# Patient Record
Sex: MALE | Race: WHITE | NOT HISPANIC OR LATINO | Employment: FULL TIME | URBAN - METROPOLITAN AREA
[De-identification: names, ages, dates, MRNs, and addresses within clinical notes are randomized per-mention and may not be internally consistent; named-entity substitution may affect disease eponyms.]

---

## 2017-01-12 ENCOUNTER — TELEPHONE (OUTPATIENT)
Dept: ORTHOPEDICS | Facility: CLINIC | Age: 22
End: 2017-01-12

## 2017-01-12 ENCOUNTER — HOSPITAL ENCOUNTER (INPATIENT)
Facility: CLINIC | Age: 22
LOS: 2 days | Discharge: HOME | DRG: 603 | End: 2017-01-14
Attending: EMERGENCY MEDICINE | Admitting: PEDIATRICS
Payer: COMMERCIAL

## 2017-01-12 ENCOUNTER — APPOINTMENT (OUTPATIENT)
Dept: ULTRASOUND IMAGING | Facility: CLINIC | Age: 22
DRG: 603 | End: 2017-01-12
Attending: EMERGENCY MEDICINE
Payer: COMMERCIAL

## 2017-01-12 ENCOUNTER — APPOINTMENT (OUTPATIENT)
Dept: GENERAL RADIOLOGY | Facility: CLINIC | Age: 22
DRG: 603 | End: 2017-01-12
Attending: EMERGENCY MEDICINE
Payer: COMMERCIAL

## 2017-01-12 DIAGNOSIS — L03.119 CELLULITIS OF UPPER EXTREMITY, UNSPECIFIED LATERALITY: ICD-10-CM

## 2017-01-12 DIAGNOSIS — L03.90 CELLULITIS: ICD-10-CM

## 2017-01-12 DIAGNOSIS — Z94.1 HEART REPLACED BY TRANSPLANT (H): Primary | ICD-10-CM

## 2017-01-12 DIAGNOSIS — R52 PAIN: Primary | ICD-10-CM

## 2017-01-12 LAB
ANION GAP SERPL CALCULATED.3IONS-SCNC: 8 MMOL/L (ref 3–14)
APTT PPP: 28 SEC (ref 22–37)
BASOPHILS # BLD AUTO: 0 10E9/L (ref 0–0.2)
BASOPHILS NFR BLD AUTO: 0.2 %
BUN SERPL-MCNC: 10 MG/DL (ref 7–30)
CALCIUM SERPL-MCNC: 9.3 MG/DL (ref 8.5–10.1)
CHLORIDE SERPL-SCNC: 103 MMOL/L (ref 94–109)
CO2 SERPL-SCNC: 27 MMOL/L (ref 20–32)
CREAT SERPL-MCNC: 0.69 MG/DL (ref 0.66–1.25)
CRP SERPL-MCNC: 71.6 MG/L (ref 0–8)
DIFFERENTIAL METHOD BLD: ABNORMAL
EOSINOPHIL # BLD AUTO: 0.1 10E9/L (ref 0–0.7)
EOSINOPHIL NFR BLD AUTO: 0.4 %
ERYTHROCYTE [DISTWIDTH] IN BLOOD BY AUTOMATED COUNT: 12.3 % (ref 10–15)
GFR SERPL CREATININE-BSD FRML MDRD: ABNORMAL ML/MIN/1.7M2
GLUCOSE SERPL-MCNC: 104 MG/DL (ref 70–99)
HCT VFR BLD AUTO: 44.2 % (ref 40–53)
HGB BLD-MCNC: 15.8 G/DL (ref 13.3–17.7)
IMM GRANULOCYTES # BLD: 0 10E9/L (ref 0–0.4)
IMM GRANULOCYTES NFR BLD: 0.3 %
INR PPP: 1 (ref 0.86–1.14)
LYMPHOCYTES # BLD AUTO: 1.3 10E9/L (ref 0.8–5.3)
LYMPHOCYTES NFR BLD AUTO: 11.1 %
MCH RBC QN AUTO: 30.3 PG (ref 26.5–33)
MCHC RBC AUTO-ENTMCNC: 35.7 G/DL (ref 31.5–36.5)
MCV RBC AUTO: 85 FL (ref 78–100)
MONOCYTES # BLD AUTO: 1.5 10E9/L (ref 0–1.3)
MONOCYTES NFR BLD AUTO: 12.6 %
NEUTROPHILS # BLD AUTO: 8.8 10E9/L (ref 1.6–8.3)
NEUTROPHILS NFR BLD AUTO: 75.4 %
NRBC # BLD AUTO: 0 10*3/UL
NRBC BLD AUTO-RTO: 0 /100
PLATELET # BLD AUTO: 205 10E9/L (ref 150–450)
POTASSIUM SERPL-SCNC: 3.6 MMOL/L (ref 3.4–5.3)
RBC # BLD AUTO: 5.21 10E12/L (ref 4.4–5.9)
SODIUM SERPL-SCNC: 138 MMOL/L (ref 133–144)
WBC # BLD AUTO: 11.7 10E9/L (ref 4–11)

## 2017-01-12 PROCEDURE — 85025 COMPLETE CBC W/AUTO DIFF WBC: CPT | Performed by: EMERGENCY MEDICINE

## 2017-01-12 PROCEDURE — 96376 TX/PRO/DX INJ SAME DRUG ADON: CPT | Performed by: EMERGENCY MEDICINE

## 2017-01-12 PROCEDURE — S0077 INJECTION, CLINDAMYCIN PHOSP: HCPCS | Performed by: PEDIATRICS

## 2017-01-12 PROCEDURE — 96361 HYDRATE IV INFUSION ADD-ON: CPT | Performed by: EMERGENCY MEDICINE

## 2017-01-12 PROCEDURE — 99285 EMERGENCY DEPT VISIT HI MDM: CPT | Mod: GC | Performed by: EMERGENCY MEDICINE

## 2017-01-12 PROCEDURE — 12000001 ZZH R&B MED SURG/OB UMMC

## 2017-01-12 PROCEDURE — 85730 THROMBOPLASTIN TIME PARTIAL: CPT | Performed by: EMERGENCY MEDICINE

## 2017-01-12 PROCEDURE — 73110 X-RAY EXAM OF WRIST: CPT | Mod: LT

## 2017-01-12 PROCEDURE — 93971 EXTREMITY STUDY: CPT | Mod: LT

## 2017-01-12 PROCEDURE — 25000128 H RX IP 250 OP 636: Performed by: EMERGENCY MEDICINE

## 2017-01-12 PROCEDURE — 25000125 ZZHC RX 250: Performed by: PEDIATRICS

## 2017-01-12 PROCEDURE — 80048 BASIC METABOLIC PNL TOTAL CA: CPT | Performed by: EMERGENCY MEDICINE

## 2017-01-12 PROCEDURE — 25000125 ZZHC RX 250: Performed by: EMERGENCY MEDICINE

## 2017-01-12 PROCEDURE — 25000132 ZZH RX MED GY IP 250 OP 250 PS 637: Performed by: PEDIATRICS

## 2017-01-12 PROCEDURE — 99285 EMERGENCY DEPT VISIT HI MDM: CPT | Performed by: EMERGENCY MEDICINE

## 2017-01-12 PROCEDURE — 87040 BLOOD CULTURE FOR BACTERIA: CPT | Performed by: EMERGENCY MEDICINE

## 2017-01-12 PROCEDURE — 86140 C-REACTIVE PROTEIN: CPT | Performed by: EMERGENCY MEDICINE

## 2017-01-12 PROCEDURE — 96375 TX/PRO/DX INJ NEW DRUG ADDON: CPT | Performed by: EMERGENCY MEDICINE

## 2017-01-12 PROCEDURE — 96365 THER/PROPH/DIAG IV INF INIT: CPT | Performed by: EMERGENCY MEDICINE

## 2017-01-12 PROCEDURE — 85610 PROTHROMBIN TIME: CPT | Performed by: EMERGENCY MEDICINE

## 2017-01-12 RX ORDER — TACROLIMUS 1 MG/1
2 CAPSULE ORAL EVERY 24 HOURS
Status: DISCONTINUED | OUTPATIENT
Start: 2017-01-13 | End: 2017-01-14 | Stop reason: HOSPADM

## 2017-01-12 RX ORDER — NALOXONE HYDROCHLORIDE 0.4 MG/ML
.1-.4 INJECTION, SOLUTION INTRAMUSCULAR; INTRAVENOUS; SUBCUTANEOUS
Status: DISCONTINUED | OUTPATIENT
Start: 2017-01-12 | End: 2017-01-14 | Stop reason: HOSPADM

## 2017-01-12 RX ORDER — AMLODIPINE BESYLATE 5 MG/1
5 TABLET ORAL DAILY
Qty: 90 TABLET | Refills: 3 | Status: SHIPPED
Start: 2017-01-12 | End: 2017-12-22

## 2017-01-12 RX ORDER — MORPHINE SULFATE 2 MG/ML
2 INJECTION, SOLUTION INTRAMUSCULAR; INTRAVENOUS ONCE
Status: COMPLETED | OUTPATIENT
Start: 2017-01-12 | End: 2017-01-12

## 2017-01-12 RX ORDER — IBUPROFEN 600 MG/1
10 TABLET, FILM COATED ORAL EVERY 6 HOURS PRN
Status: DISCONTINUED | OUTPATIENT
Start: 2017-01-12 | End: 2017-01-13

## 2017-01-12 RX ORDER — SODIUM CHLORIDE 9 MG/ML
INJECTION, SOLUTION INTRAVENOUS
Status: DISCONTINUED
Start: 2017-01-12 | End: 2017-01-12 | Stop reason: HOSPADM

## 2017-01-12 RX ORDER — PRAVASTATIN SODIUM 10 MG
10 TABLET ORAL EVERY 24 HOURS
Status: DISCONTINUED | OUTPATIENT
Start: 2017-01-13 | End: 2017-01-14 | Stop reason: HOSPADM

## 2017-01-12 RX ORDER — AMLODIPINE BESYLATE 2.5 MG/1
5 TABLET ORAL EVERY 24 HOURS
Status: DISCONTINUED | OUTPATIENT
Start: 2017-01-13 | End: 2017-01-14 | Stop reason: HOSPADM

## 2017-01-12 RX ORDER — OXYCODONE HYDROCHLORIDE 5 MG/1
5 TABLET ORAL EVERY 4 HOURS
Status: DISCONTINUED | OUTPATIENT
Start: 2017-01-12 | End: 2017-01-12

## 2017-01-12 RX ORDER — MYCOPHENOLATE MOFETIL 500 MG/1
500 TABLET ORAL EVERY 12 HOURS SCHEDULED
Status: DISCONTINUED | OUTPATIENT
Start: 2017-01-13 | End: 2017-01-14 | Stop reason: HOSPADM

## 2017-01-12 RX ORDER — MORPHINE SULFATE 2 MG/ML
2 INJECTION, SOLUTION INTRAMUSCULAR; INTRAVENOUS EVERY 4 HOURS PRN
Status: DISCONTINUED | OUTPATIENT
Start: 2017-01-12 | End: 2017-01-14 | Stop reason: HOSPADM

## 2017-01-12 RX ORDER — OXYCODONE HYDROCHLORIDE 5 MG/1
5 TABLET ORAL EVERY 4 HOURS PRN
Status: DISCONTINUED | OUTPATIENT
Start: 2017-01-12 | End: 2017-01-14 | Stop reason: HOSPADM

## 2017-01-12 RX ORDER — CLINDAMYCIN PHOSPHATE 600 MG/50ML
600 INJECTION, SOLUTION INTRAVENOUS EVERY 8 HOURS
Status: DISCONTINUED | OUTPATIENT
Start: 2017-01-12 | End: 2017-01-14 | Stop reason: HOSPADM

## 2017-01-12 RX ORDER — ASPIRIN 81 MG/1
162 TABLET ORAL EVERY 24 HOURS
Status: DISCONTINUED | OUTPATIENT
Start: 2017-01-13 | End: 2017-01-14 | Stop reason: HOSPADM

## 2017-01-12 RX ADMIN — OXYCODONE HYDROCHLORIDE 5 MG: 5 TABLET ORAL at 18:50

## 2017-01-12 RX ADMIN — MORPHINE SULFATE 2 MG: 2 INJECTION, SOLUTION INTRAMUSCULAR; INTRAVENOUS at 18:20

## 2017-01-12 RX ADMIN — MORPHINE SULFATE 2 MG: 2 INJECTION, SOLUTION INTRAMUSCULAR; INTRAVENOUS at 14:19

## 2017-01-12 RX ADMIN — MORPHINE SULFATE 2 MG: 2 INJECTION, SOLUTION INTRAMUSCULAR; INTRAVENOUS at 16:00

## 2017-01-12 RX ADMIN — IBUPROFEN 600 MG: 600 TABLET ORAL at 20:27

## 2017-01-12 RX ADMIN — CLINDAMYCIN PHOSPHATE 600 MG: 12 INJECTION, SOLUTION INTRAVENOUS at 23:02

## 2017-01-12 RX ADMIN — SODIUM CHLORIDE 500 ML: 9 INJECTION, SOLUTION INTRAVENOUS at 14:18

## 2017-01-12 RX ADMIN — CLINDAMYCIN PHOSPHATE 600 MG: 18 INJECTION, SOLUTION INTRAVENOUS at 15:50

## 2017-01-12 RX ADMIN — OXYCODONE HYDROCHLORIDE 5 MG: 5 TABLET ORAL at 23:06

## 2017-01-12 RX ADMIN — MORPHINE SULFATE 2 MG: 2 INJECTION, SOLUTION INTRAMUSCULAR; INTRAVENOUS at 14:51

## 2017-01-12 NOTE — IP AVS SNAPSHOT
MRN:[id number]                      After Visit Summary   1/12/2017    Obed Viramontes    MRN: [id number]           Thank you!     Thank you for choosing [facility] for your care. Our goal is always to provide you with excellent care. Hearing back from our patients is one way we can continue to improve our services. Please take a few minutes to complete the written survey that you may receive in the mail after you visit with us. Thank you!        Patient Information     Date Of Birth          1995        About your hospital stay     You were admitted on:  January 12, 2017 You last received care in the:  [facility]    You were discharged on:  January 14, 2017        Reason for your hospital stay       Dear Obed,    You were hospitalized because you have cellulitis (skin/soft tissue infection) in your left wrist.  The redness and pain improved on IV clindamycin (antibiotic), so we are discharging you on oral clindamycin to complete a 10 day course of antibiotics.  You will need to follow up with Dr. Tinoco from Orthopedic surgery in 2 weeks with a left wrist x-ray, and to coordinate with OT hand therapy at that appointment.  If you are having worsening pain, swelling, or redness, you need to see Ortho sooner this coming week.  If you have fevers, you need to come back to the hospital for evaluation.  Your tacrolimus level was 4.5 in the hospital, and no adjustment was made to your medications at this time.  For your pain, continue Tylenol scheduled or as needed, and oxycodone for severe pain.  Please follow up with Dr. Pruitt as previously planned.                  Who to Call     For medical emergencies, please call 911.  For non-urgent questions about your medical care, please call your primary care provider or clinic, [phone]          Attending Provider     Provider    Elmer Ortiz MD Sanchez Mejia, Aura Andrea, MD       Primary Care Provider Office Phone # Fax #    Rayo Macdonald  "MD [facility] [phone]       [facility] [address]  [place] [address]        After Care Instructions     Activity       Your activity upon discharge: activity as tolerated            Diet       Follow this diet upon discharge: Regular diet                  Follow-up Appointments     Follow Up and recommended labs and tests       Follow up with Orthopedic surgery, dr. Tinoco with left wrist x-ray, coordinated with OT hand therapy to discuss left wrist splint.                  Your next 10 appointments already scheduled     Jan 23, 2017 11:00 AM   (Arrive by 10:45 AM)   RETURN HAND with Amna Tinoco MD   [facility] ([facility])    [address]  [address] [facility] [address] [phone]              Pending Results     Date and Time Order Name Status Description    1/12/2017 1349 Blood culture Preliminary             Statement of Approval     Ordered          01/14/17 1217  I have reviewed and agree with all the recommendations and orders detailed in this document.   EFFECTIVE NOW     Approved and electronically signed by:  Allyssa Franklin MD             Admission Information        Provider Department Dept Phone    1/12/2017 Gonzalo Peng MD [facility] [phone]      Your Vitals Were     Blood Pressure Pulse Temperature Respirations Weight Pulse Oximetry    111/70 mmHg 110 98.3  F (36.8  C) (Oral) 16 60 kg (132 lb 4.4 oz) 97%      MyChart Information     [facility] lets you send messages to your doctor, view your test results, renew your prescriptions, schedule appointments and more. To sign up, go to [email or web address] . Click on \"Log in\" on the left side of the screen, which will take you to the Welcome page. Then click on \"Sign up Now\" on the right side of the page.     You will be asked to enter the access code listed below, as well as some personal information. Please follow the " directions to create your username and password.     Your access code is: [email or web address]  Expires: 2017  5:57 PM     Your access code will  in 90 days. If you need help or a new code, please call your [facility] clinic or [phone].        Care EveryWhere ID     This is your Care EveryWhere ID. This could be used by other organizations to access your [facility] medical records  [id number]           Review of your medicines      START taking        Dose / Directions    clindamycin 300 MG capsule   Commonly known as:  CLEOCIN   Used for:  Cellulitis of upper extremity, unspecified laterality        Dose:  300 mg   Take 1 capsule (300 mg) by mouth 3 times daily for 9 days   Quantity:  27 capsule   Refills:  0       oxyCODONE 5 MG IR tablet   Commonly known as:  ROXICODONE   Used for:  Pain        Dose:  5 mg   Take 1 tablet (5 mg) by mouth every 4 hours as needed for moderate to severe pain   Quantity:  10 tablet   Refills:  0         CONTINUE these medicines which have NOT CHANGED        Dose / Directions    amLODIPine 5 MG tablet   Commonly known as:  NORVASC   Used for:  Heart replaced by transplant (H)        Dose:  5 mg   Take 1 tablet (5 mg) by mouth daily   Quantity:  90 tablet   Refills:  3       aspirin 81 MG EC tablet   Used for:  Status post transplant, heart (H)        Dose:  162 mg   Take 2 tablets (162 mg) by mouth daily   Quantity:  60 tablet   Refills:  99       mycophenolate 500 MG tablet   Commonly known as:  CELLCEPT - GENERIC EQUIVALENT   Used for:  Heart transplanted (H)        Dose:  500 mg   Take 1 tablet (500 mg) by mouth 2 times daily   Quantity:  180 tablet   Refills:  11       pravastatin 10 MG tablet   Commonly known as:  PRAVACHOL   Used for:  Heart transplanted (H), Heart transplanted (H)        Dose:  10 mg   Take 1 tablet (10 mg) by mouth daily At bedtime   Quantity:  90 tablet   Refills:  6       * tacrolimus 1 MG capsule   Commonly known as:  PROGRAF - GENERIC EQUIVALENT    Used for:  Transplanted heart (H)        Take 2 mg in the morning and 1.5 mg in the evening   Quantity:  270 capsule   Refills:  11       * tacrolimus 0.5 MG capsule   Commonly known as:  PROGRAF - GENERIC EQUIVALENT   Used for:  Heart transplanted (H)        Take 1 capsule by mouth every evening (total dose is 2mg in am and 1.5mg in pm)   Quantity:  90 capsule   Refills:  11       Vitamin D3 2000 UNITS Tabs   Used for:  Heart transplanted (H)        Dose:  2000 Units   Take 2,000 Units by mouth daily   Quantity:  30 tablet   Refills:  6       * Notice:  This list has 2 medication(s) that are the same as other medications prescribed for you. Read the directions carefully, and ask your doctor or other care provider to review them with you.         Where to get your medicines      These medications were sent to [facility] [place], [place] - [address]  [address] [facility] [address], [facility] [address]     Phone:  [phone]    - clindamycin 300 MG capsule      Some of these will need a paper prescription and others can be bought over the counter. Ask your nurse if you have questions.     Bring a paper prescription for each of these medications    - oxyCODONE 5 MG IR tablet             Protect others around you: Learn how to safely use, store and throw away your medicines at www.disposemymeds.org.             Medication List: This is a list of all your medications and when to take them. Check marks below indicate your daily home schedule. Keep this list as a reference.      Medications           Morning Afternoon Evening Bedtime As Needed    amLODIPine 5 MG tablet   Commonly known as:  NORVASC   Take 1 tablet (5 mg) by mouth daily   Last time this was given:  5 mg on 1/14/2017  1:07 AM                                aspirin 81 MG EC tablet   Take 2 tablets (162 mg) by mouth daily   Last time this was given:  162 mg on 1/14/2017 12:59 PM                                clindamycin 300 MG capsule    Commonly known as:  CLEOCIN   Take 1 capsule (300 mg) by mouth 3 times daily for 9 days                                mycophenolate 500 MG tablet   Commonly known as:  CELLCEPT - GENERIC EQUIVALENT   Take 1 tablet (500 mg) by mouth 2 times daily   Last time this was given:  500 mg on 1/14/2017  1:00 PM                                oxyCODONE 5 MG IR tablet   Commonly known as:  ROXICODONE   Take 1 tablet (5 mg) by mouth every 4 hours as needed for moderate to severe pain   Last time this was given:  5 mg on 1/14/2017  1:12 AM                                pravastatin 10 MG tablet   Commonly known as:  PRAVACHOL   Take 1 tablet (10 mg) by mouth daily At bedtime   Last time this was given:  10 mg on 1/14/2017  1:07 AM                                * tacrolimus 1 MG capsule   Commonly known as:  PROGRAF - GENERIC EQUIVALENT   Take 2 mg in the morning and 1.5 mg in the evening   Last time this was given:  2 mg on 1/14/2017 12:59 PM                                * tacrolimus 0.5 MG capsule   Commonly known as:  PROGRAF - GENERIC EQUIVALENT   Take 1 capsule by mouth every evening (total dose is 2mg in am and 1.5mg in pm)   Last time this was given:  2 mg on 1/14/2017 12:59 PM                                Vitamin D3 2000 UNITS Tabs   Take 2,000 Units by mouth daily   Last time this was given:  2,000 Units on 1/14/2017 12:58 PM                                * Notice:  This list has 2 medication(s) that are the same as other medications prescribed for you. Read the directions carefully, and ask your doctor or other care provider to review them with you.

## 2017-01-12 NOTE — ED NOTES
[facility] PEDS ED HANDOFF      PATIENT NAME: Obed Viramontes   MRN: [id number]   YOB: 1995   AGE: 21 year old       S (Situation)     ED Chief Complaint: Hand Pain and Post-op Problem     ED Final Diagnosis: Final diagnoses:   Cellulitis      Isolation Precautions: None   Suspected Infection: Not Applicable     Needed?: No     B (Background)    Pertinent Past Medical History: Past Medical History   Diagnosis Date     H/O heart transplant (H)      Mar.23, 2012     Lymphoproliferative disease (H)      Cardiomyopathy, hypertrophic obstructive (H)      Hemiplegia following CVA (cerebrovascular accident) (H)      left, improved with rehab     Unspecified cerebral artery occlusion with cerebral infarction      age 2 1/2      Pertinent Past Social History: Social History     Social History     Marital Status: Single     Spouse Name: N/A     Number of Children: N/A     Years of Education: N/A     Social History Main Topics     Smoking status: Never Smoker      Smokeless tobacco: Never Used     Alcohol Use: No     Drug Use: No     Sexual Activity: Not Asked     Other Topics Concern     None     Social History Narrative      Allergies: Allergies   Allergen Reactions     Latex Rash     Other [Seasonal Allergies]      Corn-abdominal pain and diarrhea.        A (Assessment)    Vital Signs: Filed Vitals:    01/12/17 1645 01/12/17 1700 01/12/17 1715 01/12/17 1738   BP:       Pulse:       Temp:    98.2  F (36.8  C)   TempSrc:    Oral   Resp:    18   Weight:       SpO2: 99% 99% 99% 98%       Medications Administered:  Medications   sodium chloride (PF) 0.9% PF flush 1-5 mL (not administered)   sodium chloride (PF) 0.9% PF flush 3 mL (not administered)   lidocaine BUFFERED 1 % 1 % injection (not administered)   NaCl 0.9 % infusion (not administered)   0.9% sodium chloride BOLUS (0 mLs Intravenous Stopped 1/12/17 1448)   morphine injection 2 mg (2 mg Intravenous Given 1/12/17  1419)   morphine injection 2 mg (2 mg Intravenous Given 1/12/17 1451)   clindamycin (CLEOCIN) injection PEDS/NICU 600 mg (0 mg Intravenous Stopped 1/12/17 1711)   morphine injection 2 mg (2 mg Intravenous Given 1/12/17 1600)      Interventions:        PIV:  20 RT AC       Drains:  NA       Oxygen Needs: NA   Skin Integrity: LT hand cellulititis     R (Recommendations)    Family Present:  Yes   Other Considerations:        Questions Please Call: Treatment Team: Attending Provider: Elmer Ortiz MD; Registered Nurse: Amanda Valdovinos RN; Resident: Rachael Uribe MD; MD: Team, [facility] [place]   Ready for Conference Call:  NO

## 2017-01-12 NOTE — PHARMACY-ADMISSION MEDICATION HISTORY
Admission medication history interview status for the 1/12/2017 admission is complete. See Epic admission navigator for allergy information, pharmacy, prior to admission medications and immunization status.     Medication history interview sources:  Mom/Obed    Changes made to PTA medication list (reason)  Added: none  Deleted:  Diazepam, hydroxyzine, naproxen, ondansetron & oxycodone-apap - these meds were post-op meds. Obed is no longer taking these medications.   Changed: none     Patient Medication Preference  prefers medications come as pills    Patient Medication Schedule Preference  The patient has a specific medication schedule - Takes all meds at 0100 & 1300 (At Bedtime = 0100)    Patient Supplied Medications  The patient does not have any home medications approved for use while inpatient      Additional medication history information (including reliability of information, actions taken by pharmacist):None      Prior to Admission medications    Medication Sig Last Dose Taking? Auth Provider   amLODIPine (NORVASC) 5 MG tablet Take 1 tablet (5 mg) by mouth daily 1/12/2017 at 0100 Yes Bobbi Pruitt MD   mycophenolate (CELLCEPT - GENERIC EQUIVALENT) 500 MG tablet Take 1 tablet (500 mg) by mouth 2 times daily 1/12/2017 at 0100 Yes Bobbi Pruitt MD   tacrolimus (PROGRAF - GENERIC EQUIVALENT) 1 MG capsule Take 2 mg in the morning and 1.5 mg in the evening 1/12/2017 at 1300 Yes Bobbi Pruitt MD   tacrolimus (PROGRAF - GENERIC EQUIVALENT) 0.5 MG capsule Take 1 capsule by mouth every evening (total dose is 2mg in am and 1.5mg in pm) 1/12/2017 at 1300 Yes Bobbi Pruitt MD   pravastatin (PRAVACHOL) 10 MG tablet Take 1 tablet (10 mg) by mouth daily At bedtime 1/12/2017 at 0100 Yes Bobbi Pruitt MD   aspirin 81 MG EC tablet Take 2 tablets (162 mg) by mouth daily 1/12/2017 at 0100 Yes Bobbi Pruitt MD   Cholecalciferol (VITAMIN D3) 2000 UNITS TABS Take 2,000 Units by  mouth daily 1/12/2017 at 0100 Yes Bobbi Pruitt MD         Medication history completed by: Celeste Barnes, SeeD

## 2017-01-12 NOTE — ED NOTES
Pt's left hand is very swollen and painful. Pt has no movement in hand at baseline. Pt has good cap refill and pluses but hand is very red, swollen. The swelling started on tues last night and this am the swelling has increased a lot. No fevers.

## 2017-01-12 NOTE — CONSULTS
FULL CONSULT NOTE DICTATED    DIAGNOSIS:  Left hemiplegia secondary to stroke with elbow flexion contracture, wrist flexion contracture, pronation deformity in thumb and palm.        PROCEDURES WITH DR. FIGUEROA ON 10/20/2016:      1.  Left elbow biceps and brachialis lengthening.    2.  Left wrist fusion.    3.  Left distal ulnar resection ([place]).    4.  Left flexor pollicis longus and flexor digitorum superficialis lengthening.    5.  Palmaris longus and flexor carpi ulnaris tenotomy.     6.  Left adductor release.    7.  Left EPL rerouting.      IMPRESSION:   Obed Viramontes is a 21 year old right-hand dominant male status-post the above on 10/20/2016 with the followin. Left dorsal wrist cellulitis.  2. Chronic immunosuppression following heart transplant.     RECOMMENDATIONS:   - Admit to Pediatrics    Recommend IV antibiotics as directed by Pediatrics.   Follow inflammatory markers.   Elevation of right wrist.   No further imaging.  Activity as tolerated.  Patient and mother politely declined soft protective splint at this time. (Patient is interested in having his current splint refashioned by OT in a few weeks).    - Anticoagulation/DVT Prophylaxis: None.  - Antibiotics/Tetanus: Per Pediatrics.  - X-rays/Imaging: No further imaging required.   - Activity: As tolerated  - Weight bearing: As tolerated.   - Pain control: Orals as needed.  - Diet: Regular  - Follow-up: Follow-up in 2 weeks with Dr. Figueroa in clinic with repeat left wrist radiographs. Will also coordinated OT hand Therapy appointment to follow to evaluate options for refashioning splint.     - Disposition: Admit to Pediatrics. Anticipate 1-2 days in hospital followed by transition to Oral antibiotics and discharge.         Assessment and Plan discussed with Dr. Figueroa, Orthopaedic Surgery Staff.     Isrrael Temple MD 2017 4:14 PM  Orthopaedic Surgery Resident, PGY-4  Pager: [phone]    For questions about this  patient, please contact me at my pager.

## 2017-01-12 NOTE — ED PROVIDER NOTES
History     Chief Complaint   Patient presents with     Hand Pain     Post-op Problem     HPI    History obtained from family and mother and patient    Obed is a 21 year old with complex PMH significant for stroke at age 1 s/p heart transplant in 2012 for hypertrophic cardiomyopathy and left wrist contracture release performed in October 2016 who presents at 1:41 PM with left hand swelling, erythema and pain for 3 days. At baseline he is unable to move his left hand. Reports that he bumped his left hand on Monday, and has done this in the past and not had any pain or swelling. No other trauma to the hand. Pain started first three days prior to presentation, and he noticed swelling starting two days ago. Has become progressively more painful and noticed a significant increase in swelling today and so came to the ED for evaluation.     PMHx:  Past Medical History   Diagnosis Date     H/O heart transplant (H)      Mar.23, 2012     Lymphoproliferative disease (H)      Cardiomyopathy, hypertrophic obstructive (H)      Hemiplegia following CVA (cerebrovascular accident) (H)      left, improved with rehab     Unspecified cerebral artery occlusion with cerebral infarction      age 1 1/2     Past Surgical History   Procedure Laterality Date     Transplant heart recipient       Cl aff surgical pathology       Biopsy       gingival and tonsillar biopsy     Picc insertion       Picc removal       Orthopedic surgery       at [place], left lower extremity     Heart cath child  11/13/2012     Procedure: HEART CATH CHILD;  Right Heart Cath Procedure and Biopsy *Latex Allergy*;  Surgeon: Bobbi Pruitt MD;  [facility]     Heart cath child  2/15/2013     Procedure: HEART CATH CHILD;  Right Hearth Cath, Angiogram and Biopsy;  Surgeon: Bobbi Pruitt MD;  [facility]     Heart cath, biopsy  3/21/2014     Procedure: HEART CATH, BIOPSY;  Right and Left Heart Cath, Angiogram, Biopsy   *Latex Allergy*;  Surgeon:  Bobbi Pruitt MD;  [facility]     Heart cath child N/A 4/10/2015     Procedure: HEART CATH CHILD;  Surgeon: Bobbi Pruitt MD;  [facility]     Heart cath, biopsy Right 4/18/2016     Procedure: HEART CATH, BIOPSY;  Surgeon: Bobbi Pruitt MD;  [facility]     Arthrodesis wrist Left 10/20/2016     Procedure: ARTHRODESIS WRIST;  Surgeon: Amna Tinoco MD;  [facility]     Interposition tendon hand Left 10/20/2016     Procedure: INTERPOSITION TENDON HAND;  Surgeon: Amna Tinoco MD;  [facility]     These were reviewed with the patient/family.    MEDICATIONS were reviewed and are as follows:   Current Facility-Administered Medications   Medication     sodium chloride (PF) 0.9% PF flush 1-5 mL     sodium chloride (PF) 0.9% PF flush 3 mL     lidocaine BUFFERED 1 % 1 % injection     NaCl 0.9 % infusion     Current Outpatient Prescriptions   Medication     amLODIPine (NORVASC) 5 MG tablet     ondansetron (ZOFRAN-ODT) 4 MG ODT tab     diazepam (VALIUM) 5 MG tablet     oxyCODONE-acetaminophen (PERCOCET) 5-325 MG per tablet     hydrOXYzine (VISTARIL) 25 MG capsule     naproxen (NAPROSYN) 500 MG tablet     mycophenolate (CELLCEPT - GENERIC EQUIVALENT) 500 MG tablet     tacrolimus (PROGRAF - GENERIC EQUIVALENT) 1 MG capsule     tacrolimus (PROGRAF - GENERIC EQUIVALENT) 0.5 MG capsule     pravastatin (PRAVACHOL) 10 MG tablet     [DISCONTINUED] amLODIPine (NORVASC) 2.5 MG tablet     aspirin 81 MG EC tablet     Cholecalciferol (VITAMIN D3) 2000 UNITS TABS     ALLERGIES:  Latex and Other    IMMUNIZATIONS:  UTD by report.    SOCIAL HISTORY: Obed lives with parents.  He does attend school.      I have reviewed the Medications, Allergies, Past Medical and Surgical History, and Social History in the Epic system.    Review of Systems  Please see HPI for pertinent positives and negatives.  All other systems reviewed and found to be negative.        Physical Exam   BP:  (!) 119/94 mmHg  Pulse: 110  Temp: 99.7  F (37.6  C)  Resp: 18  Weight: 60 kg (132 lb 4.4 oz)  SpO2: 97 %    Physical Exam   Appearance: Alert and appropriate, well developed, nontoxic, with moist mucous membranes.  HEENT: Head: Normocephalic and atraumatic. Eyes: PERRL, EOM grossly intact, conjunctivae and sclerae clear. Nose: Nares clear with no active discharge.  Mouth/Throat: No oral lesions, pharynx clear with no erythema or exudate.  Neck: Supple, no masses, no meningismus. No significant cervical lymphadenopathy.  Pulmonary: No grunting, flaring, retractions or stridor. Good air entry, clear to auscultation bilaterally, with no rales, rhonchi, or wheezing.  Cardiovascular: Regular rate and rhythm, normal S1 and S2, with no murmurs.  Normal symmetric peripheral pulses and brisk cap refill.  Abdominal: Normal bowel sounds, soft, nontender, nondistended, with no masses and no hepatosplenomegaly.  Neurologic: Alert and oriented, moving all extremities equally with grossly normal coordination and normal gait.  Extremities/Back: Right hand normal in appearance. Left hand with well healed midline scar from wrist to dorsum of hand. Erythema and swelling across palm and dorsum extending onto base of fingers. Pain with palpation. Hand is neurovascularly intact.   Skin: No significant rashes, ecchymoses, or lacerations.  Genitourinary: Deferred  Rectal:  Deferred      ED Course   Procedures    Results for orders placed or performed during the hospital encounter of 01/12/17 (from the past 24 hour(s))   Wrist XR, G/E 3 views, left    Narrative    XR WRIST LEFT G/E 3 VIEWS 1/12/2017 2:10 PM    CLINICAL HISTORY: left wrist swollen - previous surgery    COMPARISON: 12/1/2016      Impression    IMPRESSION: Surgical hardware is unchanged without evidence of  loosening. No fracture identified. Bony alignment is unchanged.    MEAGHAN LEONARDO MD   CBC with platelets differential   Result Value Ref Range    WBC 11.7 (H) 4.0 - 11.0  10e9/L    RBC Count 5.21 4.4 - 5.9 10e12/L    Hemoglobin 15.8 13.3 - 17.7 g/dL    Hematocrit 44.2 40.0 - 53.0 %    MCV 85 78 - 100 fl    MCH 30.3 26.5 - 33.0 pg    MCHC 35.7 31.5 - 36.5 g/dL    RDW 12.3 10.0 - 15.0 %    Platelet Count 205 150 - 450 10e9/L    Diff Method Automated Method     % Neutrophils 75.4 %    % Lymphocytes 11.1 %    % Monocytes 12.6 %    % Eosinophils 0.4 %    % Basophils 0.2 %    % Immature Granulocytes 0.3 %    Nucleated RBCs 0 0 /100    Absolute Neutrophil 8.8 (H) 1.6 - 8.3 10e9/L    Absolute Lymphocytes 1.3 0.8 - 5.3 10e9/L    Absolute Monocytes 1.5 (H) 0.0 - 1.3 10e9/L    Absolute Eosinophils 0.1 0.0 - 0.7 10e9/L    Absolute Basophils 0.0 0.0 - 0.2 10e9/L    Abs Immature Granulocytes 0.0 0 - 0.4 10e9/L    Absolute Nucleated RBC 0.0    CRP inflammation   Result Value Ref Range    CRP Inflammation 71.6 (H) 0.0 - 8.0 mg/L   Basic metabolic panel   Result Value Ref Range    Sodium 138 133 - 144 mmol/L    Potassium 3.6 3.4 - 5.3 mmol/L    Chloride 103 94 - 109 mmol/L    Carbon Dioxide 27 20 - 32 mmol/L    Anion Gap 8 3 - 14 mmol/L    Glucose 104 (H) 70 - 99 mg/dL    Urea Nitrogen 10 7 - 30 mg/dL    Creatinine 0.69 0.66 - 1.25 mg/dL    GFR Estimate >90  Non  GFR Calc   >60 mL/min/1.7m2    GFR Estimate If Black >90   GFR Calc   >60 mL/min/1.7m2    Calcium 9.3 8.5 - 10.1 mg/dL   INR   Result Value Ref Range    INR 1.00 0.86 - 1.14   Partial thromboplastin time   Result Value Ref Range    PTT 28 22 - 37 sec   US Upper Extremity Venous Duplex Left    Narrative    EXAMINATION: US UPPER EXTREMITY VENOUS DUPLEX LEFT  1/12/2017 3:33 PM       CLINICAL HISTORY: left hand redness and swelling     COMPARISON: None        PROCEDURE COMMENTS: Ultrasound was performed of the deep venous system  of the left upper extremity using grayscale, color, and spectral  Doppler.    FINDINGS:  The internal jugular, brachiocephalic, subclavian, brachial, basilic  and cephalic veins are  visualized and are patent. Venous waveforms are  normal. There is normal response to compression.    There is asymmetric skin thickening with soft tissue edema involving  the dorsum of the left hand with hypervascularity. No drainable fluid  collection identified.      Impression    IMPRESSION:  1. No deep venous thrombosis in the left upper extremity.  2. Significant edema involving the dorsum of the left hand which can  be seen in the setting of cellulitis. Number no drainable fluid  collection identified, however if symptoms do not appropriately  resolve recommend follow-up.    I have personally reviewed the examination and initial interpretation  and I agree with the findings.    AKIRA BOYD MD       Medications   sodium chloride (PF) 0.9% PF flush 1-5 mL (not administered)   sodium chloride (PF) 0.9% PF flush 3 mL (not administered)   lidocaine BUFFERED 1 % 1 % injection (not administered)   NaCl 0.9 % infusion (not administered)   0.9% sodium chloride BOLUS (0 mLs Intravenous Stopped 1/12/17 1448)   morphine injection 2 mg (2 mg Intravenous Given 1/12/17 1419)   morphine injection 2 mg (2 mg Intravenous Given 1/12/17 1451)   clindamycin (CLEOCIN) injection PEDS/NICU 600 mg (600 mg Intravenous New Bag 1/12/17 1550)   morphine injection 2 mg (2 mg Intravenous Given 1/12/17 1600)       Patient was attended to immediately upon arrival and assessed for immediate life-threatening conditions.  History obtained from family.  Xray left wrist-- no fracture   CBC, CRP, BMP-- mild leukocytosis, elevated CRP  US left upper extremity-- no thrombus, soft tissue swelling in dorsum of hand  A consult was requested and obtained from orthopedics, who evaluated the patient in the ED. Agree with diagnosis of cellulitis.   Discussed with the admitting physician, cardiology fellow Luna Miller.  We have discussed the common side effects of Clindamycin with the mother.      Critical care time:  none       Assessments & Plan (with  Medical Decision Making)     Obed is a 21 year old with complex PMH significant for stroke at age 1 s/p heart transplant in 2012 for hypertrophic cardiomyopathy and left wrist contracture release performed in October 2016 who presents with left hand swelling, erythema and pain for 3 days. Differential includes-- cellulitis, fracture, thrombus, osteomyelitis, hardware infection. Xray left wrist with no signs of fracture and US of left upper extremity did not reveal any thrombus-- did show soft tissue swelling in dorsum of hand concerning for cellulitis. concern for compartment syndrome but looks more like cellulitis which ortho agrees. He has not had fevers. Mild leukocytosis and elevated CRP support the diagnosis of cellulitis. No signs of osteomyelitis on xray. Orthopedics did not feel that this represents a hardware infection and is most likely due to cellulitis. Started clindamycin to provide some MRSA coverage give possibility of hardware infection. No concern for compartment syndrome at this time, hand is neurovascularly intact.     Plan:  - Admit to cardiology service  - Given one dose of clindamycin in ED  - Morphine for pain    I have reviewed the nursing notes.    I have reviewed the findings, diagnosis, plan and need for follow up with the patient.  New Prescriptions    No medications on file       Final diagnoses:   Cellulitis     The patient was discussed with Dr. Diana Uribe MD  Pediatrics Resident, PL2    1/12/2017   [facility] EMERGENCY DEPARTMENT  This data collected with the Resident working in the Emergency Department. Patient was seen and evaluated by myself and I repeated the history and physical exam with the patient. The plan of care was discussed with them. The key portions of the note including the entire assessment and plan reflect my documentation. Elmer Fuller MD  01/14/17 [id number]

## 2017-01-12 NOTE — TELEPHONE ENCOUNTER
Patient's Mother called to say that Obed has swelling of his hand and forearm that has occurred over the past 2 days.  He had surgery back in October by Dr. Tinoco.  The swelling is so much that it appears twice the size of normal.  She was advised to go to the ER for eval.  She agreed with this plan.

## 2017-01-12 NOTE — H&P
[facility], [place]    History and Physical  Cardiology     Date of Admission:  1/12/2017  Date of Service (when I saw the patient): 01/12/2017    Assessment and Plan    Obed is a 20 yo M with a remote hx of cardiomyopathy s/p heart transplant and stroke  resulting in loss of motor control of his left hand, who presents with a two day hx of worsening redness and swelling of his hand after minor trauma. He has been afebrile. His physical exam is consitent with cellulitis. There is no evidence of abscess formation or of thrombosis given unremarkable ultrasound. His x-ray is negative for fractures. He is admitted for IV antibiotics, pain control and further management.    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 Appreciate input  - Recommend keeping hand elevated    GI  - No issues. Continue regular diet    HEME/ONC  Hx of PTLD - Currently stable and off medications    Access: PIV x1    Dispo: Likely home in 1-2 days pending improvement in cellulitis    The patient and plan was discussed with Dr. Danish Mcdowell MD  Pediatr Resident  [phone]        Physician Attestation  I, Dhara Sánchez, saw this patient with the resident and agree with the resident s findings and plan of care as documented in the resident s note.      I personally  reviewed vital signs, medications, labs and imaging.    Dhara Sánchez MD  Pediatric Cardiology    Date of Service (when I saw the patient): 1/12/17      -----------------------------------------------------------------------------------  Code Status  Full Code    Primary Care Physician  Rayo Schafer    Chief Complaint  Left hand swelling.   History is obtained from the patient    History of Present Illness  Obed Viramontes is a 21 year old male who is s/p heart transplant in 2012 due to cardiomyopathy and possible glycogen storage disease who presents with three days of swelling on his left hand. Obed suffered from a stroke at the age of [age] and at baseline, has no motor control of his left hand. In October, he underwent elbow flexor lengthening and wrist fusion with finger flexor lengthening due to wrist contracture. He has an implant in the left wrist. He was slowly regaining previous function with OT therapy on an outpatient basis until 1/9 when he bumped his left hand on a counter at work. He denied noticed any breaks in the skin or bleeding. He initially had mild pain which has worsened since onset. Yesterday, he noted redness and swelling in the hand and this has also worsened. He is unaware of any fevers. He has had no drainage. He has no other joint pain or redness. With the exception of pain, he has noted no change in sensation in that hand.    In the ER: an x-ray was obtained, which ruled out an acute fracture. An US of his left extremity was obtained, which ruled out DVT but did reveal significant edema of the dorsum of the left hand. There was no abscess seen. A CBC, CRP, and BMP were obtained. Ortho was consulted, and the decision was made to admit Obed for IV antibiotic treatment.     Past Medical History   I have reviewed this patient's medical history and updated it with pertinent information if needed.   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  Past Surgical History   Procedure Laterality Date     Transplant heart recipient       Cl aff surgical pathology       Biopsy       gingival and tonsillar biopsy     Picc insertion       Picc removal       Orthopedic surgery       at [place], left lower extremity     Heart cath child  11/13/2012     Procedure: HEART CATH CHILD;  Right Heart Cath Procedure and Biopsy *Latex Allergy*;  Surgeon: Bobbi Pruitt MD;  [facility]     Heart cath child  2/15/2013     Procedure: HEART CATH CHILD;  Right [place]earth Cath, Angiogram and Biopsy;  Surgeon: Bobbi Pruitt MD;  [facility]     Heart cath, biopsy  3/21/2014     Procedure: HEART CATH, BIOPSY;  Right and Left Heart Cath, Angiogram, Biopsy   *Latex Allergy*;  Surgeon: Bobbi Pruitt MD;  [facility]     Heart cath child N/A 4/10/2015     Procedure: HEART CATH CHILD;  Surgeon: Bobbi Pruitt MD;  [facility]     Heart cath, biopsy Right 4/18/2016     Procedure: HEART CATH, BIOPSY;  Surgeon: Bobbi Pruitt MD;  [facility]     Arthrodesis wrist Left 10/20/2016     Procedure: ARTHRODESIS WRIST;  Surgeon: Amna Tinoco MD;  [facility]     Interposition tendon hand Left 10/20/2016     Procedure: INTERPOSITION TENDON HAND;  Surgeon: Amna Tinoco MD;  [facility]       Prior to Admission Medications  Prior to Admission Medications   Prescriptions Last Dose Informant Patient Reported? Taking?   Cholecalciferol (VITAMIN D3) 2000 UNITS  TABS 1/12/2017 at 0100  No Yes   Sig: Take 2,000 Units by mouth daily   amLODIPine (NORVASC) 5 MG tablet 1/12/2017 at 0100  No Yes   Sig: Take 1 tablet (5 mg) by mouth daily   aspirin 81 MG EC tablet 1/12/2017 at 0100  Yes Yes   Sig: Take 2 tablets (162 mg) by mouth daily   mycophenolate (CELLCEPT - GENERIC EQUIVALENT) 500 MG tablet 1/12/2017 at 0100  No Yes   Sig: Take 1 tablet (500 mg) by mouth 2 times daily   pravastatin (PRAVACHOL) 10 MG tablet 1/12/2017 at 0100  No Yes   Sig: Take 1 tablet (10 mg) by mouth daily At bedtime   tacrolimus (PROGRAF - GENERIC EQUIVALENT) 0.5 MG capsule 1/12/2017 at 1300  No Yes   Sig: Take 1 capsule by mouth every evening (total dose is 2mg in am and 1.5mg in pm)   tacrolimus (PROGRAF - GENERIC EQUIVALENT) 1 MG capsule 1/12/2017 at 1300  No Yes   Sig: Take 2 mg in the morning and 1.5 mg in the evening      Facility-Administered Medications: None     Allergies  Allergies   Allergen Reactions     Latex Rash     Other [Seasonal Allergies]      Corn-abdominal pain and diarrhea.       Social History  Obed lives at home with his father. He does not currently attend school. He works at a [employer].     Family History  No family history of MRSA infections    Review of Systems  The 10 point Review of Systems is negative other than noted in the HPI or here.     Physical Exam  Temp: 98.2  F (36.8  C) Temp src: Oral BP: (!) 119/94 mmHg Pulse: 110 Heart Rate: 108 Resp: 18 SpO2: 98 % O2 Device: None (Room air) Oxygen Delivery: 2 LPM  Vital Signs with Ranges  Temp:  [98.2  F (36.8  C)-99.7  F (37.6  C)] 98.2  F (36.8  C)  Pulse:  [110] 110  Heart Rate:  [108] 108  Resp:  [18] 18  BP: (119)/(94) 119/94 mmHg  SpO2:  [93 %-100 %] 98 %  132 lbs 4.42 oz    Appearance: Alert and appropriate, well developed, nontoxic, with moist mucous membranes.  HEENT: Head: Normocephalic and atraumatic. Eyes: PERRL, EOM grossly intact, conjunctivae and sclerae clear. Ears: Tympanic membranes clear bilaterally,  without inflammation or effusion. Nose: Nares clear with no active discharge.  Mouth/Throat: No oral lesions, pharynx clear with no erythema or exudate.  Neck: Supple, no masses, no meningismus. No significant cervical lymphadenopathy.  Pulmonary: No grunting, flaring, retractions or stridor. Good air entry, clear to auscultation bilaterally, with no rales, rhonchi, or wheezing.  Cardiovascular: Regular rate and rhythm, normal S1 and S2, with no murmurs.  Normal symmetric peripheral pulses and brisk cap refill.  Abdominal: Normal bowel sounds, soft, nontender, nondistended, with no masses and no hepatosplenomegaly.  Neurologic: Alert and oriented, cranial nerves II-XII grossly intact, left sided hemiplegia of the extremities. Sensation intact in the LUE but unable to move arm below the elbow. Orthopedic device on LLE. Sensation intact. Normal strength and tone on the right.  Extremities/Back: Left hand contracted at the wrist. Significant redness and swelling of all fingers including thumb, and dorsal and palmar surface of hand. Healing scab noted on vental aspect of the left wrist. No draining or oozing. No swelling of any other joints.      Data  Results for orders placed or performed during the hospital encounter of 01/12/17 (from the past 24 hour(s))   Wrist XR, G/E 3 views, left    Narrative    XR WRIST LEFT G/E 3 VIEWS 1/12/2017 2:10 PM    CLINICAL HISTORY: left wrist swollen - previous surgery    COMPARISON: 12/1/2016      Impression    IMPRESSION: Surgical hardware is unchanged without evidence of  loosening. No fracture identified. Bony alignment is unchanged.    MEAGHAN LEONARDO MD   CBC with platelets differential   Result Value Ref Range    WBC 11.7 (H) 4.0 - 11.0 10e9/L    RBC Count 5.21 4.4 - 5.9 10e12/L    Hemoglobin 15.8 13.3 - 17.7 g/dL    Hematocrit 44.2 40.0 - 53.0 %    MCV 85 78 - 100 fl    MCH 30.3 26.5 - 33.0 pg    MCHC 35.7 31.5 - 36.5 g/dL    RDW 12.3 10.0 - 15.0 %    Platelet Count 205 150 -  450 10e9/L    Diff Method Automated Method     % Neutrophils 75.4 %    % Lymphocytes 11.1 %    % Monocytes 12.6 %    % Eosinophils 0.4 %    % Basophils 0.2 %    % Immature Granulocytes 0.3 %    Nucleated RBCs 0 0 /100    Absolute Neutrophil 8.8 (H) 1.6 - 8.3 10e9/L    Absolute Lymphocytes 1.3 0.8 - 5.3 10e9/L    Absolute Monocytes 1.5 (H) 0.0 - 1.3 10e9/L    Absolute Eosinophils 0.1 0.0 - 0.7 10e9/L    Absolute Basophils 0.0 0.0 - 0.2 10e9/L    Abs Immature Granulocytes 0.0 0 - 0.4 10e9/L    Absolute Nucleated RBC 0.0    CRP inflammation   Result Value Ref Range    CRP Inflammation 71.6 (H) 0.0 - 8.0 mg/L   Basic metabolic panel   Result Value Ref Range    Sodium 138 133 - 144 mmol/L    Potassium 3.6 3.4 - 5.3 mmol/L    Chloride 103 94 - 109 mmol/L    Carbon Dioxide 27 20 - 32 mmol/L    Anion Gap 8 3 - 14 mmol/L    Glucose 104 (H) 70 - 99 mg/dL    Urea Nitrogen 10 7 - 30 mg/dL    Creatinine 0.69 0.66 - 1.25 mg/dL    GFR Estimate >90  Non  GFR Calc   >60 mL/min/1.7m2    GFR Estimate If Black >90   GFR Calc   >60 mL/min/1.7m2    Calcium 9.3 8.5 - 10.1 mg/dL   INR   Result Value Ref Range    INR 1.00 0.86 - 1.14   Partial thromboplastin time   Result Value Ref Range    PTT 28 22 - 37 sec   US Upper Extremity Venous Duplex Left    Narrative    EXAMINATION: US UPPER EXTREMITY VENOUS DUPLEX LEFT  1/12/2017 3:33 PM       CLINICAL HISTORY: left hand redness and swelling     COMPARISON: None        PROCEDURE COMMENTS: Ultrasound was performed of the deep venous system  of the left upper extremity using grayscale, color, and spectral  Doppler.    FINDINGS:  The internal jugular, brachiocephalic, subclavian, brachial, basilic  and cephalic veins are visualized and are patent. Venous waveforms are  normal. There is normal response to compression.    There is asymmetric skin thickening with soft tissue edema involving  the dorsum of the left hand with hypervascularity. No drainable  fluid  collection identified.      Impression    IMPRESSION:  1. No deep venous thrombosis in the left upper extremity.  2. Significant edema involving the dorsum of the left hand which can  be seen in the setting of cellulitis. Number no drainable fluid  collection identified, however if symptoms do not appropriately  resolve recommend follow-up.    I have personally reviewed the examination and initial interpretation  and I agree with the findings.    AKIRA BOYD MD

## 2017-01-12 NOTE — IP AVS SNAPSHOT
[facility]    [address]    [place] [address]    Phone:  [phone]                                       After Visit Summary   1/12/2017    Obed Viramontes    MRN: [id number]           After Visit Summary Signature Page     I have received my discharge instructions, and my questions have been answered. I have discussed any challenges I see with this plan with the nurse or doctor.    ..........................................................................................................................................  Patient/Patient Representative Signature      ..........................................................................................................................................  Patient Representative Print Name and Relationship to Patient    ..................................................               ................................................  Date                                            Time    ..........................................................................................................................................  Reviewed by Signature/Title    ...................................................              ..............................................  Date                                                            Time

## 2017-01-13 LAB
CRP SERPL-MCNC: 87.2 MG/L (ref 0–8)
TACROLIMUS BLD-MCNC: 4.5 UG/L (ref 5–15)
TME LAST DOSE: ABNORMAL H

## 2017-01-13 PROCEDURE — 25000125 ZZHC RX 250: Performed by: PEDIATRICS

## 2017-01-13 PROCEDURE — 86140 C-REACTIVE PROTEIN: CPT | Performed by: PEDIATRICS

## 2017-01-13 PROCEDURE — 25000132 ZZH RX MED GY IP 250 OP 250 PS 637: Performed by: PEDIATRICS

## 2017-01-13 PROCEDURE — S0077 INJECTION, CLINDAMYCIN PHOSP: HCPCS | Performed by: PEDIATRICS

## 2017-01-13 PROCEDURE — 80197 ASSAY OF TACROLIMUS: CPT | Performed by: STUDENT IN AN ORGANIZED HEALTH CARE EDUCATION/TRAINING PROGRAM

## 2017-01-13 PROCEDURE — 80197 ASSAY OF TACROLIMUS: CPT | Performed by: PEDIATRICS

## 2017-01-13 PROCEDURE — 12000001 ZZH R&B MED SURG/OB UMMC

## 2017-01-13 RX ORDER — POLYETHYLENE GLYCOL 3350 17 G/17G
17 POWDER, FOR SOLUTION ORAL DAILY PRN
Status: DISCONTINUED | OUTPATIENT
Start: 2017-01-13 | End: 2017-01-14

## 2017-01-13 RX ORDER — SENNOSIDES 8.6 MG
8.6 TABLET ORAL 2 TIMES DAILY PRN
Status: DISCONTINUED | OUTPATIENT
Start: 2017-01-13 | End: 2017-01-14 | Stop reason: HOSPADM

## 2017-01-13 RX ORDER — IBUPROFEN 600 MG/1
10 TABLET, FILM COATED ORAL EVERY 6 HOURS
Status: DISCONTINUED | OUTPATIENT
Start: 2017-01-13 | End: 2017-01-14

## 2017-01-13 RX ADMIN — ASPIRIN 162 MG: 81 TABLET, COATED ORAL at 13:14

## 2017-01-13 RX ADMIN — OXYCODONE HYDROCHLORIDE 5 MG: 5 TABLET ORAL at 06:33

## 2017-01-13 RX ADMIN — CLINDAMYCIN PHOSPHATE 600 MG: 12 INJECTION, SOLUTION INTRAVENOUS at 22:16

## 2017-01-13 RX ADMIN — IBUPROFEN 600 MG: 600 TABLET ORAL at 02:23

## 2017-01-13 RX ADMIN — TACROLIMUS 1.5 MG: 0.5 CAPSULE ORAL at 01:00

## 2017-01-13 RX ADMIN — OXYCODONE HYDROCHLORIDE 5 MG: 5 TABLET ORAL at 21:19

## 2017-01-13 RX ADMIN — MYCOPHENOLATE MOFETIL 500 MG: 500 TABLET, FILM COATED ORAL at 13:14

## 2017-01-13 RX ADMIN — AMLODIPINE BESYLATE 5 MG: 2.5 TABLET ORAL at 00:59

## 2017-01-13 RX ADMIN — POLYETHYLENE GLYCOL 3350 17 G: 17 POWDER, FOR SOLUTION ORAL at 12:12

## 2017-01-13 RX ADMIN — CLINDAMYCIN PHOSPHATE 600 MG: 12 INJECTION, SOLUTION INTRAVENOUS at 06:54

## 2017-01-13 RX ADMIN — MYCOPHENOLATE MOFETIL 500 MG: 500 TABLET, FILM COATED ORAL at 01:01

## 2017-01-13 RX ADMIN — PRAVASTATIN SODIUM 10 MG: 10 TABLET ORAL at 01:00

## 2017-01-13 RX ADMIN — CLINDAMYCIN PHOSPHATE 600 MG: 12 INJECTION, SOLUTION INTRAVENOUS at 14:46

## 2017-01-13 RX ADMIN — IBUPROFEN 600 MG: 600 TABLET ORAL at 15:56

## 2017-01-13 RX ADMIN — IBUPROFEN 600 MG: 600 TABLET ORAL at 12:04

## 2017-01-13 RX ADMIN — VITAMIN D, TAB 1000IU (100/BT) 2000 UNITS: 25 TAB at 13:14

## 2017-01-13 RX ADMIN — TACROLIMUS 2 MG: 1 CAPSULE ORAL at 13:14

## 2017-01-13 RX ADMIN — IBUPROFEN 600 MG: 600 TABLET ORAL at 22:16

## 2017-01-13 ASSESSMENT — ACTIVITIES OF DAILY LIVING (ADL)
DRESS: 0-->INDEPENDENT
FALL_HISTORY_WITHIN_LAST_SIX_MONTHS: NO
TRANSFERRING: 0-->INDEPENDENT
RETIRED_EATING: 0-->INDEPENDENT
COGNITION: 0 - NO COGNITION ISSUES REPORTED
TOILETING: 0-->INDEPENDENT
SWALLOWING: 0-->SWALLOWS FOODS/LIQUIDS WITHOUT DIFFICULTY
RETIRED_COMMUNICATION: 0-->UNDERSTANDS/COMMUNICATES WITHOUT DIFFICULTY
BATHING: 0-->INDEPENDENT
AMBULATION: 0-->INDEPENDENT

## 2017-01-13 NOTE — PLAN OF CARE
Problem: Goal Outcome Summary  Goal: Goal Outcome Summary  Pt. A&Ox4. VSS. Afebrile. Lungs-CTA bilaterally with both anterior and posterior. O2 sats-100% on RA. Bowels-active in all four quadrants. PP+ DP+. CMS and neuro's are intact. Reports tingling in L hand. Denies nausea, shortness of breath, and chest pain. Has pain in the L hand and given prn oxycodone and ibuprofen. Pt voided 150mL w/ PVR 540mL. Order was added for intermitent SC. Was SC for 800mL at 0220. L hand redness within marked boundary. Pt up with SBA. PIV is patent and SL between abx.  Call light is within reach, pt able to make needs known. Mother is at bedside. Will continue to monitor.

## 2017-01-13 NOTE — CONSULTS
ORTHOPEDIC SURGERY CONSULTATION      DATE OF SERVICE:  01/12/2017.        REQUESTING PROVIDER:  Elmer Ortiz MD      CHIEF COMPLAINT:  Concern for left wrist infection.      HISTORY OF PRESENT ILLNESS:  Obed Viramontes is a pleasant 21-year-old male with a history of left hemiplegia secondary to a stroke who underwent a left wrist fusion with dorsal plating and distal ulnar resection with Dr. Tinoco on 10/20/2016.  He also had a left elbow biceps and brachialis lengthening, left FPL and FDS lengthening, palmaris longus and FCU tenotomy, left adductor release and left EPL rerouting at that time.  The patient states that overall he has done quite well with this.      Unfortunately, on Monday 01/09/2017, he bumped his wrist gently and noted some increased discomfort.  He noticed developing and progressive swelling and redness over the left dorsal wrist over the coming days.  He then presented to the Emergency Department today accompanied by his mother for further evaluation of his left dorsal wrist redness, warmth, tenderness and pain.  He does have some pain and discomfort at rest.  He denies any numbness or tingling.  At baseline he has no motor function to the left hand.  Otherwise, the patient feels well and denies any fevers, chills, night sweats, chest pain, shortness of breath, abdominal pain, nausea, vomiting, diarrhea or constipation        PHYSICAL EXAMINATION:     GENERAL:  On exam the patient is pleasant 21-year-old male in no apparent distress.     EXTREMITIES:  His focused examination of his left upper extremity demonstrates a warm and well perfused hand with marked erythema over the dorsal wrist extending from the proximal aspect of his incision diffusely across the dorsal wrist and hand into the base of the fingers.  The area is quite tender and hot to the touch.  There are no areas of palpable fluctuance.  There is minimal erythema extending across the first web.  He has some old eschar over the  volar wrist without any surrounding signs of infection.  The patient has normal sensation to light touch in the median, radial and ulnar nerve distributions.  There is no gross motion at the fusion site.      IMAGING:  Repeat radiographs were reviewed and demonstrate no interval change in hardware alignment or evidence of bony injury.      An ultrasound was obtained which was negative for a DVT and demonstrated diffuse edema about the dorsal left wrist consistent with cellulitis.  No area of fluid collection or abscess was identified.      LABORATORY DATA:  White count 11.7, CRP 71.6.      IMPRESSION:  Left dorsal wrist cellulitis, unlikely to represent a deep infection.      PLAN:  I had a lengthy discussion with Obed and his mother today.  At this time all of his clinical history and exam findings are most consistent with left dorsal wrist cellulitis.  There is no evidence of deep hardware infection.  The patient is on chronic immunosuppressive drugs following his heart transplant.  This may put him at risk for infections like this in the future.  We recommend admission to the Pediatric Service with IV antibiotics followed by transition to orals as tolerated.  The CRP should be trended.  They should monitor for fevers.  He may have activity as tolerated.      I did discuss providing Obed with a soft protective splint in the Emergency Department, and he politely declined.  He does have a removable protective splint that occupational therapy made him previously.  They expressed some displeasure with this splint and are requesting a repeat visit with our therapist for a new splint.      Ultimately, the patient's disposition will be per the Pediatrics team; however, I expect the patient to be in the hospital for 1-2 days with close clinical monitoring of his response to IV antibiotics.  We will plan to see the patient back in Dr. Tinoco's clinic in 2 weeks with repeat left wrist x-rays and an occupational hand  therapy visit for formation of a new left wrist splint.  All questions and concerns were answered.  The patient and his mother were satisfied with the discussion held.      This patient's assessment and plan was discussed at length with Dr. Figueroa, who agrees with the above.         DAR FIGUEROA MD       As dictated by AIMEE UNDERWOOD MD            D: 2017 17:50   T: 2017 19:13   MT: CT      Name:     RODNEY ENCARNACION   MRN:      [id number]        Account:       [id number]   :      1995           Consult Date:  2017      Document: [id number]

## 2017-01-13 NOTE — PROGRESS NOTES
Orthopaedic Surgery Progress Note 2017    E: No acute events overnight. Tmax 100.2.    S: Pain well controlled on current regimen. Denies numbness or tingling. Little improvement in left dorsal wrist cellulitis overnight. Denies chest pain, shortness of breath, nausea, or vomiting. No other issues or concerns.     O:  Filed Vitals:    17 2140 17 0000 17 0102 17 0630   BP:  105/61 106/62 108/57   Pulse:       Temp: 99.6  F (37.6  C) 97.7  F (36.5  C)     TempSrc: Oral Oral     Resp:  16  15   Weight:       SpO2:  100% 99% 100%         Exam:  Gen: No acute distress, resting comfortably in bed.  Resp: Non-labored breathing  Left wrist:   - Hand wwp. SILT. No motor at baseline. Left dorsal wrist cellulitis stable, mildly receded from marked borders. No area of fluctuance.       Lab Results   Component Value Date    WBC 11.7* 2017    WBC 6.4 10/13/2016    HGB 15.8 2017    HGB 16.5 10/13/2016     2017     10/13/2016     Inflammatory Labs:  Lab Results   Component Value Date    WBC 11.7* 2017    CRP 71.6* 2017       Recent Labs   Lab Test  17   1416  10/13/16   1237  16   0809   WBC  11.7*  6.4  4.2   CRP  71.6*   --    --        Micro:  Recent Labs  Lab 17  1416   CULT No growth after 13 hours       Culture results: Blood culture NGTD      IMPRESSION:   Obed Viramontes is a 21 year old right-hand dominant male with Left hemiplegia status-post Left wrist fusion and Darrach  on 10/20/2016 with the followin. Left dorsal wrist cellulitis.  2. Chronic immunosuppression following heart transplant.     RECOMMENDATIONS:   - Pediatrics Primary.    Recommend IV antibiotics as directed by Pediatrics.   Follow inflammatory markers.   Elevation of left wrist.   No further imaging.  Activity as tolerated.  Patient and mother politely declined soft protective splint at this time. (Patient is interested in having his current splint  refashioned by OT in a few weeks).    - Anticoagulation/DVT Prophylaxis: None.  - Antibiotics/Tetanus: Per Pediatrics.  - X-rays/Imaging: No further imaging required.   - Activity: As tolerated  - Weight bearing: As tolerated.   - Pain control: Orals as needed.  - Diet: Regular  - Follow-up: Follow-up in 2 weeks with Dr. Tinoco in clinic with repeat left wrist radiographs. Will also coordinated OT hand Therapy appointment to follow to evaluate options for refashioning splint.     - Disposition: Admit to Pediatrics. Anticipate 1-2 days in hospital followed by transition to Oral antibiotics and discharge.       Isrrael Temple MD  Orthopaedic Surgery Resident, PGY-4  Pager: [phone]

## 2017-01-13 NOTE — PLAN OF CARE
Problem: Individualization  Goal: Patient Preferences  Pt. admitted from the ER at 1803PM. Pt. accompanied by his mother and arrived with personal belongings. Report was taken from СВЕТЛАНА Lama in the ER. Pt. is A&Ox4. VSS. Oral temperature is 100.2, Ibuprofen given and new oral temperature is 99.6. 02 sats= 98% on RA. Lung sounds= Clear bilaterally with both anterior and posterior. Bowel sounds are Hyperactive in all 4 quadrants. Is on a Ped's diet and appetite was fair this shift. Pt has a baseline contracture in the left hand, pt stated that he had a stroke on the left side of his body in July 1996, all other CMS and neuro's are intact. Admit with baseline tingling in the left hand. Has pain in the left hand and given 2mg IV morphine, 5mg Oxycodone, and is well controlled. Pt. denied nausea, CP, SOB, lightheadedness, and dizziness. Left hand is pink/red on both anterior and posterior sides, warm to touch with + 2 edema, and within the black marked lines. Pt has not voided yet since arrival to unit 8[facility], Night RN updated and will bladder scan pt. PIV is patent and saline locked in the right arm. Bilateral heels are elevated off the bed. Pt. is oriented to the room and call light system and the call light is within the pt's reach. Continue to monitor.

## 2017-01-13 NOTE — PHARMACY
Prescriber Notification Note    The pharmacist has communicated with this patient's provider regarding a concern or therapy recommendation.    Notified Person: Dr. Mcdowell  Date/Time of Notification: 1/12/17 @8094  Interaction: phone  Concern/Recommendation:  Oxycodone ordered as scheduled, usually do PRN on 8A for patients. Nurse wondering if ok to give IV morphine now (last dose 1600). Clindamycin ordered as one time dose, wondering if it should be scheduled.     Comments/Additional Details: Oxycodone changed by provider to PRN, clindamycin changed by provider to IV Q8H, ok per MD to give IV morphine now if patient is in pain. Want to have pain kept under control before the patient has pain (why oxycodone was originally scheduled). Told MD nurse will keep on top of patient's pain.    Candace Casillas, Pharm.D.

## 2017-01-13 NOTE — PROGRESS NOTES
"Focus: patients status  DB: pt has positive bowel sounds but still has not had a BM since 3 days ago. Pt continues to have redness and swelling on left hand. Mother of patient states\" I think it looks better then yesterday.\" pt having more pain relief with taking PO Ibuprofen and oral oxycodone 5 mg as PRN. Pt last was straight cathed at 0200 this am. Pt declining to try to go to bathroom and declined bladder scan at this time. He stated\" I will wait until I am done eating.   I: encouraged pt to use I S, increase po intake and talked about plan of cares in hospital and for voiding.   E: Pt appears to be resting quite comfortably between cares. Mom is at bedside and very helpful. Status of patient will continue to be monitored   "

## 2017-01-13 NOTE — PROGRESS NOTES
[facility], [place]    Pediatric Cardiology Progress Note    Date of Service (when I saw the patient): 01/13/2017     Assessment and Plan  Obed is a 20 yo M with a remote hx of cardiomyopathy s/p heart transplant and stroke  resulting in loss of motor control of his left hand, who presented with a two day hx of worsening redness and swelling of his hand after minor trauma and findings consistent with cellulitis. He is admitted for IV antibiotics and further management.     ID  Cellulitis: Appearance improved since admission. CRP markedly elevated on admission. F/u slightly more elevated than on admission. This is not unexpected given less than 24 hours of antibiotics  - Continue to monitor area of erythema and swelling  - Repeat CRP in the AM  - IV clindamycin Q8H. 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 No further imaging need. Will f/u with orthopedics soon after discharge. Appt has been scheduled  - Recommend keeping hand elevated    GI/  Urinary retention: Noted overnight. Per mother, this sometimes happens with narcotics  - Will hopefully need less with improvement in cellulitis  - Closely monitor output. Will bladder scan and cath as needed    Constipation  - Also occurs with narcotic use  - Senna and miralax prn. Will schedule if needed    HEME/ONC  Hx of PTLD - Currently stable and off  medications    Access: PIV x1    Dispo: Likely home tomorrow pending improvement in cellulitis    The patient and plan was discussed with Dr. Danish Mcdowell MD  Pediatrc Resident  [phone]  =======================================    Interval History  Overnight, pain was well controlled with ibuprofen, morphine and oxycodone. He had an episode of urinary retention and  Needed to be straight cathed for 800cc. Per mother, this is not unusual with narcotic use. He has not urinated yet this AM. He has not stooled. He has no chest pain or SOB.    Physical Exam  Temp: 97.5  F (36.4  C) Temp src: Oral BP: 103/65 mmHg Pulse: 110 Heart Rate: 92 Resp: 16 SpO2: 98 % O2 Device: None (Room air) Oxygen Delivery: 2 LPM  Filed Vitals:    01/12/17 1338   Weight: 60 kg (132 lb 4.4 oz)     Vital Signs with Ranges  Temp:  [97.5  F (36.4  C)-100.2  F (37.9  C)] 97.5  F (36.4  C)  Pulse:  [110] 110  Heart Rate:  [] 92  Resp:  [15-18] 16  BP: (103-121)/(57-94) 103/65 mmHg  SpO2:  [93 %-100 %] 98 %  I/O last 3 completed shifts:  In: 600 [P.O.:600]  Out: 970 [Urine:970]    Gen: awake, alert, sitting comfortably in bed, conversant, in no distress  HEENT: normocephalic, PERRL, moist mucous membranes  CV: Regular rate and rhythm, normal S1 and S2, no murmurs, strong peripheral pulses  Resp: Breathing comfortably, clear lung sounds bilaterally  Abd: Soft, non tender, normal bowel sounds  Ext: Left hand contracted at the wrist. Significant swelling of all fingers including thumb, and dorsal and palmar surface of hand. Erythema in this area as well but improved since yesterday. Healing scab noted on vental aspect of the left wrist. No draining or oozing. No swelling of any other joints.    Medications       sodium chloride (PF)  3 mL Intracatheter Q8H     amLODIPine  5 mg Oral Q24H     aspirin  162 mg Oral Q24H     cholecalciferol  2,000 Units Oral Q24H     mycophenolate  500 mg Oral Q12H GARIMA     pravastatin  10 mg Oral Q24H      tacrolimus  1.5 mg Oral Q24H     tacrolimus  2 mg Oral Q24H     clindamycin  600 mg Intravenous Q8H       Data    CRP inflammation   Result Value Ref Range    CRP Inflammation 87.2 (H) 0.0 - 8.0 mg/L

## 2017-01-14 VITALS
RESPIRATION RATE: 16 BRPM | HEART RATE: 110 BPM | SYSTOLIC BLOOD PRESSURE: 111 MMHG | BODY MASS INDEX: 17.44 KG/M2 | OXYGEN SATURATION: 97 % | WEIGHT: 132.28 LBS | TEMPERATURE: 98.3 F | DIASTOLIC BLOOD PRESSURE: 70 MMHG

## 2017-01-14 LAB — CRP SERPL-MCNC: 61.4 MG/L (ref 0–8)

## 2017-01-14 PROCEDURE — 25000125 ZZHC RX 250: Performed by: PEDIATRICS

## 2017-01-14 PROCEDURE — 25000132 ZZH RX MED GY IP 250 OP 250 PS 637: Performed by: INTERNAL MEDICINE

## 2017-01-14 PROCEDURE — 36415 COLL VENOUS BLD VENIPUNCTURE: CPT | Performed by: PEDIATRICS

## 2017-01-14 PROCEDURE — 86140 C-REACTIVE PROTEIN: CPT | Performed by: PEDIATRICS

## 2017-01-14 PROCEDURE — S0077 INJECTION, CLINDAMYCIN PHOSP: HCPCS | Performed by: PEDIATRICS

## 2017-01-14 PROCEDURE — 25000132 ZZH RX MED GY IP 250 OP 250 PS 637: Performed by: PEDIATRICS

## 2017-01-14 RX ORDER — ACETAMINOPHEN 325 MG/1
650 TABLET ORAL 3 TIMES DAILY
Status: DISCONTINUED | OUTPATIENT
Start: 2017-01-14 | End: 2017-01-14 | Stop reason: HOSPADM

## 2017-01-14 RX ORDER — CLINDAMYCIN HCL 300 MG
300 CAPSULE ORAL 3 TIMES DAILY
Qty: 27 CAPSULE | Refills: 0 | Status: SHIPPED
Start: 2017-01-14 | End: 2017-01-23

## 2017-01-14 RX ORDER — POLYETHYLENE GLYCOL 3350 17 G/17G
17 POWDER, FOR SOLUTION ORAL 2 TIMES DAILY
Status: DISCONTINUED | OUTPATIENT
Start: 2017-01-14 | End: 2017-01-14 | Stop reason: HOSPADM

## 2017-01-14 RX ORDER — IBUPROFEN 600 MG/1
10 TABLET, FILM COATED ORAL EVERY 6 HOURS PRN
Status: DISCONTINUED | OUTPATIENT
Start: 2017-01-14 | End: 2017-01-14 | Stop reason: HOSPADM

## 2017-01-14 RX ORDER — OXYCODONE HYDROCHLORIDE 5 MG/1
5 TABLET ORAL EVERY 4 HOURS PRN
Qty: 10 TABLET | Refills: 0 | Status: SHIPPED | OUTPATIENT
Start: 2017-01-14 | End: 2017-04-19

## 2017-01-14 RX ADMIN — OXYCODONE HYDROCHLORIDE 5 MG: 5 TABLET ORAL at 01:12

## 2017-01-14 RX ADMIN — TACROLIMUS 2 MG: 1 CAPSULE ORAL at 12:59

## 2017-01-14 RX ADMIN — AMLODIPINE BESYLATE 5 MG: 2.5 TABLET ORAL at 01:07

## 2017-01-14 RX ADMIN — ASPIRIN 162 MG: 81 TABLET, COATED ORAL at 12:59

## 2017-01-14 RX ADMIN — IBUPROFEN 600 MG: 600 TABLET ORAL at 05:56

## 2017-01-14 RX ADMIN — POLYETHYLENE GLYCOL 3350 17 G: 17 POWDER, FOR SOLUTION ORAL at 09:56

## 2017-01-14 RX ADMIN — MYCOPHENOLATE MOFETIL 500 MG: 500 TABLET, FILM COATED ORAL at 01:07

## 2017-01-14 RX ADMIN — MYCOPHENOLATE MOFETIL 500 MG: 500 TABLET, FILM COATED ORAL at 13:00

## 2017-01-14 RX ADMIN — TACROLIMUS 1.5 MG: 0.5 CAPSULE ORAL at 01:07

## 2017-01-14 RX ADMIN — CLINDAMYCIN PHOSPHATE 600 MG: 12 INJECTION, SOLUTION INTRAVENOUS at 06:00

## 2017-01-14 RX ADMIN — ACETAMINOPHEN 650 MG: 325 TABLET, FILM COATED ORAL at 09:56

## 2017-01-14 RX ADMIN — PRAVASTATIN SODIUM 10 MG: 10 TABLET ORAL at 01:07

## 2017-01-14 RX ADMIN — VITAMIN D, TAB 1000IU (100/BT) 2000 UNITS: 25 TAB at 12:58

## 2017-01-14 NOTE — PROGRESS NOTES
Orthopaedic Surgery Progress Note     E: No acute events overnight.     S: Pain controlled on PO. Pt with receeding erythema. Afebrile. Down trending inflamm markers.     O:  Filed Vitals:    17 0813 17 1747 17 0058 17 0601   BP: 103/65 111/63 101/60 111/70   Pulse:       Temp: 97.5  F (36.4  C) 96.3  F (35.7  C) 97.8  F (36.6  C) 98.3  F (36.8  C)   TempSrc: Oral Oral Oral Oral   Resp:    Weight:       SpO2: 98% 97% 98% 97%         Exam:  Gen: No acute distress, resting comfortably in bed.  Resp: Non-labored breathing  Left wrist:   - Hand wwp. SILT. No motor at baseline. Left dorsal wrist cellulitis improving. , receded from marked borders to edges of healed incision. No palpable fluctuance or discrete areas of pain.       Lab Results   Component Value Date    WBC 11.7* 2017    WBC 6.4 10/13/2016    HGB 15.8 2017    HGB 16.5 10/13/2016     2017     10/13/2016     Inflammatory Labs:  Lab Results   Component Value Date    WBC 11.7* 2017    CRP 61.4* 2017       Recent Labs   Lab Test  17   0953  17   1200  17   1416  10/13/16   1237  16   0809   WBC   --    --   11.7*  6.4  4.2   CRP  61.4*  87.2*  71.6*   --    --        Micro:    Recent Labs  Lab 17  1416   CULT No growth after 2 days       Culture results: Blood culture NGTD      IMPRESSION:   Obed Viramontes is a 21 year old right-hand dominant male with Left hemiplegia status-post Left wrist fusion and Darrach  on 10/20/2016 with the followin. Left dorsal wrist cellulitis.  2. Chronic immunosuppression following heart transplant.     RECOMMENDATIONS:   - Pediatrics Primary.    antibiotics as directed by Pediatrics, anticipate oral transition  Trend inflammatory markers.   Elevation of left wrist.  Ice, compression as tolerated for pain relief  No further imaging recommended  Activity as tolerated.  - Weight bearing: As tolerated.     - Follow-up:  Follow-up as scheduled with Dr. Tinoco in clinic with repeat left wrist radiographs. Will also coordinated OT hand Therapy appointment to follow to evaluate options for refashioning splint.   - if symptoms persist through weekend, call to schedule clinic visit with Booker Lancaster this week.   Future Appointments  Date Time Provider Department Center   1/23/2017 11:00 AM Amna Tinoco MD [facility]       Paul Hess MD  01/14/2017  Pager [phone]

## 2017-01-14 NOTE — PLAN OF CARE
Problem: Goal Outcome Summary  Goal: Goal Outcome Summary  Outcome: Adequate for Discharge Date Met:  01/14/17  Patient A/Ox4. VSS. Denies CP, SOB, dizziness/LH. LSCTA. +fl/BS. Voiding in adequate amounts using the urinal in the bathroom. CMS intact. Left hand is less red today, the swelling and redness has receded from the marked areas when he first came in.  Tolerating a regular diet without NV. IS encouraged. Activity is up ad lesa in his room, he wears a brace to his left foot. Saline lock was discontinued prior to him going homel Pain has been managed with scheduled tylenol and as needed ibuprofen and oxycodone. He has received an order for discharge, instructions were given in verbal and written form, mom and patient both verbalized an understanding, he will start taking oral anti-biotic, if symptoms come back he is to seek medical attention. He ambulated out with his mother at his side.  Patient has demonstrated ability to call appropriately. Patient is resting with call light within reach. Will continue to monitor.

## 2017-01-14 NOTE — PLAN OF CARE
Problem: Goal Outcome Summary  Goal: Goal Outcome Summary  Outcome: No Change  Patient A/Ox4. VSS. Denies CP, SOB, dizziness/LH. LSCTA. +fl/BS. Voided 400cc per urinal. CMS intact. Left hand is red, marked, elevated on one pillow. Tolerating a regular diet without NV. IS encouraged. Activity up with the assist of one person. Mother is in the room with him. IV is saline locked inbetween IV anti-biotics. Pain has been managed with scheduled ibuprofen. He does have oxycodone available, will call if he needs it.  Patient has demonstrated ability to call appropriately. Patient is resting with call light within reach. Will continue to monitor.

## 2017-01-14 NOTE — DISCHARGE SUMMARY
[facility], [place]    Discharge Summary  Pediatric Cardiology    Date of Admission:  1/12/2017  Date of Discharge:  1/14/2017  2:35 PM  Discharging Provider: Allyssa Franklin  Date of Service (when I saw the patient): 01/14/2017    Discharge Diagnoses  Cellulitis    History of Present Illness  Obed Viramontes is an 21 year old male with a remote history of cardiomyopathy s/p heart transplant 2012 and stroke at age 1 resulting in contracture s/p plate placement in fall 2016, who was admitted with two days of erythema, swelling, and pain in his left hand.  Please see H&P for further details.      Hospital Course  Obed Viramontes was admitted on 1/12/2017.  The following problems were addressed during his hospitalization:    # Cellulitis  On presentation, Obed was noted to have findings consistent with cellulitis of the left hand. An x-ray was obtained which was negative for fractures. An ultrasound was performed which was negative for abscess or clot formation. He was started on IV clindamycin and showed improvement in the erythema and swelling during his hospitalization. He was transitioned to oral clindamycin to complete a 10 total day course. A CRP was elevated to 71.6 (normal <2.9) on presentation, and on discharge was 61.4.      He was seen by Orthopedic surgery during his admission. No surgical intervention was indicated. He will follow-up with orthopedics within 2 weeks with repeat left wrist x-rays.  He was instructed to follow up sooner if he has fever, worsening pain, swelling, or erythema.    # S/p heart transplant:   Obed's home medications of tacrolimus and cellcept were continued at his home doses. He had a tacrolimus level performed which was 4.8, Dr. Pruitt was consulted, and she recommended no changes to his current tacrolimus dosing.  He remained cardiovascularly stable throughout his admission        Significant Results and Procedures  Blood cultures negative,  CRP as above.      Pending Results    Unresulted Labs Ordered in the Past 30 Days of this Admission     No orders found from 11/14/2016 to 1/13/2017.          Primary Care Physician  Rayo Schafer    Physical Exam  Vital Signs with Ranges  Temp:  [97.8  F (36.6  C)-98.3  F (36.8  C)] 98.3  F (36.8  C)  Heart Rate:  [84-86] 84  Resp:  [16] 16  BP: (101-111)/(60-70) 111/70 mmHg  SpO2:  [97 %-98 %] 97 %  I/O last 3 completed shifts:  In: 120 [P.O.:120]  Out: 750 [Urine:750]    Gen: Alert, oriented, in no distress and speaking in full sentences comfortably  HEENT: PERRL, mucus membranes moist  Neck: Supple, no lymphadenopathy.  Lungs: CBTA, no wheezing.  CV: RRR, no murmurs.  Abd: Soft, non tender, bowel sounds present.   Msk: Left hand contractured with gross deformity with persisting mild erythema particularly of the thenar eminence and the lateral wrist, but erythema well receded from demarcated lines, tender to palpation, no discrete fluctuance palpated.  Neuro: CN grossly intact, moves all extremities equally.  Skin: No rashes or open sores.    Psych: Appropriate speech and affect.      Time Spent on This Encounter  NICO, Allyssa Franklin, personally saw the patient today and spent greater than 30 minutes discharging this patient.    Discharge Disposition  Discharged to home  Condition at discharge: Stable    Consultations This Hospital Stay  None    Discharge Orders    Reason for your hospital stay   Dear Obed,    You were hospitalized because you have cellulitis (skin/soft tissue infection) in your left wrist.  The redness and pain improved on IV clindamycin (antibiotic), so we are discharging you on oral clindamycin to complete a 10 day course of antibiotics.  You will need to follow up with Dr. Tinoco from Orthopedic surgery in 2 weeks with a left wrist x-ray, and to coordinate with OT hand therapy at that appointment.  If you are having worsening pain, swelling, or redness, you need to see Ortho  sooner this coming week.  If you have fevers, you need to come back to the hospital for evaluation.  Your tacrolimus level was 4.5 in the hospital, and no adjustment was made to your medications at this time.  For your pain, continue Tylenol scheduled or as needed, and oxycodone for severe pain.  Please follow up with Dr. Pruitt as previously planned.     Follow Up and recommended labs and tests   Follow up with Orthopedic surgery, dr. Tinoco with left wrist x-ray, coordinated with OT hand therapy to discuss left wrist splint.     Activity   Your activity upon discharge: activity as tolerated     Full Code     Diet   Follow this diet upon discharge: Regular diet       Discharge Medications  Discharge Medication List as of 1/14/2017  1:29 PM      START taking these medications    Details   oxyCODONE (ROXICODONE) 5 MG IR tablet Take 1 tablet (5 mg) by mouth every 4 hours as needed for moderate to severe pain, Disp-10 tablet, R-0, Local Print      clindamycin (CLEOCIN) 300 MG capsule Take 1 capsule (300 mg) by mouth 3 times daily for 9 days, Disp-27 capsule, R-0, Fax         CONTINUE these medications which have NOT CHANGED    Details   amLODIPine (NORVASC) 5 MG tablet Take 1 tablet (5 mg) by mouth daily, Disp-90 tablet, R-3, Fax      mycophenolate (CELLCEPT - GENERIC EQUIVALENT) 500 MG tablet Take 1 tablet (500 mg) by mouth 2 times daily, Disp-180 tablet, R-11, E-Prescribe      !! tacrolimus (PROGRAF - GENERIC EQUIVALENT) 1 MG capsule Take 2 mg in the morning and 1.5 mg in the evening, Disp-270 capsule, R-11, E-Prescribe      !! tacrolimus (PROGRAF - GENERIC EQUIVALENT) 0.5 MG capsule Take 1 capsule by mouth every evening (total dose is 2mg in am and 1.5mg in pm), Disp-90 capsule, R-11, E-PrescribeMost current rx.  90 days supply per insurance.      pravastatin (PRAVACHOL) 10 MG tablet Take 1 tablet (10 mg) by mouth daily At bedtime, Disp-90 tablet, R-6, E-Prescribe      aspirin 81 MG EC tablet Take 2 tablets (162  mg) by mouth daily, Disp-60 tablet, R-99, Historical      Cholecalciferol (VITAMIN D3) 2000 UNITS TABS Take 2,000 Units by mouth daily, Disp-30 tablet, R-6, Fax       !! - Potential duplicate medications found. Please discuss with provider.        Allergies  Allergies   Allergen Reactions     Latex Rash     Other [Seasonal Allergies]      Corn-abdominal pain and diarrhea.     Data  Most Recent 3 CBC's:  Recent Labs   Lab Test  01/12/17   1416  10/13/16   1237  07/26/16   0809   WBC  11.7*  6.4  4.2   HGB  15.8  16.5  15.0   MCV  85  85  83   PLT  205  203  164      Most Recent 3 BMP's:  Recent Labs   Lab Test  01/12/17   1416  10/13/16   1237  07/26/16   0809   NA  138  140  141   POTASSIUM  3.6  4.1  3.8   CHLORIDE  103  107  109   CO2  27  26  31   BUN  10  11  14   CR  0.69  0.77  0.81   ANIONGAP  8  7  1*   CARLOS A  9.3  9.1  8.6   GLC  104*  73  90     Most Recent 2 LFT's:  Recent Labs   Lab Test  10/13/16   1237  07/26/16   0809   AST  29  20   ALT  21  16   ALKPHOS  90  78   BILITOTAL  0.6  0.9     Most Recent INR's and Anticoagulation Dosing History:  Anticoagulation Dose History     Recent Dosing and Labs Latest Ref Rng 1/12/2017    INR 0.86 - 1.14 1.00        Most Recent 3 Troponin's:  Recent Labs   Lab Test  10/13/16   1237  07/26/16   0809  04/18/16   0800   TROPI  0.020  0.039  0.023     Most Recent Cholesterol Panel:  Recent Labs   Lab Test  06/17/13   0842   CHOL  143   LDL  82   HDL  48   TRIG  70     Most Recent 6 Bacteria Isolates From Any Culture (See EPIC Reports for Culture Details):  Recent Labs   Lab Test  01/12/17   1416   CULT  No growth after 2 days     Most Recent TSH, T4 and A1c Labs:No lab results found.  Results for orders placed or performed during the hospital encounter of 01/12/17   Wrist XR, G/E 3 views, left    Narrative    XR WRIST LEFT G/E 3 VIEWS 1/12/2017 2:10 PM    CLINICAL HISTORY: left wrist swollen - previous surgery    COMPARISON: 12/1/2016      Impression    IMPRESSION:  Surgical hardware is unchanged without evidence of  loosening. No fracture identified. Bony alignment is unchanged.    MEAGHAN LEONARDO MD   US Upper Extremity Venous Duplex Left    Narrative    EXAMINATION: US UPPER EXTREMITY VENOUS DUPLEX LEFT  1/12/2017 3:33 PM       CLINICAL HISTORY: left hand redness and swelling     COMPARISON: None        PROCEDURE COMMENTS: Ultrasound was performed of the deep venous system  of the left upper extremity using grayscale, color, and spectral  Doppler.    FINDINGS:  The internal jugular, brachiocephalic, subclavian, brachial, basilic  and cephalic veins are visualized and are patent. Venous waveforms are  normal. There is normal response to compression.    There is asymmetric skin thickening with soft tissue edema involving  the dorsum of the left hand with hypervascularity. No drainable fluid  collection identified.      Impression    IMPRESSION:  1. No deep venous thrombosis in the left upper extremity.  2. Significant edema involving the dorsum of the left hand which can  be seen in the setting of cellulitis. Number no drainable fluid  collection identified, however if symptoms do not appropriately  resolve recommend follow-up.    I have personally reviewed the examination and initial interpretation  and I agree with the findings.    AKIRA BOYD MD       Patient was seen, examined, and discussed with Dr. Maddox, who agrees with the above assessment and plan.    Allyssa Franklin DO  [facility] Medicine-Pediatrics PGY-4  [phone]    Attestation:  I saw and evaluated this patient.  I discussed the patient with the house staff team or resident(s) and agree with the findings and plan in this note.  I reviewed today's vital signs, medications, labs and imaging.  Omer Maddox MD    Pager: [phone]

## 2017-01-14 NOTE — PLAN OF CARE
Problem: Goal Outcome Summary  Goal: Goal Outcome Summary  Outcome: No Change  Patient A/Ox4. VSS. Denies CP, SOB, dizziness/LH. LSCTA. +fl/BS. Voiding fairly well in bathroom, pt noted to have jodie color urine, continuing to push fluids, pt did drink out of pitcher this shift. CMS intact, tingles noted. Tolerating regular diet fairly well without NV. IS encouraged. Activity level fair, pt ambulated to bathroom and rested between cares. IV in place in R arm. Pain rated as fairly well managed throughout shift, has scheduled ibuprofen and took 1 PRN oxycodone. Patient has demonstrated ability to call appropriately. Patient is resting with call light within reach. Will continue to monitor.

## 2017-01-18 LAB
BACTERIA SPEC CULT: NO GROWTH
MICRO REPORT STATUS: NORMAL
SPECIMEN SOURCE: NORMAL

## 2017-04-18 ENCOUNTER — PRE VISIT (OUTPATIENT)
Dept: PEDIATRIC CARDIOLOGY | Facility: CLINIC | Age: 22
End: 2017-04-18

## 2017-04-18 DIAGNOSIS — Z94.1 HEART REPLACED BY TRANSPLANT (H): Primary | ICD-10-CM

## 2017-04-18 NOTE — TELEPHONE ENCOUNTER
Obed is being seen by Dr Pruitt for his 5 year post-transplant visit. All lab, ECHO, EKG, xray, US, and heart cath orders are in place.

## 2017-04-19 ENCOUNTER — ANESTHESIA EVENT (OUTPATIENT)
Dept: SURGERY | Facility: CLINIC | Age: 22
End: 2017-04-19
Payer: COMMERCIAL

## 2017-04-20 ENCOUNTER — HOSPITAL ENCOUNTER (OUTPATIENT)
Dept: GENERAL RADIOLOGY | Facility: CLINIC | Age: 22
End: 2017-04-20
Attending: PEDIATRICS
Payer: COMMERCIAL

## 2017-04-20 ENCOUNTER — OFFICE VISIT (OUTPATIENT)
Dept: PEDIATRIC CARDIOLOGY | Facility: CLINIC | Age: 22
End: 2017-04-20
Attending: PEDIATRICS
Payer: COMMERCIAL

## 2017-04-20 ENCOUNTER — RESULTS ONLY (OUTPATIENT)
Dept: OTHER | Facility: CLINIC | Age: 22
End: 2017-04-20

## 2017-04-20 ENCOUNTER — HOSPITAL ENCOUNTER (OUTPATIENT)
Dept: CARDIOLOGY | Facility: CLINIC | Age: 22
Discharge: HOME | End: 2017-04-20
Attending: PEDIATRICS | Admitting: PEDIATRICS
Payer: COMMERCIAL

## 2017-04-20 ENCOUNTER — HOSPITAL ENCOUNTER (OUTPATIENT)
Dept: ULTRASOUND IMAGING | Facility: CLINIC | Age: 22
End: 2017-04-20
Attending: PEDIATRICS
Payer: COMMERCIAL

## 2017-04-20 VITALS
OXYGEN SATURATION: 100 % | WEIGHT: 134.7 LBS | HEIGHT: 73 IN | TEMPERATURE: 97.9 F | RESPIRATION RATE: 16 BRPM | SYSTOLIC BLOOD PRESSURE: 121 MMHG | BODY MASS INDEX: 17.85 KG/M2 | HEART RATE: 96 BPM | DIASTOLIC BLOOD PRESSURE: 83 MMHG

## 2017-04-20 DIAGNOSIS — Z94.1 HEART REPLACED BY TRANSPLANT (H): ICD-10-CM

## 2017-04-20 DIAGNOSIS — Z94.1 HEART REPLACED BY TRANSPLANT (H): Primary | ICD-10-CM

## 2017-04-20 LAB
ALBUMIN SERPL-MCNC: 3.8 G/DL (ref 3.4–5)
ALP SERPL-CCNC: 87 U/L (ref 40–150)
ALT SERPL W P-5'-P-CCNC: 10 U/L (ref 0–70)
ANION GAP SERPL CALCULATED.3IONS-SCNC: 10 MMOL/L (ref 3–14)
AST SERPL W P-5'-P-CCNC: 14 U/L (ref 0–45)
BASOPHILS # BLD AUTO: 0 10E9/L (ref 0–0.2)
BASOPHILS NFR BLD AUTO: 0.2 %
BILIRUB SERPL-MCNC: 0.7 MG/DL (ref 0.2–1.3)
BUN SERPL-MCNC: 13 MG/DL (ref 7–30)
CALCIUM SERPL-MCNC: 8.8 MG/DL (ref 8.5–10.1)
CHLORIDE SERPL-SCNC: 106 MMOL/L (ref 94–109)
CK SERPL-CCNC: 94 U/L (ref 30–300)
CMV IGG SERPL QL IA: 0.2 AI (ref 0–0.8)
CO2 SERPL-SCNC: 26 MMOL/L (ref 20–32)
CREAT SERPL-MCNC: 0.65 MG/DL (ref 0.66–1.25)
DIFFERENTIAL METHOD BLD: NORMAL
EBV NA IGG SER QL IA: 0.2 AI (ref 0–0.8)
EBV VCA IGG SER QL IA: 7.9 AI (ref 0–0.8)
EBV VCA IGM SER QL IA: NORMAL AI (ref 0–0.8)
EOSINOPHIL # BLD AUTO: 0.2 10E9/L (ref 0–0.7)
EOSINOPHIL NFR BLD AUTO: 3.3 %
ERYTHROCYTE [DISTWIDTH] IN BLOOD BY AUTOMATED COUNT: 13.5 % (ref 10–15)
GFR SERPL CREATININE-BSD FRML MDRD: ABNORMAL ML/MIN/1.7M2
GLUCOSE SERPL-MCNC: 93 MG/DL (ref 70–99)
HCT VFR BLD AUTO: 45.7 % (ref 40–53)
HGB BLD-MCNC: 15.6 G/DL (ref 13.3–17.7)
IMM GRANULOCYTES # BLD: 0 10E9/L (ref 0–0.4)
IMM GRANULOCYTES NFR BLD: 0.2 %
LYMPHOCYTES # BLD AUTO: 1.3 10E9/L (ref 0.8–5.3)
LYMPHOCYTES NFR BLD AUTO: 21.1 %
MAGNESIUM SERPL-MCNC: 1.8 MG/DL (ref 1.6–2.3)
MCH RBC QN AUTO: 28.8 PG (ref 26.5–33)
MCHC RBC AUTO-ENTMCNC: 34.1 G/DL (ref 31.5–36.5)
MCV RBC AUTO: 84 FL (ref 78–100)
MONOCYTES # BLD AUTO: 0.8 10E9/L (ref 0–1.3)
MONOCYTES NFR BLD AUTO: 11.8 %
NEUTROPHILS # BLD AUTO: 4 10E9/L (ref 1.6–8.3)
NEUTROPHILS NFR BLD AUTO: 63.4 %
NRBC # BLD AUTO: 0 10*3/UL
NRBC BLD AUTO-RTO: 0 /100
NT-PROBNP SERPL-MCNC: 34 PG/ML (ref 0–125)
PHOSPHATE SERPL-MCNC: 4.3 MG/DL (ref 2.5–4.5)
PLATELET # BLD AUTO: 192 10E9/L (ref 150–450)
POTASSIUM SERPL-SCNC: 3.7 MMOL/L (ref 3.4–5.3)
PROT SERPL-MCNC: 7.7 G/DL (ref 6.8–8.8)
RBC # BLD AUTO: 5.42 10E12/L (ref 4.4–5.9)
SODIUM SERPL-SCNC: 142 MMOL/L (ref 133–144)
TROPONIN I SERPL-MCNC: 0.14 UG/L (ref 0–0.04)
WBC # BLD AUTO: 6.3 10E9/L (ref 4–11)

## 2017-04-20 PROCEDURE — 83735 ASSAY OF MAGNESIUM: CPT | Performed by: PEDIATRICS

## 2017-04-20 PROCEDURE — 93005 ELECTROCARDIOGRAM TRACING: CPT | Mod: ZF

## 2017-04-20 PROCEDURE — 86832 HLA CLASS I HIGH DEFIN QUAL: CPT | Performed by: PEDIATRICS

## 2017-04-20 PROCEDURE — 86665 EPSTEIN-BARR CAPSID VCA: CPT | Performed by: PEDIATRICS

## 2017-04-20 PROCEDURE — 99213 OFFICE O/P EST LOW 20 MIN: CPT | Mod: 25,ZF

## 2017-04-20 PROCEDURE — 72100 X-RAY EXAM L-S SPINE 2/3 VWS: CPT

## 2017-04-20 PROCEDURE — 86833 HLA CLASS II HIGH DEFIN QUAL: CPT | Performed by: PEDIATRICS

## 2017-04-20 PROCEDURE — 82550 ASSAY OF CK (CPK): CPT | Performed by: PEDIATRICS

## 2017-04-20 PROCEDURE — 84484 ASSAY OF TROPONIN QUANT: CPT | Performed by: PEDIATRICS

## 2017-04-20 PROCEDURE — 73522 X-RAY EXAM HIPS BI 3-4 VIEWS: CPT

## 2017-04-20 PROCEDURE — 80197 ASSAY OF TACROLIMUS: CPT | Performed by: PEDIATRICS

## 2017-04-20 PROCEDURE — 87799 DETECT AGENT NOS DNA QUANT: CPT | Performed by: PEDIATRICS

## 2017-04-20 PROCEDURE — 71020 XR CHEST 2 VW: CPT

## 2017-04-20 PROCEDURE — 85025 COMPLETE CBC W/AUTO DIFF WBC: CPT | Performed by: PEDIATRICS

## 2017-04-20 PROCEDURE — 86644 CMV ANTIBODY: CPT | Performed by: PEDIATRICS

## 2017-04-20 PROCEDURE — 72070 X-RAY EXAM THORAC SPINE 2VWS: CPT

## 2017-04-20 PROCEDURE — 76770 US EXAM ABDO BACK WALL COMP: CPT

## 2017-04-20 PROCEDURE — 36415 COLL VENOUS BLD VENIPUNCTURE: CPT | Performed by: PEDIATRICS

## 2017-04-20 PROCEDURE — 84100 ASSAY OF PHOSPHORUS: CPT | Performed by: PEDIATRICS

## 2017-04-20 PROCEDURE — 80053 COMPREHEN METABOLIC PANEL: CPT | Performed by: PEDIATRICS

## 2017-04-20 PROCEDURE — 83880 ASSAY OF NATRIURETIC PEPTIDE: CPT | Performed by: PEDIATRICS

## 2017-04-20 PROCEDURE — 93306 TTE W/DOPPLER COMPLETE: CPT

## 2017-04-20 PROCEDURE — 86664 EPSTEIN-BARR NUCLEAR ANTIGEN: CPT | Performed by: PEDIATRICS

## 2017-04-20 ASSESSMENT — PAIN SCALES - GENERAL: PAINLEVEL: NO PAIN (0)

## 2017-04-20 NOTE — PATIENT INSTRUCTIONS
1) Heart catheterization planned for tomorrow. Will get tacrolimus level.   2) Will page dental and see if they are available to do an exam with heart catheterization.   3) Plan for transition to adult heart transplant team in 6 months.

## 2017-04-20 NOTE — LETTER
2017      RE: Obed iVramontes  [address]  [facility] [address]       [facility]  [address] [place] [address]    Pediatric Heart Failure and Transplant Clinic Note  Date of referral:2012  Date of initial consult: 2012  Date of note: 2017  Re: Obed Viramontes  MR #: [id number]  : 1995    HPI:  Obed is a 21 year old male with history of heart transplant (3/23/2012) and posttransplant lymphoproliferative disorder (diagnosed 2012) who transferred care to [facility] in 2012.   He is in clinic today with his mother for routine followup now 5 years post-transplant.   He has done well from an infection/PTLD standpoint and came off valcyte therapy in 2013 with negative EBV PCR in 2014, no recent fevers, no recurrence of mouth sores, no chest pain, palpitations, tachycardia, dizziness or syncope.  He reports good energy.  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  He has had some issues with edema of left leg after working long shifts, improved with elevation and rest.  He did undergo orthopedic surgery for his left hand contracture in October, admitted in January with a cellulitis of that hand that has completely resolved. He is scheduled to undergo plate removal from that hand in October this year.   Comprehensive review of systems is otherwise negative today.     In reviewing his history, he was born at 38 weeks with uncomplicated pregnancy and delivery, weighed 7 pounds.  He had uncomplicated  course and was healthy in early childhood.  He was hospitalized at age 1 1/2 years with viral myocarditis, hospitalized for 6 weeks, intubated for 10 days, had recovery of myocardial function and was treated chronically with digoxin, hospital course complicated by cerobrovascular accident with resultant left hemiplegia, transitioned to [facility] rehab for an additional 5 weeks following his hospital  course.  He has had several orthopedic surgeries throughout childhood because of his hemiplegia, but otherwise has been healthy.  He was asymptomatic from a cardiac standpoint until February of this year, at which time he presented with chest pain.  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  He was transplanted on March 23, 2012, did well post transplant and was discharged after 1 week.  He reportedly has done well from a cardiac standpoint with no rejection, 1R biopsy in May and 1R biopsy in September but otherwise negative biopsies.  He presented in September with mouth sores and difficulty taking medication, initially treated with lowering his immunosuppression, but was rehospitalized with fever and worsening mouth sores.  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  He was treated with rituximab x 2, and increased prednisone dose from 2.5mg daily back up 10mg twice daily.  He was scheduled to undergo additional rituximab dose but symptoms improved and repeat CT Scan showed improvement in his lymph node and tonsillar hypertrophy.  Per mom he was EBV negative prior to transplant, and she thinks his donor was EBV positive.    Past medical/Past surgical history:  Born 38 weeks, 7 pound birth weight  Hospitalized at age 1 1/2 years with viral myocarditis  Cerebrovascular accident at age 1 1/2 with resultant left hemiplegia  Multiple orthopedic surgeries in past  Cardiomyopathy  Heart transplant March 23, 2012  PTLD, EBV positive      SOCIAL HISTORY:   Lives with dad, works at [employer] theater.     Family history:  Negative for congenital heart defects, heart transplant, cardiomyopathy, sudden death.   "Sister with type 1 DM.       Medications:  Prescription Medications as of 4/20/2017             amLODIPine (NORVASC) 5 MG tablet Take 1 tablet (5 mg) by mouth daily    mycophenolate (CELLCEPT - GENERIC EQUIVALENT) 500 MG tablet Take 1 tablet (500 mg) by mouth 2 times daily    tacrolimus (PROGRAF - GENERIC EQUIVALENT) 1 MG capsule Take 2 mg in the morning and 1.5 mg in the evening    tacrolimus (PROGRAF - GENERIC EQUIVALENT) 0.5 MG capsule Take 1 capsule by mouth every evening (total dose is 2mg in am and 1.5mg in pm)    pravastatin (PRAVACHOL) 10 MG tablet Take 1 tablet (10 mg) by mouth daily At bedtime    aspirin 81 MG EC tablet Take 2 tablets (162 mg) by mouth daily    Cholecalciferol (VITAMIN D3) 2000 UNITS TABS Take 2,000 Units by mouth daily        Physical Exam:  /83 (BP Location: Right arm, Patient Position: Chair, Cuff Size: Adult Regular)  Pulse 96  Temp 97.9  F (36.6  C) (Oral)  Resp 16  Ht 6' 1.31\" (186.2 cm)  Wt 134 lb 11.2 oz (61.1 kg)  SpO2 100%  BMI 17.62 kg/m2    Gen: Alert, interactive and appropriate, sitting, no respiratory distress.   Chest:  normal breath sounds bilaterally, no wheeze, crackles, or rhonchi, good air entry  CV: RRR, normal S1 and S2, no murmurs, rubs, gallops  Abd: abdomen is soft without significant tenderness, masses, organomegaly or guarding  Ext: wwp, 2+ pulses upper and 2+ lower extremitiy without delay, no edema  Obed had evaluation today with labs, ecg, echocardiogram, imaging.  His echo showed normal post transplant anatomy, normal ventricular function with EF 64%, normal diastolic function with LVRI of 0.9,  no pericardial effusion.  His ecg showed normal sinus rhythm, rate of 93 beats per minute, interventricular conduction delay, no st or twave changes. His labs included a comprehensive metabolic panel which was normal, specifically bun of 13, creatinine of 0.65, normal LFTs.  troponin mildly elevated at 0.144.  He had a cbc which had normal wbc count of " 6.3, hemoglobin of 15.6, platelets of 510930.  He had ebv, cmv, pra, and tacrolimus level that are pending.  His cxr showed clear lungs.   His last biopsy on 4/18/16 showed no rejection, grade 1R, pAMR0, negative C3d/C4d staining.  His last EBV from 10/13/16 showed negative PCR, negative IgG and IgM, negative CMV PCR.    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 Of hypertrophic cardiomyopathy picture and glycogen storage disease).  He also has history of EBV positive PTLD and is currently doing well. From a cardiac standpoint, Obed is doing extremely well with no current signs/symptoms of rejection, however his troponin is mildly elevated today.  We made no medication changes today. He is scheduled to undergo heart catheterization tomorrow with right and left heart cath, coronary angiograms, and biopsy.  We will discuss medication changes after that.    We again re-referred to Dr. Evgeny Decker (genetics/metabolism) today and will plan to have Obed see him to have some counseling, blood and urine testing done to look for evidene of glycogen storage disease.      Diagnosis summary:  1. Viral myocarditis at age 2 1/2  2. Dilated cardiomyopathy           A. S/p orthotopic heart transplant (3/23/12)  3. Cerebrovascular accident at age 2 1/2 with resultant left hemiplegia  4. Multiple orthopedic surgeries in past  5. PTLD, EBV positive            A.  treated with rituximab x 2, and increased prednisone dose from 2.5mg daily back up 10mg twice daily      Thank you for the opportunity to participate in his care.  Please let me know if you have further questions.    Sincerely,    Bobbi Pruitt MD  Medical Director, Pediatric Heart Failure and  Transplant    CC  Patient Care Team:  Rayo Schafer MD as PCP - General (Pediatrics)  Isrrael Trent MD as MD (Oncology)    Copy to patient  [address]NCARNA[address]    [address]  [facility] [id number]

## 2017-04-20 NOTE — NURSING NOTE
Obed reports doing well. 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 His mom's only concern is that he has not seen the dentist.

## 2017-04-20 NOTE — NURSING NOTE
"Chief Complaint   Patient presents with     Heart Problem     Heart transplant.       Initial /83 (BP Location: Right arm, Patient Position: Chair, Cuff Size: Adult Regular)  Pulse 96  Temp 97.9  F (36.6  C) (Oral)  Resp 16  Ht 6' 1.31\" (186.2 cm)  Wt 134 lb 11.2 oz (61.1 kg)  SpO2 100%  BMI 17.62 kg/m2 Estimated body mass index is 17.62 kg/(m^2) as calculated from the following:    Height as of this encounter: 6' 1.31\" (186.2 cm).    Weight as of this encounter: 134 lb 11.2 oz (61.1 kg).  Medication Reconciliation: complete       Christina Mason M.A.    "

## 2017-04-20 NOTE — PROGRESS NOTES
Rayo Schafer  [facility]  [address] [place] [address]    Pediatric Heart Failure and Transplant Clinic Note  Date of referral:2012  Date of initial consult: 2012  Date of note: 2017  Re: Obed Viramontes  MR #: [id number]  : 1995    HPI:  Obed is a 21 year old male with history of heart transplant (3/23/2012) and posttransplant lymphoproliferative disorder (diagnosed 2012) who transferred care to [facility] in 2012.   He is in clinic today with his mother for routine followup now 5 years post-transplant.   He has done well from an infection/PTLD standpoint and came off valcyte therapy in 2013 with negative EBV PCR in 2014, no recent fevers, no recurrence of mouth sores, no chest pain, palpitations, tachycardia, dizziness or syncope.  He reports good energy.  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  He has had some issues with edema of left leg after working long shifts, improved with elevation and rest.  He did undergo orthopedic surgery for his left hand contracture in October, admitted in January with a cellulitis of that hand that has completely resolved. He is scheduled to undergo plate removal from that hand in October this year.   Comprehensive review of systems is otherwise negative today.     In reviewing his history, he was born at 38 weeks with uncomplicated pregnancy and delivery, weighed 7 pounds.  He had uncomplicated  course and was healthy in early childhood.  He was hospitalized at age 2 1/2 years with viral myocarditis, hospitalized for 6 weeks, intubated for 10 days, had recovery of myocardial function and was treated chronically with digoxin, hospital course complicated by cerobrovascular accident with resultant left hemiplegia, transitioned to [facility] rehab for an additional 5 weeks following his hospital course.  He has had several orthopedic surgeries throughout childhood  because of his hemiplegia, but otherwise has been healthy.  He was asymptomatic from a cardiac standpoint until February of this year, at which time he presented with chest pain.  He was found to have significant arrythmias, was admitted to [facility], evaluated and listed for transplant (mom reports underlying diagnosis of hypertrophic cardiomyopathy possibly secondary to glycogen storage disease?, some pathology results on native heart still pending).  He was transplanted on March 23, 2012, did well post transplant and was discharged after 1 week.  He reportedly has done well from a cardiac standpoint with no rejection, 1R biopsy in May and 1R biopsy in September but otherwise negative biopsies.  He presented in September with mouth sores and difficulty taking medication, initially treated with lowering his immunosuppression, but was rehospitalized with fever and worsening mouth sores.  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  He was treated with rituximab x 2, and increased prednisone dose from 2.5mg daily back up 10mg twice daily.  He was scheduled to undergo additional rituximab dose but symptoms improved and repeat CT Scan showed improvement in his lymph node and tonsillar hypertrophy.  Per mom he was EBV negative prior to transplant, and she thinks his donor was EBV positive.    Past medical/Past surgical history:  Born 38 weeks, 7 pound birth weight  Hospitalized at age 2 1/2 years with viral myocarditis  Cerebrovascular accident at age 2 1/2 with resultant left hemiplegia  Multiple orthopedic surgeries in past  Cardiomyopathy  Heart transplant March 23, 2012  PTLD, EBV positive      SOCIAL HISTORY:   Lives with dad, works at [employer].     Family history:  Negative for congenital heart defects, heart transplant, cardiomyopathy, sudden death.  Sister with type 1 DM.       Medications:  Prescription Medications as of  "4/20/2017             amLODIPine (NORVASC) 5 MG tablet Take 1 tablet (5 mg) by mouth daily    mycophenolate (CELLCEPT - GENERIC EQUIVALENT) 500 MG tablet Take 1 tablet (500 mg) by mouth 2 times daily    tacrolimus (PROGRAF - GENERIC EQUIVALENT) 1 MG capsule Take 2 mg in the morning and 1.5 mg in the evening    tacrolimus (PROGRAF - GENERIC EQUIVALENT) 0.5 MG capsule Take 1 capsule by mouth every evening (total dose is 2mg in am and 1.5mg in pm)    pravastatin (PRAVACHOL) 10 MG tablet Take 1 tablet (10 mg) by mouth daily At bedtime    aspirin 81 MG EC tablet Take 2 tablets (162 mg) by mouth daily    Cholecalciferol (VITAMIN D3) 2000 UNITS TABS Take 2,000 Units by mouth daily        Physical Exam:  /83 (BP Location: Right arm, Patient Position: Chair, Cuff Size: Adult Regular)  Pulse 96  Temp 97.9  F (36.6  C) (Oral)  Resp 16  Ht 6' 1.31\" (186.2 cm)  Wt 134 lb 11.2 oz (61.1 kg)  SpO2 100%  BMI 17.62 kg/m2    Gen: Alert, interactive and appropriate, sitting, no respiratory distress.   Chest:  normal breath sounds bilaterally, no wheeze, crackles, or rhonchi, good air entry  CV: RRR, normal S1 and S2, no murmurs, rubs, gallops  Abd: abdomen is soft without significant tenderness, masses, organomegaly or guarding  Ext: wwp, 2+ pulses upper and 2+ lower extremitiy without delay, no edema  Obed had evaluation today with labs, ecg, echocardiogram, imaging.  His echo showed normal post transplant anatomy, normal ventricular function with EF 64%, normal diastolic function with LVRI of 0.9,  no pericardial effusion.  His ecg showed normal sinus rhythm, rate of 93 beats per minute, interventricular conduction delay, no st or twave changes. His labs included a comprehensive metabolic panel which was normal, specifically bun of 13, creatinine of 0.65, normal LFTs.  troponin mildly elevated at 0.144.  He had a cbc which had normal wbc count of 6.3, hemoglobin of 15.6, platelets of 540380.  He had ebv, cmv, pra, and " tacrolimus level that are pending.  His cxr showed clear lungs.   His last biopsy on 4/18/16 showed no rejection, grade 1R, pAMR0, negative C3d/C4d staining.  His last EBV from 10/13/16 showed negative PCR, negative IgG and IgM, negative CMV PCR.    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 Of hypertrophic cardiomyopathy picture and glycogen storage disease).  He also has history of EBV positive PTLD and is currently doing well. From a cardiac standpoint, Obed is doing extremely well with no current signs/symptoms of rejection, however his troponin is mildly elevated today.  We made no medication changes today. He is scheduled to undergo heart catheterization tomorrow with right and left heart cath, coronary angiograms, and biopsy.  We will discuss medication changes after that.    We again re-referred to Dr. Evgeny Decker (genetics/metabolism) today and will plan to have Obed see him to have some counseling, blood and urine testing done to look for evidene of glycogen storage disease.      Diagnosis summary:  1. Viral myocarditis at age 2 1/2  2. Dilated cardiomyopathy           A. S/p orthotopic heart transplant (3/23/12)  3. Cerebrovascular accident at age 2 1/2 with resultant left hemiplegia  4. Multiple orthopedic surgeries in past  5. PTLD, EBV positive            A.  treated with rituximab x 2, and increased prednisone dose from 2.5mg daily back up 10mg twice daily      Thank you for the opportunity to participate in his care.  Please let me know if you have further questions.    Sincerely,    Bobbi Pruitt MD  Medical Director, Pediatric Heart Failure and Transplant    CC  Patient Care Team:  Rayo Schafer MD as PCP - General  (Pediatrics)  Aimee Lombardi MD as MD (Oncology)  Bobbi Pruitt MD as MD (Pediatric Cardiology)  AIMEE LOMBARDI    Copy to patient  [address] ENCARNACION    [address]  [facility] [id number]

## 2017-04-20 NOTE — Clinical Note
Please schedule in 6 months for ECHO, EKG, and labs. Will coordinate this to have adult and peds see him at the same time.

## 2017-04-20 NOTE — MR AVS SNAPSHOT
After Visit Summary   4/20/2017    Obed Viramontes    MRN: [id number]           Patient Information     Date Of Birth          1995        Visit Information        Provider Department      4/20/2017 11:30 AM Bobbi Pruitt MD Peds Cardiac Transplant        Today's Diagnoses     Heart replaced by transplant (H)          Care Instructions    1) Heart catheterization planned for tomorrow. Will get tacrolimus level.   2) Will page dental and see if they are available to do an exam with heart catheterization.   3) Plan for transition to adult heart transplant team in 6 months.        Follow-ups after your visit        Follow-up notes from your care team     Return in about 6 months (around 10/20/2017).      Your next 10 appointments already scheduled     Apr 20, 2017 11:30 AM CDT   Return Visit with MD Mallory Bates Cardiac Transplant ([facility])    [facility]  [address] [facility]  [address] [address] [phone]            Apr 21, 2017   Procedure with Bobbi Pruitt MD   [facility] [place], Same Day Surgery (--)    [address] [address] [phone]              Who to contact     Please call your clinic at [phone] to:    Ask questions about your health    Make or cancel appointments    Discuss your medicines    Learn about your test results    Speak to your doctor   If you have compliments or concerns about an experience at your clinic, or if you wish to file a complaint, please contact [facility] Patient Relations at [phone] or email us at [email or web address]         Additional Information About Your Visit        MyChart Information     Raidarrr is an electronic gateway that provides easy, online access to your medical records. With Raidarrr, you can request a clinic appointment, read your test results, renew a prescription or communicate with your care team.     To  "sign up for MyChart visit the website at [email or web address]   You will be asked to enter the access code listed below, as well as some personal information. Please follow the directions to create your username and password.     Your access code is: [email or web address]  Expires: 2017 11:17 AM     Your access code will  in 90 days. If you need help or a new code, please contact your [facility] or call [phone] for assistance.        Care EveryWhere ID     This is your Care EveryWhere ID. This could be used by other organizations to access your [place] medical records  [id number]        Your Vitals Were     Pulse Temperature Respirations Height Pulse Oximetry BMI (Body Mass Index)    96 97.9  F (36.6  C) (Oral) 16 6' 1.31\" (186.2 cm) 100% 17.62 kg/m2       Blood Pressure from Last 3 Encounters:   17 121/83   17 111/70   10/20/16 115/89    Weight from Last 3 Encounters:   17 134 lb 11.2 oz (61.1 kg)   17 132 lb 4.4 oz (60 kg)   10/20/16 134 lb 14.7 oz (61.2 kg)              We Performed the Following     CBC with platelets differential     CK total     CMV Antibody IgG     CMV DNA quantification     Comprehensive metabolic panel     EBV Capsid Antibody IgG     EBV Capsid Antibody IgM     EBV DNA PCR Quantitative Whole Blood     EBV Nuclear Antigen EBNA Antibody IgG     EKG 12 lead - pediatric     Magnesium     N terminal pro BNP outpatient     Phosphorus     PRA Donor Specific Antibody     PRA Single Antigen IgG Antibody     Troponin I        Primary Care Provider Office Phone # Fax #    Rayo Schafer [phone]       [facility] [address]  [place] [address]        Thank you!     Thank you for choosing [facility]  for your care. Our goal is always to provide you with excellent care. Hearing back from our patients is one way we can continue to improve our services. Please take a " few minutes to complete the written survey that you may receive in the mail after your visit with us. Thank you!             Your Updated Medication List - Protect others around you: Learn how to safely use, store and throw away your medicines at www.disposemymeds.org.          This list is accurate as of: 4/20/17 11:17 AM.  Always use your most recent med list.                   Brand Name Dispense Instructions for use    amLODIPine 5 MG tablet    NORVASC    90 tablet    Take 1 tablet (5 mg) by mouth daily       aspirin 81 MG EC tablet     60 tablet    Take 2 tablets (162 mg) by mouth daily       mycophenolate 500 MG tablet    CELLCEPT - GENERIC EQUIVALENT    180 tablet    Take 1 tablet (500 mg) by mouth 2 times daily       pravastatin 10 MG tablet    PRAVACHOL    90 tablet    Take 1 tablet (10 mg) by mouth daily At bedtime       * tacrolimus 1 MG capsule    PROGRAF - GENERIC EQUIVALENT    270 capsule    Take 2 mg in the morning and 1.5 mg in the evening       * tacrolimus 0.5 MG capsule    PROGRAF - GENERIC EQUIVALENT    90 capsule    Take 1 capsule by mouth every evening (total dose is 2mg in am and 1.5mg in pm)       Vitamin D3 2000 UNITS Tabs     30 tablet    Take 2,000 Units by mouth daily       * Notice:  This list has 2 medication(s) that are the same as other medications prescribed for you. Read the directions carefully, and ask your doctor or other care provider to review them with you.

## 2017-04-21 ENCOUNTER — ANESTHESIA (OUTPATIENT)
Dept: SURGERY | Facility: CLINIC | Age: 22
End: 2017-04-21
Payer: COMMERCIAL

## 2017-04-21 ENCOUNTER — APPOINTMENT (OUTPATIENT)
Dept: CARDIOLOGY | Facility: CLINIC | Age: 22
End: 2017-04-21
Attending: PEDIATRICS
Payer: COMMERCIAL

## 2017-04-21 ENCOUNTER — HOSPITAL ENCOUNTER (OUTPATIENT)
Facility: CLINIC | Age: 22
Discharge: HOME | End: 2017-04-21
Attending: PEDIATRICS | Admitting: PEDIATRICS
Payer: COMMERCIAL

## 2017-04-21 ENCOUNTER — TELEPHONE (OUTPATIENT)
Dept: PEDIATRIC CARDIOLOGY | Facility: CLINIC | Age: 22
End: 2017-04-21

## 2017-04-21 ENCOUNTER — SURGERY (OUTPATIENT)
Age: 22
End: 2017-04-21

## 2017-04-21 VITALS
SYSTOLIC BLOOD PRESSURE: 115 MMHG | BODY MASS INDEX: 18.26 KG/M2 | RESPIRATION RATE: 12 BRPM | DIASTOLIC BLOOD PRESSURE: 72 MMHG | TEMPERATURE: 98 F | HEIGHT: 73 IN | WEIGHT: 137.79 LBS | OXYGEN SATURATION: 100 %

## 2017-04-21 LAB
BASE EXCESS BLDA CALC-SCNC: 0.7 MMOL/L
CA-I BLD-MCNC: 4.7 MG/DL (ref 4.4–5.2)
CHOLEST SERPL-MCNC: 91 MG/DL
CMV DNA SPEC NAA+PROBE-ACNC: NORMAL [IU]/ML
CMV DNA SPEC NAA+PROBE-LOG#: NORMAL {LOG_IU}/ML
COPATH REPORT: NORMAL
EBV DNA # SPEC NAA+PROBE: NORMAL {COPIES}/ML
EBV DNA SPEC NAA+PROBE-LOG#: NORMAL {LOG_COPIES}/ML
GLUCOSE BLD-MCNC: 106 MG/DL (ref 70–99)
HCO3 BLD-SCNC: 27 MMOL/L (ref 21–28)
HDLC SERPL-MCNC: 43 MG/DL
HGB BLD-MCNC: 13.2 G/DL (ref 13.3–17.7)
INTERPRETATION ECG - MUSE: NORMAL
LACTATE BLD-SCNC: 0.9 MMOL/L (ref 0.7–2.1)
LDLC SERPL CALC-MCNC: 38 MG/DL
NONHDLC SERPL-MCNC: 48 MG/DL
O2/TOTAL GAS SETTING VFR VENT: 21 %
OXYHGB MFR BLD: 96 % (ref 92–100)
PCO2 BLD: 50 MM HG (ref 35–45)
PH BLD: 7.34 PH (ref 7.35–7.45)
PO2 BLD: 90 MM HG (ref 80–105)
POTASSIUM BLD-SCNC: 3.7 MMOL/L (ref 3.4–5.3)
PRA DONOR SPECIFIC ABY: NORMAL
PRA SINGLE ANTIGEN IGG ANTIBODY: NORMAL
SODIUM BLD-SCNC: 140 MMOL/L (ref 133–144)
SPECIMEN SOURCE: NORMAL
TACROLIMUS BLD-MCNC: 5.6 UG/L (ref 5–15)
TME LAST DOSE: NORMAL H
TRIGL SERPL-MCNC: 49 MG/DL

## 2017-04-21 PROCEDURE — 40000477 ZZHCL STATISTIC IP IF DIRECT UMP PF 88346: Performed by: PEDIATRICS

## 2017-04-21 PROCEDURE — C1769 GUIDE WIRE: HCPCS

## 2017-04-21 PROCEDURE — 25500064 ZZH RX 255 OP 636: Performed by: PEDIATRICS

## 2017-04-21 PROCEDURE — 40000428 ZZHCL STATISTIC R-RUSH PROCESSING: Performed by: PEDIATRICS

## 2017-04-21 PROCEDURE — 82803 BLOOD GASES ANY COMBINATION: CPT

## 2017-04-21 PROCEDURE — 37000008 ZZH ANESTHESIA TECHNICAL FEE, 1ST 30 MIN: Performed by: PEDIATRICS

## 2017-04-21 PROCEDURE — 93505 ENDOMYOCARDIAL BIOPSY: CPT

## 2017-04-21 PROCEDURE — 71000014 ZZH RECOVERY PHASE 1 LEVEL 2 FIRST HR: Performed by: PEDIATRICS

## 2017-04-21 PROCEDURE — 25000128 H RX IP 250 OP 636: Performed by: NURSE ANESTHETIST, CERTIFIED REGISTERED

## 2017-04-21 PROCEDURE — 80061 LIPID PANEL: CPT | Performed by: PHYSICIAN ASSISTANT

## 2017-04-21 PROCEDURE — 02BK3ZX EXCISION OF RIGHT VENTRICLE, PERCUTANEOUS APPROACH, DIAGNOSTIC: ICD-10-PCS | Performed by: PEDIATRICS

## 2017-04-21 PROCEDURE — 83605 ASSAY OF LACTIC ACID: CPT

## 2017-04-21 PROCEDURE — 25800025 ZZH RX 258: Performed by: NURSE ANESTHETIST, CERTIFIED REGISTERED

## 2017-04-21 PROCEDURE — 27210769 ZZH KIT ACIST INJECTOR CR4

## 2017-04-21 PROCEDURE — 88346 IMFLUOR 1ST 1ANTB STAIN PX: CPT | Performed by: PEDIATRICS

## 2017-04-21 PROCEDURE — C1893 INTRO/SHEATH, FIXED,NON-PEEL: HCPCS

## 2017-04-21 PROCEDURE — 82810 BLOOD GASES O2 SAT ONLY: CPT

## 2017-04-21 PROCEDURE — 84295 ASSAY OF SERUM SODIUM: CPT

## 2017-04-21 PROCEDURE — 27210956 ZZH BALLOON TIP PRESSURE CR7

## 2017-04-21 PROCEDURE — 80197 ASSAY OF TACROLIMUS: CPT | Performed by: PHYSICIAN ASSISTANT

## 2017-04-21 PROCEDURE — 27210946 ZZH KIT HC TOTES DISP CR8

## 2017-04-21 PROCEDURE — B2111ZZ FLUOROSCOPY OF MULTIPLE CORONARY ARTERIES USING LOW OSMOLAR CONTRAST: ICD-10-PCS | Performed by: PEDIATRICS

## 2017-04-21 PROCEDURE — 71000027 ZZH RECOVERY PHASE 2 EACH 15 MINS: Performed by: PEDIATRICS

## 2017-04-21 PROCEDURE — C1887 CATHETER, GUIDING: HCPCS

## 2017-04-21 PROCEDURE — 82947 ASSAY GLUCOSE BLOOD QUANT: CPT

## 2017-04-21 PROCEDURE — 88350 IMFLUOR EA ADDL 1ANTB STN PX: CPT | Performed by: PEDIATRICS

## 2017-04-21 PROCEDURE — 27210841 ZZH MANIFOLD CR5

## 2017-04-21 PROCEDURE — 25000125 ZZHC RX 250

## 2017-04-21 PROCEDURE — 27210806 ZZH SHEATH CR5

## 2017-04-21 PROCEDURE — 88307 TISSUE EXAM BY PATHOLOGIST: CPT | Mod: 26 | Performed by: PEDIATRICS

## 2017-04-21 PROCEDURE — 93460 R&L HRT ART/VENTRICLE ANGIO: CPT

## 2017-04-21 PROCEDURE — 71000015 ZZH RECOVERY PHASE 1 LEVEL 2 EA ADDTL HR: Performed by: PEDIATRICS

## 2017-04-21 PROCEDURE — 84132 ASSAY OF SERUM POTASSIUM: CPT

## 2017-04-21 PROCEDURE — 37000009 ZZH ANESTHESIA TECHNICAL FEE, EACH ADDTL 15 MIN: Performed by: PEDIATRICS

## 2017-04-21 PROCEDURE — 27211047 ZZH FORCEP BIOPSY CR10

## 2017-04-21 PROCEDURE — 82330 ASSAY OF CALCIUM: CPT

## 2017-04-21 PROCEDURE — 25000125 ZZHC RX 250: Performed by: NURSE ANESTHETIST, CERTIFIED REGISTERED

## 2017-04-21 PROCEDURE — 40000170 ZZH STATISTIC PRE-PROCEDURE ASSESSMENT II: Performed by: PEDIATRICS

## 2017-04-21 PROCEDURE — 88307 TISSUE EXAM BY PATHOLOGIST: CPT | Performed by: PEDIATRICS

## 2017-04-21 PROCEDURE — 4A023N8 MEASUREMENT OF CARDIAC SAMPLING AND PRESSURE, BILATERAL, PERCUTANEOUS APPROACH: ICD-10-PCS | Performed by: PEDIATRICS

## 2017-04-21 RX ORDER — FENTANYL CITRATE 50 UG/ML
INJECTION, SOLUTION INTRAMUSCULAR; INTRAVENOUS PRN
Status: DISCONTINUED | OUTPATIENT
Start: 2017-04-21 | End: 2017-04-21

## 2017-04-21 RX ORDER — SODIUM CHLORIDE, SODIUM LACTATE, POTASSIUM CHLORIDE, CALCIUM CHLORIDE 600; 310; 30; 20 MG/100ML; MG/100ML; MG/100ML; MG/100ML
INJECTION, SOLUTION INTRAVENOUS CONTINUOUS PRN
Status: DISCONTINUED | OUTPATIENT
Start: 2017-04-21 | End: 2017-04-21

## 2017-04-21 RX ORDER — PROPOFOL 10 MG/ML
INJECTION, EMULSION INTRAVENOUS PRN
Status: DISCONTINUED | OUTPATIENT
Start: 2017-04-21 | End: 2017-04-21

## 2017-04-21 RX ORDER — LIDOCAINE 40 MG/G
CREAM TOPICAL
Status: DISCONTINUED | OUTPATIENT
Start: 2017-04-21 | End: 2017-04-21 | Stop reason: HOSPADM

## 2017-04-21 RX ORDER — LIDOCAINE 40 MG/G
CREAM TOPICAL ONCE
Status: DISCONTINUED | OUTPATIENT
Start: 2017-04-21 | End: 2017-04-21 | Stop reason: HOSPADM

## 2017-04-21 RX ORDER — IODIXANOL 320 MG/ML
INJECTION, SOLUTION INTRAVASCULAR PRN
Status: DISCONTINUED | OUTPATIENT
Start: 2017-04-21 | End: 2017-04-21 | Stop reason: HOSPADM

## 2017-04-21 RX ORDER — ACETAMINOPHEN 325 MG/1
10.4 TABLET ORAL
Status: DISCONTINUED | OUTPATIENT
Start: 2017-04-21 | End: 2017-04-21 | Stop reason: HOSPADM

## 2017-04-21 RX ORDER — PROPOFOL 10 MG/ML
INJECTION, EMULSION INTRAVENOUS CONTINUOUS PRN
Status: DISCONTINUED | OUTPATIENT
Start: 2017-04-21 | End: 2017-04-21

## 2017-04-21 RX ORDER — LIDOCAINE HYDROCHLORIDE 20 MG/ML
INJECTION, SOLUTION INFILTRATION; PERINEURAL PRN
Status: DISCONTINUED | OUTPATIENT
Start: 2017-04-21 | End: 2017-04-21

## 2017-04-21 RX ORDER — ACETAMINOPHEN 325 MG/1
10.4 TABLET ORAL EVERY 4 HOURS PRN
Status: DISCONTINUED | OUTPATIENT
Start: 2017-04-21 | End: 2017-04-21 | Stop reason: HOSPADM

## 2017-04-21 RX ORDER — ONDANSETRON 2 MG/ML
INJECTION INTRAMUSCULAR; INTRAVENOUS PRN
Status: DISCONTINUED | OUTPATIENT
Start: 2017-04-21 | End: 2017-04-21

## 2017-04-21 RX ADMIN — MIDAZOLAM HYDROCHLORIDE 1 MG: 1 INJECTION, SOLUTION INTRAMUSCULAR; INTRAVENOUS at 08:11

## 2017-04-21 RX ADMIN — ONDANSETRON 4 MG: 2 INJECTION INTRAMUSCULAR; INTRAVENOUS at 09:26

## 2017-04-21 RX ADMIN — FENTANYL CITRATE 25 MCG: 50 INJECTION, SOLUTION INTRAMUSCULAR; INTRAVENOUS at 08:13

## 2017-04-21 RX ADMIN — PROPOFOL 50 MCG/KG/MIN: 10 INJECTION, EMULSION INTRAVENOUS at 08:08

## 2017-04-21 RX ADMIN — PROPOFOL 50 MG: 10 INJECTION, EMULSION INTRAVENOUS at 08:06

## 2017-04-21 RX ADMIN — LIDOCAINE HYDROCHLORIDE 60 MG: 20 INJECTION, SOLUTION INFILTRATION; PERINEURAL at 08:06

## 2017-04-21 RX ADMIN — SODIUM CHLORIDE, POTASSIUM CHLORIDE, SODIUM LACTATE AND CALCIUM CHLORIDE: 600; 310; 30; 20 INJECTION, SOLUTION INTRAVENOUS at 07:33

## 2017-04-21 RX ADMIN — IODIXANOL 29 ML: 320 INJECTION, SOLUTION INTRAVASCULAR at 09:26

## 2017-04-21 RX ADMIN — MIDAZOLAM HYDROCHLORIDE 2 MG: 1 INJECTION, SOLUTION INTRAMUSCULAR; INTRAVENOUS at 07:55

## 2017-04-21 NOTE — BRIEF OP NOTE
[facility]  BRIEF POST-PROCEDURE NOTE    Pre Cath CRISP score 3  Risk Category 2    Pre-procedure diagnosis S/P OHT   Post-procedure diagnosis same   Procedure 1. right heart cath  2. left heart cath  3. angiography  4. endomyocardial biopsy   Staff Dr Pruitt   Assistant(s) Korey Anna   Anesthesia monitored anesthesia care and local with 1% lidocaine   Access 7F RFV, 4F RFA    Specimens Endomyocardial biopsy x4   IV contrast 29 mL   Heparinized No   Blood loss < 5 mL   Complications None     Preliminary findings:      Normal hemodynamics    Normal cardiac output    Normal caliber right and left coronary artery systems without stenosis    S/P endomyocardial biopsy x 4 with no extravasation    Korey Anna, DO  Pediatric Cardiology  [facility]        Attestation:  This patient has been seen and evaluated by me, Bobbi Pruitt.  Discussed with the medical student, house staff team and/or resident(s) and agree with the findings and plan in this note.  I have reviewed today's vital signs, medications, labs and imaging.  Bobbi Pruitt MD    Discussed results of cath with parents. Troponin is elevated and concerning. We will followup on biopsy results and call family. If rejection on biopsy will plan to admit for iv steroids.

## 2017-04-21 NOTE — ANESTHESIA PREPROCEDURE EVALUATION
Anesthesia Evaluation    ROS/Med Hx   Comments:   Obed Viramontes is a 21 year old man who developed viral myocarditis at age 2 1/2 with a 6-week hospital course complicated by cerebral vascular accidents resulting in hemiplegia. He later developed a dilated cardiomyopathy and underwent heart transplant on 3/23/17. 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 Currently doing well. Plan for heart cath with biopsies.    Cardiovascular Findings   Comments:   Viral myocarditis at age 2 1/2   Dilated cardiomyopathy s/p heart transplant on 3/23/12    TTE 4/20/17  Patient after orthotopic heart transplant. Normal right and left ventricular  size and systolic function. The calculated biplane left ventricular ejection  fraction is 60-65%. LVRI of 0.89. Insufficient jet to estimate right  ventricular systolic pressure. No pericardial effusion.  No significant change from last echocardiogram.    ECG 4/20/17  normal sinus rhythm, rate of 93 beats per minute, interventricular conduction delay, no st or twave changes.      Neuro Findings   Comments:   Hemiplegia 2/2 cerebral vascular accident during hospitalization for viral myocarditis.     Pulmonary Findings - negative ROS  (-) recent URI          GI/Hepatic/Renal Findings - negative ROS  (-) GERD    Endocrine/Metabolic Findings       Comments: Patient has been referred for genetics/metabolism eval for question of glycogen storage disease associated with initial cardiomyopathy diagnosis at OSH.      Genetic/Syndrome Findings   Comments: There is a question of glycogen storage disease associated with his diagnosis of cardiomyopathy at an OSH. Patient has been referred for genetics/metabolism eval.    Hematology/Oncology Findings - negative hematology/oncology ROS    Additional Notes  Orthopedic surgeries related to hemiplegia.    Procedure: Procedure(s):  Right Heart Cath Procedure with Angio Biopsy     (latex allergy)  - Wound Class:       PMHx/PSHx:  Past Medical History:   Diagnosis Date     Cardiomyopathy, hypertrophic obstructive (H)      H/O heart transplant (H)     Mar.23, 2012     Hemiplegia following CVA (cerebrovascular accident) (H)     left, improved with rehab     Lymphoproliferative disease (H)      Unspecified cerebral artery occlusion with cerebral infarction     age 2 1/2       Past Surgical History:   Procedure Laterality Date     ARTHRODESIS WRIST Left 10/20/2016    Procedure: ARTHRODESIS WRIST;  Surgeon: Amna Tinoco MD;  Location: [facility] OR     BIOPSY      gingival and tonsillar biopsy     [facility] SURGICAL PATHOLOGY       HEART CATH CHILD  11/13/2012    Procedure: HEART CATH CHILD;  Right Heart Cath Procedure and Biopsy *Latex Allergy*;  Surgeon: oBbbi Pruitt MD;  Location: [facility] OR     HEART CATH CHILD  2/15/2013    Procedure: HEART CATH CHILD;  Right [place] Cath, Angiogram and Biopsy;  Surgeon: Bobbi Pruitt MD;  Location: [facility] OR     HEART CATH CHILD N/A 4/10/2015    Procedure: HEART CATH CHILD;  Surgeon: Bobbi Pruitt MD;  Location: [facility] OR     HEART CATH, BIOPSY  3/21/2014    Procedure: HEART CATH, BIOPSY;  Right and Left Heart Cath, Angiogram, Biopsy   *Latex Allergy*;  Surgeon: Bobbi Pruitt MD;  Location: [facility] OR     HEART CATH, BIOPSY Right 4/18/2016    Procedure: HEART CATH, BIOPSY;  Surgeon: Bobbi Pruitt MD;  Location: [facility] OR     INTERPOSITION TENDON HAND Left 10/20/2016    Procedure: INTERPOSITION TENDON HAND;  Surgeon: Amna Tinoco MD;  Location: [facility] OR     ORTHOPEDIC SURGERY      at [place], left lower extremity     PICC INSERTION       PICC REMOVAL       TRANSPLANT HEART RECIPIENT           No current facility-administered medications on file prior to encounter.   Current Outpatient Prescriptions on File Prior to Encounter:  amLODIPine (NORVASC) 5 MG tablet Take 1 tablet (5 mg) by mouth daily   mycophenolate (CELLCEPT -  GENERIC EQUIVALENT) 500 MG tablet Take 1 tablet (500 mg) by mouth 2 times daily   tacrolimus (PROGRAF - GENERIC EQUIVALENT) 1 MG capsule Take 2 mg in the morning and 1.5 mg in the evening   tacrolimus (PROGRAF - GENERIC EQUIVALENT) 0.5 MG capsule Take 1 capsule by mouth every evening (total dose is 2mg in am and 1.5mg in pm)   pravastatin (PRAVACHOL) 10 MG tablet Take 1 tablet (10 mg) by mouth daily At bedtime   aspirin 81 MG EC tablet Take 2 tablets (162 mg) by mouth daily   Cholecalciferol (VITAMIN D3) 2000 UNITS TABS Take 2,000 Units by mouth daily       Physical Exam  Normal systems: pulmonary and dental    Airway   Mallampati: II  TM distance: >3 FB  Neck ROM: full    Dental     Cardiovascular   Rhythm and rate: regular and normal      Pulmonary    breath sounds clear to auscultation          Anesthesia Plan      History & Physical Review      ASA Status:  2 .    NPO Status:  > 8 hours    Plan for MAC with Intravenous induction. Maintenance will be TIVA.    PONV prophylaxis:  Ondansetron (or other 5HT-3)    - Natural airway with LMA/ETT backup  - TIVA with propofol infusion  - Relevant risks, benefits, alternatives and the anesthetic plan were discussed with patient/family or family representative.  All questions were answered and there was agreement to proceed.          Postoperative Care  Postoperative pain management:  IV analgesics.      Consents  Anesthetic plan, risks, benefits and alternatives discussed with:  Parent (Mother and/or Father) and Patient.  Use of blood products discussed: No .   .          Britney Calderon MD  Staff Pediatric Anesthesiologist  [phone]    10:21 PM  April 20, 2017

## 2017-04-21 NOTE — ANESTHESIA POSTPROCEDURE EVALUATION
Patient: Obed Viramontes    Procedure(s):  Right Heart Cath Procedure with Angio Biopsy    (latex allergy)     Diagnosis:Post Heart Transplant   Diagnosis Additional Information: No value filed.    Anesthesia Type:  MAC    Note:  Anesthesia Post Evaluation    Patient location during evaluation: PACU and Bedside  Patient participation: Able to fully participate in evaluation  Level of consciousness: awake  Pain management: adequate  Airway patency: patent  Cardiovascular status: stable  Respiratory status: room air and spontaneous ventilation  Hydration status: acceptable  PONV: none     Anesthetic complications: None    Comments: Uneventful anesthetic and recovery.        Last vitals:  Vitals:    04/21/17 1145 04/21/17 1200 04/21/17 1230   BP: 109/68 100/67 101/66   Resp: 14 16 (!) 38   Temp:      SpO2: 97% 98% 98%         Electronically Signed By: Britney Calderon MD  April 21, 2017  12:46 PM

## 2017-04-21 NOTE — IP AVS SNAPSHOT
MRN:[id number]                      After Visit Summary   4/21/2017    Obed Viramontes    MRN: [id number]           Thank you!     Thank you for choosing [facility] for your care. Our goal is always to provide you with excellent care. Hearing back from our patients is one way we can continue to improve our services. Please take a few minutes to complete the written survey that you may receive in the mail after you visit with us. Thank you!        Patient Information     Date Of Birth          1995        About your hospital stay     You were admitted on:  April 21, 2017 [facility]    You were discharged on:  April 21, 2017       Who to Call     For medical emergencies, please call 911.  For non-urgent questions about your medical care, please call your primary care provider or clinic, [phone]  For questions related to your surgery, please call your surgery clinic        Attending Provider     Provider Specialty    Bobbi Pruitt MD Pediatric Cardiology       Primary Care Provider Office Phone # Fax #    Rayo Schafer [phone]       [facility] [address] [place] [address]        Further instructions from your care team                                      [facility]  Cardiac Catheterization & Electrophysiology Laboratory  Discharge Instructions    Obed Viramontes MRN# [id number]   YOB: 1995 Age: 21 year old     Date of Admission:  4/21/2017  Date of Discharge:  No discharge date for patient encounter.  Physician:   Bobbi Pruitt MD    Primary Care Provider: Rayo Schafer           Diagnoses:   Heart transplant          Procedures, Findings, Outcomes, Recommendations, Plans:     Heart catheterization, angiography, endomyocardial biopsy           Pending Results:   Endomyocardial biopsy           Discharge Weight and  "Vitals:   Blood pressure 112/78, temperature 98.1  F (36.7  C), temperature source Oral, resp. rate 16, height 1.854 m (6' 1\"), weight 62.5 kg (137 lb 12.6 oz), SpO2 100 %.         Follow-Up Appointments:   Primary Care Provider: as needed  Dr. Pruitt:                            Transplant coordinator will contact patient/family regarding biopsy results and plan for follow-up          Wound Care, Monitoring, and Other Instructions:     Watch the right groin site closely for any bleeding, swelling, redness, discharge, or change in color/temperature/sensation of the R Leg    Call immediately if there is bleeding or fever    Keep the site clean and dry    You may leave the site uncovered; if you want to cover it with a band-aid be sure to change the band-aid any time it gets wet or dirty    Avoid vigorous activity for 48 hours to reduce the risk of bleeding from the site    Do not soak the site (bathe or swim) for 48 hours; okay to shower or sponge-bathe after 24 hours    If you have any questions about the site, either your primary care provider or your cardiologist can examine it    To reach [facility] cardiologist at any time please call [phone] (M-F 7:30 AM- 4:00 PM) or [phone] and ask for the on-call pediatric cardiologist (anytime)        Same-Day Surgery   Adult Discharge Orders & Instructions     For 24 hours after surgery:  1. Get plenty of rest.  A responsible adult must stay with you for at least 24 hours after you leave the hospital.   2. Pain medication can slow your reflexes. Do not drive or use heavy equipment.  If you have weakness or tingling, don't drive or use heavy equipment until this feeling goes away.  3. Mixing alcohol and pain medication can cause dizziness and slow your breathing. It can even be fatal. Do not drink alcohol while taking pain medication.  4. Avoid strenuous or risky activities.  Ask for help when climbing stairs.   5. You may " feel lightheaded.  If so, sit for a few minutes before standing.  Have someone help you get up.   6. If you have nausea (feel sick to your stomach), drink only clear liquids such as apple juice, ginger ale, broth or 7-Up.  Rest may also help.  Be sure to drink enough fluids.  Move to a regular diet as you feel able. Take pain medications with a small amount of solid food, such as toast or crackers, to avoid nausea.   7. A slight fever is normal. Call the doctor if your fever is over 100 F (37.7 C) (taken under the tongue) or lasts longer than 24 hours.  8. You may have a dry mouth, muscle aches, trouble sleeping or a sore throat.  These symptoms should go away after 24 hours.  9. Do not make important or legal decisions.   Pain Management:      1. Take pain medication (if prescribed) for pain as directed by your physician.        2. WARNING: If the pain medication you have been prescribed contains Tylenol (acetaminophen), DO NOT take additional doses of Tylenol (acetaminophen).     Call your doctor for any of the followin.  Signs of infection (fever, growing tenderness at the surgery site, severe pain, a large amount of drainage or bleeding, foul-smelling drainage, redness, swelling).    2.  It has been over 8 to 10 hours since surgery and you are still not able to urinate (pee).    3.  Headache for over 24 hours.    4.  Numbness, tingling or weakness the day after surgery (if you had spinal anesthesia).  To contact a doctor, call ___Dr. PruittXtadcwf_________[phone]___________ or:      [phone] and ask for the Resident On Call for:          ___________Pediatric Cardiology__________ (answered 24 hours a day)      Emergency Department:  [place] [facility]: [phone]  [place] [facility]: [phone]               Rev. 10/2014         Pending Results     Date and Time Order Name Status Description    2017 0939 Surgical pathology exam In process     2017 0926 Tacrolimus  "level In process     2017 0807 EKG 12 LEAD - PEDIATRIC Preliminary     2017 0806 PRA DONOR SPECIFIC ANTIBODY In process     2017 0806 PRA SINGLE ANTIGEN IGG ANTIBODY In process     2017 0806 EBV DNA PCR QUANTITATIVE WHOLE BLOOD In process             Admission Information     Date & Time Provider Department Dept. Phone    2017 Bobbi Pruitt MD [facility] PACU [phone]      Your Vitals Were     Blood Pressure Temperature Respirations Height Weight Pulse Oximetry    103/56 97.9  F (36.6  C) (Oral) 14 1.854 m (6' 1\") 62.5 kg (137 lb 12.6 oz) 100%    BMI (Body Mass Index)                   18.18 kg/m2           MyChart Information     [employer] lets you send messages to your doctor, view your test results, renew your prescriptions, schedule appointments and more. To sign up, go to [email or web address] . Click on \"Log in\" on the left side of the screen, which will take you to the Welcome page. Then click on \"Sign up Now\" on the right side of the page.     You will be asked to enter the access code listed below, as well as some personal information. Please follow the directions to create your username and password.     Your access code is: [email or web address]  Expires: 2017 11:17 AM     Your access code will  in 90 days. If you need help or a new code, please call your [place] clinic or [phone].        Care EveryWhere ID     This is your Care EveryWhere ID. This could be used by other organizations to access your [place] medical records  [id number]           Review of your medicines      UNREVIEWED medicines. Ask your doctor about these medicines        Dose / Directions    amLODIPine 5 MG tablet   Commonly known as:  NORVASC   Used for:  Heart replaced by transplant (H)        Dose:  5 mg   Take 1 tablet (5 mg) by mouth daily   Quantity:  90 tablet   Refills:  3       aspirin 81 MG EC tablet   Used for:  Status post transplant, heart (H)        Dose:  162 mg   Take 2 tablets " (162 mg) by mouth daily   Quantity:  60 tablet   Refills:  99       mycophenolate 500 MG tablet   Commonly known as:  CELLCEPT - GENERIC EQUIVALENT   Used for:  Heart transplanted (H)        Dose:  500 mg   Take 1 tablet (500 mg) by mouth 2 times daily   Quantity:  180 tablet   Refills:  11       pravastatin 10 MG tablet   Commonly known as:  PRAVACHOL   Used for:  Heart transplanted (H), Heart transplanted (H)        Dose:  10 mg   Take 1 tablet (10 mg) by mouth daily At bedtime   Quantity:  90 tablet   Refills:  6       * tacrolimus 1 MG capsule   Commonly known as:  PROGRAF - GENERIC EQUIVALENT   Used for:  Transplanted heart (H)        Take 2 mg in the morning and 1.5 mg in the evening   Quantity:  270 capsule   Refills:  11       * tacrolimus 0.5 MG capsule   Commonly known as:  PROGRAF - GENERIC EQUIVALENT   Used for:  Heart transplanted (H)        Take 1 capsule by mouth every evening (total dose is 2mg in am and 1.5mg in pm)   Quantity:  90 capsule   Refills:  11       Vitamin D3 2000 UNITS Tabs   Used for:  Heart transplanted (H)        Dose:  2000 Units   Take 2,000 Units by mouth daily   Quantity:  30 tablet   Refills:  6       * Notice:  This list has 2 medication(s) that are the same as other medications prescribed for you. Read the directions carefully, and ask your doctor or other care provider to review them with you.             Protect others around you: Learn how to safely use, store and throw away your medicines at www.disposemymeds.org.             Medication List: This is a list of all your medications and when to take them. Check marks below indicate your daily home schedule. Keep this list as a reference.      Medications           Morning Afternoon Evening Bedtime As Needed    amLODIPine 5 MG tablet   Commonly known as:  NORVASC   Take 1 tablet (5 mg) by mouth daily                                aspirin 81 MG EC tablet   Take 2 tablets (162 mg) by mouth daily                                 mycophenolate 500 MG tablet   Commonly known as:  CELLCEPT - GENERIC EQUIVALENT   Take 1 tablet (500 mg) by mouth 2 times daily                                pravastatin 10 MG tablet   Commonly known as:  PRAVACHOL   Take 1 tablet (10 mg) by mouth daily At bedtime                                * tacrolimus 1 MG capsule   Commonly known as:  PROGRAF - GENERIC EQUIVALENT   Take 2 mg in the morning and 1.5 mg in the evening                                * tacrolimus 0.5 MG capsule   Commonly known as:  PROGRAF - GENERIC EQUIVALENT   Take 1 capsule by mouth every evening (total dose is 2mg in am and 1.5mg in pm)                                Vitamin D3 2000 UNITS Tabs   Take 2,000 Units by mouth daily                                * Notice:  This list has 2 medication(s) that are the same as other medications prescribed for you. Read the directions carefully, and ask your doctor or other care provider to review them with you.

## 2017-04-21 NOTE — ANESTHESIA CARE TRANSFER NOTE
Patient: Obed Viramontes    Procedure(s):  Right Heart Cath Procedure with Angio Biopsy    (latex allergy)     Diagnosis: Post Heart Transplant   Diagnosis Additional Information: No value filed.    Anesthesia Type:   MAC     Note:  Airway :Nasal Cannula  Patient transferred to:PACU  Comments: Pt to PACU, VSS.  Report to RN, questions answered.       Vitals: (Last set prior to Anesthesia Care Transfer)    CRNA VITALS  4/21/2017 0904 - 4/21/2017 0940      4/21/2017             Resp Rate (set): 10                Electronically Signed By: CEZAR Yanes CRNA  April 21, 2017  9:40 AM

## 2017-04-21 NOTE — DISCHARGE INSTRUCTIONS
"                               [facility]  Cardiac Catheterization & Electrophysiology Laboratory  Discharge Instructions    Obed Viramontes MRN# [id number]   YOB: 1995 Age: 21 year old     Date of Admission:  4/21/2017  Date of Discharge:  No discharge date for patient encounter.  Physician:   Bobbi Pruitt MD    Primary Care Provider: Rayo Schafer           Diagnoses:   Heart transplant          Procedures, Findings, Outcomes, Recommendations, Plans:     Heart catheterization, angiography, endomyocardial biopsy           Pending Results:   Endomyocardial biopsy           Discharge Weight and Vitals:   Blood pressure 112/78, temperature 98.1  F (36.7  C), temperature source Oral, resp. rate 16, height 1.854 m (6' 1\"), weight 62.5 kg (137 lb 12.6 oz), SpO2 100 %.         Follow-Up Appointments:   Primary Care Provider: as needed  Dr. Pruitt:                            Transplant coordinator will contact patient/family regarding biopsy results and plan for follow-up          Wound Care, Monitoring, and Other Instructions:     Watch the right groin site closely for any bleeding, swelling, redness, discharge, or change in color/temperature/sensation of the R Leg    Call immediately if there is bleeding or fever    Keep the site clean and dry    You may leave the site uncovered; if you want to cover it with a band-aid be sure to change the band-aid any time it gets wet or dirty    Avoid vigorous activity for 48 hours to reduce the risk of bleeding from the site    Do not soak the site (bathe or swim) for 48 hours; okay to shower or sponge-bathe after 24 hours    If you have any questions about the site, either your primary care provider or your cardiologist can examine it    To reach [facility] cardiologist at any time please call [phone] (M-F 7:30 AM- 4:00 PM) or [phone] and ask for the " on-call pediatric cardiologist (anytime)        Same-Day Surgery   Adult Discharge Orders & Instructions     For 24 hours after surgery:  1. Get plenty of rest.  A responsible adult must stay with you for at least 24 hours after you leave the hospital.   2. Pain medication can slow your reflexes. Do not drive or use heavy equipment.  If you have weakness or tingling, don't drive or use heavy equipment until this feeling goes away.  3. Mixing alcohol and pain medication can cause dizziness and slow your breathing. It can even be fatal. Do not drink alcohol while taking pain medication.  4. Avoid strenuous or risky activities.  Ask for help when climbing stairs.   5. You may feel lightheaded.  If so, sit for a few minutes before standing.  Have someone help you get up.   6. If you have nausea (feel sick to your stomach), drink only clear liquids such as apple juice, ginger ale, broth or 7-Up.  Rest may also help.  Be sure to drink enough fluids.  Move to a regular diet as you feel able. Take pain medications with a small amount of solid food, such as toast or crackers, to avoid nausea.   7. A slight fever is normal. Call the doctor if your fever is over 100 F (37.7 C) (taken under the tongue) or lasts longer than 24 hours.  8. You may have a dry mouth, muscle aches, trouble sleeping or a sore throat.  These symptoms should go away after 24 hours.  9. Do not make important or legal decisions.   Pain Management:      1. Take pain medication (if prescribed) for pain as directed by your physician.        2. WARNING: If the pain medication you have been prescribed contains Tylenol (acetaminophen), DO NOT take additional doses of Tylenol (acetaminophen).     Call your doctor for any of the followin.  Signs of infection (fever, growing tenderness at the surgery site, severe pain, a large amount of drainage or bleeding, foul-smelling drainage, redness, swelling).    2.  It has been over 8 to 10 hours since surgery and  you are still not able to urinate (pee).    3.  Headache for over 24 hours.    4.  Numbness, tingling or weakness the day after surgery (if you had spinal anesthesia).  To contact a doctor, call ___Dr. PruittZkjxoke_________[phone]___________ or:      [phone] and ask for the Resident On Call for:          ___________Pediatric Cardiology__________ (answered 24 hours a day)      Emergency Department:  [place] [facility]: [phone]  [place] [facility]: [phone]               Rev. 10/2014

## 2017-04-21 NOTE — IP AVS SNAPSHOT
[place] [address] [facility] [address]    Phone:  [phone]                                       After Visit Summary   4/21/2017    Obed Viramontes    MRN: [id number]           After Visit Summary Signature Page     I have received my discharge instructions, and my questions have been answered. I have discussed any challenges I see with this plan with the nurse or doctor.    ..........................................................................................................................................  Patient/Patient Representative Signature      ..........................................................................................................................................  Patient Representative Print Name and Relationship to Patient    ..................................................               ................................................  Date                                            Time    ..........................................................................................................................................  Reviewed by Signature/Title    ...................................................              ..............................................  Date                                                            Time

## 2017-04-22 ENCOUNTER — HOSPITAL ENCOUNTER (INPATIENT)
Facility: CLINIC | Age: 22
LOS: 2 days | Discharge: HOME | DRG: 315 | End: 2017-04-24
Attending: PEDIATRICS | Admitting: PEDIATRICS
Payer: COMMERCIAL

## 2017-04-22 PROBLEM — T86.21 HEART TRANSPLANT REJECTION (H): Status: ACTIVE | Noted: 2017-04-22

## 2017-04-22 PROCEDURE — 12000014 ZZH R&B PEDS UMMC

## 2017-04-22 PROCEDURE — 25000131 ZZH RX MED GY IP 250 OP 636 PS 637: Performed by: INTERNAL MEDICINE

## 2017-04-22 PROCEDURE — 25000128 H RX IP 250 OP 636: Performed by: INTERNAL MEDICINE

## 2017-04-22 PROCEDURE — 25000132 ZZH RX MED GY IP 250 OP 250 PS 637: Performed by: INTERNAL MEDICINE

## 2017-04-22 PROCEDURE — 25000128 H RX IP 250 OP 636

## 2017-04-22 PROCEDURE — 25000131 ZZH RX MED GY IP 250 OP 636 PS 637: Performed by: PEDIATRICS

## 2017-04-22 RX ORDER — TACROLIMUS 1 MG/1
2 CAPSULE ORAL
Status: DISCONTINUED | OUTPATIENT
Start: 2017-04-22 | End: 2017-04-22

## 2017-04-22 RX ORDER — TACROLIMUS 1 MG/1
2 CAPSULE ORAL
Status: DISCONTINUED | OUTPATIENT
Start: 2017-04-22 | End: 2017-04-24 | Stop reason: HOSPADM

## 2017-04-22 RX ORDER — AMLODIPINE BESYLATE 5 MG/1
5 TABLET ORAL AT BEDTIME
Status: DISCONTINUED | OUTPATIENT
Start: 2017-04-23 | End: 2017-04-24 | Stop reason: HOSPADM

## 2017-04-22 RX ORDER — ASPIRIN 81 MG/1
162 TABLET ORAL DAILY
Status: DISCONTINUED | OUTPATIENT
Start: 2017-04-22 | End: 2017-04-24 | Stop reason: HOSPADM

## 2017-04-22 RX ORDER — MYCOPHENOLATE MOFETIL 500 MG/1
500 TABLET ORAL 2 TIMES DAILY
Status: DISCONTINUED | OUTPATIENT
Start: 2017-04-22 | End: 2017-04-24 | Stop reason: HOSPADM

## 2017-04-22 RX ORDER — PRAVASTATIN SODIUM 10 MG
10 TABLET ORAL DAILY
Status: DISCONTINUED | OUTPATIENT
Start: 2017-04-22 | End: 2017-04-24 | Stop reason: HOSPADM

## 2017-04-22 RX ORDER — SODIUM CHLORIDE 9 MG/ML
INJECTION, SOLUTION INTRAVENOUS
Status: COMPLETED
Start: 2017-04-22 | End: 2017-04-22

## 2017-04-22 RX ORDER — LIDOCAINE 40 MG/G
CREAM TOPICAL
Status: DISCONTINUED | OUTPATIENT
Start: 2017-04-22 | End: 2017-04-24 | Stop reason: HOSPADM

## 2017-04-22 RX ADMIN — SODIUM CHLORIDE 1000 MG: 0.9 INJECTION, SOLUTION INTRAVENOUS at 12:38

## 2017-04-22 RX ADMIN — PRAVASTATIN SODIUM 10 MG: 10 TABLET ORAL at 23:50

## 2017-04-22 RX ADMIN — AMLODIPINE BESYLATE 5 MG: 5 TABLET ORAL at 23:50

## 2017-04-22 RX ADMIN — ASPIRIN 162 MG: 81 TABLET, COATED ORAL at 12:26

## 2017-04-22 RX ADMIN — SODIUM CHLORIDE 500 ML: 9 INJECTION, SOLUTION INTRAVENOUS at 12:43

## 2017-04-22 RX ADMIN — TACROLIMUS 2 MG: 1 CAPSULE ORAL at 12:27

## 2017-04-22 RX ADMIN — MYCOPHENOLATE MOFETIL 500 MG: 500 TABLET ORAL at 23:50

## 2017-04-22 RX ADMIN — VITAMIN D, TAB 1000IU (100/BT) 2000 UNITS: 25 TAB at 12:27

## 2017-04-22 RX ADMIN — TACROLIMUS 1.5 MG: 0.5 CAPSULE ORAL at 23:49

## 2017-04-22 RX ADMIN — MYCOPHENOLATE MOFETIL 500 MG: 500 TABLET ORAL at 12:27

## 2017-04-22 ASSESSMENT — ACTIVITIES OF DAILY LIVING (ADL)
AMBULATION: 0-->INDEPENDENT
DRESS: 0-->INDEPENDENT
RETIRED_COMMUNICATION: 0-->UNDERSTANDS/COMMUNICATES WITHOUT DIFFICULTY
COGNITION: 0 - NO COGNITION ISSUES REPORTED
FALL_HISTORY_WITHIN_LAST_SIX_MONTHS: NO
SWALLOWING: 0-->SWALLOWS FOODS/LIQUIDS WITHOUT DIFFICULTY
BATHING: 0-->INDEPENDENT
RETIRED_EATING: 0-->INDEPENDENT
TOILETING: 0-->INDEPENDENT
TRANSFERRING: 0-->INDEPENDENT

## 2017-04-22 NOTE — PLAN OF CARE
Problem: Goal Outcome Summary  Goal: Goal Outcome Summary  Outcome: No Change  Pt admitted for rejection and IV course of steroids. Pt had heart cath 4/21/17. IV placed and received first dose of methylprednisolone. Mom here with pt. Pt and mom expressed interest in doing an advanced care directive this admission, gave pt forms, will look over and fill out. Pt eating fair, denies pain. Will continue to monitor and inform MD of changes.

## 2017-04-22 NOTE — H&P
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 Troponin only slightly elevated. Parents have concerns about medication non-compliance as contributing, although his Tacro level was appropriate.   - high-dose IV Solumedrol 1 gram daily x 3 doses   - continue Tacrolimus 2 mg QAM, 1.5 mg QHS  - continue Cellcept 500 mg BID   - recent Tacro level 4/21 was appropriate 5.6 (goal 5-7)  - continue Amlodipine 5 mg daily (takes at night)  - continue ASA 81 mg daily   - continue Pravastatin 10 mg QHS -- will discuss with Dr. Pruitt whether dose should be increased  - repeat Troponin on Monday   - patient and family to work on strategies to ensure medication compliance      #History of EBV-positive PTLD  Treated with Rituximab x2, then daily Prednisone. Repeat CT showed improvement in lymph node and tonsillary hypertrophy.   - EBV DNA not detectable on recent checks, including most recent labs 4/20/17    #FEN  - regular diet  - no need for IVF    DISPO: Inpatient for management of acute cellular rejection of transplanted heart.     Patient seen and discussed with attending Cardiologist Micha Garcia MD.     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    He saw his primary Cardiologist Dr. Pruitt on 4/20/17, had stable Echo, but was noted to have mildly elevated Troponin at 0.144. He underwent routine surveillance RHC, LHC with angiography, and endomyocardial biopsy on 4/21. Pathology results just returned showing 2R moderately-severe acute cellular rejection, no evidence of antibody mediated rejection. He and his family were informed of the results, and was recommended to come in for admission for high-dose IV steroids.     Obed reports feeling well, with no symptoms. He denies any dyspnea, orthopnea, PND, cough, chest pain, palpitations, lightheadedness, dizziness, lower extremity edema. He can walk 12 flights of stairs without difficulty. He has not had any recent fevers or URI symptoms. He does have chronically low appetite, but says this is stable. Denies any abdominal pain, nausea, vomiting, diarrhea. He had hand surgery back in October 2016 and was admitted here in January after he developed cellulitis of that hand, now resolved. He reports he is \"mostly\" compliant with medications. Estimates misses meds one day per week on average. Mother suspects he misses more frequently than that, notes there seem to be excess pills in bottles at the end of the month.     Past Medical History    I have reviewed this patient's medical history and updated it with pertinent information if needed.   Past Medical History:   Diagnosis Date     Cardiomyopathy, hypertrophic obstructive (H)      H/O heart transplant (H)     Mar.23, 2012     Hemiplegia following CVA (cerebrovascular accident) (H)     left, improved with rehab "     Lymphoproliferative disease (H)      Unspecified cerebral artery occlusion with cerebral infarction     age 2 1/2       Past Surgical History   I have reviewed this patient's surgical history and updated it with pertinent information if needed.  Past Surgical History:   Procedure Laterality Date     ARTHRODESIS WRIST Left 10/20/2016    Procedure: ARTHRODESIS WRIST;  Surgeon: Amna Tinoco MD;  Location: [facility] OR     BIOPSY      gingival and tonsillar biopsy     [facility] AFF SURGICAL PATHOLOGY       HEART CATH CHILD  11/13/2012    Procedure: HEART CATH CHILD;  Right Heart Cath Procedure and Biopsy *Latex Allergy*;  Surgeon: Bobbi Pruitt MD;  Location: [facility] OR     HEART CATH CHILD  2/15/2013    Procedure: HEART CATH CHILD;  Right [place] Cath, Angiogram and Biopsy;  Surgeon: Bobbi Pruitt MD;  [facility] OR     HEART CATH CHILD N/A 4/10/2015    Procedure: HEART CATH CHILD;  Surgeon: Bobbi Pruitt MD;  [facility]     HEART CATH, BIOPSY  3/21/2014    Procedure: HEART CATH, BIOPSY;  Right and Left Heart Cath, Angiogram, Biopsy   *Latex Allergy*;  Surgeon: Bobbi Pruitt MD;  [facility] OR     HEART CATH, BIOPSY Right 4/18/2016    Procedure: HEART CATH, BIOPSY;  Surgeon: Bobbi Pruitt MD;  [facility]     INTERPOSITION TENDON HAND Left 10/20/2016    Procedure: INTERPOSITION TENDON HAND;  Surgeon: Amna Tinoco MD;  [facility] OR     ORTHOPEDIC SURGERY      at [place], left lower extremity     PICC INSERTION       PICC REMOVAL       TRANSPLANT HEART RECIPIENT         Prior to Admission Medications   Prior to Admission Medications   Prescriptions Last Dose Informant Patient Reported? Taking?   Cholecalciferol (VITAMIN D3) 2000 UNITS TABS 4/21/2017 at Unknown time  No Yes   Sig: Take 2,000 Units by mouth daily   amLODIPine (NORVASC) 5 MG tablet 4/21/2017 at Unknown time  No Yes   Sig: Take 1 tablet (5 mg) by mouth daily   aspirin 81 MG EC tablet  Past Week at Unknown time  Yes Yes   Sig: Take 2 tablets (162 mg) by mouth daily   mycophenolate (CELLCEPT - GENERIC EQUIVALENT) 500 MG tablet 4/21/2017 at Unknown time  No Yes   Sig: Take 1 tablet (500 mg) by mouth 2 times daily   pravastatin (PRAVACHOL) 10 MG tablet 4/21/2017 at Unknown time  No Yes   Sig: Take 1 tablet (10 mg) by mouth daily At bedtime   tacrolimus (PROGRAF - GENERIC EQUIVALENT) 0.5 MG capsule 4/21/2017 at Unknown time  No Yes   Sig: Take 1 capsule by mouth every evening (total dose is 2mg in am and 1.5mg in pm)   tacrolimus (PROGRAF - GENERIC EQUIVALENT) 1 MG capsule 4/21/2017 at Unknown time  No Yes   Sig: Take 2 mg in the morning and 1.5 mg in the evening      Facility-Administered Medications: None     Allergies   Allergies   Allergen Reactions     Latex Rash     Other [Seasonal Allergies]      Corn-abdominal pain and diarrhea.       Social History   Lives with father. Mother is here with him today. He works at a [employer] in the evenings.     Family History   No family history of congenital heart defects, heart failure, sudden death.   Sister has T1DM.     Review of Systems   The 10 point Review of Systems is negative other than noted in the HPI or here.     Physical Exam   Temp: 97.7  F (36.5  C) Temp src: Oral BP: 123/74   Heart Rate: 85 Resp: 18 SpO2: 97 % O2 Device: None (Room air)    Vital Signs with Ranges  Temp:  [97.7  F (36.5  C)-98  F (36.7  C)] 97.7  F (36.5  C)  Heart Rate:  [85-92] 85  Resp:  [18] 18  BP: (112-127)/(62-79) 123/74  SpO2:  [97 %-100 %] 97 %  132 lbs 7.94 oz    General: Thin young adult male in NAD.   HEENT: Normocephalic, atraumatic. Conjunctiva clear. EOMI. Mucous membranes moist. Oropharynx clear.   Neck: Supple. No JVD.  Lungs: Breathing comfortably on room air. Clear to auscultation bilaterally.   CV: RRR. Normal S1, S2. No murmurs, rubs, or gallops.   Abdomen: Soft, non-tender, non-distended. No organomegaly or mass appreciated. Normal bowel sounds.  :  deferred   Musculoskeletal: Left hand is atrophie and held flexed with mild contracture. Surgical scars noted. Left leg mildly atrophied.   Extremities: No peripheral edema. Feet are WWP. DP pulses are difficult to palpate. PT pulse 1+ on RLE, unable to palpate on LLE.   Skin: no lesions, rash, ecchymosis.   Neurologic: Alert, appropriate. CN grossly in-tact. Speech occasionally mildly slurred. Moving all extremities with no focal deficits.     Data   Tacro level 5.6 on 4/21  Troponin 0.144 on 4/21     Endomyocardial biopsy 4/21/2017 4/21/2017 Endomyocardium biopsy with Immunofluorescence     FINAL DIAGNOSIS:   Heart, allograft, right ventricle; endomyocardial biopsy:   - ISHLT cellular grade:      2R        - Moderately severe acute cellular rejection   - ISHLT antibody-mediated grade:      pAMR 0        - No histological features diagnostic for antibody-mediated   rejection        - No detectable capillary staining for C4d or C3d     COMMENT:   This biopsy demonstrates an increase in ISHLT grade from the most recent   previous biopsy of 4/18/2016 ([id number], ISHLT grade 1R).  Light and   immunofluorescence microscopic findings were reported by Dr. KENZIE Salinas by telephone to Dr. RONEL Pruitt on 4/21/2017 at 5:10 pm.   I have personally reviewed all specimens and or slides, including the   listed special stains, and used them with my medical judgement to   determine the final diagnosis.       Echo 4/20/2017  Patient after orthotopic heart transplant. Normal right and left ventricular  size and systolic function. The calculated biplane left ventricular ejection  fraction is 60-65%. LVRI of 0.89. Insufficient jet to estimate right  ventricular systolic pressure. No pericardial effusion.  No significant change from last echocardiogram.

## 2017-04-22 NOTE — LETTER
Transition Communication Hand-off for Care Transitions to Next Level of Care Provider    Name: Obed Viramontes  MRN #: [id number]  Primary Care Provider: Rayo Schafer     Primary Clinic: [facility] [address], [place] [address]     Reason for Hospitalization:  Acute rejection of the heart, S/P Heart transplant    Admit Date/Time: 4/22/2017  9:27 AM  Discharge Date: 4/24/2017    Payor Source: Payor: MEDICA / Plan: MEDICA CHOICE / Product Type: Indemnity /           Reason for Communication Hand-off Referral: Other [facility] Standard of Practice        Key Recommendations:  Please review AVS    Fabi GARCIAN RN PHN  Patient Care Mgmt Coordinator   [facility] Peds      AVS/Discharge Summary is the source of truth; this is a helpful guide for improved communication of patient story

## 2017-04-22 NOTE — IP AVS SNAPSHOT
MRN:[id number]                      After Visit Summary   4/22/2017    Obed Viramontes    MRN: [id number]           Thank you!     Thank you for choosing [facility] for your care. Our goal is always to provide you with excellent care. Hearing back from our patients is one way we can continue to improve our services. Please take a few minutes to complete the written survey that you may receive in the mail after you visit with us. Thank you!        Patient Information     Date Of Birth          1995        Designated Caregiver       Most Recent Value    Caregiver    Will someone help with your care after discharge? yes    Name of designated caregiver Portia    Phone number of caregiver [phone] and [phone]    Caregiver address [address]      About your hospital stay     You were admitted on:  April 22, 2017 You last received care in the:  [facility]    You were discharged on:  April 24, 2017        Reason for your hospital stay       Acute cellular rejection, 2R- moderate to severe. This was caught early, and yourTroponin already improved, which is a good sign. You will continue to be at risk for rejection, so it is very important to take your medications consistently.                  Who to Call     For medical emergencies, please call 911.  For non-urgent questions about your medical care, please call your primary care provider or clinic, [phone]          Attending Provider     Provider Specialty    Micha Garcia MD Pediatric Cardiology       Primary Care Provider Office Phone # Fax #    Rayo Schafer [phone]       [facility] [address]  [place] [address]         When to contact your care team       Call your Cardiology team if you have any of the following:  increased fatigue, shortness of breath, increased swelling or  "increased pain.                  After Care Instructions     Activity       Your activity upon discharge: activity as tolerated            Diet       Follow this diet upon discharge: Heart-healthy diet, high in lean proteins, fruits and vegetables, and healthy fats (nuts, avocado, olive oil); low in added sugar and fats such as butter and fried foods.                  Follow-up Appointments     Follow Up and recommended labs and tests       Follow up with Dr. Pruitt of Cardiology in 1 month with Echocardiogram and labs.    Also follow-up with Dental. We recommend [facility]  Address: [address], [place] [address]  Phone: [phone]                  Pending Results     No orders found from 4/20/2017 to 4/23/2017.            Statement of Approval     Ordered          04/24/17 1319  I have reviewed and agree with all the recommendations and orders detailed in this document.  EFFECTIVE NOW     Approved and electronically signed by:  Reba Beasley MD             Admission Information     Date & Time Provider Department Dept. Phone    4/22/2017 Micha Garcia MD [facility] [phone]      Your Vitals Were     Blood Pressure Temperature Respirations Height Weight Pulse Oximetry    118/73 98  F (36.7  C) (Oral) 18 1.854 m (6' 1\") 60.7 kg (133 lb 13.1 oz) 97%    BMI (Body Mass Index)                   17.66 kg/m2           [employer] Information     [employer] lets you send messages to your doctor, view your test results, renew your prescriptions, schedule appointments and more. To sign up, go to [email or web address] . Click on \"Log in\" on the left side of the screen, which will take you to the Welcome page. Then click on \"Sign up Now\" on the right side of the page.     You will be asked to enter the access code listed below, as well as some personal information. Please follow " the directions to create your username and password.     Your access code is: [email or web address]  Expires: 2017 11:17 AM     Your access code will  in 90 days. If you need help or a new code, please call your [facility] clinic or [phone].        Care EveryWhere ID     This is your Care EveryWhere ID. This could be used by other organizations to access your [facility] medical records  [id number]           Review of your medicines      CONTINUE these medicines which have NOT CHANGED        Dose / Directions    amLODIPine 5 MG tablet   Commonly known as:  NORVASC   Used for:  Heart replaced by transplant (H)        Dose:  5 mg   Take 1 tablet (5 mg) by mouth daily   Quantity:  90 tablet   Refills:  3       aspirin 81 MG EC tablet   Used for:  Status post transplant, heart (H)        Dose:  162 mg   Take 2 tablets (162 mg) by mouth daily   Quantity:  60 tablet   Refills:  99       mycophenolate 500 MG tablet   Commonly known as:  CELLCEPT - GENERIC EQUIVALENT   Used for:  Heart transplanted (H)        Dose:  500 mg   Take 1 tablet (500 mg) by mouth 2 times daily   Quantity:  180 tablet   Refills:  11       pravastatin 10 MG tablet   Commonly known as:  PRAVACHOL   Used for:  Heart transplanted (H), Heart transplanted (H)        Dose:  10 mg   Take 1 tablet (10 mg) by mouth daily At bedtime   Quantity:  90 tablet   Refills:  6       * tacrolimus 1 MG capsule   Commonly known as:  PROGRAF - GENERIC EQUIVALENT   Used for:  Transplanted heart (H)        Take 2 mg in the morning and 1.5 mg in the evening   Quantity:  270 capsule   Refills:  11       * tacrolimus 0.5 MG capsule   Commonly known as:  PROGRAF - GENERIC EQUIVALENT   Used for:  Heart transplanted (H)        Take 1 capsule by mouth every evening (total dose is 2mg in am and 1.5mg in pm)   Quantity:  90 capsule   Refills:  11       Vitamin D3 2000 UNITS Tabs   Used for:  Heart transplanted (H)        Dose:  2000 Units   Take 2,000 Units by mouth daily    Quantity:  30 tablet   Refills:  6       * Notice:  This list has 2 medication(s) that are the same as other medications prescribed for you. Read the directions carefully, and ask your doctor or other care provider to review them with you.             Protect others around you: Learn how to safely use, store and throw away your medicines at www.disposemymeds.org.             Medication List: This is a list of all your medications and when to take them. Check marks below indicate your daily home schedule. Keep this list as a reference.      Medications           Morning Afternoon Evening Bedtime As Needed    amLODIPine 5 MG tablet   Commonly known as:  NORVASC   Take 1 tablet (5 mg) by mouth daily   Last time this was given:  5 mg on 4/23/2017 11:55 PM                                aspirin 81 MG EC tablet   Take 2 tablets (162 mg) by mouth daily   Last time this was given:  162 mg on 4/24/2017 12:00 PM                                mycophenolate 500 MG tablet   Commonly known as:  CELLCEPT - GENERIC EQUIVALENT   Take 1 tablet (500 mg) by mouth 2 times daily   Last time this was given:  500 mg on 4/24/2017 12:00 PM                                pravastatin 10 MG tablet   Commonly known as:  PRAVACHOL   Take 1 tablet (10 mg) by mouth daily At bedtime   Last time this was given:  10 mg on 4/23/2017 11:55 PM                                * tacrolimus 1 MG capsule   Commonly known as:  PROGRAF - GENERIC EQUIVALENT   Take 2 mg in the morning and 1.5 mg in the evening   Last time this was given:  2 mg on 4/24/2017 12:00 PM                                * tacrolimus 0.5 MG capsule   Commonly known as:  PROGRAF - GENERIC EQUIVALENT   Take 1 capsule by mouth every evening (total dose is 2mg in am and 1.5mg in pm)   Last time this was given:  2 mg on 4/24/2017 12:00 PM                                Vitamin D3 2000 UNITS Tabs   Take 2,000 Units by mouth daily   Last time this was given:  2,000 Units on 4/24/2017 12:00 PM                                 * Notice:  This list has 2 medication(s) that are the same as other medications prescribed for you. Read the directions carefully, and ask your doctor or other care provider to review them with you.

## 2017-04-22 NOTE — TELEPHONE ENCOUNTER
Called Obed and his Mother answered his phone. I let her know Obed's biopsy came back a 2R, moderately severe acute rejection. Per Dr. Pruitt I told Mom Obed needs to be admitted for 3 days of IV steroids to treat the rejection. I told her we would arrange for him to be admitted at 0900 tomorrow to unit 6 for treatment. I also told her his Tacro level came back 5.6 (goal 5-7). Mom verbalized understanding of plan for admission. She said she would bring him in at 0900 tomorrow morning. Will continue to follow.

## 2017-04-22 NOTE — IP AVS SNAPSHOT
[facility]    [address]    [place] [address]    Phone:  [phone]                                       After Visit Summary   4/22/2017    Obed Viramontes    MRN: [id number]           After Visit Summary Signature Page     I have received my discharge instructions, and my questions have been answered. I have discussed any challenges I see with this plan with the nurse or doctor.    ..........................................................................................................................................  Patient/Patient Representative Signature      ..........................................................................................................................................  Patient Representative Print Name and Relationship to Patient    ..................................................               ................................................  Date                                            Time    ..........................................................................................................................................  Reviewed by Signature/Title    ...................................................              ..............................................  Date                                                            Time

## 2017-04-23 PROCEDURE — 25000132 ZZH RX MED GY IP 250 OP 250 PS 637: Performed by: INTERNAL MEDICINE

## 2017-04-23 PROCEDURE — 25000128 H RX IP 250 OP 636: Performed by: INTERNAL MEDICINE

## 2017-04-23 PROCEDURE — 12000014 ZZH R&B PEDS UMMC

## 2017-04-23 PROCEDURE — 25000131 ZZH RX MED GY IP 250 OP 636 PS 637: Performed by: PEDIATRICS

## 2017-04-23 PROCEDURE — 25000131 ZZH RX MED GY IP 250 OP 636 PS 637: Performed by: INTERNAL MEDICINE

## 2017-04-23 RX ADMIN — VITAMIN D, TAB 1000IU (100/BT) 2000 UNITS: 25 TAB at 12:09

## 2017-04-23 RX ADMIN — SODIUM CHLORIDE 1000 MG: 0.9 INJECTION, SOLUTION INTRAVENOUS at 12:09

## 2017-04-23 RX ADMIN — ASPIRIN 162 MG: 81 TABLET, COATED ORAL at 12:09

## 2017-04-23 RX ADMIN — MYCOPHENOLATE MOFETIL 500 MG: 500 TABLET ORAL at 12:09

## 2017-04-23 RX ADMIN — MYCOPHENOLATE MOFETIL 500 MG: 500 TABLET ORAL at 23:54

## 2017-04-23 RX ADMIN — AMLODIPINE BESYLATE 5 MG: 5 TABLET ORAL at 23:55

## 2017-04-23 RX ADMIN — PRAVASTATIN SODIUM 10 MG: 10 TABLET ORAL at 23:55

## 2017-04-23 RX ADMIN — TACROLIMUS 2 MG: 1 CAPSULE ORAL at 12:09

## 2017-04-23 RX ADMIN — TACROLIMUS 1.5 MG: 0.5 CAPSULE ORAL at 23:54

## 2017-04-23 NOTE — PLAN OF CARE
Problem: Goal Outcome Summary  Goal: Goal Outcome Summary  Outcome: No Change  Pt afeb, vss, denies pain, garrick reg diet, left sided weakness unchanged.  Mom here throughout the day, plan of care discussed.

## 2017-04-23 NOTE — PROGRESS NOTES
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 Troponin only slightly elevated. Parents have concerns about medication non-compliance as contributing, although his Tacro level was appropriate.  - high-dose IV Solumedrol 1 gram daily x 3 doses (day 2 of 3)  - continue Tacrolimus 2 mg QAM, 1.5 mg QHS  - continue Cellcept 500 mg BID   - recent Tacro level 4/21 was appropriate 5.6 (goal 5-7)  - continue Amlodipine 5 mg daily (takes at night)  - continue ASA 81 mg daily   - continue Pravastatin 10 mg QHS -- will discuss with Dr. Pruitt whether dose should be increased  - repeat Troponin on Monday   - patient and family to work on strategies to ensure medication compliance      #History of EBV-positive PTLD  Treated with Rituximab x2, then daily Prednisone. Repeat CT showed improvement in lymph node and tonsillary hypertrophy.   - EBV DNA not detectable on recent checks, including most recent labs 4/20/17     #FEN  - regular diet  - no need for IVF     DISPO: Inpatient for management of acute cellular rejection of transplanted heart.     Patient seen and discussed with attending physician, Dr. Garcia.  Oskar Jseus MD MPH  Pediatrics Resident, PGY-1    Interval History   No acute events overnight.  Patient tolerating IV steroids without difficulty    Physical Exam   Temp: 97.8  F (36.6   C) Temp src: Oral BP: 117/68   Heart Rate: 91 Resp: 20 SpO2: 99 % O2 Device: None (Room air)    Vitals:    04/22/17 0930   Weight: 60.1 kg (132 lb 7.9 oz)     Vital Signs with Ranges  Temp:  [97.8  F (36.6  C)-98.2  F (36.8  C)] 97.8  F (36.6  C)  Heart Rate:  [91-99] 91  Resp:  [20] 20  BP: (117-136)/(68-72) 117/68  SpO2:  [98 %-99 %] 99 %  I/O last 3 completed shifts:  In: 883 [P.O.:630; I.V.:253]  Out: 850 [Urine:850]    General: Thin young adult male in NAD.   HEENT: Normocephalic, atraumatic. Conjunctiva clear. EOMI. Mucous membranes moist. Oropharynx clear.   Neck: Supple. No JVD.  Lungs: Breathing comfortably on room air. Clear to auscultation bilaterally.   CV: RRR. Normal S1, S2. No murmurs, rubs, or gallops.   Abdomen: Soft, non-tender, non-distended. No organomegaly or mass appreciated. Normal bowel sounds.  : deferred   Musculoskeletal: Left hand is atrophied and held flexed with mild contracture. Surgical scars noted. Left leg mildly atrophied.   Extremities: No peripheral edema. Feet are WWP. DP pulses are difficult to palpate. PT pulse 1+ on RLE, unable to palpate on LLE.   Skin: no lesions, rash, ecchymosis.   Neurologic: Alert, appropriate. CN grossly in-tact. Speech occasionally mildly slurred. Moving all extremities with no focal deficits.       Medications        lidocaine BUFFERED 1 %         amLODIPine  5 mg Oral At Bedtime     aspirin  162 mg Oral Daily     cholecalciferol  2,000 Units Oral Daily     mycophenolate  500 mg Oral BID     pravastatin  10 mg Oral Daily     tacrolimus  1.5 mg Oral At Bedtime     sodium chloride (PF)  3 mL Intracatheter Q8H     methylPREDNISolone  1,000 mg Intravenous Q24H     tacrolimus  2 mg Oral QAM       Data   No results found for this or any previous visit (from the past 24 hour(s)).

## 2017-04-23 NOTE — PLAN OF CARE
Problem: Goal Outcome Summary  Goal: Goal Outcome Summary  Outcome: No Change  Slept well. Mom at bedside and attentive to patient. Continue to monitor.

## 2017-04-23 NOTE — PLAN OF CARE
Problem: Goal Outcome Summary  Goal: Goal Outcome Summary  Outcome: No Change  A/VSS. No c/o pain. Good PO intake, good UOP. Ambulated around unit 3 laps. Mom at bedside & attentive to pt. Will continue to monitor.

## 2017-04-24 VITALS
BODY MASS INDEX: 17.74 KG/M2 | SYSTOLIC BLOOD PRESSURE: 118 MMHG | WEIGHT: 133.82 LBS | TEMPERATURE: 98 F | DIASTOLIC BLOOD PRESSURE: 73 MMHG | RESPIRATION RATE: 18 BRPM | HEIGHT: 73 IN | OXYGEN SATURATION: 97 %

## 2017-04-24 LAB — TROPONIN I SERPL-MCNC: 0.06 UG/L (ref 0–0.04)

## 2017-04-24 PROCEDURE — 84484 ASSAY OF TROPONIN QUANT: CPT | Performed by: INTERNAL MEDICINE

## 2017-04-24 PROCEDURE — 25000131 ZZH RX MED GY IP 250 OP 636 PS 637: Performed by: PEDIATRICS

## 2017-04-24 PROCEDURE — 36415 COLL VENOUS BLD VENIPUNCTURE: CPT | Performed by: INTERNAL MEDICINE

## 2017-04-24 PROCEDURE — 25000128 H RX IP 250 OP 636: Performed by: INTERNAL MEDICINE

## 2017-04-24 PROCEDURE — 25000131 ZZH RX MED GY IP 250 OP 636 PS 637: Performed by: INTERNAL MEDICINE

## 2017-04-24 PROCEDURE — 25000132 ZZH RX MED GY IP 250 OP 250 PS 637: Performed by: INTERNAL MEDICINE

## 2017-04-24 RX ADMIN — VITAMIN D, TAB 1000IU (100/BT) 2000 UNITS: 25 TAB at 12:00

## 2017-04-24 RX ADMIN — TACROLIMUS 2 MG: 1 CAPSULE ORAL at 12:00

## 2017-04-24 RX ADMIN — SODIUM CHLORIDE 1000 MG: 0.9 INJECTION, SOLUTION INTRAVENOUS at 11:59

## 2017-04-24 RX ADMIN — ASPIRIN 162 MG: 81 TABLET, COATED ORAL at 12:00

## 2017-04-24 RX ADMIN — MYCOPHENOLATE MOFETIL 500 MG: 500 TABLET ORAL at 12:00

## 2017-04-24 RX ADMIN — LIDOCAINE: 40 CREAM TOPICAL at 06:22

## 2017-04-24 NOTE — PROGRESS NOTES
Discharge medication review for this patient is complete. Pharmacist assisted with medication reconciliation of discharge medications with prior to admission medications.     The following changes were made to the discharge medication list based on pharmacist review:  Added:  n/a  Discontinued:n/a  Changed: n/a    No prednisone going home per Reba ([facility]) per Dr. Pruitt  (not on pred PTA).         Patient's Discharge Medication List  - medications as listed on After Visit Summary (AVS)     Review of your medicines        CONTINUE these medicines which have NOT CHANGED         Dose / Directions      amLODIPine 5 MG tablet   Commonly known as:  NORVASC   Used for:  Heart replaced by transplant (H)        Dose:  5 mg   Take 1 tablet (5 mg) by mouth daily   Quantity:  90 tablet   Refills:  3       aspirin 81 MG EC tablet   Used for:  Status post transplant, heart (H)        Dose:  162 mg   Take 2 tablets (162 mg) by mouth daily   Quantity:  60 tablet   Refills:  99       mycophenolate 500 MG tablet   Commonly known as:  CELLCEPT - GENERIC EQUIVALENT   Used for:  Heart transplanted (H)        Dose:  500 mg   Take 1 tablet (500 mg) by mouth 2 times daily   Quantity:  180 tablet   Refills:  11       pravastatin 10 MG tablet   Commonly known as:  PRAVACHOL   Used for:  Heart transplanted (H), Heart transplanted (H)        Dose:  10 mg   Take 1 tablet (10 mg) by mouth daily At bedtime   Quantity:  90 tablet   Refills:  6       * tacrolimus 1 MG capsule   Commonly known as:  PROGRAF - GENERIC EQUIVALENT   Used for:  Transplanted heart (H)        Take 2 mg in the morning and 1.5 mg in the evening   Quantity:  270 capsule   Refills:  11       * tacrolimus 0.5 MG capsule   Commonly known as:  PROGRAF - GENERIC EQUIVALENT   Used for:  Heart transplanted (H)        Take 1 capsule by mouth every evening (total dose is 2mg in am and 1.5mg in pm)   Quantity:  90 capsule   Refills:  11       Vitamin D3 2000 UNITS Tabs   Used  for:  Heart transplanted (H)        Dose:  2000 Units   Take 2,000 Units by mouth daily   Quantity:  30 tablet   Refills:  6       * Notice:  This list has 2 medication(s) that are the same as other medications prescribed for you. Read the directions carefully, and ask your doctor or other care provider to review them with you.

## 2017-04-24 NOTE — PLAN OF CARE
Problem: Goal Outcome Summary  Goal: Goal Outcome Summary  Outcome: No Change  [id number]. VSS. Denies pain. Eating and drinking. Slept. Mom at bedside. Plan to re-check troponin this AM and possibly d/c after noon solu-medrol. Will continue to monitor.

## 2017-04-24 NOTE — PROGRESS NOTES
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     Data/Intervention  This writer met with Obed (derrell Timmons) at bedside with mother and older brother.  Mom did the majority of the talking in the beginning asking questions about getting new housing for Obed and connecting him with a psychotherapist.  This writer provided community resources and endorsed [facility] as a good stepping stone to finding community.    Mom wanted a new apartment for bOed, however, she also noted he may be moving into an apartment in [place] near [facility] with a sister.  Obed does not drive and holds down a part time job at a [employer], \"I made $7,000 last year.\"  Obed filed taxes this year and did was not declared a dependent.    [facility] transplant coordinator states patient is able to drive, parents , patient doesn't follow through with returning calls from coordinator, nor complies with timing of medications which he continues to take at 2 and 2 rather than the window asked by the transplant team of between 7-9/day and night.  Assessment  Mom may be concerned with Obed's motivation and follow through, and hoping SW would navigate patient's next steps into adulthood.  Trace and siblings are very supportive.  Obed is respectful but not spontaneously forthcoming, parent and sibling do the work for him.  Obed would benefit from a therapist.    Plan  Follow and support patient and family until patient transfers to adult heart program    Bobbi Alexander MSW, LICSW " [phone] pager

## 2017-04-24 NOTE — DISCHARGE SUMMARY
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 He saw his primary Cardiologist Dr. Pruitt on 4/20/17, had stable Echo, but was noted to have mildly elevated Troponin at 0.144. He underwent routine surveillance RHC, LHC with angiography, and endomyocardial biopsy on 4/21. Pathology results just returned showing 2R moderately-severe acute cellular rejection, no evidence of antibody mediated rejection. He and his family were informed of the results, and was recommended to come in for admission for high-dose IV steroids. He has been asymptomatic, feeling at baseline. Parents have concerns he may be missing doses of his immunosuppressants.     Hospital Course   Obed Viramontes was admitted on 4/22/2017.  The following problems were addressed during his hospitalization:    #s/p orthotopic heart transplant 3/23/2012  #History of dilated cardiomyopathy due to viral myocarditis   #Acute cellular rejection, 2R- moderate to severe   Fortunately Obed is asymptomatic and Echo is stable. Parents have concerns about medication non-compliance as contributing, and he did admit to missing occasional doses, although his Tacro level was appropriate at 5.6 (goal 5-7). Troponin mildly elevated at 0.144 on 4/20. He was started on high-dose IV Solumedrol 1 gram daily upon admission,  given for three doses. His Troponin was slightly improved on day of discharge, down to 0.062 on 4/24. He was hemodynamically stable and asymptomatic throughout his stay. We made no medication changes; he is to continue his current doses of Tacrolimus, Cellcept, Amlodipine, Aspirin, and Pravastatin. The importance of medication compliance was emphasized, as was the risk of possible future rejection. He will work with his family and community resources to develop skills to help him be successful with medication compliance. He will follow-up with Dr. Pruitt in a month with repeat labs and Echo.     Significant Results and Procedures   Endomyocardial biopsy 4/21/2017 4/21/2017 Endomyocardium biopsy with Immunofluorescence     FINAL DIAGNOSIS:   Heart, allograft, right ventricle; endomyocardial biopsy:   - ISHLT cellular grade:      2R        - Moderately severe acute cellular rejection   - ISHLT antibody-mediated grade:      pAMR 0        - No histological features diagnostic for antibody-mediated   rejection        - No detectable capillary staining for C4d or C3d     COMMENT:   This biopsy demonstrates an increase in ISHLT grade from the most recent   previous biopsy of 4/18/2016 ([id number], ISHLT grade 1R).  Light and   immunofluorescence microscopic findings were reported by Dr. KENZIE Salinas by telephone to Dr. RONEL Pruitt on 4/21/2017 at 5:10 pm.   I have personally reviewed all specimens and or slides, including the   listed special stains, and used them with my medical judgement to   determine the final diagnosis.         Echo 4/20/2017  Patient after orthotopic heart transplant. Normal right and left ventricular  size and systolic function. The calculated biplane left ventricular ejection  fraction is 60-65%. LVRI of 0.89. Insufficient jet to estimate right  ventricular systolic pressure. No pericardial effusion.  No significant change from last echocardiogram.    Immunization History   Declined influenza  vaccine    Pending Results   None     Primary Care Physician   Rayo Schafer    Physical Exam   Vital Signs with Ranges  Temp:  [97.7  F (36.5  C)-98.1  F (36.7  C)] 98  F (36.7  C)  Heart Rate:  [] 84  Resp:  [18-22] 18  BP: (115-132)/(57-73) 118/73  SpO2:  [97 %-98 %] 97 %  I/O last 3 completed shifts:  In: 610 [P.O.:360; I.V.:250]  Out: 750 [Urine:750]    General: Thin young adult male in NAD.   HEENT: Normocephalic, atraumatic. Conjunctiva clear. EOMI. Mucous membranes moist. Oropharynx clear.   Neck: Supple. No JVD.  Lungs: Breathing comfortably on room air. Clear to auscultation bilaterally.   CV: RRR. Normal S1, S2. No murmurs, rubs, or gallops.   Abdomen: Soft, non-tender, non-distended. No organomegaly or mass appreciated. Normal bowel sounds.  : deferred   Musculoskeletal: Left hand is atrophied and held flexed with mild contracture. Surgical scars noted. Left leg mildly atrophied.   Extremities: No peripheral edema. Feet are WWP. DP pulses are difficult to palpate. PT pulse 1+ on RLE, unable to palpate on LLE.   Skin: no lesions, rash, ecchymosis.   Neurologic: Alert, appropriate. CN grossly in-tact. Speech occasionally mildly slurred. Moving all extremities with no focal deficits.     Time Spent on this Encounter   I, Reba Beasley, personally saw the patient today and spent less than or equal to 30 minutes discharging this patient.    Discharge Disposition   Discharged to home  Condition at discharge: Stable    Consultations This Hospital Stay   SOCIAL WORK IP CONSULT    Discharge Orders     Reason for your hospital stay   Acute cellular rejection, 2R- moderate to severe. This was caught early, and yourTroponin already improved, which is a good sign. You will continue to be at risk for rejection, so it is very important to take your medications consistently.     Follow Up and recommended labs and tests   Follow up with Dr. Pruitt of Cardiology in 1 month with Echocardiogram and  labs.    Also follow-up with Dental. We recommend [facility]  Address: [address], [place] [address]  Phone: [phone]     Activity   Your activity upon discharge: activity as tolerated     When to contact your care team   Call your Cardiology team if you have any of the following:  increased fatigue, shortness of breath, increased swelling or increased pain.     Full Code     Diet   Follow this diet upon discharge: Heart-healthy diet, high in lean proteins, fruits and vegetables, and healthy fats (nuts, avocado, olive oil); low in added sugar and fats such as butter and fried foods.       Discharge Medications   Current Discharge Medication List      CONTINUE these medications which have NOT CHANGED    Details   amLODIPine (NORVASC) 5 MG tablet Take 1 tablet (5 mg) by mouth daily  Qty: 90 tablet, Refills: 3    Associated Diagnoses: Heart replaced by transplant (H)      mycophenolate (CELLCEPT - GENERIC EQUIVALENT) 500 MG tablet Take 1 tablet (500 mg) by mouth 2 times daily  Qty: 180 tablet, Refills: 11    Associated Diagnoses: Heart transplanted (H)      tacrolimus (PROGRAF - GENERIC EQUIVALENT) 1 MG capsule Take 2 mg in the morning and 1.5 mg in the evening  Qty: 270 capsule, Refills: 11    Associated Diagnoses: Transplanted heart (H)      tacrolimus (PROGRAF - GENERIC EQUIVALENT) 0.5 MG capsule Take 1 capsule by mouth every evening (total dose is 2mg in am and 1.5mg in pm)  Qty: 90 capsule, Refills: 11    Comments: Most current rx.  90 days supply per insurance.  Associated Diagnoses: Heart transplanted (H)      pravastatin (PRAVACHOL) 10 MG tablet Take 1 tablet (10 mg) by mouth daily At bedtime  Qty: 90 tablet, Refills: 6    Associated Diagnoses: Heart transplanted (H); Heart transplanted (H)      aspirin 81 MG EC tablet Take 2 tablets (162 mg) by mouth daily  Qty: 60 tablet, Refills: 99    Associated Diagnoses: Status post transplant, heart (H)       Cholecalciferol (VITAMIN D3) 2000 UNITS TABS Take 2,000 Units by mouth daily  Qty: 30 tablet, Refills: 6    Associated Diagnoses: Heart transplanted (H)           Allergies   Allergies   Allergen Reactions     Latex Rash     Other [Seasonal Allergies]      Corn-abdominal pain and diarrhea.     Data   Most Recent 3 CBC's:  Recent Labs   Lab Test  04/21/17   0834  04/20/17   0820  01/12/17   1416  10/13/16   1237   WBC   --   6.3  11.7*  6.4   HGB  13.2*  15.6  15.8  16.5   MCV   --   84  85  85   PLT   --   192  205  203      Most Recent 3 BMP's:  Recent Labs   Lab Test  04/21/17   0834  04/20/17   0820  01/12/17   1416  10/13/16   1237   NA  140  142  138  140   POTASSIUM  3.7  3.7  3.6  4.1   CHLORIDE   --   106  103  107   CO2   --   26  27  26   BUN   --   13  10  11   CR   --   0.65*  0.69  0.77   ANIONGAP   --   10  8  7   CARLOS A   --   8.8  9.3  9.1   GLC  106*  93  104*  73     Most Recent 2 LFT's:  Recent Labs   Lab Test  04/20/17   0820  10/13/16   1237   AST  14  29   ALT  10  21   ALKPHOS  87  90   BILITOTAL  0.7  0.6     Most Recent 3 Troponin's:  Recent Labs   Lab Test  04/24/17   0658  04/20/17   0820  10/13/16   1237   TROPI  0.062*  0.144*  0.020     Most Recent Cholesterol Panel:  Recent Labs   Lab Test  04/21/17   0923   CHOL  91   LDL  38   HDL  43   TRIG  49     Patient seen and discussed with Cardiology attending Bobbi Pruitt MD on day of discharge.    Reba Beasley MD  Internal Medicine-Pediatrics PGY-4  Pager [phone]    Attestation:  This patient has been seen and evaluated by me, Bobbi Pruitt.  Discussed with the medical student, house staff team and/or resident(s) and agree with the findings and plan in this note.  I have reviewed today's vital signs, medications, labs and imaging.  Bobbi Pruitt MD    Obed is a 21 year old male, now 5 years after heart transplant, who was seen last week for routine annual followup. Had elevated troponin on labs concerning for  rejection, biopsy came back with 2R cellular rejection so was admitted for steroid burst treatment of rejection. Has tolerated steroids well, troponin downtrending today.     Plan for discharge today after 3rd dose of steroids. Will followup in clinic with labs, echo in 1 month, sooner if any concerns.

## 2017-04-24 NOTE — PLAN OF CARE
Problem: Goal Outcome Summary  Goal: Goal Outcome Summary  Outcome: Improving  Patient denies pain, discomfort.  Tolerated last dose of high dose IV steroid, discharged to home this afternoon with f/u with cardiology in 1 month.  Patient could properly verbalize heart healthy diet choices, home medications/timing.  Mother at bedside throughout the shift as well.

## 2017-04-24 NOTE — PLAN OF CARE
Problem: Goal Outcome Summary  Goal: Goal Outcome Summary  Outcome: No Change  [id number] Pt garrick good po, up ambulating hallways, dad here at bedside, plan of care discussed.

## 2017-04-26 ENCOUNTER — TELEPHONE (OUTPATIENT)
Dept: PEDIATRIC CARDIOLOGY | Facility: CLINIC | Age: 22
End: 2017-04-26

## 2017-04-26 DIAGNOSIS — Z94.1 HEART REPLACED BY TRANSPLANT (H): Primary | ICD-10-CM

## 2017-04-26 NOTE — TELEPHONE ENCOUNTER
Called Obed to check on him following discharge. No answer. Left message reminding him that Dr. Pruitt would like to see him back in 1 month in clinic. I said the  will be calling him to set up the appointment.

## 2017-04-26 NOTE — Clinical Note
This patient just discharged from the hospital post a rejection episode. Dr. Pruitt would like to see him in 1 month for ECHO, EKG, CXR and labs. I know we are also looking to set him up to transfer to the adult side for his next appointment in the fall. I will follow up with Dr. Pruitt to let you know which MD on the adult side he should see. Thank you.

## 2017-04-27 LAB
DONOR IDENTIFICATION: NORMAL
DR51: 594
DSA COMMENTS: NORMAL
DSA PRESENT: YES
DSA TEST METHOD: NORMAL
ORGAN: NORMAL
SA1 CELL: NORMAL
SA1 COMMENTS: NORMAL
SA1 HI RISK ABY: NORMAL
SA1 MOD RISK ABY: NORMAL
SA1 TEST METHOD: NORMAL
SA2 CELL: NORMAL
SA2 COMMENTS: NORMAL
SA2 HI RISK ABY UA: NORMAL
SA2 MOD RISK ABY: NORMAL
SA2 TEST METHOD: NORMAL
UNOS CPRA: 0

## 2017-05-04 ENCOUNTER — TELEPHONE (OUTPATIENT)
Dept: TRANSPLANT | Facility: CLINIC | Age: 22
End: 2017-05-04

## 2017-05-18 ENCOUNTER — TELEPHONE (OUTPATIENT)
Dept: PEDIATRIC CARDIOLOGY | Facility: CLINIC | Age: 22
End: 2017-05-18

## 2017-05-18 NOTE — TELEPHONE ENCOUNTER
I called bOed to try and talk to him about his medications, per pharmacy he has not refilled any medications in 121 days. His last fill was January 12th for a 90 day supply. The pharmacy  called him asking him to order his refills they left messages on 4/6, 4/13 and 4/21 with no response. They also called his Mom on 4/13 and she said she would talk to him. I left him a voicemail today and I texted him as well. He has a clinic visit on May 25th scheduled with Dr. Pruitt. Will continue to follow.

## 2017-05-24 ENCOUNTER — PRE VISIT (OUTPATIENT)
Dept: PEDIATRIC CARDIOLOGY | Facility: CLINIC | Age: 22
End: 2017-05-24

## 2017-05-24 DIAGNOSIS — Z94.1 HEART REPLACED BY TRANSPLANT (H): Primary | ICD-10-CM

## 2017-05-25 ENCOUNTER — HOSPITAL ENCOUNTER (OUTPATIENT)
Dept: GENERAL RADIOLOGY | Facility: CLINIC | Age: 22
End: 2017-05-25
Attending: PEDIATRICS
Payer: COMMERCIAL

## 2017-05-25 ENCOUNTER — RESULTS ONLY (OUTPATIENT)
Dept: OTHER | Facility: CLINIC | Age: 22
End: 2017-05-25

## 2017-05-25 ENCOUNTER — HOSPITAL ENCOUNTER (OUTPATIENT)
Dept: CARDIOLOGY | Facility: CLINIC | Age: 22
Discharge: HOME | End: 2017-05-25
Attending: PEDIATRICS | Admitting: PEDIATRICS
Payer: COMMERCIAL

## 2017-05-25 ENCOUNTER — OFFICE VISIT (OUTPATIENT)
Dept: PEDIATRIC CARDIOLOGY | Facility: CLINIC | Age: 22
End: 2017-05-25
Attending: PEDIATRICS
Payer: COMMERCIAL

## 2017-05-25 ENCOUNTER — ANESTHESIA EVENT (OUTPATIENT)
Dept: SURGERY | Facility: CLINIC | Age: 22
End: 2017-05-25
Payer: COMMERCIAL

## 2017-05-25 VITALS
RESPIRATION RATE: 20 BRPM | SYSTOLIC BLOOD PRESSURE: 114 MMHG | DIASTOLIC BLOOD PRESSURE: 78 MMHG | BODY MASS INDEX: 18.67 KG/M2 | HEART RATE: 92 BPM | OXYGEN SATURATION: 100 % | WEIGHT: 140.87 LBS | HEIGHT: 73 IN

## 2017-05-25 DIAGNOSIS — Z94.1 HEART REPLACED BY TRANSPLANT (H): ICD-10-CM

## 2017-05-25 DIAGNOSIS — Z94.1 STATUS POST TRANSPLANT, HEART (H): ICD-10-CM

## 2017-05-25 DIAGNOSIS — Z94.1 HEART TRANSPLANTED (H): ICD-10-CM

## 2017-05-25 LAB
ALBUMIN SERPL-MCNC: 3.8 G/DL (ref 3.4–5)
ALP SERPL-CCNC: 82 U/L (ref 40–150)
ALT SERPL W P-5'-P-CCNC: 13 U/L (ref 0–70)
ANION GAP SERPL CALCULATED.3IONS-SCNC: 6 MMOL/L (ref 3–14)
AST SERPL W P-5'-P-CCNC: 16 U/L (ref 0–45)
BILIRUB SERPL-MCNC: 0.7 MG/DL (ref 0.2–1.3)
BUN SERPL-MCNC: 11 MG/DL (ref 7–30)
CALCIUM SERPL-MCNC: 9 MG/DL (ref 8.5–10.1)
CHLORIDE SERPL-SCNC: 108 MMOL/L (ref 94–109)
CK SERPL-CCNC: 108 U/L (ref 30–300)
CMV IGG SERPL QL IA: NORMAL AI (ref 0–0.8)
CO2 SERPL-SCNC: 28 MMOL/L (ref 20–32)
CREAT SERPL-MCNC: 0.8 MG/DL (ref 0.66–1.25)
EBV NA IGG SER QL IA: NORMAL AI (ref 0–0.8)
EBV VCA IGG SER QL IA: 5.2 AI (ref 0–0.8)
EBV VCA IGM SER QL IA: NORMAL AI (ref 0–0.8)
ERYTHROCYTE [DISTWIDTH] IN BLOOD BY AUTOMATED COUNT: 13.5 % (ref 10–15)
GFR SERPL CREATININE-BSD FRML MDRD: ABNORMAL ML/MIN/1.7M2
GLUCOSE SERPL-MCNC: 128 MG/DL (ref 70–99)
HCT VFR BLD AUTO: 43.2 % (ref 40–53)
HGB BLD-MCNC: 15.2 G/DL (ref 13.3–17.7)
MAGNESIUM SERPL-MCNC: 2 MG/DL (ref 1.6–2.3)
MCH RBC QN AUTO: 29.9 PG (ref 26.5–33)
MCHC RBC AUTO-ENTMCNC: 35.2 G/DL (ref 31.5–36.5)
MCV RBC AUTO: 85 FL (ref 78–100)
NT-PROBNP SERPL-MCNC: 21 PG/ML (ref 0–125)
PHOSPHATE SERPL-MCNC: 4 MG/DL (ref 2.5–4.5)
PLATELET # BLD AUTO: 197 10E9/L (ref 150–450)
POTASSIUM SERPL-SCNC: 3.7 MMOL/L (ref 3.4–5.3)
PROT SERPL-MCNC: 7.2 G/DL (ref 6.8–8.8)
RBC # BLD AUTO: 5.09 10E12/L (ref 4.4–5.9)
SODIUM SERPL-SCNC: 142 MMOL/L (ref 133–144)
TACROLIMUS BLD-MCNC: 3.5 UG/L (ref 5–15)
TME LAST DOSE: ABNORMAL H
TROPONIN I SERPL-MCNC: 0.13 UG/L (ref 0–0.04)
WBC # BLD AUTO: 5.7 10E9/L (ref 4–11)

## 2017-05-25 PROCEDURE — 86664 EPSTEIN-BARR NUCLEAR ANTIGEN: CPT | Performed by: PEDIATRICS

## 2017-05-25 PROCEDURE — 86644 CMV ANTIBODY: CPT | Performed by: PEDIATRICS

## 2017-05-25 PROCEDURE — 93005 ELECTROCARDIOGRAM TRACING: CPT | Mod: ZF

## 2017-05-25 PROCEDURE — 85027 COMPLETE CBC AUTOMATED: CPT | Performed by: PEDIATRICS

## 2017-05-25 PROCEDURE — 93306 TTE W/DOPPLER COMPLETE: CPT

## 2017-05-25 PROCEDURE — 87799 DETECT AGENT NOS DNA QUANT: CPT | Performed by: PEDIATRICS

## 2017-05-25 PROCEDURE — 83880 ASSAY OF NATRIURETIC PEPTIDE: CPT | Performed by: PEDIATRICS

## 2017-05-25 PROCEDURE — 86832 HLA CLASS I HIGH DEFIN QUAL: CPT | Performed by: PEDIATRICS

## 2017-05-25 PROCEDURE — 83735 ASSAY OF MAGNESIUM: CPT | Performed by: PEDIATRICS

## 2017-05-25 PROCEDURE — 82550 ASSAY OF CK (CPK): CPT | Performed by: PEDIATRICS

## 2017-05-25 PROCEDURE — 80053 COMPREHEN METABOLIC PANEL: CPT | Performed by: PEDIATRICS

## 2017-05-25 PROCEDURE — 84484 ASSAY OF TROPONIN QUANT: CPT | Performed by: PEDIATRICS

## 2017-05-25 PROCEDURE — 86833 HLA CLASS II HIGH DEFIN QUAL: CPT | Performed by: PEDIATRICS

## 2017-05-25 PROCEDURE — 86665 EPSTEIN-BARR CAPSID VCA: CPT | Performed by: PEDIATRICS

## 2017-05-25 PROCEDURE — 86665 EPSTEIN-BARR CAPSID VCA: CPT | Mod: 91 | Performed by: PEDIATRICS

## 2017-05-25 PROCEDURE — 36415 COLL VENOUS BLD VENIPUNCTURE: CPT | Performed by: PEDIATRICS

## 2017-05-25 PROCEDURE — 71020 XR CHEST 2 VW: CPT

## 2017-05-25 PROCEDURE — 84100 ASSAY OF PHOSPHORUS: CPT | Performed by: PEDIATRICS

## 2017-05-25 PROCEDURE — 99213 OFFICE O/P EST LOW 20 MIN: CPT | Mod: 25,ZF

## 2017-05-25 PROCEDURE — 80197 ASSAY OF TACROLIMUS: CPT | Performed by: PEDIATRICS

## 2017-05-25 RX ORDER — CHOLECALCIFEROL (VITAMIN D3) 50 MCG
2000 TABLET ORAL DAILY
Qty: 100 TABLET | Refills: 6 | Status: SHIPPED | OUTPATIENT
Start: 2017-05-25

## 2017-05-25 ASSESSMENT — PAIN SCALES - GENERAL: PAINLEVEL: MILD PAIN (2)

## 2017-05-25 NOTE — MR AVS SNAPSHOT
After Visit Summary   2017    Obed Viramontes    MRN: [id number]           Patient Information     Date Of Birth          1995        Visit Information        Provider Department      2017 10:00 AM Bobbi Pruitt MD Peds Cardiac Transplant        Today's Diagnoses     Heart replaced by transplant (H)        Status post transplant, heart (H)        Heart transplanted (H)          Care Instructions    Plan:  Heart cath and biopsy tomorrow at 9:30am, arrive at 7:30am.    meds at [place] outpatient pharmacy, across from Peds ER          Follow-ups after your visit        Who to contact     Please call your clinic at [phone] to:    Ask questions about your health    Make or cancel appointments    Discuss your medicines    Learn about your test results    Speak to your doctor   If you have compliments or concerns about an experience at your clinic, or if you wish to file a complaint, please contact [facility] Patient Relations at [phone] or email us at [email or web address]         Additional Information About Your Visit        MyChart Information     Kaze[employer] is an electronic gateway that provides easy, online access to your medical records. With Kazeon, you can request a clinic appointment, read your test results, renew a prescription or communicate with your care team.     To sign up for Kazeon visit the website at [email or web address]   You will be asked to enter the access code listed below, as well as some personal information. Please follow the directions to create your username and password.     Your access code is: [email or web address]  Expires: 2017 11:17 AM     Your access code will  in 90 days. If you need help or a new code, please contact your [facility] or call [phone] for assistance.        Care EveryWhere ID     This is your Care EveryWhere ID. This could be used by  "other organizations to access your [facility] medical records  [id number]        Your Vitals Were     Pulse Respirations Height Pulse Oximetry BMI (Body Mass Index)       92 20 6' 0.56\" (184.3 cm) 100% 18.81 kg/m2        Blood Pressure from Last 3 Encounters:   05/25/17 114/78   04/24/17 118/73   04/21/17 115/72    Weight from Last 3 Encounters:   05/25/17 140 lb 14 oz (63.9 kg)   04/24/17 133 lb 13.1 oz (60.7 kg)   04/21/17 137 lb 12.6 oz (62.5 kg)              We Performed the Following     CBC with platelets     CK total     CMV Antibody IgG     CMV DNA quantification     Comprehensive metabolic panel     EBV Capsid Antibody IgG     EBV Capsid Antibody IgM     EBV DNA PCR Quantitative Whole Blood     EBV Nuclear Antigen EBNA Antibody IgG     EKG 12 lead - pediatric     Magnesium     N terminal pro BNP outpatient     Phosphorus     PRA Donor Specific Antibody     Tacrolimus level     Troponin I          Where to get your medicines      These medications were sent to [facility] [place], [place] - [address]  [address] [address]     Phone:  [phone]     aspirin 81 MG EC tablet    Vitamin D3 2000 UNITS Tabs          Primary Care Provider Office Phone # Fax #    Rayo Schafer [phone]       [facility] [address]  [place] [address]        Thank you!     Thank you for choosing [facility]  for your care. Our goal is always to provide you with excellent care. Hearing back from our patients is one way we can continue to improve our services. Please take a few minutes to complete the written survey that you may receive in the mail after your visit with us. Thank you!             Your Updated Medication List - Protect others around you: Learn how to safely use, store and throw away your medicines at www.disposemymeds.org.          This list is accurate as of: 5/25/17 10:40 AM.  Always use your most recent " med list.                   Brand Name Dispense Instructions for use    amLODIPine 5 MG tablet    NORVASC    90 tablet    Take 1 tablet (5 mg) by mouth daily       aspirin 81 MG EC tablet     60 tablet    Take 2 tablets (162 mg) by mouth daily       mycophenolate 500 MG tablet    CELLCEPT - GENERIC EQUIVALENT    180 tablet    Take 1 tablet (500 mg) by mouth 2 times daily       pravastatin 10 MG tablet    PRAVACHOL    90 tablet    Take 1 tablet (10 mg) by mouth daily At bedtime       * tacrolimus 1 MG capsule    PROGRAF - GENERIC EQUIVALENT    270 capsule    Take 2 mg in the morning and 1.5 mg in the evening       * tacrolimus 0.5 MG capsule    PROGRAF - GENERIC EQUIVALENT    90 capsule    Take 1 capsule by mouth every evening (total dose is 2mg in am and 1.5mg in pm)       Vitamin D3 2000 UNITS Tabs     100 tablet    Take 2,000 Units by mouth daily       * Notice:  This list has 2 medication(s) that are the same as other medications prescribed for you. Read the directions carefully, and ask your doctor or other care provider to review them with you.

## 2017-05-25 NOTE — PHARMACY-CONSULT NOTE
Current Outpatient Prescriptions   Medication Sig Dispense Refill     aspirin 81 MG EC tablet Take 2 tablets (162 mg) by mouth daily 60 tablet 99     Cholecalciferol (VITAMIN D3) 2000 UNITS TABS Take 2,000 Units by mouth daily 100 tablet 6     amLODIPine (NORVASC) 5 MG tablet Take 1 tablet (5 mg) by mouth daily 90 tablet 3     mycophenolate (CELLCEPT - GENERIC EQUIVALENT) 500 MG tablet Take 1 tablet (500 mg) by mouth 2 times daily 180 tablet 11     tacrolimus (PROGRAF - GENERIC EQUIVALENT) 1 MG capsule Take 2 mg in the morning and 1.5 mg in the evening 270 capsule 11     tacrolimus (PROGRAF - GENERIC EQUIVALENT) 0.5 MG capsule Take 1 capsule by mouth every evening (total dose is 2mg in am and 1.5mg in pm) 90 capsule 11     pravastatin (PRAVACHOL) 10 MG tablet Take 1 tablet (10 mg) by mouth daily At bedtime 90 tablet 6     I had the privilege of seeing Obed in clinic today along with Dr. Pruitt, Rosa Maria RN coordinator,  Jono (mom), and Scot (dad).       Current labs are as follows:  (Tacrolimus or CSA) level: Tacrolimus in process  Goal level:  Tacrolimus 5-7  WBC: 5.7 (4-11)   Cr: 0.8 (0.66-1.25)   Other: Troponin 0.128 (0.000-0.045), N terminal Pro BNP 21 (0-125), CMV and EBV in process,  (), magnesium 2.0 (1.6-2.3), hemoglobin 15.2 (13.3-15.7), platelets 197 (150-450), /78    Immunosuppressant regimen: Tacrolimus generic caps-- 2mg in am and 1.5mg in evening, mycophenolate generic tablets-- 500mg twice daily     Visit summary:  Obed is a heart transplant recipient of 3/23/12.   His chart is complete.   His mycophenolate dose per past notes-- keeping current dose (got low WBC in past with higher).   Dosing is appropriate. His compliance lately has not been good-- probably taking tacrolimus just once daily on most days.   Stressed compliance.  Made sure medications in hand when leave facility.   He picked up his medications at [facility] today.  Biopsy 5/26 (tomorrow).  Takes his medications  at 1pm and 1am.  Told Jono to clear voice mail --so messages can be left.   Told them to anna calendar for future fills with [facility].

## 2017-05-25 NOTE — LETTER
2017      RE: Obed Viramontes  [address]  [facility] [address]       [facility] [place] [facility]  [facility]  [address] [place] [address]    Pediatric Heart Failure and Transplant Clinic Note  Date of referral:2012  Date of initial consult: 2012  Date of note: 2017  Re: Obed Viramontes  MR #: [id number]  : 1995    HPI:  Obed is a 21 year old male with history of heart transplant (3/23/2012) and posttransplant lymphoproliferative disorder (diagnosed 2012) who transferred care to [facility] in 2012.   He is in clinic today with his parents for followup after recent rejection episode. He was seen in April for routine  annual followup, however he had elevated troponin and routine biopsy showed 2R rejection, and he was noncompliant with medications prior to this. He was admitted for 3 days of iv solumedrol and his troponin improved. Following that, he was staying with mom for a few weeks and was on a better schedule, compliant with medications, and eating better and has gained weight. He reports that even since going back to dad's house and going back to work that he is compliant with his medications. He feels his energy level is stable, no edema, no shortness of breath with activity. no recent fevers, no recurrence of mouth sores, no chest pain, palpitations, tachycardia, dizziness or syncope. Comprehensive review of systems is otherwise negative today.      In reviewing his history, he was born at 38 weeks with uncomplicated pregnancy and delivery, weighed 7 pounds.  He had uncomplicated  course and was healthy in early childhood.  He was hospitalized at age 2 1/2 years with viral myocarditis, hospitalized for 6 weeks, intubated for 10 days, had recovery of myocardial function and was treated chronically with digoxin, hospital course complicated by cerobrovascular accident with resultant left hemiplegia, transitioned  to [facility] for an additional 5 weeks following his hospital course.  He has had several orthopedic surgeries throughout childhood because of his hemiplegia, but otherwise has been healthy.  He was asymptomatic from a cardiac standpoint until February of this year, at which time he presented with chest pain.  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  He was transplanted on March 23, 2012, did well post transplant and was discharged after 1 week.  He reportedly has done well from a cardiac standpoint with no rejection, 1R biopsy in May and 1R biopsy in September but otherwise negative biopsies.  He presented in September with mouth sores and difficulty taking medication, initially treated with lowering his immunosuppression, but was rehospitalized with fever and worsening mouth sores.  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  He was treated with rituximab x 2, and increased prednisone dose from 2.5mg daily back up 10mg twice daily.  He was scheduled to undergo additional rituximab dose but symptoms improved and repeat CT Scan showed improvement in his lymph node and tonsillar hypertrophy.  Per mom he was EBV negative prior to transplant, and she thinks his donor was EBV positive.     He has done well from an infection/PTLD standpoint and came off valcyte therapy in December 2013 with negative EBV PCR in February 2014.  He did undergo orthopedic surgery for his left hand contracture in October, admitted in January with a cellulitis of that hand that has completely resolved. He is scheduled to undergo plate removal from that hand in October this year.  Past medical/Past surgical history:  Born  "38 weeks, 7 pound birth weight  Hospitalized at age 2 1/2 years with viral myocarditis  Cerebrovascular accident at age 2 1/2 with resultant left hemiplegia  Multiple orthopedic surgeries in past  Cardiomyopathy  Heart transplant March 23, 2012  PTLD, EBV positive      SOCIAL HISTORY:   Lives with dad, works at [employer].     Family history:  Negative for congenital heart defects, heart transplant, cardiomyopathy, sudden death.  Sister with type 1 DM.     Medications:  Prescription Medications as of 5/25/2017             aspirin 81 MG EC tablet Take 2 tablets (162 mg) by mouth daily    Cholecalciferol (VITAMIN D3) 2000 UNITS TABS Take 2,000 Units by mouth daily    amLODIPine (NORVASC) 5 MG tablet Take 1 tablet (5 mg) by mouth daily    mycophenolate (CELLCEPT - GENERIC EQUIVALENT) 500 MG tablet Take 1 tablet (500 mg) by mouth 2 times daily    tacrolimus (PROGRAF - GENERIC EQUIVALENT) 1 MG capsule Take 2 mg in the morning and 1.5 mg in the evening    tacrolimus (PROGRAF - GENERIC EQUIVALENT) 0.5 MG capsule Take 1 capsule by mouth every evening (total dose is 2mg in am and 1.5mg in pm)    pravastatin (PRAVACHOL) 10 MG tablet Take 1 tablet (10 mg) by mouth daily At bedtime        Physical Exam:  /78 (BP Location: Right arm, Patient Position: Supine, Cuff Size: Adult Regular)  Pulse 92  Resp 20  Ht 6' 0.56\" (184.3 cm)  Wt 140 lb 14 oz (63.9 kg)  SpO2 100%  BMI 18.81 kg/m2    Gen: Alert, interactive and appropriate, sitting, no respiratory distress.   Chest:  normal breath sounds bilaterally, no wheeze, crackles, or rhonchi, good air entry  CV: RRR, normal S1 and S2, no murmurs, rubs, gallops  Abd: abdomen is soft without significant tenderness, masses, organomegaly or guarding  Ext: wwp, 2+ pulses upper and 2+ lower extremitiy without delay, no edema  Obed had evaluation today with labs, ecg, echocardiogram, imaging.  His echo showed normal post transplant anatomy, normal systolic function, there is some " mild edema/LVH and abnormal diastolic function, no pericardial effusion.  His ecg showed normal sinus rhythm, rate of 96 beats per minute, interventricular conduction delay, no st or twave changes. His labs included a comprehensive metabolic panel which was normal, specifically bun of 11, creatinine of 0.8, normal LFTs.  troponin mildly elevated at 0.128.  He had a cbc which had normal wbc count of 5.7, hemoglobin of 15.2, platelets of 662161.  He had ebv, cmv, pra, and tacrolimus level that are pending.  His cxr showed clear lungs.   His last biopsy on 4/21/17 showed mild rejection, grade 2R, pAMR0, negative C3d/C4d staining.    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 Of hypertrophic cardiomyopathy picture and glycogen storage disease).  He also has history of EBV positive PTLD and is currently doing well. From a cardiac standpoint, Obed had a recent 2R rejection likely secondary to noncompliance, treated with 3 days of iv solumedrol. Unfortunately his troponin is elevated again today concerning for ongoing rejection. We are planning to do a right heart cath and biopsy tomorrow am to further evaluate this, and will make medication changes based on tacro level and biopsy results tomorrow.  We made no medication changes today.   We again re-referred to Dr. Evgeny Decker (genetics/metabolism) today and will plan to have Obed see him to have some counseling, blood and urine testing done to look for evidene of glycogen storage disease.      Diagnosis summary:  1. Viral myocarditis at age 2 1/2  2. Dilated cardiomyopathy           A. S/p orthotopic heart transplant (3/23/12)   1. 2R rejection (4/21/17), treated with  iv solumedrol x 3 days  3. Cerebrovascular accident at age 2 1/2 with resultant left hemiplegia  4. Multiple orthopedic surgeries in past  5. PTLD, EBV positive            A.  treated with rituximab x 2, and increased prednisone dose from 2.5mg daily back up 10mg twice daily      Thank you for the opportunity to participate in his care.  Please let me know if you have further questions.    Sincerely,    Bobbi Pruitt MD  Medical Director, Pediatric Heart Failure and Transplant    CC  Patient Care Team:  Rayo Schafer MD as PCP - General (Pediatrics)  Isrrael Trent MD as MD (Oncology)    Copy to patient  Obed Viramontes  [address]  [facility] [address]

## 2017-05-25 NOTE — NURSING NOTE
"Chief Complaint   Patient presents with     Heart Problem     Heart transplant.       Initial /78 (BP Location: Right arm, Patient Position: Supine, Cuff Size: Adult Regular)  Pulse 92  Resp 20  Ht 6' 0.56\" (184.3 cm)  Wt 140 lb 14 oz (63.9 kg)  SpO2 100%  BMI 18.81 kg/m2 Estimated body mass index is 18.81 kg/(m^2) as calculated from the following:    Height as of this encounter: 6' 0.56\" (184.3 cm).    Weight as of this encounter: 140 lb 14 oz (63.9 kg).  Medication Reconciliation: complete       Christina Mason M.A.    "

## 2017-05-25 NOTE — NURSING NOTE
It was a pleasure to see Obed and his parents today with Dr. Pruitt, Obed reports feeling well since discharge post rejection 1 month ago. There are med compliance issues. His last refill was 1/21/17 for a 90 day supply. We are refilling all meds today at the [facility] pharmacy and getting a biopsy with heart cath tomorrow for high suspicion of rejection. He does have a positive troponin.

## 2017-05-25 NOTE — PROGRESS NOTES
Rayo Schafer  [facility]  [address] [place] [address]    Pediatric Heart Failure and Transplant Clinic Note  Date of referral:2012  Date of initial consult: 2012  Date of note: 2017  Re: Obed Viramontes  MR #: [id number]  : 1995    HPI:  Obed is a 21 year old male with history of heart transplant (3/23/2012) and posttransplant lymphoproliferative disorder (diagnosed 2012) who transferred care to [facility] in 2012.   He is in clinic today with his parents for followup after recent rejection episode. He was seen in April for routine 5th annual followup, however he had elevated troponin and routine biopsy showed 2R rejection, and he was noncompliant with medications prior to this. He was admitted for 3 days of iv solumedrol and his troponin improved. Following that, he was staying with mom for a few weeks and was on a better schedule, compliant with medications, and eating better and has gained weight. He reports that even since going back to dad's house and going back to work that he is compliant with his medications. He feels his energy level is stable, no edema, no shortness of breath with activity. no recent fevers, no recurrence of mouth sores, no chest pain, palpitations, tachycardia, dizziness or syncope. Comprehensive review of systems is otherwise negative today.      In reviewing his history, he was born at 38 weeks with uncomplicated pregnancy and delivery, weighed 7 pounds.  He had uncomplicated  course and was healthy in early childhood.  He was hospitalized at age 2 1/2 years with viral myocarditis, hospitalized for 6 weeks, intubated for 10 days, had recovery of myocardial function and was treated chronically with digoxin, hospital course complicated by cerobrovascular accident with resultant left hemiplegia, transitioned to [facility] rehab for an additional 5 weeks following his hospital course.  He  has had several orthopedic surgeries throughout childhood because of his hemiplegia, but otherwise has been healthy.  He was asymptomatic from a cardiac standpoint until February of this year, at which time he presented with chest pain.  He was found to have significant arrythmias, was admitted to [facility], evaluated and listed for transplant (mom reports underlying diagnosis of hypertrophic cardiomyopathy possibly secondary to glycogen storage disease?, some pathology results on native heart still pending).  He was transplanted on March 23, 2012, did well post transplant and was discharged after 1 week.  He reportedly has done well from a cardiac standpoint with no rejection, 1R biopsy in May and 1R biopsy in September but otherwise negative biopsies.  He presented in September with mouth sores and difficulty taking medication, initially treated with lowering his immunosuppression, but was rehospitalized with fever and worsening mouth sores.  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  He was treated with rituximab x 2, and increased prednisone dose from 2.5mg daily back up 10mg twice daily.  He was scheduled to undergo additional rituximab dose but symptoms improved and repeat CT Scan showed improvement in his lymph node and tonsillar hypertrophy.  Per mom he was EBV negative prior to transplant, and she thinks his donor was EBV positive.     He has done well from an infection/PTLD standpoint and came off valcyte therapy in December 2013 with negative EBV PCR in February 2014.  He did undergo orthopedic surgery for his left hand contracture in October, admitted in January with a cellulitis of that hand that has completely resolved. He is scheduled to undergo plate removal from that hand in October this year.  Past medical/Past surgical history:  Born 38 weeks, 7 pound birth weight  Hospitalized at age 2 1/2 years with viral  "myocarditis  Cerebrovascular accident at age 2 1/2 with resultant left hemiplegia  Multiple orthopedic surgeries in past  Cardiomyopathy  Heart transplant March 23, 2012  PTLD, EBV positive      SOCIAL HISTORY:   Lives with dad, works at [employer].     Family history:  Negative for congenital heart defects, heart transplant, cardiomyopathy, sudden death.  Sister with type 1 DM.     Medications:  Prescription Medications as of 5/25/2017             aspirin 81 MG EC tablet Take 2 tablets (162 mg) by mouth daily    Cholecalciferol (VITAMIN D3) 2000 UNITS TABS Take 2,000 Units by mouth daily    amLODIPine (NORVASC) 5 MG tablet Take 1 tablet (5 mg) by mouth daily    mycophenolate (CELLCEPT - GENERIC EQUIVALENT) 500 MG tablet Take 1 tablet (500 mg) by mouth 2 times daily    tacrolimus (PROGRAF - GENERIC EQUIVALENT) 1 MG capsule Take 2 mg in the morning and 1.5 mg in the evening    tacrolimus (PROGRAF - GENERIC EQUIVALENT) 0.5 MG capsule Take 1 capsule by mouth every evening (total dose is 2mg in am and 1.5mg in pm)    pravastatin (PRAVACHOL) 10 MG tablet Take 1 tablet (10 mg) by mouth daily At bedtime        Physical Exam:  /78 (BP Location: Right arm, Patient Position: Supine, Cuff Size: Adult Regular)  Pulse 92  Resp 20  Ht 6' 0.56\" (184.3 cm)  Wt 140 lb 14 oz (63.9 kg)  SpO2 100%  BMI 18.81 kg/m2    Gen: Alert, interactive and appropriate, sitting, no respiratory distress.   Chest:  normal breath sounds bilaterally, no wheeze, crackles, or rhonchi, good air entry  CV: RRR, normal S1 and S2, no murmurs, rubs, gallops  Abd: abdomen is soft without significant tenderness, masses, organomegaly or guarding  Ext: wwp, 2+ pulses upper and 2+ lower extremitiy without delay, no edema  Obed had evaluation today with labs, ecg, echocardiogram, imaging.  His echo showed normal post transplant anatomy, normal systolic function, there is some mild edema/LVH and abnormal diastolic function, no pericardial effusion.  " His ecg showed normal sinus rhythm, rate of 96 beats per minute, interventricular conduction delay, no st or twave changes. His labs included a comprehensive metabolic panel which was normal, specifically bun of 11, creatinine of 0.8, normal LFTs.  troponin mildly elevated at 0.128.  He had a cbc which had normal wbc count of 5.7, hemoglobin of 15.2, platelets of 189931.  He had ebv, cmv, pra, and tacrolimus level that are pending.  His cxr showed clear lungs.   His last biopsy on 4/21/17 showed mild rejection, grade 2R, pAMR0, negative C3d/C4d staining.    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 Of hypertrophic cardiomyopathy picture and glycogen storage disease).  He also has history of EBV positive PTLD and is currently doing well. From a cardiac standpoint, Obed had a recent 2R rejection likely secondary to noncompliance, treated with 3 days of iv solumedrol. Unfortunately his troponin is elevated again today concerning for ongoing rejection. We are planning to do a right heart cath and biopsy tomorrow am to further evaluate this, and will make medication changes based on tacro level and biopsy results tomorrow.  We made no medication changes today.   We again re-referred to Dr. Evgeny Decker (genetics/metabolism) today and will plan to have Obed see him to have some counseling, blood and urine testing done to look for evidene of glycogen storage disease.      Diagnosis summary:  1. Viral myocarditis at age 2 1/2  2. Dilated cardiomyopathy           A. S/p orthotopic heart transplant (3/23/12)   1. 2R rejection (4/21/17), treated with iv solumedrol x 3 days  3. Cerebrovascular accident at age 2 1/2 with  resultant left hemiplegia  4. Multiple orthopedic surgeries in past  5. PTLD, EBV positive            A.  treated with rituximab x 2, and increased prednisone dose from 2.5mg daily back up 10mg twice daily      Thank you for the opportunity to participate in his care.  Please let me know if you have further questions.    Sincerely,    Bobbi Pruitt MD  Medical Director, Pediatric Heart Failure and Transplant    CC  Patient Care Team:  Rayo Schafer MD as PCP - General (Pediatrics)  Isrrael Lombardi MD as MD (Oncology)  Bobbi Pruitt MD as MD (Pediatric Cardiology)  ISRRAEL LOMBARDI    Copy to patient  [address]BALDO ENCARNACION   [address]  [facility] [address]

## 2017-05-25 NOTE — PATIENT INSTRUCTIONS
Plan:  Heart cath and biopsy tomorrow at 9:30am, arrive at 7:30am.    meds at [facility], across from [facility]

## 2017-05-26 ENCOUNTER — ANESTHESIA (OUTPATIENT)
Dept: SURGERY | Facility: CLINIC | Age: 22
End: 2017-05-26
Payer: COMMERCIAL

## 2017-05-26 ENCOUNTER — TELEPHONE (OUTPATIENT)
Dept: PEDIATRIC CARDIOLOGY | Facility: CLINIC | Age: 22
End: 2017-05-26

## 2017-05-26 ENCOUNTER — APPOINTMENT (OUTPATIENT)
Dept: CARDIOLOGY | Facility: CLINIC | Age: 22
End: 2017-05-26
Attending: PEDIATRICS
Payer: COMMERCIAL

## 2017-05-26 ENCOUNTER — HOSPITAL ENCOUNTER (OUTPATIENT)
Facility: CLINIC | Age: 22
Discharge: HOME | End: 2017-05-26
Attending: PEDIATRICS | Admitting: PEDIATRICS
Payer: COMMERCIAL

## 2017-05-26 ENCOUNTER — SURGERY (OUTPATIENT)
Age: 22
End: 2017-05-26

## 2017-05-26 VITALS
HEIGHT: 73 IN | HEART RATE: 80 BPM | DIASTOLIC BLOOD PRESSURE: 70 MMHG | RESPIRATION RATE: 16 BRPM | WEIGHT: 143.08 LBS | SYSTOLIC BLOOD PRESSURE: 101 MMHG | OXYGEN SATURATION: 98 % | TEMPERATURE: 97.9 F | BODY MASS INDEX: 18.96 KG/M2

## 2017-05-26 DIAGNOSIS — Z94.1 HEART TRANSPLANTED (H): ICD-10-CM

## 2017-05-26 DIAGNOSIS — T86.21 ACUTE REJECTION OF CARDIAC TRANSPLANT (H): Primary | ICD-10-CM

## 2017-05-26 DIAGNOSIS — Z94.1 TRANSPLANTED HEART (H): ICD-10-CM

## 2017-05-26 LAB
CMV DNA SPEC NAA+PROBE-ACNC: NORMAL [IU]/ML
CMV DNA SPEC NAA+PROBE-LOG#: NORMAL {LOG_IU}/ML
COPATH REPORT: NORMAL
EBV DNA # SPEC NAA+PROBE: NORMAL {COPIES}/ML
EBV DNA SPEC NAA+PROBE-LOG#: NORMAL {LOG_COPIES}/ML
INTERPRETATION ECG - MUSE: NORMAL
PRA DONOR SPECIFIC ABY: NORMAL
SPECIMEN SOURCE: NORMAL
TACROLIMUS BLD-MCNC: 4.1 UG/L (ref 5–15)
TME LAST DOSE: ABNORMAL H

## 2017-05-26 PROCEDURE — 00000159 ZZHCL STATISTIC H-SEND OUTS PREP: Performed by: PEDIATRICS

## 2017-05-26 PROCEDURE — 27210769 ZZH KIT ACIST INJECTOR CR4

## 2017-05-26 PROCEDURE — 93505 ENDOMYOCARDIAL BIOPSY: CPT

## 2017-05-26 PROCEDURE — 25000128 H RX IP 250 OP 636: Performed by: PEDIATRICS

## 2017-05-26 PROCEDURE — 27210956 ZZH BALLOON TIP PRESSURE CR7

## 2017-05-26 PROCEDURE — 71000027 ZZH RECOVERY PHASE 2 EACH 15 MINS: Performed by: PEDIATRICS

## 2017-05-26 PROCEDURE — 88346 IMFLUOR 1ST 1ANTB STAIN PX: CPT | Performed by: PEDIATRICS

## 2017-05-26 PROCEDURE — 80197 ASSAY OF TACROLIMUS: CPT | Performed by: PEDIATRICS

## 2017-05-26 PROCEDURE — 27210946 ZZH KIT HC TOTES DISP CR8

## 2017-05-26 PROCEDURE — 40000477 ZZHCL STATISTIC IP IF DIRECT UMP PF 88346: Performed by: PEDIATRICS

## 2017-05-26 PROCEDURE — 40000170 ZZH STATISTIC PRE-PROCEDURE ASSESSMENT II: Performed by: PEDIATRICS

## 2017-05-26 PROCEDURE — 37000009 ZZH ANESTHESIA TECHNICAL FEE, EACH ADDTL 15 MIN: Performed by: PEDIATRICS

## 2017-05-26 PROCEDURE — C1893 INTRO/SHEATH, FIXED,NON-PEEL: HCPCS

## 2017-05-26 PROCEDURE — 25000125 ZZHC RX 250: Performed by: NURSE ANESTHETIST, CERTIFIED REGISTERED

## 2017-05-26 PROCEDURE — 37000008 ZZH ANESTHESIA TECHNICAL FEE, 1ST 30 MIN: Performed by: PEDIATRICS

## 2017-05-26 PROCEDURE — B2111ZZ FLUOROSCOPY OF MULTIPLE CORONARY ARTERIES USING LOW OSMOLAR CONTRAST: ICD-10-PCS | Performed by: PEDIATRICS

## 2017-05-26 PROCEDURE — 88307 TISSUE EXAM BY PATHOLOGIST: CPT | Mod: 26 | Performed by: PEDIATRICS

## 2017-05-26 PROCEDURE — 88307 TISSUE EXAM BY PATHOLOGIST: CPT | Performed by: PEDIATRICS

## 2017-05-26 PROCEDURE — 25000128 H RX IP 250 OP 636: Performed by: NURSE ANESTHETIST, CERTIFIED REGISTERED

## 2017-05-26 PROCEDURE — 93451 RIGHT HEART CATH: CPT | Mod: XU

## 2017-05-26 PROCEDURE — 4A023N6 MEASUREMENT OF CARDIAC SAMPLING AND PRESSURE, RIGHT HEART, PERCUTANEOUS APPROACH: ICD-10-PCS | Performed by: PEDIATRICS

## 2017-05-26 PROCEDURE — 27211047 ZZH FORCEP BIOPSY CR10

## 2017-05-26 PROCEDURE — 88350 IMFLUOR EA ADDL 1ANTB STN PX: CPT | Performed by: PEDIATRICS

## 2017-05-26 RX ORDER — LIDOCAINE 40 MG/G
CREAM TOPICAL
Status: DISCONTINUED | OUTPATIENT
Start: 2017-05-26 | End: 2017-05-26 | Stop reason: HOSPADM

## 2017-05-26 RX ORDER — IODIXANOL 320 MG/ML
INJECTION, SOLUTION INTRAVASCULAR PRN
Status: DISCONTINUED | OUTPATIENT
Start: 2017-05-26 | End: 2017-05-26 | Stop reason: HOSPADM

## 2017-05-26 RX ORDER — PROPOFOL 10 MG/ML
INJECTION, EMULSION INTRAVENOUS CONTINUOUS PRN
Status: DISCONTINUED | OUTPATIENT
Start: 2017-05-26 | End: 2017-05-26

## 2017-05-26 RX ORDER — TACROLIMUS 0.5 MG/1
CAPSULE ORAL
Qty: 90 CAPSULE | Refills: 11 | Status: SHIPPED | OUTPATIENT
Start: 2017-05-26 | End: 2017-06-08 | Stop reason: DRUGHIGH

## 2017-05-26 RX ORDER — PREDNISONE 20 MG/1
60 TABLET ORAL DAILY
Qty: 15 TABLET | Refills: 0 | Status: SHIPPED | OUTPATIENT
Start: 2017-05-26 | End: 2017-06-26

## 2017-05-26 RX ORDER — SODIUM CHLORIDE, SODIUM LACTATE, POTASSIUM CHLORIDE, CALCIUM CHLORIDE 600; 310; 30; 20 MG/100ML; MG/100ML; MG/100ML; MG/100ML
INJECTION, SOLUTION INTRAVENOUS CONTINUOUS PRN
Status: DISCONTINUED | OUTPATIENT
Start: 2017-05-26 | End: 2017-05-26

## 2017-05-26 RX ORDER — FENTANYL CITRATE 50 UG/ML
10-25 INJECTION, SOLUTION INTRAMUSCULAR; INTRAVENOUS EVERY 10 MIN PRN
Status: DISCONTINUED | OUTPATIENT
Start: 2017-05-26 | End: 2017-05-26 | Stop reason: HOSPADM

## 2017-05-26 RX ORDER — ALBUTEROL SULFATE 0.83 MG/ML
2.5 SOLUTION RESPIRATORY (INHALATION)
Status: DISCONTINUED | OUTPATIENT
Start: 2017-05-26 | End: 2017-05-26 | Stop reason: HOSPADM

## 2017-05-26 RX ORDER — FENTANYL CITRATE 50 UG/ML
INJECTION, SOLUTION INTRAMUSCULAR; INTRAVENOUS PRN
Status: DISCONTINUED | OUTPATIENT
Start: 2017-05-26 | End: 2017-05-26

## 2017-05-26 RX ORDER — ACETAMINOPHEN 325 MG/1
10 TABLET ORAL
Status: DISCONTINUED | OUTPATIENT
Start: 2017-05-26 | End: 2017-05-26 | Stop reason: HOSPADM

## 2017-05-26 RX ORDER — ACETAMINOPHEN 325 MG/1
10 TABLET ORAL EVERY 4 HOURS PRN
Status: DISCONTINUED | OUTPATIENT
Start: 2017-05-26 | End: 2017-05-26 | Stop reason: HOSPADM

## 2017-05-26 RX ORDER — ONDANSETRON 2 MG/ML
0.06 INJECTION INTRAMUSCULAR; INTRAVENOUS EVERY 30 MIN PRN
Status: DISCONTINUED | OUTPATIENT
Start: 2017-05-26 | End: 2017-05-26 | Stop reason: HOSPADM

## 2017-05-26 RX ORDER — LIDOCAINE HYDROCHLORIDE 20 MG/ML
INJECTION, SOLUTION INFILTRATION; PERINEURAL PRN
Status: DISCONTINUED | OUTPATIENT
Start: 2017-05-26 | End: 2017-05-26

## 2017-05-26 RX ORDER — LIDOCAINE 40 MG/G
CREAM TOPICAL ONCE
Status: DISCONTINUED | OUTPATIENT
Start: 2017-05-26 | End: 2017-05-26 | Stop reason: HOSPADM

## 2017-05-26 RX ORDER — TACROLIMUS 1 MG/1
CAPSULE ORAL
Qty: 270 CAPSULE | Refills: 11 | Status: SHIPPED | OUTPATIENT
Start: 2017-05-26 | End: 2017-06-08

## 2017-05-26 RX ORDER — SODIUM CHLORIDE, SODIUM LACTATE, POTASSIUM CHLORIDE, CALCIUM CHLORIDE 600; 310; 30; 20 MG/100ML; MG/100ML; MG/100ML; MG/100ML
INJECTION, SOLUTION INTRAVENOUS CONTINUOUS
Status: DISCONTINUED | OUTPATIENT
Start: 2017-05-26 | End: 2017-05-26 | Stop reason: HOSPADM

## 2017-05-26 RX ORDER — PROPOFOL 10 MG/ML
INJECTION, EMULSION INTRAVENOUS PRN
Status: DISCONTINUED | OUTPATIENT
Start: 2017-05-26 | End: 2017-05-26

## 2017-05-26 RX ADMIN — LIDOCAINE HYDROCHLORIDE 60 MG: 20 INJECTION, SOLUTION INFILTRATION; PERINEURAL at 09:50

## 2017-05-26 RX ADMIN — PROPOFOL 20 MG: 10 INJECTION, EMULSION INTRAVENOUS at 10:01

## 2017-05-26 RX ADMIN — PROPOFOL 50 MG: 10 INJECTION, EMULSION INTRAVENOUS at 09:50

## 2017-05-26 RX ADMIN — FENTANYL CITRATE 20 MCG: 50 INJECTION, SOLUTION INTRAMUSCULAR; INTRAVENOUS at 10:05

## 2017-05-26 RX ADMIN — IODIXANOL 8 ML: 320 INJECTION, SOLUTION INTRAVASCULAR at 10:26

## 2017-05-26 RX ADMIN — SODIUM CHLORIDE, POTASSIUM CHLORIDE, SODIUM LACTATE AND CALCIUM CHLORIDE: 600; 310; 30; 20 INJECTION, SOLUTION INTRAVENOUS at 09:50

## 2017-05-26 RX ADMIN — FENTANYL CITRATE 25 MCG: 50 INJECTION, SOLUTION INTRAMUSCULAR; INTRAVENOUS at 10:01

## 2017-05-26 RX ADMIN — PROPOFOL 50 MCG/KG/MIN: 10 INJECTION, EMULSION INTRAVENOUS at 09:50

## 2017-05-26 RX ADMIN — MIDAZOLAM HYDROCHLORIDE 2 MG: 1 INJECTION, SOLUTION INTRAMUSCULAR; INTRAVENOUS at 09:50

## 2017-05-26 NOTE — IP AVS SNAPSHOT
[facility]    [address] [address]    Phone:  [phone]                                       After Visit Summary   5/26/2017    Obed Viramontes    MRN: [id number]           After Visit Summary Signature Page     I have received my discharge instructions, and my questions have been answered. I have discussed any challenges I see with this plan with the nurse or doctor.    ..........................................................................................................................................  Patient/Patient Representative Signature      ..........................................................................................................................................  Patient Representative Print Name and Relationship to Patient    ..................................................               ................................................  Date                                            Time    ..........................................................................................................................................  Reviewed by Signature/Title    ...................................................              ..............................................  Date                                                            Time

## 2017-05-26 NOTE — ANESTHESIA CARE TRANSFER NOTE
Patient: Obed Viramontes    Procedure(s):  Right Heart Cath and Biopsy  (Latex Allergy) - Wound Class: I-Clean    Diagnosis: Post Heart Transplant  Diagnosis Additional Information: No value filed.    Anesthesia Type:   General     Note:  Airway :Room Air  Patient transferred to:PACU        Vitals: (Last set prior to Anesthesia Care Transfer)    CRNA VITALS  5/26/2017 1003 - 5/26/2017 1049      5/26/2017             Pulse: 85    SpO2: 97 %    Resp Rate (set): 10                Electronically Signed By: CEZAR Martin CRNA  May 26, 2017  10:49 AM

## 2017-05-26 NOTE — DISCHARGE INSTRUCTIONS
"                               [facility]  Cardiac Catheterization & Electrophysiology Laboratory  Discharge Instructions    Obed Viramontes MRN# [id number]   YOB: 1995 Age: 21 year old     Date of Admission:  5/26/2017  Date of Discharge:  5/26/2017  Physician:   Bobbi Pruitt MD    Primary Care Provider: Rayo Schafer           Diagnoses:   Heart transplant          Procedures, Findings, Outcomes, Recommendations, Plans:     Heart catheterization, angiography, endomyocardial biopsy           Pending Results:   Endomyocardial biopsy           Discharge Weight and Vitals:   Blood pressure 90/60, pulse 80, temperature 97.7  F (36.5  C), temperature source Temporal, resp. rate 14, height 1.843 m (6' 0.56\"), weight 64.9 kg (143 lb 1.3 oz), SpO2 98 %.         Follow-Up Appointments:   Primary Care Provider: as needed  Dr. Pruitt:                            Transplant coordinator will contact patient/family regarding biopsy results and plan for follow-up          Wound Care, Monitoring, and Other Instructions:     Watch the right neck site closely for any bleeding, swelling, redness, discharge, or change in color/temperature/sensation    Call immediately if there is bleeding or fever    Keep the site clean and dry    You may leave the site uncovered; if you want to cover it with a band-aid be sure to change the band-aid any time it gets wet or dirty    Avoid vigorous activity for 48 hours to reduce the risk of bleeding from the site    Do not soak the site (bathe or swim) for 48 hours; okay to shower or sponge-bathe after 24 hours    If you have any questions about the site, either your primary care provider or your cardiologist can examine it    To reach [facility] cardiologist at any time please call [phone] (M-F 7:30 AM- 4:00 PM) or [phone] and ask for the on-call pediatric cardiologist " (anytime)  Same-Day Surgery   Adult Discharge Orders & Instructions     For 24 hours after surgery:  1. Get plenty of rest.  A responsible adult must stay with you for at least 24 hours after you leave the hospital.   2. Pain medication can slow your reflexes. Do not drive or use heavy equipment.  If you have weakness or tingling, don't drive or use heavy equipment until this feeling goes away.  3. Mixing alcohol and pain medication can cause dizziness and slow your breathing. It can even be fatal. Do not drink alcohol while taking pain medication.  4. Avoid strenuous or risky activities.  Ask for help when climbing stairs.   5. You may feel lightheaded.  If so, sit for a few minutes before standing.  Have someone help you get up.   6. If you have nausea (feel sick to your stomach), drink only clear liquids such as apple juice, ginger ale, broth or 7-Up.  Rest may also help.  Be sure to drink enough fluids.  Move to a regular diet as you feel able. Take pain medications with a small amount of solid food, such as toast or crackers, to avoid nausea.   7. A slight fever is normal. Call the doctor if your fever is over 100 F (37.7 C) (taken under the tongue) or lasts longer than 24 hours.  8. You may have a dry mouth, muscle aches, trouble sleeping or a sore throat.  These symptoms should go away after 24 hours.  9. Do not make important or legal decisions.   Pain Management:      1. Take pain medication (if prescribed) for pain as directed by your physician.        2. WARNING: If the pain medication you have been prescribed contains Tylenol  (acetaminophen), DO NOT take additional doses of Tylenol (acetaminophen).     Call your doctor for any of the followin.  Signs of infection (fever, growing tenderness at the surgery site, severe pain, a large amount of drainage or bleeding, foul-smelling drainage, redness, swelling).    2.  It has been over 8 to 10 hours since surgery and you are still not able to urinate  (pee).    3.  Headache for over 24 hours.    4.  Numbness, tingling or weakness the day after surgery (if you had spinal anesthesia).  To contact a doctor, call :      [phone] and ask for the Resident On Call for:          Pediatric Cardiology, Dr. Pruitt (answered 24 hours a day)      Emergency Department:  [facility]: [phone]  [place] [facility]: [phone]               Rev. 10/2014

## 2017-05-26 NOTE — BRIEF OP NOTE
[facility]  BRIEF POST-PROCEDURE NOTE    Pre Cath CRISP score  0  Risk Category 1    Pre-procedure diagnosis Heart transplant, transplant rejection   Post-procedure diagnosis same   Procedure 1. right heart cath   Staff Dr Pruitt   Assistant(s) Bobbi Pruitt   Anesthesia monitored anesthesia care   Access 7F RIJ    Specimens  5 biopsy specimens   IV contrast 8 mL   Heparinized No   Blood loss 2 mL   Complications None     Preliminary findings:      SVC mean = 10    RA mean = 10    RV 31/14    RPA 29/22 mean 16    RPA wedge mean = 12    RPA sat 68%    Thermodilution cardiac output 5.3L/min    Plan to PACU and then can be discharged home once awake from sedation.     Bobbi Pruitt MD  Pediatric Cardiology  [facility]

## 2017-05-26 NOTE — ANESTHESIA POSTPROCEDURE EVALUATION
Patient: Obed Viramontes    Procedure(s):  Right Heart Cath and Biopsy  (Latex Allergy) - Wound Class: I-Clean    Diagnosis:Post Heart Transplant  Diagnosis Additional Information: No value filed.    Anesthesia Type:  General    Note:  Anesthesia Post Evaluation    Patient location during evaluation: PACU  Patient participation: Able to fully participate in evaluation  Level of consciousness: awake  Pain management: adequate  Airway patency: patent  Cardiovascular status: acceptable  Respiratory status: acceptable, room air and nonlabored ventilation  Hydration status: acceptable  PONV: none     Anesthetic complications: None    Comments: I personally evaluated the patient at bedside. No anesthesia-related complications noted. Patient is hemodynamically stable with adequate control of pain and nausea. Ready for discharge from PACU. All questions were answered.    Carter Ford MD  Pediatric Staff Anesthesiologist  [facility]  Pager [phone]  Phone [phone]         Last vitals:  Vitals:    05/26/17 1100 05/26/17 1115 05/26/17 1130   BP: 99/67 (!) 84/54 101/70   Pulse:      Resp: 13 15 16   Temp: 36.7  C (98  F)  36.6  C (97.9  F)   SpO2: 98% 97% 98%         Electronically Signed By: Carter Ford MD  May 26, 2017  3:07 PM

## 2017-05-26 NOTE — ANESTHESIA PREPROCEDURE EVALUATION
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 He later developed a dilated cardiomyopathy and underwent heart transplant on 3/23/17. 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 Currently doing well. Plan for heart cath with biopsies.    The patient has tolerated previous general anesthetics (natural airway/TIVA) without any problems.     Cardiovascular Findings   Comments:   Viral myocarditis at age 2 1/2   Dilated cardiomyopathy s/p heart transplant on 3/23/12    TTE (5/25/17):  Patient after orthotopic heart transplant. Normal right and left ventricular  size and systolic function. The calculated biplane left ventricular ejection  fraction is 61% LVRI of 1.0. Trival aortic and mitral valve insufficency seen.  Insufficient jet to estimate right ventricular systolic pressure. No  pericardial effusion.  No significant change from last echocardiogram.      Neuro Findings   Comments:   Hemiplegia 2/2 cerebral vascular accident during hospitalization for viral myocarditis.     Pulmonary Findings - negative ROS  (-) recent URI          GI/Hepatic/Renal Findings - negative ROS  (-) GERD    Endocrine/Metabolic Findings       Comments: Patient has been referred for genetics/metabolism eval for question of glycogen storage disease associated with initial cardiomyopathy diagnosis at OSH.      Genetic/Syndrome Findings   (+) genetic syndrome (possible glycogen storage disease?)  Comments: There is a question of glycogen storage disease associated with his diagnosis of cardiomyopathy at an OSH. Patient has been referred for genetics/metabolism eval.    Hematology/Oncology Findings - negative hematology/oncology  ROS    Additional Notes  ANESTHESIA PREOP EVALUATION    PROCEDURE: Procedure(s):  Right Heart Cath and Biopsy(Latex Allergy) - Wound Class:     HPI: Obed Viramontes is a 21 year old male who presents for Procedure(s):  Right Heart Cath and Biopsy(Latex Allergy) - Wound Class:     NPO status: reviewed, adequate per ASA guidelines    WEIGHT: 0 lbs 0 oz    PMHx: Past Medical History:  No date: Cardiomyopathy, hypertrophic obstructive (H)  No date: H/O heart transplant (H)      Comment: Mar.23, 2012  No date: Hemiplegia following CVA (cerebrovascular acci*      Comment: left, improved with rehab  No date: Lymphoproliferative disease (H)  No date: Unspecified cerebral artery occlusion with cer*      Comment: age 1 1/2    PSHx: Past Surgical History:  10/20/2016: ARTHRODESIS WRIST Left      Comment: Procedure: ARTHRODESIS WRIST;  Surgeon: Amna Tinoco MD;  [facility]  No date: BIOPSY      Comment: gingival and tonsillar biopsy  No date: CL AFF SURGICAL PATHOLOGY  11/13/2012: HEART CATH CHILD      Comment: Procedure: HEART CATH CHILD;  Right Heart Cath               Procedure and Biopsy *Latex Allergy*;  Surgeon:               Bobbi Pruitt MD;  [facility]  2/15/2013: HEART CATH CHILD      Comment: Procedure: HEART CATH CHILD;  Right [place]earth                Cath, Angiogram and Biopsy;  Surgeon: Bobbi Pruitt MD;  [facility]  4/10/2015: HEART CATH CHILD N/A      Comment: Procedure: HEART CATH CHILD;  Surgeon:                Bobbi Pruitt MD;  [facility]  4/21/2017: HEART CATH CHILD N/A      Comment: Procedure: HEART CATH CHILD;  Right Heart Cath               Procedure with Angio Biopsy    (latex allergy)                ;  Surgeon: Bobbi Pruitt MD;                 [facility]  3/21/2014: HEART CATH, BIOPSY      Comment: Procedure: HEART CATH, BIOPSY;  Right and Left               Heart Cath, Angiogram, Biopsy   *Latex       "          Allergy*;  Surgeon: Bobbi Pruitt MD;                [facility]  4/18/2016: HEART CATH, BIOPSY Right      Comment: Procedure: HEART CATH, BIOPSY;  Surgeon:                Bobbi Pruitt MD;  [facility]  10/20/2016: INTERPOSITION TENDON HAND Left      Comment: Procedure: INTERPOSITION TENDON HAND;                 Surgeon: Amna Tinoco MD;                 [facility]  No date: ORTHOPEDIC SURGERY      Comment: at [place], left lower extremity  No date: PICC INSERTION  No date: PICC REMOVAL  No date: TRANSPLANT HEART RECIPIENT    ALLERGIES:  -- Latex -- Rash   -- Other (Seasonal Allergies)     --  Corn-abdominal pain and diarrhea.    Preop Vitals  BP Readings from Last 3 Encounters:  05/25/17 : 114/78  04/24/17 : 118/73  04/21/17 : 115/72   Pulse Readings from Last 3 Encounters:  05/25/17 : 92  04/20/17 : 96  01/12/17 : 110    Resp Readings from Last 3 Encounters:  05/25/17 : 20  04/24/17 : 18  04/21/17 : 12   SpO2 Readings from Last 3 Encounters:  05/25/17 : 100%  04/24/17 : 97%  04/21/17 : 100%    Temp Readings from Last 3 Encounters:  04/24/17 : 36.7  C (98  F) (Oral)  04/21/17 : 36.7  C (98  F) (Oral)  04/20/17 : 36.6  C (97.9  F) (Oral)   Ht Readings from Last 3 Encounters:  05/25/17 : 1.843 m (6' 0.56\")  04/22/17 : 1.854 m (6' 1\")  04/21/17 : 1.854 m (6' 1\")    Wt Readings from Last 3 Encounters:  05/25/17 : 63.9 kg (140 lb 14 oz)  04/24/17 : 60.7 kg (133 lb 13.1 oz)  04/21/17 : 62.5 kg (137 lb 12.6 oz)   Estimated body mass index is 18.81 kg/(m^2) as calculated from the following:    Height as of 5/25/17: 1.843 m (6' 0.56\").    Weight as of 5/25/17: 63.9 kg (140 lb 14 oz).    Current Medications  No prescriptions prior to admission.    No outpatient prescriptions have been marked as taking for the 5/26/17 encounter (Hospital Encounter).      LDA         Procedure: Procedure(s):  Right Heart Cath and Biopsy  (Latex Allergy) - Wound Class: "       PMHx/PSHx:  Past Medical History:   Diagnosis Date     Cardiomyopathy, hypertrophic obstructive (H)      H/O heart transplant (H)     Mar.23, 2012     Hemiplegia following CVA (cerebrovascular accident) (H)     left, improved with rehab     Lymphoproliferative disease (H)      Unspecified cerebral artery occlusion with cerebral infarction     age 2 1/2       Past Surgical History:   Procedure Laterality Date     ARTHRODESIS WRIST Left 10/20/2016    Procedure: ARTHRODESIS WRIST;  Surgeon: Amna Tinoco MD;  [facility]     BIOPSY      gingival and tonsillar biopsy     CL AFF SURGICAL PATHOLOGY       HEART CATH CHILD  11/13/2012    Procedure: HEART CATH CHILD;  Right Heart Cath Procedure and Biopsy *Latex Allergy*;  Surgeon: Bobbi Pruitt MD;  [facility]     HEART CATH CHILD  2/15/2013    Procedure: HEART CATH CHILD;  Right Hearth Cath, Angiogram and Biopsy;  Surgeon: Bobbi Pruitt MD;  [facility]     HEART CATH CHILD N/A 4/10/2015    Procedure: HEART CATH CHILD;  Surgeon: Bobbi Pruitt MD;  [facility]     HEART CATH CHILD N/A 4/21/2017    Procedure: HEART CATH CHILD;  Right Heart Cath Procedure with Angio Biopsy    (latex allergy) ;  Surgeon: Bobbi Pruitt MD;  [facility]     HEART CATH, BIOPSY  3/21/2014    Procedure: HEART CATH, BIOPSY;  Right and Left Heart Cath, Angiogram, Biopsy   *Latex Allergy*;  Surgeon: Bobbi Pruitt MD;  [facility]     HEART CATH, BIOPSY Right 4/18/2016    Procedure: HEART CATH, BIOPSY;  Surgeon: Bobbi Pruitt MD;  [facility]     INTERPOSITION TENDON HAND Left 10/20/2016    Procedure: INTERPOSITION TENDON HAND;  Surgeon: Amna Tinoco MD;  [facility]     ORTHOPEDIC SURGERY      at [place], left lower extremity     PICC INSERTION       PICC REMOVAL       TRANSPLANT HEART RECIPIENT           No current facility-administered medications on file prior to encounter.    Current Outpatient Prescriptions on File Prior to Encounter:  aspirin 81 MG EC tablet Take 2 tablets (162 mg) by mouth daily   Cholecalciferol (VITAMIN D3) 2000 UNITS TABS Take 2,000 Units by mouth daily   amLODIPine (NORVASC) 5 MG tablet Take 1 tablet (5 mg) by mouth daily   mycophenolate (CELLCEPT - GENERIC EQUIVALENT) 500 MG tablet Take 1 tablet (500 mg) by mouth 2 times daily   tacrolimus (PROGRAF - GENERIC EQUIVALENT) 1 MG capsule Take 2 mg in the morning and 1.5 mg in the evening   tacrolimus (PROGRAF - GENERIC EQUIVALENT) 0.5 MG capsule Take 1 capsule by mouth every evening (total dose is 2mg in am and 1.5mg in pm)   pravastatin (PRAVACHOL) 10 MG tablet Take 1 tablet (10 mg) by mouth daily At bedtime       Physical Exam  Normal systems: cardiovascular, pulmonary and dental    Airway   Mallampati: I  TM distance: >3 FB  Neck ROM: full    Dental     Cardiovascular   Rhythm and rate: regular and normal      Pulmonary    breath sounds clear to auscultation          Anesthesia Plan      History & Physical Review  History and physical reviewed and following examination; no interval change.    ASA Status:  2 .    NPO Status:  > 6 hours    Plan for General with Intravenous induction. Maintenance will be TIVA.    PONV prophylaxis:  Ondansetron (or other 5HT-3)  - PIV  - GA/natural airway, LMA/GETA as back-up  - Maintenance: TIVA with propofol  - Analgesia: fentanyl    Risks and benefits of anesthetic approach, including but not limited to need for conversion to LMA/ETT, sore throat, hoarseness, mucosal injury, dental injury, bronchospasm/laryngospasm, PONV, aspiration, injury to blood vessels and/ or nerves, hemodynamic and respiratory issues including potential long term consequences, bleeding, side effects of blood transfusion and postoperative delirium were discussed with parents and all questions were answered.    Carter Ford MD  Pediatric Staff Anesthesiologist  [facility]  Pager [phone]  Phone [phone]       Postoperative Care  Postoperative pain management:  IV analgesics.      Consents  Anesthetic plan, risks, benefits and alternatives discussed with:  Patient and Parent (Mother and/or Father).  Use of blood products discussed: No .   .          Britney Calderon MD  Staff Pediatric Anesthesiologist  [phone]    10:21 PM  April 20, 2017

## 2017-05-26 NOTE — TELEPHONE ENCOUNTER
Called Obed and Helen to let them know Obed's biopsy came back a 2 R, Tacro  Level 4.1,Per Dr. Pruitt I instructed Obed to increase his Tacro dose to 2.5mg twice daily and we will prescribe prednisone 60mg daily for 5 days. We want repeat labs in 2 weeks. Mom and Obed verbalized understanding of plan.

## 2017-05-26 NOTE — IP AVS SNAPSHOT
MRN:[id number]                      After Visit Summary   5/26/2017    Obed Viramontes    MRN: [id number]           Thank you!     Thank you for choosing [facility] for your care. Our goal is always to provide you with excellent care. Hearing back from our patients is one way we can continue to improve our services. Please take a few minutes to complete the written survey that you may receive in the mail after you visit with us. Thank you!        Patient Information     Date Of Birth          1995        About your hospital stay     You were admitted on:  May 26, 2017 You last received care in the:   PACU    You were discharged on:  May 26, 2017       Who to Call     For medical emergencies, please call 911.  For non-urgent questions about your medical care, please call your primary care provider or clinic, [phone]  For questions related to your surgery, please call your surgery clinic        Attending Provider     Provider Specialty    Bobbi Pruitt MD Pediatric Cardiology       Primary Care Provider Office Phone # Fax #    Rayo Schafer [phone]       [facility] [address] [place] [address]        Further instructions from your care team                                      [facility]  Cardiac Catheterization & Electrophysiology Laboratory  Discharge Instructions    Obed Viramontes MRN# [id number]   YOB: 1995 Age: 21 year old     Date of Admission:  5/26/2017  Date of Discharge:  5/26/2017  Physician:   Bobbi Pruitt MD    Primary Care Provider: Rayo Schafer           Diagnoses:   Heart transplant          Procedures, Findings, Outcomes, Recommendations, Plans:     Heart catheterization, angiography, endomyocardial biopsy           Pending Results:   Endomyocardial biopsy           Discharge Weight and Vitals:   Blood pressure 90/60, pulse 80,  "temperature 97.7  F (36.5  C), temperature source Temporal, resp. rate 14, height 1.843 m (6' 0.56\"), weight 64.9 kg (143 lb 1.3 oz), SpO2 98 %.         Follow-Up Appointments:   Primary Care Provider: as needed  Dr. Pruitt:                            Transplant coordinator will contact patient/family regarding biopsy results and plan for follow-up          Wound Care, Monitoring, and Other Instructions:     Watch the right neck site closely for any bleeding, swelling, redness, discharge, or change in color/temperature/sensation    Call immediately if there is bleeding or fever    Keep the site clean and dry    You may leave the site uncovered; if you want to cover it with a band-aid be sure to change the band-aid any time it gets wet or dirty    Avoid vigorous activity for 48 hours to reduce the risk of bleeding from the site    Do not soak the site (bathe or swim) for 48 hours; okay to shower or sponge-bathe after 24 hours    If you have any questions about the site, either your primary care provider or your cardiologist can examine it    To reach [facility] cardiologist at any time please call [phone] (M-F 7:30 AM- 4:00 PM) or [phone] and ask for the on-call pediatric cardiologist (anytime)  Same-Day Surgery   Adult Discharge Orders & Instructions     For 24 hours after surgery:  1. Get plenty of rest.  A responsible adult must stay with you for at least 24 hours after you leave the hospital.   2. Pain medication can slow your reflexes. Do not drive or use heavy equipment.  If you have weakness or tingling, don't drive or use heavy equipment until this feeling goes away.  3. Mixing alcohol and pain medication can cause dizziness and slow your breathing. It can even be fatal. Do not drink alcohol while taking pain medication.  4. Avoid strenuous or risky activities.  Ask for help when climbing stairs.   5. You may feel lightheaded.  If so, sit for a few minutes " before standing.  Have someone help you get up.   6. If you have nausea (feel sick to your stomach), drink only clear liquids such as apple juice, ginger ale, broth or 7-Up.  Rest may also help.  Be sure to drink enough fluids.  Move to a regular diet as you feel able. Take pain medications with a small amount of solid food, such as toast or crackers, to avoid nausea.   7. A slight fever is normal. Call the doctor if your fever is over 100 F (37.7 C) (taken under the tongue) or lasts longer than 24 hours.  8. You may have a dry mouth, muscle aches, trouble sleeping or a sore throat.  These symptoms should go away after 24 hours.  9. Do not make important or legal decisions.   Pain Management:      1. Take pain medication (if prescribed) for pain as directed by your physician.        2. WARNING: If the pain medication you have been prescribed contains Tylenol  (acetaminophen), DO NOT take additional doses of Tylenol (acetaminophen).     Call your doctor for any of the followin.  Signs of infection (fever, growing tenderness at the surgery site, severe pain, a large amount of drainage or bleeding, foul-smelling drainage, redness, swelling).    2.  It has been over 8 to 10 hours since surgery and you are still not able to urinate (pee).    3.  Headache for over 24 hours.    4.  Numbness, tingling or weakness the day after surgery (if you had spinal anesthesia).  To contact a doctor, call :      [phone] and ask for the Resident On Call for:          Pediatric Cardiology, Dr. Pruitt (answered 24 hours a day)      Emergency Department:  [place] [facility]: [phone]  [place] [facility]: [phone]               Rev. 10/2014       Pending Results     Date and Time Order Name Status Description    2017 1032 Surgical pathology exam In process     2017 0801 Tacrolimus level In process     2017 0815 EBV DNA PCR QUANTITATIVE WHOLE BLOOD In process             Admission  "Information     Date & Time Provider Department Dept. Phone    2017 Bobbi Pruitt MD  PACU [phone]      Your Vitals Were     Blood Pressure Pulse Temperature Respirations Height Weight    90/60 80 97.7  F (36.5  C) (Temporal) 14 1.843 m (6' 0.56\") 64.9 kg (143 lb 1.3 oz)    Pulse Oximetry BMI (Body Mass Index)                98% 19.11 kg/m2          [employer] Information     [employer] lets you send messages to your doctor, view your test results, renew your prescriptions, schedule appointments and more. To sign up, go to [email or web address] . Click on \"Log in\" on the left side of the screen, which will take you to the Welcome page. Then click on \"Sign up Now\" on the right side of the page.     You will be asked to enter the access code listed below, as well as some personal information. Please follow the directions to create your username and password.     Your access code is: [email or web address]  Expires: 2017 11:17 AM     Your access code will  in 90 days. If you need help or a new code, please call your [facility] clinic or [phone].        Care EveryWhere ID     This is your Care EveryWhere ID. This could be used by other organizations to access your [facility] medical records  [id number]           Review of your medicines      CONTINUE these medicines which have NOT CHANGED        Dose / Directions    amLODIPine 5 MG tablet   Commonly known as:  NORVASC   Used for:  Heart replaced by transplant (H)        Dose:  5 mg   Take 1 tablet (5 mg) by mouth daily   Quantity:  90 tablet   Refills:  3       aspirin 81 MG EC tablet   Used for:  Status post transplant, heart (H)        Dose:  162 mg   Take 2 tablets (162 mg) by mouth daily   Quantity:  60 tablet   Refills:  99       mycophenolate 500 MG tablet   Commonly known as:  CELLCEPT - GENERIC EQUIVALENT   Used for:  Heart transplanted (H)        Dose:  500 mg   Take 1 tablet (500 mg) by mouth 2 times daily   Quantity:  180 tablet "   Refills:  11       pravastatin 10 MG tablet   Commonly known as:  PRAVACHOL   Used for:  Heart transplanted (H), Heart transplanted (H)        Dose:  10 mg   Take 1 tablet (10 mg) by mouth daily At bedtime   Quantity:  90 tablet   Refills:  6       * tacrolimus 1 MG capsule   Commonly known as:  PROGRAF - GENERIC EQUIVALENT   Used for:  Transplanted heart (H)        Take 2 mg in the morning and 1.5 mg in the evening   Quantity:  270 capsule   Refills:  11       * tacrolimus 0.5 MG capsule   Commonly known as:  PROGRAF - GENERIC EQUIVALENT   Used for:  Heart transplanted (H)        Take 1 capsule by mouth every evening (total dose is 2mg in am and 1.5mg in pm)   Quantity:  90 capsule   Refills:  11       Vitamin D3 2000 UNITS Tabs   Used for:  Heart transplanted (H)        Dose:  2000 Units   Take 2,000 Units by mouth daily   Quantity:  100 tablet   Refills:  6       * Notice:  This list has 2 medication(s) that are the same as other medications prescribed for you. Read the directions carefully, and ask your doctor or other care provider to review them with you.             Protect others around you: Learn how to safely use, store and throw away your medicines at www.disposemymeds.org.             Medication List: This is a list of all your medications and when to take them. Check marks below indicate your daily home schedule. Keep this list as a reference.      Medications           Morning Afternoon Evening Bedtime As Needed    amLODIPine 5 MG tablet   Commonly known as:  NORVASC   Take 1 tablet (5 mg) by mouth daily                                aspirin 81 MG EC tablet   Take 2 tablets (162 mg) by mouth daily                                mycophenolate 500 MG tablet   Commonly known as:  CELLCEPT - GENERIC EQUIVALENT   Take 1 tablet (500 mg) by mouth 2 times daily                                pravastatin 10 MG tablet   Commonly known as:  PRAVACHOL   Take 1 tablet (10 mg) by mouth daily At bedtime                                 * tacrolimus 1 MG capsule   Commonly known as:  PROGRAF - GENERIC EQUIVALENT   Take 2 mg in the morning and 1.5 mg in the evening                                * tacrolimus 0.5 MG capsule   Commonly known as:  PROGRAF - GENERIC EQUIVALENT   Take 1 capsule by mouth every evening (total dose is 2mg in am and 1.5mg in pm)                                Vitamin D3 2000 UNITS Tabs   Take 2,000 Units by mouth daily                                * Notice:  This list has 2 medication(s) that are the same as other medications prescribed for you. Read the directions carefully, and ask your doctor or other care provider to review them with you.

## 2017-06-06 LAB
DONOR IDENTIFICATION: NORMAL
DSA COMMENTS: NORMAL
DSA PRESENT: NO
DSA TEST METHOD: NORMAL
ORGAN: NORMAL
SA1 CELL: NORMAL
SA1 COMMENTS: NORMAL
SA1 HI RISK ABY: NORMAL
SA1 MOD RISK ABY: NORMAL
SA1 TEST METHOD: NORMAL
SA2 CELL: NORMAL
SA2 COMMENTS: NORMAL
SA2 HI RISK ABY UA: NORMAL
SA2 MOD RISK ABY: NORMAL
SA2 TEST METHOD: NORMAL

## 2017-06-07 ENCOUNTER — RESULTS ONLY (OUTPATIENT)
Dept: OTHER | Facility: CLINIC | Age: 22
End: 2017-06-07

## 2017-06-07 DIAGNOSIS — Z94.1 HEART TRANSPLANTED (H): ICD-10-CM

## 2017-06-07 DIAGNOSIS — Z94.1 TRANSPLANTED HEART (H): ICD-10-CM

## 2017-06-07 DIAGNOSIS — T86.21 ACUTE REJECTION OF CARDIAC TRANSPLANT (H): ICD-10-CM

## 2017-06-07 LAB
ANION GAP SERPL CALCULATED.3IONS-SCNC: 8 MMOL/L (ref 3–14)
BASOPHILS # BLD AUTO: 0 10E9/L (ref 0–0.2)
BASOPHILS NFR BLD AUTO: 0.4 %
BUN SERPL-MCNC: 14 MG/DL (ref 7–30)
CALCIUM SERPL-MCNC: 8.9 MG/DL (ref 8.5–10.1)
CHLORIDE SERPL-SCNC: 108 MMOL/L (ref 94–109)
CO2 SERPL-SCNC: 28 MMOL/L (ref 20–32)
CREAT SERPL-MCNC: 0.7 MG/DL (ref 0.66–1.25)
DIFFERENTIAL METHOD BLD: NORMAL
EOSINOPHIL # BLD AUTO: 0.2 10E9/L (ref 0–0.7)
EOSINOPHIL NFR BLD AUTO: 3.1 %
ERYTHROCYTE [DISTWIDTH] IN BLOOD BY AUTOMATED COUNT: 13 % (ref 10–15)
GFR SERPL CREATININE-BSD FRML MDRD: ABNORMAL ML/MIN/1.7M2
GLUCOSE SERPL-MCNC: 67 MG/DL (ref 70–99)
HCT VFR BLD AUTO: 42.3 % (ref 40–53)
HGB BLD-MCNC: 14.9 G/DL (ref 13.3–17.7)
IMM GRANULOCYTES # BLD: 0 10E9/L (ref 0–0.4)
IMM GRANULOCYTES NFR BLD: 0.2 %
LYMPHOCYTES # BLD AUTO: 1.4 10E9/L (ref 0.8–5.3)
LYMPHOCYTES NFR BLD AUTO: 26.4 %
MCH RBC QN AUTO: 29.5 PG (ref 26.5–33)
MCHC RBC AUTO-ENTMCNC: 35.2 G/DL (ref 31.5–36.5)
MCV RBC AUTO: 84 FL (ref 78–100)
MONOCYTES # BLD AUTO: 0.6 10E9/L (ref 0–1.3)
MONOCYTES NFR BLD AUTO: 10.8 %
NEUTROPHILS # BLD AUTO: 3 10E9/L (ref 1.6–8.3)
NEUTROPHILS NFR BLD AUTO: 59.1 %
NRBC # BLD AUTO: 0 10*3/UL
NRBC BLD AUTO-RTO: 0 /100
NT-PROBNP SERPL-MCNC: 29 PG/ML (ref 0–125)
PLATELET # BLD AUTO: 185 10E9/L (ref 150–450)
POTASSIUM SERPL-SCNC: 3.5 MMOL/L (ref 3.4–5.3)
RBC # BLD AUTO: 5.05 10E12/L (ref 4.4–5.9)
SODIUM SERPL-SCNC: 144 MMOL/L (ref 133–144)
TACROLIMUS BLD-MCNC: 7.1 UG/L (ref 5–15)
TME LAST DOSE: NORMAL H
TROPONIN I SERPL-MCNC: 0.07 UG/L (ref 0–0.04)
WBC # BLD AUTO: 5.1 10E9/L (ref 4–11)

## 2017-06-07 PROCEDURE — 80197 ASSAY OF TACROLIMUS: CPT | Performed by: PEDIATRICS

## 2017-06-07 PROCEDURE — 85025 COMPLETE CBC W/AUTO DIFF WBC: CPT | Performed by: PEDIATRICS

## 2017-06-07 PROCEDURE — 87799 DETECT AGENT NOS DNA QUANT: CPT | Performed by: PEDIATRICS

## 2017-06-07 PROCEDURE — 86644 CMV ANTIBODY: CPT | Performed by: PEDIATRICS

## 2017-06-07 PROCEDURE — 86665 EPSTEIN-BARR CAPSID VCA: CPT | Performed by: PEDIATRICS

## 2017-06-07 PROCEDURE — 86664 EPSTEIN-BARR NUCLEAR ANTIGEN: CPT | Performed by: PEDIATRICS

## 2017-06-07 PROCEDURE — 84484 ASSAY OF TROPONIN QUANT: CPT | Performed by: PEDIATRICS

## 2017-06-07 PROCEDURE — 86832 HLA CLASS I HIGH DEFIN QUAL: CPT | Performed by: PEDIATRICS

## 2017-06-07 PROCEDURE — 86833 HLA CLASS II HIGH DEFIN QUAL: CPT | Performed by: PEDIATRICS

## 2017-06-07 PROCEDURE — 36415 COLL VENOUS BLD VENIPUNCTURE: CPT | Performed by: PEDIATRICS

## 2017-06-07 PROCEDURE — 83880 ASSAY OF NATRIURETIC PEPTIDE: CPT | Performed by: PEDIATRICS

## 2017-06-07 PROCEDURE — 80048 BASIC METABOLIC PNL TOTAL CA: CPT | Performed by: PEDIATRICS

## 2017-06-08 ENCOUNTER — TELEPHONE (OUTPATIENT)
Dept: PEDIATRIC CARDIOLOGY | Facility: CLINIC | Age: 22
End: 2017-06-08

## 2017-06-08 DIAGNOSIS — Z94.1 TRANSPLANTED HEART (H): ICD-10-CM

## 2017-06-08 LAB
CMV DNA SPEC NAA+PROBE-ACNC: NORMAL [IU]/ML
CMV DNA SPEC NAA+PROBE-LOG#: NORMAL {LOG_IU}/ML
CMV IGG SERPL QL IA: NORMAL AI (ref 0–0.8)
EBV DNA # SPEC NAA+PROBE: NORMAL {COPIES}/ML
EBV DNA SPEC NAA+PROBE-LOG#: NORMAL {LOG_COPIES}/ML
EBV NA IGG SER QL IA: NORMAL AI (ref 0–0.8)
EBV VCA IGG SER QL IA: 5 AI (ref 0–0.8)
EBV VCA IGM SER QL IA: NORMAL AI (ref 0–0.8)
PRA DONOR SPECIFIC ABY: NORMAL
PRA SINGLE ANTIGEN IGG ANTIBODY: NORMAL
SPECIMEN SOURCE: NORMAL

## 2017-06-08 RX ORDER — TACROLIMUS 1 MG/1
CAPSULE ORAL
Qty: 270 CAPSULE | Refills: 11 | Status: SHIPPED | OUTPATIENT
Start: 2017-06-08 | End: 2017-07-25

## 2017-06-08 NOTE — Clinical Note
Please schedule for a clinic visit in 2 weeks with Dr. Pruitt for Labs, ECHO, EKG and CXR. Thank you!

## 2017-06-08 NOTE — TELEPHONE ENCOUNTER
Called and texted Obed to let him know his Tacro level was 7.1 (goal 10-15) Per Dr. Pruitt instructed him to increase his dose to 3mg twice daily. Dr. Pruitt wants to see him back in clinic in 2 weeks. I told him the  would call to set it up. I asked him to call/text me back so I know he received the message.

## 2017-06-22 ENCOUNTER — PRE VISIT (OUTPATIENT)
Dept: PEDIATRIC CARDIOLOGY | Facility: CLINIC | Age: 22
End: 2017-06-22

## 2017-06-22 DIAGNOSIS — Z94.1 HEART REPLACED BY TRANSPLANT (H): Primary | ICD-10-CM

## 2017-06-26 ENCOUNTER — RESULTS ONLY (OUTPATIENT)
Dept: OTHER | Facility: CLINIC | Age: 22
End: 2017-06-26

## 2017-06-26 ENCOUNTER — OFFICE VISIT (OUTPATIENT)
Dept: PEDIATRIC CARDIOLOGY | Facility: CLINIC | Age: 22
End: 2017-06-26
Attending: PEDIATRICS
Payer: COMMERCIAL

## 2017-06-26 ENCOUNTER — HOSPITAL ENCOUNTER (OUTPATIENT)
Dept: CARDIOLOGY | Facility: CLINIC | Age: 22
Discharge: HOME | End: 2017-06-26
Attending: PEDIATRICS | Admitting: PEDIATRICS
Payer: COMMERCIAL

## 2017-06-26 VITALS
DIASTOLIC BLOOD PRESSURE: 66 MMHG | WEIGHT: 138.89 LBS | RESPIRATION RATE: 20 BRPM | HEART RATE: 80 BPM | BODY MASS INDEX: 18.41 KG/M2 | OXYGEN SATURATION: 100 % | HEIGHT: 73 IN | SYSTOLIC BLOOD PRESSURE: 122 MMHG

## 2017-06-26 DIAGNOSIS — Z94.1 HEART REPLACED BY TRANSPLANT (H): ICD-10-CM

## 2017-06-26 DIAGNOSIS — Z94.1 TRANSPLANTED HEART (H): ICD-10-CM

## 2017-06-26 DIAGNOSIS — Z94.1 HEART REPLACED BY TRANSPLANT (H): Primary | ICD-10-CM

## 2017-06-26 LAB
ALBUMIN SERPL-MCNC: 4.2 G/DL (ref 3.4–5)
ALP SERPL-CCNC: 69 U/L (ref 40–150)
ALT SERPL W P-5'-P-CCNC: 14 U/L (ref 0–70)
ANION GAP SERPL CALCULATED.3IONS-SCNC: 9 MMOL/L (ref 3–14)
AST SERPL W P-5'-P-CCNC: 14 U/L (ref 0–45)
BASOPHILS # BLD AUTO: 0 10E9/L (ref 0–0.2)
BASOPHILS NFR BLD AUTO: 0.2 %
BILIRUB SERPL-MCNC: 1 MG/DL (ref 0.2–1.3)
BUN SERPL-MCNC: 16 MG/DL (ref 7–30)
CALCIUM SERPL-MCNC: 8.9 MG/DL (ref 8.5–10.1)
CHLORIDE SERPL-SCNC: 107 MMOL/L (ref 94–109)
CK SERPL-CCNC: 114 U/L (ref 30–300)
CMV IGG SERPL QL IA: NORMAL AI (ref 0–0.8)
CO2 SERPL-SCNC: 26 MMOL/L (ref 20–32)
CREAT SERPL-MCNC: 0.8 MG/DL (ref 0.66–1.25)
DIFFERENTIAL METHOD BLD: NORMAL
EBV NA IGG SER QL IA: NORMAL AI (ref 0–0.8)
EBV VCA IGG SER QL IA: 5.8 AI (ref 0–0.8)
EBV VCA IGM SER QL IA: NORMAL AI (ref 0–0.8)
EOSINOPHIL # BLD AUTO: 0.3 10E9/L (ref 0–0.7)
EOSINOPHIL NFR BLD AUTO: 4.9 %
ERYTHROCYTE [DISTWIDTH] IN BLOOD BY AUTOMATED COUNT: 13 % (ref 10–15)
GFR SERPL CREATININE-BSD FRML MDRD: ABNORMAL ML/MIN/1.7M2
GLUCOSE SERPL-MCNC: 123 MG/DL (ref 70–99)
HCT VFR BLD AUTO: 45.1 % (ref 40–53)
HGB BLD-MCNC: 15.6 G/DL (ref 13.3–17.7)
IMM GRANULOCYTES # BLD: 0 10E9/L (ref 0–0.4)
IMM GRANULOCYTES NFR BLD: 0.8 %
LYMPHOCYTES # BLD AUTO: 1.4 10E9/L (ref 0.8–5.3)
LYMPHOCYTES NFR BLD AUTO: 26.9 %
MAGNESIUM SERPL-MCNC: 1.5 MG/DL (ref 1.6–2.3)
MCH RBC QN AUTO: 29.3 PG (ref 26.5–33)
MCHC RBC AUTO-ENTMCNC: 34.6 G/DL (ref 31.5–36.5)
MCV RBC AUTO: 85 FL (ref 78–100)
MONOCYTES # BLD AUTO: 0.4 10E9/L (ref 0–1.3)
MONOCYTES NFR BLD AUTO: 6.9 %
NEUTROPHILS # BLD AUTO: 3.1 10E9/L (ref 1.6–8.3)
NEUTROPHILS NFR BLD AUTO: 60.3 %
NRBC # BLD AUTO: 0 10*3/UL
NRBC BLD AUTO-RTO: 0 /100
NT-PROBNP SERPL-MCNC: 35 PG/ML (ref 0–125)
PHOSPHATE SERPL-MCNC: 3.4 MG/DL (ref 2.5–4.5)
PLATELET # BLD AUTO: 150 10E9/L (ref 150–450)
POTASSIUM SERPL-SCNC: 3.8 MMOL/L (ref 3.4–5.3)
PROT SERPL-MCNC: 7.4 G/DL (ref 6.8–8.8)
RBC # BLD AUTO: 5.33 10E12/L (ref 4.4–5.9)
SODIUM SERPL-SCNC: 142 MMOL/L (ref 133–144)
TACROLIMUS BLD-MCNC: 12.9 UG/L (ref 5–15)
TME LAST DOSE: NORMAL H
TROPONIN I SERPL-MCNC: 0.07 UG/L (ref 0–0.04)
WBC # BLD AUTO: 5.1 10E9/L (ref 4–11)

## 2017-06-26 PROCEDURE — 86665 EPSTEIN-BARR CAPSID VCA: CPT | Performed by: PEDIATRICS

## 2017-06-26 PROCEDURE — 83735 ASSAY OF MAGNESIUM: CPT | Performed by: PEDIATRICS

## 2017-06-26 PROCEDURE — 93225 XTRNL ECG REC<48 HRS REC: CPT | Mod: ZF | Performed by: PEDIATRICS

## 2017-06-26 PROCEDURE — 85025 COMPLETE CBC W/AUTO DIFF WBC: CPT | Performed by: PEDIATRICS

## 2017-06-26 PROCEDURE — 86644 CMV ANTIBODY: CPT | Performed by: PEDIATRICS

## 2017-06-26 PROCEDURE — 82550 ASSAY OF CK (CPK): CPT | Performed by: PEDIATRICS

## 2017-06-26 PROCEDURE — 80053 COMPREHEN METABOLIC PANEL: CPT | Performed by: PEDIATRICS

## 2017-06-26 PROCEDURE — 83880 ASSAY OF NATRIURETIC PEPTIDE: CPT | Performed by: PEDIATRICS

## 2017-06-26 PROCEDURE — 80197 ASSAY OF TACROLIMUS: CPT | Performed by: PEDIATRICS

## 2017-06-26 PROCEDURE — 84484 ASSAY OF TROPONIN QUANT: CPT | Performed by: PEDIATRICS

## 2017-06-26 PROCEDURE — 86664 EPSTEIN-BARR NUCLEAR ANTIGEN: CPT | Performed by: PEDIATRICS

## 2017-06-26 PROCEDURE — 93225 XTRNL ECG REC<48 HRS REC: CPT | Mod: ZF

## 2017-06-26 PROCEDURE — 93306 TTE W/DOPPLER COMPLETE: CPT

## 2017-06-26 PROCEDURE — 86833 HLA CLASS II HIGH DEFIN QUAL: CPT | Performed by: PEDIATRICS

## 2017-06-26 PROCEDURE — 36415 COLL VENOUS BLD VENIPUNCTURE: CPT | Performed by: PEDIATRICS

## 2017-06-26 PROCEDURE — 84100 ASSAY OF PHOSPHORUS: CPT | Performed by: PEDIATRICS

## 2017-06-26 PROCEDURE — 87799 DETECT AGENT NOS DNA QUANT: CPT | Performed by: PEDIATRICS

## 2017-06-26 PROCEDURE — 93226 XTRNL ECG REC<48 HR SCAN A/R: CPT

## 2017-06-26 PROCEDURE — 86832 HLA CLASS I HIGH DEFIN QUAL: CPT | Performed by: PEDIATRICS

## 2017-06-26 PROCEDURE — 93005 ELECTROCARDIOGRAM TRACING: CPT | Mod: ZF

## 2017-06-26 RX ORDER — PREDNISONE 20 MG/1
TABLET ORAL
Qty: 39 TABLET | Refills: 0 | Status: SHIPPED | OUTPATIENT
Start: 2017-06-26 | End: 2017-06-26

## 2017-06-26 RX ORDER — PREDNISONE 20 MG/1
TABLET ORAL
Qty: 39 TABLET | Refills: 0 | Status: SHIPPED | OUTPATIENT
Start: 2017-06-26 | End: 2017-07-24 | Stop reason: ALTCHOICE

## 2017-06-26 NOTE — MR AVS SNAPSHOT
After Visit Summary   6/26/2017    Obed Viramontes    MRN: [id number]           Patient Information     Date Of Birth          1995        Visit Information        Provider Department      6/26/2017 11:00 AM Bobbi Pruitt MD Peds Cardiac Transplant        Today's Diagnoses     Heart replaced by transplant (H)    -  1      Care Instructions    Plan:   24 hour Holter Monitor Today  Take 60mg of Prednisone for 3 days, then take 20mg prednisone daily.   Return to lab on Friday anytime for a recheck of troponin      PEDS CARDIAC TRANSPLANT  [facility]  [address]  [address] [facility] [address] [phone]      Cardiology Clinic  [phone]  Cardiology Office  [phone]  RN Care Coordinator, Kalee Karu (Bre)  [phone]  Pediatric Call Center/Scheduling  [phone]    After Hours and Emergency Contact Number  [phone]  * Ask for the pediatric cardiologist on call         Prescription Renewals  The pharmacy must fax requests to [phone]  * Please allow 3-4 days for prescriptions to be authorized     DATE: 06/26/17    PATIENT NAME / MRN: Obed Viramontes  [id number]   MONITOR NUMBER: [id number]    PEDIATRIC HOLTER MONITOR PRODUCT RESPONSIBILITY   AND FINANCIAL AGREEMENT      To the Parent/Guardian of Obed Viramontes:    Your provider, Dr. Pruitt, has ordered a Holter Monitor for you to wear for 24 hours.      A staff member of the Pediatric Cardiology Department will instruct you on the proper use and care of the Holter monitor, and explain its functions.  For questions or concerns regarding the device, please contact the [facility]'s EKG Lab at [phone] or [phone] Monday through Friday between the hours of 7:00AM and 4:30PM.    Please note that this monitor is very sensitive to humidity/moisture and MUST NOT GET WET.  Please use caution when in the bathroom to avoid accidentally  dropping the device in water.      This monitor must be returned in good working order to the [facility] or the [facility]'s EKG Lab / Pediatric Cardiovascular Imaging Department either in person or by mail NO LATER THAN 7/1/17.  If this monitor has not been mailed or returned in person by this date, you will be responsible for the cost of replacing the monitor.  The current cost of replacement is $1,781.00.    ACCEPTANCE OF RESPONSIBILITY  I understand the above instructions and agree to be financially responsible for the cost of this monitor if it is lost or damaged beyond normal wear and tear or otherwise not returned in good working order by the date specified above.      __________________________________________  06/26/17, 11:48 AM                         SIGNATURE    __________________________________________        __________________________________________           PRINTED NAME    RELATIONSHIP TO PATIENT      SIGNATURE WITNESSED BY:                                                                       Clinic Staff       ___________________________________________________________________                                             PRINTED NAME, CREDENTIAL(S)  and  INITIALS              Follow-ups after your visit        Who to contact     Please call your clinic at [phone] to:    Ask questions about your health    Make or cancel appointments    Discuss your medicines    Learn about your test results    Speak to your doctor   If you have compliments or concerns about an experience at your clinic, or if you wish to file a complaint, please contact [facility] Patient Relations at [phone] or email us at [email or web address]         Additional Information About Your Visit        [employer] Information     [employer] is an electronic gateway that provides easy, online access to your medical records. With [employer], you  "can request a clinic appointment, read your test results, renew a prescription or communicate with your care team.     To sign up for [email or web address] visit the website at [email or web address]   You will be asked to enter the access code listed below, as well as some personal information. Please follow the directions to create your username and password.     Your access code is: [email or web address]  Expires: 2017 11:17 AM     Your access code will  in 90 days. If you need help or a new code, please contact your [facility] or call [phone] for assistance.        Care EveryWhere ID     This is your Care EveryWhere ID. This could be used by other organizations to access your [place] medical records  [id number]        Your Vitals Were     Pulse Respirations Height Pulse Oximetry BMI (Body Mass Index)       80 20 6' 1.07\" (185.6 cm) 100% 18.29 kg/m2        Blood Pressure from Last 3 Encounters:   17 122/66   17 101/70   17 114/78    Weight from Last 3 Encounters:   17 138 lb 14.2 oz (63 kg)   17 143 lb 1.3 oz (64.9 kg)   17 140 lb 14 oz (63.9 kg)                 Today's Medication Changes          These changes are accurate as of: 17 11:56 AM.  If you have any questions, ask your nurse or doctor.               Start taking these medicines.        Dose/Directions    predniSONE 20 MG tablet   Commonly known as:  DELTASONE   Used for:  Heart replaced by transplant (H)   Started by:  Bobbi Pruitt MD        Take 60mg (3 tabs daily) for 3 days, then reduce dose to 20mg daily.   Quantity:  39 tablet   Refills:  0            Where to get your medicines      These medications were sent to [facility] - [place], [place] - [facility] [address]   [address] , [facility] [address]    Hours:  Mon-Fri 8:30am-5:00pm Toll Free [phone] Phone:  [phone]     predniSONE 20 MG tablet                Primary Care " Provider Office Phone # Fax #    Rayo Schafer [phone]       [facility] [address]  [place] [address]        Equal Access to Services     CARLA GREENBERG : Hadii aad ku hadnaomyo Sogiovanna, waaxda luqadaha, qaybta kaalmada adeegyada, stephanie hunterin hayaathee leon michaelchristo patiño. So [facility] [phone].    ATENCIÓN: Si habla español, tiene a neves disposición servicios gratuitos de asistencia lingüística. Llame al [phone].    We comply with applicable federal civil rights laws and [place] laws. We do not discriminate on the basis of race, color, national origin, age, disability sex, sexual orientation or gender identity.            Thank you!     Thank you for choosing P[facility]S CARDIAC TRANSPLANT  for your care. Our goal is always to provide you with excellent care. Hearing back from our patients is one way we can continue to improve our services. Please take a few minutes to complete the written survey that you may receive in the mail after your visit with us. Thank you!             Your Updated Medication List - Protect others around you: Learn how to safely use, store and throw away your medicines at www.disposemymeds.org.          This list is accurate as of: 6/26/17 11:56 AM.  Always use your most recent med list.                   Brand Name Dispense Instructions for use Diagnosis    amLODIPine 5 MG tablet    NORVASC    90 tablet    Take 1 tablet (5 mg) by mouth daily    Heart replaced by transplant (H)       aspirin 81 MG EC tablet     60 tablet    Take 2 tablets (162 mg) by mouth daily    Status post transplant, heart (H)       mycophenolate 500 MG tablet    CELLCEPT - GENERIC EQUIVALENT    180 tablet    Take 1 tablet (500 mg) by mouth 2 times daily    Heart transplanted (H)       pravastatin 10 MG tablet    PRAVACHOL    90 tablet    Take 1 tablet (10 mg) by mouth daily At bedtime    Heart transplanted (H), Heart transplanted (H)       predniSONE 20 MG tablet     DELTASONE    39 tablet    Take 60mg (3 tabs daily) for 3 days, then reduce dose to 20mg daily.    Heart replaced by transplant (H)       tacrolimus 1 MG capsule    PROGRAF - GENERIC EQUIVALENT    270 capsule    Take 3mg twice daily    Transplanted heart (H)       Vitamin D3 2000 UNITS Tabs     100 tablet    Take 2,000 Units by mouth daily    Heart transplanted (H)

## 2017-06-26 NOTE — NURSING NOTE
"Chief Complaint   Patient presents with     Follow Up For     Heart transplant on  3/23/2012     /66 (BP Location: Right leg, Patient Position: Supine)  Pulse 80  Resp 20  Ht 6' 1.07\" (185.6 cm)  Wt 138 lb 14.2 oz (63 kg)  SpO2 100%  BMI 18.29 kg/m2    Hannah Contreras LPN    "

## 2017-06-26 NOTE — PATIENT INSTRUCTIONS
Plan:   24 hour Holter Monitor Today  Take 60mg of Prednisone for 3 days, then take 20mg prednisone daily.   Return to lab on Friday anytime for a recheck of troponin      PEDS CARDIAC TRANSPLANT  [facility]  [address]  [address] [facility] [address] [phone]      Cardiology Clinic  [phone]  Cardiology Office  [phone]  RN Care Coordinator, Kalee Kaur (Bre)  [phone]  Pediatric Call Center/Scheduling  [phone]    After Hours and Emergency Contact Number  [phone]  * Ask for the pediatric cardiologist on call         Prescription Renewals  The pharmacy must fax requests to [phone]  * Please allow 3-4 days for prescriptions to be authorized     DATE: 06/26/17    PATIENT NAME / MRN: Obed Viramontes  [id number]   MONITOR NUMBER: 7    PEDIATRIC HOLTER MONITOR PRODUCT RESPONSIBILITY   AND FINANCIAL AGREEMENT      To the Parent/Guardian of Obed Viramontes:    Your provider, Dr. Pruitt, has ordered a Holter Monitor for you to wear for 24 hours.      A staff member of the Pediatric Cardiology Department will instruct you on the proper use and care of the Holter monitor, and explain its functions.  For questions or concerns regarding the device, please contact the [facility]'s EKG Lab at [phone] or [phone] Monday through Friday between the hours of 7:00AM and 4:30PM.    Please note that this monitor is very sensitive to humidity/moisture and MUST NOT GET WET.  Please use caution when in the bathroom to avoid accidentally dropping the device in water.      This monitor must be returned in good working order to the [facility] or the [facility]'s EKG Lab / Pediatric Cardiovascular Imaging Department either in person or by mail NO LATER THAN 7/1/17.  If this monitor has not been mailed or returned in person by this date, you will be  responsible for the cost of replacing the monitor.  The current cost of replacement is $1,781.00.    ACCEPTANCE OF RESPONSIBILITY  I understand the above instructions and agree to be financially responsible for the cost of this monitor if it is lost or damaged beyond normal wear and tear or otherwise not returned in good working order by the date specified above.      __________________________________________  06/26/17, 11:48 AM                         SIGNATURE    __________________________________________        __________________________________________           PRINTED NAME    RELATIONSHIP TO PATIENT      SIGNATURE WITNESSED BY:                                                                       Clinic Staff       ___________________________________________________________________                                             PRINTED NAME, CREDENTIAL(S)  and  INITIALS

## 2017-06-26 NOTE — NURSING NOTE
Teaching Flowsheet:  Teaching Topic: 24 Hour Holter Monitor Placement     Person(s) involved in teaching:   Patient/Family     Motivation Level:  Asks Questions: Yes  Eager to Learn: Yes  Cooperative: Yes  Receptive (willing/able to accept information): Yes  Any cultural factors/Sikhism beliefs that may influence understanding or compliance? No  Comments: Holter Monitor placed on patient without difficulties per MD orders.  Skin prepped with light debridement & cleansed with alcohol prep pad.  Stress loops were not applied due to tape sensitivity and patients request.  Teaching completed on care of Holter (including financial responsibility), Holter Diary, and return of Holter in Person.  Encouraged patient to contact the clinic with any questions or concerns. Financial Responsibility consent form signed.     Hannah Contreras LPN    10 min extra time spent with family

## 2017-06-26 NOTE — LETTER
2017      RE: Obed Viramontes  [address]  [facility] [address]       Rayo Bucklin  [facility]  [address] [place] [address]    Pediatric Heart Failure and Transplant Clinic Note  Date of note: 2017  Re: Obed Viramontes  MR #: [id number]  : 1995    HPI:  Obed is a 21 year old male with history of heart transplant (3/23/2012) and posttransplant lymphoproliferative disorder (diagnosed 2012) who transferred care to [facility] in 2012.   He is in clinic today with his mother for followup after recent rejection episodes. He was seen in April for routine  annual followup, however he had elevated troponin and routine biopsy showed 2R rejection, and he was noncompliant with medications prior to this. He was admitted for 3 days of iv solumedrol and his troponin improved. Following that, he was staying with mom for a few weeks and was on a better schedule, compliant with medications, and eating better and has gained weight. He reported that even since going back to dad's house and going back to work that he is compliant with his medications. However on routine visit on  had elevated troponin again, and so was taken for biopsy on  which showed 2R rejection, which was treated with 5 days of prednisone and increasing his tacrolimus goals. On followup on  his troponin had improved. He feels his energy level is stable, no edema, no shortness of breath with activity. no recent fevers, no chest pain, palpitations, tachycardia, dizziness or syncope. Comprehensive review of systems is otherwise negative today.      In reviewing his history, he was born at 38 weeks with uncomplicated pregnancy and delivery, weighed 7 pounds.  He had uncomplicated  course and was healthy in early childhood.  He was hospitalized at age 2 1/2 years with viral myocarditis, hospitalized for 6 weeks, intubated for 10 days, had  recovery of myocardial function and was treated chronically with digoxin, hospital course complicated by cerobrovascular accident with resultant left hemiplegia, transitioned to [facility] rehab for an additional 5 weeks following his hospital course.  He has had several orthopedic surgeries throughout childhood because of his hemiplegia, but otherwise has been healthy.  He was asymptomatic from a cardiac standpoint until February of this year, at which time he presented with chest pain.  He was found to have significant arrythmias, was admitted to [facility], evaluated and listed for transplant (mom reports underlying diagnosis of hypertrophic cardiomyopathy possibly secondary to glycogen storage disease?, some pathology results on native heart still pending).  He was transplanted on March 23, 2012, did well post transplant and was discharged after 1 week.  He reportedly has done well from a cardiac standpoint with no rejection, 1R biopsy in May and 1R biopsy in September but otherwise negative biopsies.  He presented in September with mouth sores and difficulty taking medication, initially treated with lowering his immunosuppression, but was rehospitalized with fever and worsening mouth sores.  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  He was treated with rituximab x 2, and increased prednisone dose from 2.5mg daily back up 10mg twice daily.  He was scheduled to undergo additional rituximab dose but symptoms improved and repeat CT Scan showed improvement in his lymph node and tonsillar hypertrophy.  Per mom he was EBV negative prior to transplant, and she thinks his donor was EBV positive.     He has done well from an infection/PTLD standpoint and came off valcyte therapy in December 2013 with negative EBV PCR in February 2014.  He did undergo orthopedic surgery for his left hand contracture in October, admitted in January with a  "cellulitis of that hand that has completely resolved. He is scheduled to undergo plate removal from that hand in October this year.  Past medical/Past surgical history:  Born 38 weeks, 7 pound birth weight  Hospitalized at age 2 1/2 years with viral myocarditis  Cerebrovascular accident at age 2 1/2 with resultant left hemiplegia  Multiple orthopedic surgeries in past  Cardiomyopathy  Heart transplant March 23, 2012  PTLD, EBV positive    SOCIAL HISTORY:   Lives with dad, works at [employer].     Family history:  Negative for congenital heart defects, heart transplant, cardiomyopathy, sudden death.  Sister with type 1 DM.  Older sister recently diagnosed with POTS.    Medications:  Prescription Medications as of 6/26/2017             predniSONE (DELTASONE) 20 MG tablet Take 60mg (3 tabs daily) for 3 days, then reduce dose to 20mg daily.    tacrolimus (PROGRAF - GENERIC EQUIVALENT) 1 MG capsule Take 3mg twice daily    aspirin 81 MG EC tablet Take 2 tablets (162 mg) by mouth daily    Cholecalciferol (VITAMIN D3) 2000 UNITS TABS Take 2,000 Units by mouth daily    amLODIPine (NORVASC) 5 MG tablet Take 1 tablet (5 mg) by mouth daily    mycophenolate (CELLCEPT - GENERIC EQUIVALENT) 500 MG tablet Take 1 tablet (500 mg) by mouth 2 times daily    pravastatin (PRAVACHOL) 10 MG tablet Take 1 tablet (10 mg) by mouth daily At bedtime        Physical Exam:  /66 (BP Location: Right leg, Patient Position: Supine)  Pulse 80  Resp 20  Ht 6' 1.07\" (185.6 cm)  Wt 138 lb 14.2 oz (63 kg)  SpO2 100%  BMI 18.29 kg/m2    Gen: Alert, interactive and appropriate, sitting, no respiratory distress.   Chest:  normal breath sounds bilaterally, no wheeze, crackles, or rhonchi, good air entry  CV: RRR, normal S1 and S2, no murmurs, rubs, gallops  Abd: abdomen is soft without significant tenderness, masses, organomegaly or guarding  Ext: wwp, 2+ pulses upper and 2+ lower extremitiy without delay, no edema  Obed had evaluation today " with labs, ecg, echocardiogram.  His echo showed normal post transplant anatomy, normal systolic function, there is some mild edema/LVH and abnormal diastolic function, no pericardial effusion.  His ecg showed normal sinus rhythm with occasional PACs, rate of 95 beats per minute, interventricular conduction delay, no st or twave changes. His labs included a comprehensive metabolic panel which was normal, specifically bun of 16, creatinine of 0.8, normal LFTs.  troponin mildly elevated at 0.071 (5/25 was 0.128, 0.068 on 6/7).  He had a cbc which had normal wbc count of 5.1, hemoglobin of 15.6, platelets of 099188.  He had ebv, cmv, pra, and tacrolimus level that are pending.   His last biopsy on 5/26/17 showed mild rejection, grade 2R, pAMR0, negative C3d/C4d staining.    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 Of hypertrophic cardiomyopathy picture and glycogen storage disease).  He also has history of EBV positive PTLD.  From a cardiac standpoint, Obed had a recent 2R rejection likely secondary to noncompliance, treated with 3 days of iv solumedrol, and a followup biopsy on 5/26 with ongoing 2R rejection treated with 5 days prednisone and increasing his tacrolimus goals. Unfortunately his troponin is elevated again today and he has new PACs on ecg concerning for ongoing rejection. He says he is compliant with medications, although takes them on an abnormal schedule due to work schedule. We elected to treat with 3 days prednsione 60mg daily, followed by 20mg daily and then will slowly taper. He will come Friday for labs for repeat troponin after steroid burst, and we  placed a holter monitor today to further evaluate his arrhythmias.  Next clinic visit or biopsy will be determined based on results of holter and troponin later this week.   We again re-referred to Dr. Evgeny Decker (genetics/metabolism) today and will plan to have Obed see him to have some counseling, blood and urine testing done to look for evidene of glycogen storage disease.      Diagnosis summary:  1. Viral myocarditis at age 2 1/2  2. Dilated cardiomyopathy           A. S/p orthotopic heart transplant (3/23/12)   1. 2R rejection (4/21/17), treated with iv solumedrol x 3 days   2. 2R rejecitn (5/26/17), treated with prednisone x 5 days  3. Cerebrovascular accident at age 2 1/2 with resultant left hemiplegia  4. Multiple orthopedic surgeries in past  5. PTLD, EBV positive            A.  treated with rituximab x 2, and increased prednisone dose from 2.5mg daily back up 10mg twice daily      Thank you for the opportunity to participate in his care.  Please let me know if you have further questions.    Sincerely,    Bobbi Pruitt MD  Medical Director, Pediatric Heart Failure and Transplant    CC  Patient Care Team:  Rayo Schafer MD as PCP - General (Pediatrics)  Isrrael Trent MD as MD (Oncology)        Copy to patient  [address]NCARNA[address]   [address]  [facility] [address]

## 2017-06-26 NOTE — PROGRESS NOTES
[facility]  [address] [place] [address]    Pediatric Heart Failure and Transplant Clinic Note  Date of note: 2017  Re: Obed Viramontes  MR #: [id number]  : 1995    HPI:  Obed is a 21 year old male with history of heart transplant (3/23/2012) and posttransplant lymphoproliferative disorder (diagnosed 2012) who transferred care to [facility] in 2012.   He is in clinic today with his mother for followup after recent rejection episodes. He was seen in April for routine  annual followup, however he had elevated troponin and routine biopsy showed 2R rejection, and he was noncompliant with medications prior to this. He was admitted for 3 days of iv solumedrol and his troponin improved. Following that, he was staying with mom for a few weeks and was on a better schedule, compliant with medications, and eating better and has gained weight. He reported that even since going back to dad's house and going back to work that he is compliant with his medications. However on routine visit on  had elevated troponin again, and so was taken for biopsy on  which showed 2R rejection, which was treated with 5 days of prednisone and increasing his tacrolimus goals. On followup on  his troponin had improved. He feels his energy level is stable, no edema, no shortness of breath with activity. no recent fevers, no chest pain, palpitations, tachycardia, dizziness or syncope. Comprehensive review of systems is otherwise negative today.      In reviewing his history, he was born at 38 weeks with uncomplicated pregnancy and delivery, weighed 7 pounds.  He had uncomplicated  course and was healthy in early childhood.  He was hospitalized at age 2 1/2 years with viral myocarditis, hospitalized for 6 weeks, intubated for 10 days, had recovery of myocardial function and was treated chronically with digoxin, hospital  course complicated by cerobrovascular accident with resultant left hemiplegia, transitioned to [facility] rehab for an additional 5 weeks following his hospital course.  He has had several orthopedic surgeries throughout childhood because of his hemiplegia, but otherwise has been healthy.  He was asymptomatic from a cardiac standpoint until February of this year, at which time he presented with chest pain.  He was found to have significant arrythmias, was admitted to [facility], evaluated and listed for transplant (mom reports underlying diagnosis of hypertrophic cardiomyopathy possibly secondary to glycogen storage disease?, some pathology results on native heart still pending).  He was transplanted on March 23, 2012, did well post transplant and was discharged after 1 week.  He reportedly has done well from a cardiac standpoint with no rejection, 1R biopsy in May and 1R biopsy in September but otherwise negative biopsies.  He presented in September with mouth sores and difficulty taking medication, initially treated with lowering his immunosuppression, but was rehospitalized with fever and worsening mouth sores.  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  He was treated with rituximab x 2, and increased prednisone dose from 2.5mg daily back up 10mg twice daily.  He was scheduled to undergo additional rituximab dose but symptoms improved and repeat CT Scan showed improvement in his lymph node and tonsillar hypertrophy.  Per mom he was EBV negative prior to transplant, and she thinks his donor was EBV positive.     He has done well from an infection/PTLD standpoint and came off valcyte therapy in December 2013 with negative EBV PCR in February 2014.  He did undergo orthopedic surgery for his left hand contracture in October, admitted in January with a cellulitis of that hand that has completely resolved. He is scheduled to undergo  "plate removal from that hand in October this year.  Past medical/Past surgical history:  Born 38 weeks, 7 pound birth weight  Hospitalized at age 2 1/2 years with viral myocarditis  Cerebrovascular accident at age 2 1/2 with resultant left hemiplegia  Multiple orthopedic surgeries in past  Cardiomyopathy  Heart transplant March 23, 2012  PTLD, EBV positive    SOCIAL HISTORY:   Lives with dad, works at [employer].     Family history:  Negative for congenital heart defects, heart transplant, cardiomyopathy, sudden death.  Sister with type 1 DM.  Older sister recently diagnosed with POTS.    Medications:  Prescription Medications as of 6/26/2017             predniSONE (DELTASONE) 20 MG tablet Take 60mg (3 tabs daily) for 3 days, then reduce dose to 20mg daily.    tacrolimus (PROGRAF - GENERIC EQUIVALENT) 1 MG capsule Take 3mg twice daily    aspirin 81 MG EC tablet Take 2 tablets (162 mg) by mouth daily    Cholecalciferol (VITAMIN D3) 2000 UNITS TABS Take 2,000 Units by mouth daily    amLODIPine (NORVASC) 5 MG tablet Take 1 tablet (5 mg) by mouth daily    mycophenolate (CELLCEPT - GENERIC EQUIVALENT) 500 MG tablet Take 1 tablet (500 mg) by mouth 2 times daily    pravastatin (PRAVACHOL) 10 MG tablet Take 1 tablet (10 mg) by mouth daily At bedtime        Physical Exam:  /66 (BP Location: Right leg, Patient Position: Supine)  Pulse 80  Resp 20  Ht 6' 1.07\" (185.6 cm)  Wt 138 lb 14.2 oz (63 kg)  SpO2 100%  BMI 18.29 kg/m2    Gen: Alert, interactive and appropriate, sitting, no respiratory distress.   Chest:  normal breath sounds bilaterally, no wheeze, crackles, or rhonchi, good air entry  CV: RRR, normal S1 and S2, no murmurs, rubs, gallops  Abd: abdomen is soft without significant tenderness, masses, organomegaly or guarding  Ext: wwp, 2+ pulses upper and 2+ lower extremitiy without delay, no edema  Obed had evaluation today with labs, ecg, echocardiogram.  His echo showed normal post transplant anatomy, " normal systolic function, there is some mild edema/LVH and abnormal diastolic function, no pericardial effusion.  His ecg showed normal sinus rhythm with occasional PACs, rate of 95 beats per minute, interventricular conduction delay, no st or twave changes. His labs included a comprehensive metabolic panel which was normal, specifically bun of 16, creatinine of 0.8, normal LFTs.  troponin mildly elevated at 0.071 (5/25 was 0.128, 0.068 on 6/7).  He had a cbc which had normal wbc count of 5.1, hemoglobin of 15.6, platelets of [id number].  He had ebv, cmv, pra, and tacrolimus level that are pending.   His last biopsy on 5/26/17 showed mild rejection, grade 2R, pAMR0, negative C3d/C4d staining.    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 Of hypertrophic cardiomyopathy picture and glycogen storage disease).  He also has history of EBV positive PTLD.  From a cardiac standpoint, Obed had a recent 2R rejection likely secondary to noncompliance, treated with 3 days of iv solumedrol, and a followup biopsy on 5/26 with ongoing 2R rejection treated with 5 days prednisone and increasing his tacrolimus goals. Unfortunately his troponin is elevated again today and he has new PACs on ecg concerning for ongoing rejection. He says he is compliant with medications, although takes them on an abnormal schedule due to work schedule. We elected to treat with 3 days prednsione 60mg daily, followed by 20mg daily and then will slowly taper. He will come Friday for labs for repeat troponin after steroid burst, and we placed a holter monitor today to further evaluate his arrhythmias.  Next clinic visit  or biopsy will be determined based on results of holter and troponin later this week.   We again re-referred to Dr. Evgeny Decker (genetics/metabolism) today and will plan to have Obed see him to have some counseling, blood and urine testing done to look for evidene of glycogen storage disease.      Diagnosis summary:  1. Viral myocarditis at age 2 1/2  2. Dilated cardiomyopathy           A. S/p orthotopic heart transplant (3/23/12)   1. 2R rejection (4/21/17), treated with iv solumedrol x 3 days   2. 2R rejecitn (5/26/17), treated with prednisone x 5 days  3. Cerebrovascular accident at age 2 1/2 with resultant left hemiplegia  4. Multiple orthopedic surgeries in past  5. PTLD, EBV positive            A.  treated with rituximab x 2, and increased prednisone dose from 2.5mg daily back up 10mg twice daily      Thank you for the opportunity to participate in his care.  Please let me know if you have further questions.    Sincerely,    Bobbi Pruitt MD  Medical Director, Pediatric Heart Failure and Transplant    CC  Patient Care Team:  Rayo Schafer MD as PCP - General (Pediatrics)  Isrrael Trent MD as MD (Oncology)  Bobbi Pruitt MD as MD (Pediatric Cardiology)      Copy to patient  OSBALDO ENCARNACION   [address]  [facility] [address]

## 2017-06-27 LAB
CMV DNA SPEC NAA+PROBE-ACNC: NORMAL [IU]/ML
CMV DNA SPEC NAA+PROBE-LOG#: NORMAL {LOG_IU}/ML
DONOR IDENTIFICATION: NORMAL
DR51: 973
DSA COMMENTS: NORMAL
DSA PRESENT: YES
DSA TEST METHOD: NORMAL
EBV DNA # SPEC NAA+PROBE: NORMAL {COPIES}/ML
EBV DNA SPEC NAA+PROBE-LOG#: NORMAL {LOG_COPIES}/ML
ORGAN: NORMAL
PRA DONOR SPECIFIC ABY: NORMAL
PRA SINGLE ANTIGEN IGG ANTIBODY: NORMAL
SA1 CELL: NORMAL
SA1 COMMENTS: NORMAL
SA1 HI RISK ABY: NORMAL
SA1 MOD RISK ABY: NORMAL
SA1 TEST METHOD: NORMAL
SA2 CELL: NORMAL
SA2 COMMENTS: NORMAL
SA2 HI RISK ABY UA: NORMAL
SA2 MOD RISK ABY: NORMAL
SA2 TEST METHOD: NORMAL
SPECIMEN SOURCE: NORMAL

## 2017-06-27 NOTE — PHARMACY-CONSULT NOTE
Current Outpatient Prescriptions   Medication Sig Dispense Refill     predniSONE (DELTASONE) 20 MG tablet Take 60mg (3 tabs daily) for 3 days, then reduce dose to 20mg daily. 39 tablet 0     tacrolimus (PROGRAF - GENERIC EQUIVALENT) 1 MG capsule Take 3mg twice daily 270 capsule 11     aspirin 81 MG EC tablet Take 2 tablets (162 mg) by mouth daily 60 tablet 99     Cholecalciferol (VITAMIN D3) 2000 UNITS TABS Take 2,000 Units by mouth daily 100 tablet 6     amLODIPine (NORVASC) 5 MG tablet Take 1 tablet (5 mg) by mouth daily 90 tablet 3     mycophenolate (CELLCEPT - GENERIC EQUIVALENT) 500 MG tablet Take 1 tablet (500 mg) by mouth 2 times daily 180 tablet 11     pravastatin (PRAVACHOL) 10 MG tablet Take 1 tablet (10 mg) by mouth daily At bedtime 90 tablet 6     I had the privilege of seeing Obed in clinic on Monday 6/26/17 along with Dr. Pruitt, Rosa Maria RN coordinator, and Jolly (mom).     Current labs are as follows:  (Tacrolimus or CSA) level: Tacrolimus 12.9   Goal level: Tacrolimus 10-15 with rejection  WBC: 5.1 (4-11)   Cr: 0.80 (0.66-1.25)  Other:  N terminal Pro BNP 35 (0-125), troponin 0.071 (0.000-0.045)(was 0.068 on 6/7/17), hemoglobin 15.6 (13.3-17.7), magnesium 1.5 (1.6-2.3),  (), platelets 150 (150-450), CMV and EBV in process, /66    Immunosuppressant regimen: Tacrolimus generic capsules--- 3mg twice daily, mycophenolate generic tabs--- 500mg twice daily(dose okay --due to low WBC in past), prednisone burst and then taper back to bring troponin down--- 60mg daily for 3 days and then 20mg daily thereafter.    Visit summary: Obed is a heart transplant recipient of 3/23/12.   His dosing is accurate.   His chart is complete per protocol standards.  Medlist reviewed.  He will now take his meds at 10am, 10pm schedule for twice daily dosing to better line up with labs.   He will put alarms on phone.   He loads his pill box with Yasmin as mom and mentor of it.

## 2017-06-28 ENCOUNTER — TELEPHONE (OUTPATIENT)
Dept: PEDIATRIC CARDIOLOGY | Facility: CLINIC | Age: 22
End: 2017-06-28

## 2017-06-28 LAB — INTERPRETATION ECG - MUSE: NORMAL

## 2017-06-28 NOTE — NURSING NOTE
It was a pleasure to see Obed and his mother today in clinic with Dr. Pruitt. Obed is here for follow up for rejection. Obed reports feeling fine, no issues. He reports being compliant with his medication. He does continue to take his Tacro at 1am and 1pm. I strongly recommended he switch his medication times to 8am and 8pm so we can get accurate levels but Obed states he can't do that. 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 He then sleeps until usually 3-4pm. Dr. Pruitt went over all labs and test results. She requested a Holter Monitor for abnormal EKG and is prescribing another course of steroids for continued presumed rejection. Plan is for a repeat troponin on Friday.

## 2017-06-30 ENCOUNTER — TELEPHONE (OUTPATIENT)
Dept: PEDIATRIC CARDIOLOGY | Facility: CLINIC | Age: 22
End: 2017-06-30

## 2017-06-30 DIAGNOSIS — Z94.1 HEART REPLACED BY TRANSPLANT (H): ICD-10-CM

## 2017-06-30 DIAGNOSIS — Z94.1 HEART REPLACED BY TRANSPLANT (H): Primary | ICD-10-CM

## 2017-06-30 LAB — TROPONIN I SERPL-MCNC: 0.04 UG/L (ref 0–0.04)

## 2017-06-30 PROCEDURE — 84484 ASSAY OF TROPONIN QUANT: CPT | Performed by: PEDIATRICS

## 2017-06-30 PROCEDURE — 36415 COLL VENOUS BLD VENIPUNCTURE: CPT | Performed by: PEDIATRICS

## 2017-06-30 NOTE — TELEPHONE ENCOUNTER
I called Obed and his mom today to give them his lab results. Troponin was down to 0.035 after 3 day steroid burst. I left a detailed message for Obed to continue taking the same doses of medication as prescribed during his appointment on Monday and to have a troponin drawn next Friday. I also left a message with his mom, Jolly. I asked both of them to call back to confirm that they received this message.

## 2017-07-11 ENCOUNTER — TELEPHONE (OUTPATIENT)
Dept: PEDIATRIC CARDIOLOGY | Facility: CLINIC | Age: 22
End: 2017-07-11

## 2017-07-11 DIAGNOSIS — Z94.1 HEART REPLACED BY TRANSPLANT (H): Primary | ICD-10-CM

## 2017-07-11 NOTE — TELEPHONE ENCOUNTER
Called Obed to follow up, Nicci had called on June 30th and left him a message to come in for a repeat Troponin on July 7th. He did not reply to her message or get that lab drawn on July 7th. Dr. Pruitt would like to have him come to clinic in 2 weeks for follow up. I left Obed a message and asked the  to call and set up an appointment.

## 2017-07-14 LAB
DONOR IDENTIFICATION: NORMAL
DR51: 832
DSA COMMENTS: NORMAL
DSA PRESENT: YES
DSA TEST METHOD: NORMAL
ORGAN: NORMAL
SA1 CELL: NORMAL
SA1 COMMENTS: NORMAL
SA1 HI RISK ABY: NORMAL
SA1 MOD RISK ABY: NORMAL
SA1 TEST METHOD: NORMAL
SA2 CELL: NORMAL
SA2 COMMENTS: NORMAL
SA2 HI RISK ABY UA: NORMAL
SA2 MOD RISK ABY: NORMAL
SA2 TEST METHOD: NORMAL

## 2017-07-24 ENCOUNTER — HOSPITAL ENCOUNTER (OUTPATIENT)
Dept: CARDIOLOGY | Facility: CLINIC | Age: 22
Discharge: HOME | End: 2017-07-24
Attending: PEDIATRICS | Admitting: PEDIATRICS
Payer: COMMERCIAL

## 2017-07-24 ENCOUNTER — APPOINTMENT (OUTPATIENT)
Dept: LAB | Facility: CLINIC | Age: 22
End: 2017-07-24
Attending: PEDIATRICS
Payer: COMMERCIAL

## 2017-07-24 ENCOUNTER — RESULTS ONLY (OUTPATIENT)
Dept: OTHER | Facility: CLINIC | Age: 22
End: 2017-07-24

## 2017-07-24 ENCOUNTER — HOSPITAL ENCOUNTER (OUTPATIENT)
Dept: GENERAL RADIOLOGY | Facility: CLINIC | Age: 22
End: 2017-07-24
Attending: PEDIATRICS
Payer: COMMERCIAL

## 2017-07-24 ENCOUNTER — OFFICE VISIT (OUTPATIENT)
Dept: PEDIATRIC CARDIOLOGY | Facility: CLINIC | Age: 22
End: 2017-07-24
Attending: PEDIATRICS
Payer: COMMERCIAL

## 2017-07-24 VITALS
SYSTOLIC BLOOD PRESSURE: 125 MMHG | WEIGHT: 139.33 LBS | BODY MASS INDEX: 18.47 KG/M2 | OXYGEN SATURATION: 100 % | HEART RATE: 88 BPM | HEIGHT: 73 IN | RESPIRATION RATE: 16 BRPM | DIASTOLIC BLOOD PRESSURE: 80 MMHG

## 2017-07-24 DIAGNOSIS — Z94.1 HEART REPLACED BY TRANSPLANT (H): ICD-10-CM

## 2017-07-24 DIAGNOSIS — Z94.1 HEART REPLACED BY TRANSPLANT (H): Primary | ICD-10-CM

## 2017-07-24 LAB
ALBUMIN SERPL-MCNC: 3.9 G/DL (ref 3.4–5)
ALP SERPL-CCNC: 73 U/L (ref 40–150)
ALT SERPL W P-5'-P-CCNC: 20 U/L (ref 0–70)
ANION GAP SERPL CALCULATED.3IONS-SCNC: 11 MMOL/L (ref 3–14)
AST SERPL W P-5'-P-CCNC: 13 U/L (ref 0–45)
BASOPHILS # BLD AUTO: 0 10E9/L (ref 0–0.2)
BASOPHILS NFR BLD AUTO: 0.3 %
BILIRUB SERPL-MCNC: 0.4 MG/DL (ref 0.2–1.3)
BUN SERPL-MCNC: 17 MG/DL (ref 7–30)
CALCIUM SERPL-MCNC: 8.7 MG/DL (ref 8.5–10.1)
CHLORIDE SERPL-SCNC: 107 MMOL/L (ref 94–109)
CK SERPL-CCNC: 67 U/L (ref 30–300)
CMV IGG SERPL QL IA: NORMAL AI (ref 0–0.8)
CO2 SERPL-SCNC: 24 MMOL/L (ref 20–32)
CREAT SERPL-MCNC: 0.62 MG/DL (ref 0.66–1.25)
DIFFERENTIAL METHOD BLD: NORMAL
EBV NA IGG SER QL IA: NORMAL AI (ref 0–0.8)
EBV VCA IGG SER QL IA: 5 AI (ref 0–0.8)
EBV VCA IGM SER QL IA: NORMAL AI (ref 0–0.8)
EOSINOPHIL # BLD AUTO: 0.2 10E9/L (ref 0–0.7)
EOSINOPHIL NFR BLD AUTO: 3.2 %
ERYTHROCYTE [DISTWIDTH] IN BLOOD BY AUTOMATED COUNT: 13.1 % (ref 10–15)
GFR SERPL CREATININE-BSD FRML MDRD: ABNORMAL ML/MIN/1.7M2
GLUCOSE SERPL-MCNC: 118 MG/DL (ref 70–99)
HCT VFR BLD AUTO: 44.3 % (ref 40–53)
HGB BLD-MCNC: 15.5 G/DL (ref 13.3–17.7)
IMM GRANULOCYTES # BLD: 0 10E9/L (ref 0–0.4)
IMM GRANULOCYTES NFR BLD: 0.3 %
INTERPRETATION ECG - MUSE: NORMAL
LYMPHOCYTES # BLD AUTO: 1.6 10E9/L (ref 0.8–5.3)
LYMPHOCYTES NFR BLD AUTO: 24.4 %
MAGNESIUM SERPL-MCNC: 1.7 MG/DL (ref 1.6–2.3)
MCH RBC QN AUTO: 29.7 PG (ref 26.5–33)
MCHC RBC AUTO-ENTMCNC: 35 G/DL (ref 31.5–36.5)
MCV RBC AUTO: 85 FL (ref 78–100)
MONOCYTES # BLD AUTO: 0.5 10E9/L (ref 0–1.3)
MONOCYTES NFR BLD AUTO: 7.2 %
NEUTROPHILS # BLD AUTO: 4.3 10E9/L (ref 1.6–8.3)
NEUTROPHILS NFR BLD AUTO: 64.6 %
NRBC # BLD AUTO: 0 10*3/UL
NRBC BLD AUTO-RTO: 0 /100
NT-PROBNP SERPL-MCNC: 38 PG/ML (ref 0–125)
PHOSPHATE SERPL-MCNC: 2.8 MG/DL (ref 2.5–4.5)
PLATELET # BLD AUTO: 194 10E9/L (ref 150–450)
POTASSIUM SERPL-SCNC: 3.3 MMOL/L (ref 3.4–5.3)
PROT SERPL-MCNC: 7.1 G/DL (ref 6.8–8.8)
RBC # BLD AUTO: 5.22 10E12/L (ref 4.4–5.9)
SODIUM SERPL-SCNC: 142 MMOL/L (ref 133–144)
TACROLIMUS BLD-MCNC: 9.5 UG/L (ref 5–15)
TME LAST DOSE: NORMAL H
TROPONIN I SERPL-MCNC: NORMAL UG/L (ref 0–0.04)
WBC # BLD AUTO: 6.7 10E9/L (ref 4–11)

## 2017-07-24 PROCEDURE — 84484 ASSAY OF TROPONIN QUANT: CPT | Performed by: PEDIATRICS

## 2017-07-24 PROCEDURE — 71020 XR CHEST 2 VW: CPT

## 2017-07-24 PROCEDURE — 93306 TTE W/DOPPLER COMPLETE: CPT

## 2017-07-24 PROCEDURE — 36415 COLL VENOUS BLD VENIPUNCTURE: CPT | Performed by: PEDIATRICS

## 2017-07-24 PROCEDURE — 86833 HLA CLASS II HIGH DEFIN QUAL: CPT | Performed by: PEDIATRICS

## 2017-07-24 PROCEDURE — 93005 ELECTROCARDIOGRAM TRACING: CPT | Mod: ZF

## 2017-07-24 PROCEDURE — 84100 ASSAY OF PHOSPHORUS: CPT | Performed by: PEDIATRICS

## 2017-07-24 PROCEDURE — 82550 ASSAY OF CK (CPK): CPT | Performed by: PEDIATRICS

## 2017-07-24 PROCEDURE — 80053 COMPREHEN METABOLIC PANEL: CPT | Performed by: PEDIATRICS

## 2017-07-24 PROCEDURE — 83880 ASSAY OF NATRIURETIC PEPTIDE: CPT | Performed by: PEDIATRICS

## 2017-07-24 PROCEDURE — 83735 ASSAY OF MAGNESIUM: CPT | Performed by: PEDIATRICS

## 2017-07-24 PROCEDURE — 86664 EPSTEIN-BARR NUCLEAR ANTIGEN: CPT | Performed by: PEDIATRICS

## 2017-07-24 PROCEDURE — 86832 HLA CLASS I HIGH DEFIN QUAL: CPT | Performed by: PEDIATRICS

## 2017-07-24 PROCEDURE — 85025 COMPLETE CBC W/AUTO DIFF WBC: CPT | Performed by: PEDIATRICS

## 2017-07-24 PROCEDURE — 80197 ASSAY OF TACROLIMUS: CPT | Performed by: PEDIATRICS

## 2017-07-24 PROCEDURE — 86644 CMV ANTIBODY: CPT | Performed by: PEDIATRICS

## 2017-07-24 PROCEDURE — 86665 EPSTEIN-BARR CAPSID VCA: CPT | Performed by: PEDIATRICS

## 2017-07-24 PROCEDURE — 99213 OFFICE O/P EST LOW 20 MIN: CPT | Mod: 25,ZF

## 2017-07-24 PROCEDURE — 87799 DETECT AGENT NOS DNA QUANT: CPT | Performed by: PEDIATRICS

## 2017-07-24 RX ORDER — PREDNISONE 10 MG/1
10 TABLET ORAL DAILY
Qty: 30 TABLET | Refills: 3 | Status: SHIPPED | OUTPATIENT
Start: 2017-07-24 | End: 2017-09-22

## 2017-07-24 RX ORDER — PREDNISONE 10 MG/1
10 TABLET ORAL DAILY
Qty: 30 TABLET | Refills: 3 | Status: SHIPPED | OUTPATIENT
Start: 2017-07-24 | End: 2017-07-24

## 2017-07-24 ASSESSMENT — PAIN SCALES - GENERAL: PAINLEVEL: NO PAIN (0)

## 2017-07-24 NOTE — PHARMACY-CONSULT NOTE
I had the privilege of seeing Obed in clinic today along with Dr. Pruitt, Dr. Lorenzo, and Nicci SOTOMAYOR.      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  His rejection is improving so prednisone was decreased to 10 mg daily.  He reports taking his medications at 10:30 am and 10:30 pm.  Awaiting tacro level from today.  Will have follow-up in 4 weeks.    Immunosuppressant regimen: Tacro goal of 10-15.  Level of 9.5  Tacro (generic) 3.5 mg twice daily (New dose. Per Alisson note need to mail out 0.5 mg capsules)  Mycophenolate (generic) 500 mg twice daily  Prednisone 10 mg daily.      Allergies: Latex and Other [seasonal allergies]  Current Outpatient Prescriptions   Medication Sig Dispense Refill     predniSONE (DELTASONE) 10 MG tablet Take 1 tablet (10 mg) by mouth daily 30 tablet 3     tacrolimus (PROGRAF - GENERIC EQUIVALENT) 1 MG capsule Take 3.5 mg twice daily 270 capsule 11     tacrolimus (PROGRAF - GENERIC EQUIVALENT) 0.5 MG capsule Take 3.5 mg twice daily 60 capsule 11     aspirin 81 MG EC tablet Take 2 tablets (162 mg) by mouth daily 60 tablet 99     Cholecalciferol (VITAMIN D3) 2000 UNITS TABS Take 2,000 Units by mouth daily 100 tablet 6     amLODIPine (NORVASC) 5 MG tablet Take 1 tablet (5 mg) by mouth daily 90 tablet 3     mycophenolate (CELLCEPT - GENERIC EQUIVALENT) 500 MG tablet Take 1 tablet (500 mg) by mouth 2 times daily 180 tablet 11     pravastatin (PRAVACHOL) 10 MG tablet Take 1 tablet (10 mg) by mouth daily At bedtime 90 tablet 6     Current labs are as follows:   Results for orders placed or performed in visit on 07/24/17   Comprehensive metabolic panel   Result Value Ref Range    Sodium 142 133 - 144 mmol/L    Potassium 3.3 (L) 3.4 - 5.3 mmol/L    Chloride 107 94 - 109 mmol/L    Carbon Dioxide 24 20 - 32 mmol/L    Anion Gap 11 3 - 14 mmol/L    Glucose 118 (H) 70 - 99 mg/dL    Urea Nitrogen 17 7 - 30 mg/dL    Creatinine 0.62 (L) 0.66 - 1.25 mg/dL    GFR Estimate >90  Non   GFR Calc   >60 mL/min/1.7m2    GFR Estimate If Black >90   GFR Calc   >60 mL/min/1.7m2    Calcium 8.7 8.5 - 10.1 mg/dL    Bilirubin Total 0.4 0.2 - 1.3 mg/dL    Albumin 3.9 3.4 - 5.0 g/dL    Protein Total 7.1 6.8 - 8.8 g/dL    Alkaline Phosphatase 73 40 - 150 U/L    ALT 20 0 - 70 U/L    AST 13 0 - 45 U/L   Magnesium   Result Value Ref Range    Magnesium 1.7 1.6 - 2.3 mg/dL   Phosphorus   Result Value Ref Range    Phosphorus 2.8 2.5 - 4.5 mg/dL   N terminal pro BNP outpatient   Result Value Ref Range    N-Terminal Pro Bnp 38 0 - 125 pg/mL   Troponin I   Result Value Ref Range    Troponin I ES  0.000 - 0.045 ug/L     <0.015  The 99th percentile for upper reference range is 0.045 ug/L.  Troponin values in   the range of 0.045 - 0.120 ug/L may be associated with risks of adverse   clinical events.     CBC with platelets differential   Result Value Ref Range    WBC 6.7 4.0 - 11.0 10e9/L    RBC Count 5.22 4.4 - 5.9 10e12/L    Hemoglobin 15.5 13.3 - 17.7 g/dL    Hematocrit 44.3 40.0 - 53.0 %    MCV 85 78 - 100 fl    MCH 29.7 26.5 - 33.0 pg    MCHC 35.0 31.5 - 36.5 g/dL    RDW 13.1 10.0 - 15.0 %    Platelet Count 194 150 - 450 10e9/L    Diff Method Automated Method     % Neutrophils 64.6 %    % Lymphocytes 24.4 %    % Monocytes 7.2 %    % Eosinophils 3.2 %    % Basophils 0.3 %    % Immature Granulocytes 0.3 %    Nucleated RBCs 0 0 /100    Absolute Neutrophil 4.3 1.6 - 8.3 10e9/L    Absolute Lymphocytes 1.6 0.8 - 5.3 10e9/L    Absolute Monocytes 0.5 0.0 - 1.3 10e9/L    Absolute Eosinophils 0.2 0.0 - 0.7 10e9/L    Absolute Basophils 0.0 0.0 - 0.2 10e9/L    Abs Immature Granulocytes 0.0 0 - 0.4 10e9/L    Absolute Nucleated RBC 0.0    Tacrolimus level   Result Value Ref Range    Tacrolimus Last Dose 07/23/17 2220     Tacrolimus Level 9.5 5.0 - 15.0 ug/L   CMV DNA quantification   Result Value Ref Range    CMV DNA Quantitation Specimen EDTA PLASMA     CMV Quant IU/mL  CMVND [IU]/mL     CMV DNA  Not Detected   Mutations within the highly conserved regions of the viral genome covered by   the MEET AmpliPrep/MEET TaqMan CMV Test primers and/or probes have been   identified and may result in under-quantitation of or failure to detect the   virus.  Supplemental testing methods should be used for testing when this is   suspected.   The MEET AmpliPrep/MEET TaqMan CMV Test is an FDA-approved in vitro nucleic   acid amplification test for the quantitation of cytomegalovirus DNA in human   plasma (EDTA plasma) using the MEET AmpliPrep Instrument for automated viral   nucleic acid extraction and the MEET TaqMan Analyzer or Dekko TaqMan for   automated Real Time amplification and detection of the viral nucleic acid   target.   Titer results are reported in International Units/mL (IU/mL using 1st WHO   International standard for Human Cytomegalovirus for Nucleic Acid Amplification   based assays. The conversion factor between CMV DNA copis/mL (as defined by the   Roche MEET  TaqMan CMV test) and International Units is the CMV DNA   concentration in IU/mL x 1.1 copies/IU = CMV DNA in copies/mL.   This assay has received FDA approval for the testing of human plasma only. The   Infectious Disease Diagnostic Laboratory at the [facility], [place], has validated the performance characteristics of the Roche   CMV assay for plasma, bronchial alveolar lavage/wash and urine.      Log IU/mL of CMVQNT Not Calculated <2.1 [Log_IU]/mL   EBV Capsid Antibody IgG   Result Value Ref Range    EBV Capsid Antibody IgG 5.0 (H) 0.0 - 0.8 AI   EBV Capsid Antibody IgM   Result Value Ref Range    EBV Capsid Antibody IgM  0.0 - 0.8 AI     <0.2  No detectable antibody.   Antibody index (AI) values reflect qualitative changes in antibody   concentration that cannot be directly associated with clinical condition or   disease state.     EBV Nuclear Antigen EBNA Antibody IgG   Result Value Ref Range    EBV  Nuclear Antigen (EBNA) Antibody IgG  0.0 - 0.8 AI     <0.2  No detectable antibody.   Antibody index (AI) values reflect qualitative changes in antibody   concentration that cannot be directly associated with clinical condition or   disease state.     PRA Single Antigen IgG Antibody   Result Value Ref Range    PRA Single Antigen IgG Antibody       Specimen received - Immunology report to follow upon completion.   PRA Donor Specific Antibody   Result Value Ref Range    PRA Donor Specific Maggie       Specimen received - Immunology report to follow upon completion.   CMV Antibody IgG   Result Value Ref Range    CMV Antibody IgG  0.0 - 0.8 AI     <0.2  Negative   Antibody index (AI) values reflect qualitative changes in antibody   concentration that cannot be directly associated with clinical condition or   disease state.     CK total   Result Value Ref Range    CK Total 67 30 - 300 U/L   EKG 12 lead - pediatric   Result Value Ref Range    Interpretation ECG Click View Image link to view waveform and result

## 2017-07-24 NOTE — MR AVS SNAPSHOT
After Visit Summary   2017    Obed Viramontes    MRN: [id number]           Patient Information     Date Of Birth          1995        Visit Information        Provider Department      2017 10:30 AM Bobbi Pruitt MD Peds Cardiac Transplant        Today's Diagnoses     Heart replaced by transplant (H)          Care Instructions    1) Decrease prednisone to 10 mg daily.   2) We will call to follow-up with labs later today.   3) Clinic visit with ECHO, labs, EKG in 4 weeks.           Follow-ups after your visit        Who to contact     Please call your clinic at [phone] to:    Ask questions about your health    Make or cancel appointments    Discuss your medicines    Learn about your test results    Speak to your doctor   If you have compliments or concerns about an experience at your clinic, or if you wish to file a complaint, please contact [facility] Patient Relations at [phone] or email us at [email or web address]         Additional Information About Your Visit        MyChart Information     [employer] is an electronic gateway that provides easy, online access to your medical records. With [employer] you can request a clinic appointment, read your test results, renew a prescription or communicate with your care team.     To sign up for [employer] visit the website at [email or web address]   You will be asked to enter the access code listed below, as well as some personal information. Please follow the directions to create your username and password.     Your access code is: [email or web address]  Expires: 10/22/2017 11:02 AM     Your access code will  in 90 days. If you need help or a new code, please contact your [facility] or call [phone] for assistance.        Care EveryWhere ID     This is your Care EveryWhere ID. This could be used by other organizations to access your [facility]  "records  [id number]        Your Vitals Were     Pulse Respirations Height Pulse Oximetry BMI (Body Mass Index)       88 16 6' 1.31\" (186.2 cm) 100% 18.23 kg/m2        Blood Pressure from Last 3 Encounters:   07/24/17 125/80   06/26/17 122/66   05/26/17 101/70    Weight from Last 3 Encounters:   07/24/17 139 lb 5.3 oz (63.2 kg)   06/26/17 138 lb 14.2 oz (63 kg)   05/26/17 143 lb 1.3 oz (64.9 kg)              We Performed the Following     CBC with platelets differential     CK total     CMV Antibody IgG     CMV DNA quantification     Comprehensive metabolic panel     EBV Capsid Antibody IgG     EBV Capsid Antibody IgM     EBV DNA PCR Quantitative Whole Blood     EBV Nuclear Antigen EBNA Antibody IgG     EKG 12 lead - pediatric     Magnesium     N terminal pro BNP outpatient     Phosphorus     PRA Donor Specific Antibody     PRA Single Antigen IgG Antibody     Tacrolimus level     Troponin I          Today's Medication Changes          These changes are accurate as of: 7/24/17 11:02 AM.  If you have any questions, ask your nurse or doctor.               These medicines have changed or have updated prescriptions.        Dose/Directions    predniSONE 10 MG tablet   Commonly known as:  DELTASONE   This may have changed:    - medication strength  - how much to take  - how to take this  - when to take this  - additional instructions   Used for:  Heart replaced by transplant (H)   Changed by:  Bobbi Pruitt MD        Dose:  10 mg   Take 1 tablet (10 mg) by mouth daily   Quantity:  30 tablet   Refills:  3            Where to get your medicines      These medications were sent to [id number] - [place] - [address]  [address] [facility] [address]     Phone:  [phone]     predniSONE 10 MG tablet                Primary Care Provider Office Phone # Fax #    Rayo Schafer [phone]       [facility] [address]  [place] " [address]        Equal Access to Services     [facility]: Hadii aad ku hadnaomysalvatore Maddox, wabenda maurilioroselineha, qaclaritzata joseluisbeatricestephanie espinoza. So [facility] [phone].    ATENCIÓN: Si habla español, tiene a neves disposición servicios gratuitos de asistencia lingüística. Llame al [phone].    We comply with applicable federal civil rights laws and [place] laws. We do not discriminate on the basis of race, color, national origin, age, disability sex, sexual orientation or gender identity.            Thank you!     Thank you for choosing [facility]  for your care. Our goal is always to provide you with excellent care. Hearing back from our patients is one way we can continue to improve our services. Please take a few minutes to complete the written survey that you may receive in the mail after your visit with us. Thank you!             Your Updated Medication List - Protect others around you: Learn how to safely use, store and throw away your medicines at www.disposemymeds.org.          This list is accurate as of: 7/24/17 11:02 AM.  Always use your most recent med list.                   Brand Name Dispense Instructions for use Diagnosis    amLODIPine 5 MG tablet    NORVASC    90 tablet    Take 1 tablet (5 mg) by mouth daily    Heart replaced by transplant (H)       aspirin 81 MG EC tablet     60 tablet    Take 2 tablets (162 mg) by mouth daily    Status post transplant, heart (H)       mycophenolate 500 MG tablet    CELLCEPT - GENERIC EQUIVALENT    180 tablet    Take 1 tablet (500 mg) by mouth 2 times daily    Heart transplanted (H)       pravastatin 10 MG tablet    PRAVACHOL    90 tablet    Take 1 tablet (10 mg) by mouth daily At bedtime    Heart transplanted (H), Heart transplanted (H)       predniSONE 10 MG tablet    DELTASONE    30 tablet    Take 1 tablet (10 mg) by mouth daily    Heart replaced by transplant (H)       tacrolimus 1 MG capsule    PROGRAF - GENERIC  EQUIVALENT    270 capsule    Take 3mg twice daily    Transplanted heart (H)       Vitamin D3 2000 UNITS Tabs     100 tablet    Take 2,000 Units by mouth daily    Heart transplanted (H)

## 2017-07-24 NOTE — NURSING NOTE
"Chief Complaint   Patient presents with     Heart Problem     Heart transplant.       Initial /80 (BP Location: Right arm, Patient Position: Supine, Cuff Size: Adult Regular)  Pulse 88  Resp 16  Ht 6' 1.31\" (186.2 cm)  Wt 139 lb 5.3 oz (63.2 kg)  SpO2 100%  BMI 18.23 kg/m2 Estimated body mass index is 18.23 kg/(m^2) as calculated from the following:    Height as of this encounter: 6' 1.31\" (186.2 cm).    Weight as of this encounter: 139 lb 5.3 oz (63.2 kg).  Medication Reconciliation: complete       Christina Mason M.A.    "

## 2017-07-24 NOTE — PATIENT INSTRUCTIONS
1) Decrease prednisone to 10 mg daily.   2) We will call to follow-up with labs later today.   3) Clinic visit with ECHO, labs, EKG in 4 weeks.

## 2017-07-24 NOTE — PROGRESS NOTES
Rayo Pabloman  [facility]  [address] [place] [address]    Pediatric Heart Failure and Transplant Clinic Note  Date of note: 2017  Re: Obed Viramontes  MR #: [id number]  : 1995    HPI:  Obed is a 21 year old male with history of heart transplant (3/23/2012) and posttransplant lymphoproliferative disorder (diagnosed 2012) who transferred care to [facility] in 2012.   He is in clinic today with his mother for followup after recent rejection episodes. He was seen in April for routine  annual followup, however he had elevated troponin and routine biopsy showed 2R rejection, and he was noncompliant with medications prior to this. He was admitted for 3 days of iv solumedrol and his troponin improved. Following that, he was staying with mom for a few weeks and was on a better schedule, compliant with medications, and eating better and has gained weight. He reported that even since going back to dad's house and going back to work that he is compliant with his medications. However on routine visit on  had elevated troponin again, and so was taken for biopsy on  which showed 2R rejection, which was treated with 5 days of prednisone and increasing his tacrolimus goals. On followup on  his troponin had improved. However, on repeat followup at end of  his troponin was elevated again and he had new PACs on ecg concerning for ongoing rejection. We elected to treat with 3 days prednsione 60mg daily, followed by 20mg daily which he has remained on.  He feels his energy level is stable, no edema, no shortness of breath with activity. no recent fevers, no chest pain, palpitations, tachycardia, dizziness or syncope. Comprehensive review of systems is otherwise negative today.      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  He had uncomplicated  course and was healthy in early childhood.  He was hospitalized at age 2 1/2 years with viral myocarditis, hospitalized for 6 weeks, intubated for 10 days, had recovery of myocardial function and was treated chronically with digoxin, hospital course complicated by cerobrovascular accident with resultant left hemiplegia, transitioned to [facility] for an additional 5 weeks following his hospital course.  He has had several orthopedic surgeries throughout childhood because of his hemiplegia, but otherwise has been healthy.  He was asymptomatic from a cardiac standpoint until February of this year, at which time he presented with chest pain.  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  He was transplanted on 2012, did well post transplant and was discharged after 1 week.  He reportedly has done well from a cardiac standpoint with no rejection, 1R biopsy in May and 1R biopsy in September but otherwise negative biopsies.  He presented in September with mouth sores and difficulty taking medication, initially treated with lowering his immunosuppression, but was rehospitalized with fever and worsening mouth sores.  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  He was treated with rituximab x 2, and increased prednisone dose from 2.5mg daily back up 10mg twice daily.  He was scheduled to undergo  "additional rituximab dose but symptoms improved and repeat CT Scan showed improvement in his lymph node and tonsillar hypertrophy.  Per mom he was EBV negative prior to transplant, and she thinks his donor was EBV positive.     He has done well from an infection/PTLD standpoint and came off valcyte therapy in December 2013 with negative EBV PCR in February 2014.  He did undergo orthopedic surgery for his left hand contracture in October, admitted in January with a cellulitis of that hand that has completely resolved. He is scheduled to undergo plate removal from that hand in October this year.    SOCIAL HISTORY:   Lives with dad, works at [employer].     Family history:  Negative for congenital heart defects, heart transplant, cardiomyopathy, sudden death.  Sister with type 1 DM.  Older sister recently diagnosed with POTS.    Medications:  Prescription Medications as of 7/24/2017             predniSONE (DELTASONE) 10 MG tablet Take 1 tablet (10 mg) by mouth daily    tacrolimus (PROGRAF - GENERIC EQUIVALENT) 1 MG capsule Take 3mg twice daily    aspirin 81 MG EC tablet Take 2 tablets (162 mg) by mouth daily    Cholecalciferol (VITAMIN D3) 2000 UNITS TABS Take 2,000 Units by mouth daily    amLODIPine (NORVASC) 5 MG tablet Take 1 tablet (5 mg) by mouth daily    mycophenolate (CELLCEPT - GENERIC EQUIVALENT) 500 MG tablet Take 1 tablet (500 mg) by mouth 2 times daily    pravastatin (PRAVACHOL) 10 MG tablet Take 1 tablet (10 mg) by mouth daily At bedtime        Physical Exam:  /80 (BP Location: Right arm, Patient Position: Supine, Cuff Size: Adult Regular)  Pulse 88  Resp 16  Ht 6' 1.31\" (186.2 cm)  Wt 139 lb 5.3 oz (63.2 kg)  SpO2 100%  BMI 18.23 kg/m2    Gen: Alert, interactive and appropriate, sitting, no respiratory distress.   Chest:  normal breath sounds bilaterally, no wheeze, crackles, or rhonchi, good air entry  CV: RRR, normal S1 and S2, no murmurs, rubs, gallops  Abd: abdomen is soft without " significant tenderness, masses, organomegaly or guarding  Ext: wwp, 2+ pulses upper and 2+ lower extremitiy without delay, no edema  Obed had evaluation today with labs, ecg, echocardiogram.  His echo showed normal post transplant anatomy, normal systolic function, no pericardial effusion.  His ecg showed normal sinus rhythm, rate of 90 beats per minute, interventricular conduction delay, no st or twave changes. His labs included a comprehensive metabolic panel which was normal, specifically bun of 17, creatinine of 0.62, normal LFTs.  troponin now normal at <0.015 (down from 0.037), pro-bnp normal at 38.  He had a cbc which had normal wbc count of 6.7, hemoglobin of 15.5, platelets of 202333.  He had ebv, cmv, pra, and tacrolimus level that are pending.   His last biopsy on 5/26/17 showed mild rejection, grade 2R, pAMR0, negative C3d/C4d staining.    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 Of hypertrophic cardiomyopathy picture and glycogen storage disease).  He also has history of EBV positive PTLD.  From a cardiac standpoint, Obed had a recent 2R rejection likely secondary to noncompliance, treated with 3 days of iv solumedrol, and a followup biopsy on 5/26 with ongoing 2R rejection treated with 5 days prednisone and increasing his tacrolimus goals. At end of June had again increased troponin, so was treated with 3 days prednsione 60mg daily, followed by 20mg daily. His troponin has now cleared on this regimen. No evidence for ongoing rejection at this time.   Following medication changes made today:  1. Decreased  prednisone to 10mg daily    Plan to followup in 4 weeks in clinic with echo, ecg, labs.     Diagnosis summary:  1. Viral myocarditis at age 2 1/2  2. Dilated cardiomyopathy           A. S/p orthotopic heart transplant (3/23/12)   1. 2R rejection (4/21/17), treated with iv solumedrol x 3 days   2. 2R rejection (5/26/17), treated with prednisone x 5 days   3. Presumed rejection (6/26/17) based on elevated troponin and new PACs, treated with prednisone x 3 days and then taper  3. Cerebrovascular accident at age 2 1/2 with resultant left hemiplegia  4. Multiple orthopedic surgeries in past  5. PTLD, EBV positive            A.  treated with rituximab x 2, and increased prednisone dose from 2.5mg daily back up 10mg twice daily      Thank you for the opportunity to participate in his care.  Please let me know if you have further questions.    Sincerely,    Bobbi Pruitt MD  Medical Director, Pediatric Heart Failure and Transplant    CC  Patient Care Team:  Rayo Schafer MD as PCP - General (Pediatrics)  Isrrael Trent MD as MD (Oncology)  Bobbi Pruitt MD as MD (Pediatric Cardiology)      Copy to patient  [address]   [address]  [facility] [address]

## 2017-07-24 NOTE — LETTER
2017      RE: Obed Viramontes  [address]  [facility] [address]       Rayo [facility]  [address] [place] [address]    Pediatric Heart Failure and Transplant Clinic Note  Date of note: 2017  Re: Obed Viramontes  MR #: [id number]  : 1995    HPI:  Obed is a 21 year old male with history of heart transplant (3/23/2012) and posttransplant lymphoproliferative disorder (diagnosed 2012) who transferred care to [facility] in 2012.   He is in clinic today with his mother for followup after recent rejection episodes. He was seen in April for routine  annual followup, however he had elevated troponin and routine biopsy showed 2R rejection, and he was noncompliant with medications prior to this. He was admitted for 3 days of iv solumedrol and his troponin improved. Following that, he was staying with mom for a few weeks and was on a better schedule, compliant with medications, and eating better and has gained weight. He reported that even since going back to dad's house and going back to work that he is compliant with his medications. However on routine visit on  had elevated troponin again, and so was taken for biopsy on  which showed 2R rejection, which was treated with 5 days of prednisone and increasing his tacrolimus goals. On followup on  his troponin had improved. However, on repeat followup at end of  his troponin was elevated again and he had new PACs on ecg concerning for ongoing rejection. We elected to treat with 3 days prednsione 60mg daily, followed by 20mg daily which he has remained on.  He feels his energy level is stable, no edema, no shortness of breath with activity. no recent fevers, no chest pain, palpitations, tachycardia, dizziness or syncope. Comprehensive review of systems is otherwise negative today.      Past medical/Past surgical history:  Born 38 weeks, 7 pound birth  weight  Hospitalized at age 2 1/2 years with viral myocarditis  Cerebrovascular accident at age 2 1/2 with resultant left hemiplegia  Multiple orthopedic surgeries in past  Cardiomyopathy  Heart transplant 2012  PTLD, EBV positive    In reviewing his history, he was born at 38 weeks with uncomplicated pregnancy and delivery, weighed 7 pounds.  He had uncomplicated  course and was healthy in early childhood.  He was hospitalized at age 2 1/2 years with viral myocarditis, hospitalized for 6 weeks, intubated for 10 days, had recovery of myocardial function and was treated chronically with digoxin, hospital course complicated by cerobrovascular accident with resultant left hemiplegia, transitioned to [facility] for an additional 5 weeks following his hospital course.  He has had several orthopedic surgeries throughout childhood because of his hemiplegia, but otherwise has been healthy.  He was asymptomatic from a cardiac standpoint until February of this year, at which time he presented with chest pain.  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  He was transplanted on 2012, did well post transplant and was discharged after 1 week.  He reportedly has done well from a cardiac standpoint with no rejection, 1R biopsy in May and 1R biopsy in September but otherwise negative biopsies.  He presented in September with mouth sores and difficulty taking medication, initially treated with lowering his immunosuppression, but was rehospitalized with fever and worsening mouth sores.  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  He was treated with rituximab x 2, and increased prednisone  "dose from 2.5mg daily back up 10mg twice daily.  He was scheduled to undergo additional rituximab dose but symptoms improved and repeat CT Scan showed improvement in his lymph node and tonsillar hypertrophy.  Per mom he was EBV negative prior to transplant, and she thinks his donor was EBV positive.     He has done well from an infection/PTLD standpoint and came off valcyte therapy in December 2013 with negative EBV PCR in February 2014.  He did undergo orthopedic surgery for his left hand contracture in October, admitted in January with a cellulitis of that hand that has completely resolved. He is scheduled to undergo plate removal from that hand in October this year.    SOCIAL HISTORY:   Lives with dad, works at [employer].     Family history:  Negative for congenital heart defects, heart transplant, cardiomyopathy, sudden death.  Sister with type 1 DM.  Older sister recently diagnosed with POTS.    Medications:  Prescription Medications as of 7/24/2017             predniSONE (DELTASONE) 10 MG tablet Take 1 tablet (10 mg) by mouth daily    tacrolimus (PROGRAF - GENERIC EQUIVALENT) 1 MG capsule Take 3mg twice daily    aspirin 81 MG EC tablet Take 2 tablets (162 mg) by mouth daily    Cholecalciferol (VITAMIN D3) 2000 UNITS TABS Take 2,000 Units by mouth daily    amLODIPine (NORVASC) 5 MG tablet Take 1 tablet (5 mg) by mouth daily    mycophenolate (CELLCEPT - GENERIC EQUIVALENT) 500 MG tablet Take 1 tablet (500 mg) by mouth 2 times daily    pravastatin (PRAVACHOL) 10 MG tablet Take 1 tablet (10 mg) by mouth daily At bedtime        Physical Exam:  /80 (BP Location: Right arm, Patient Position: Supine, Cuff Size: Adult Regular)  Pulse 88  Resp 16  Ht 6' 1.31\" (186.2 cm)  Wt 139 lb 5.3 oz (63.2 kg)  SpO2 100%  BMI 18.23 kg/m2    Gen: Alert, interactive and appropriate, sitting, no respiratory distress.   Chest:  normal breath sounds bilaterally, no wheeze, crackles, or rhonchi, good air entry  CV: RRR, " normal S1 and S2, no murmurs, rubs, gallops  Abd: abdomen is soft without significant tenderness, masses, organomegaly or guarding  Ext: wwp, 2+ pulses upper and 2+ lower extremitiy without delay, no edema  Obed had evaluation today with labs, ecg, echocardiogram.  His echo showed normal post transplant anatomy, normal systolic function, no pericardial effusion.  His ecg showed normal sinus rhythm, rate of 90 beats per minute, interventricular conduction delay, no st or twave changes. His labs included a comprehensive metabolic panel which was normal, specifically bun of 17, creatinine of 0.62, normal LFTs.  troponin now normal at <0.015 (down from 0.037), pro-bnp normal at 38.  He had a cbc which had normal wbc count of 6.7, hemoglobin of 15.5, platelets of [id number].  He had ebv, cmv, pra, and tacrolimus level that are pending.   His last biopsy on 5/26/17 showed mild rejection, grade 2R, pAMR0, negative C3d/C4d staining.    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 Of hypertrophic cardiomyopathy picture and glycogen storage disease).  He also has history of EBV positive PTLD.  From a cardiac standpoint, Obed had a recent 2R rejection likely secondary to noncompliance, treated with 3 days of iv solumedrol, and a followup biopsy on 5/26 with ongoing 2R rejection treated with 5 days prednisone and increasing his tacrolimus goals. At end of June had again increased troponin, so was treated with 3 days prednsione 60mg daily, followed by 20mg daily. His troponin has now cleared on this regimen. No evidence for ongoing  rejection at this time.   Following medication changes made today:  1. Decreased prednisone to 10mg daily    Plan to followup in 4 weeks in clinic with echo, ecg, labs.     Diagnosis summary:  1. Viral myocarditis at age 2 1/2  2. Dilated cardiomyopathy           A. S/p orthotopic heart transplant (3/23/12)   1. 2R rejection (4/21/17), treated with iv solumedrol x 3 days   2. 2R rejection (5/26/17), treated with prednisone x 5 days   3. Presumed rejection (6/26/17) based on elevated troponin and new PACs, treated with prednisone x 3 days and then taper  3. Cerebrovascular accident at age 2 1/2 with resultant left hemiplegia  4. Multiple orthopedic surgeries in past  5. PTLD, EBV positive            A.  treated with rituximab x 2, and increased prednisone dose from 2.5mg daily back up 10mg twice daily      Thank you for the opportunity to participate in his care.  Please let me know if you have further questions.    Sincerely,    Bobbi Pruitt MD  Medical Director, Pediatric Heart Failure and Transplant    CC  Patient Care Team:  Rayo Schafer MD as PCP - General (Pediatrics)  Isrrael Trent MD as MD (Oncology)  Bobbi Pruitt MD as MD (Pediatric Cardiology)      Copy to patient  [address] ENCARNACION   [address]  [facility] [phone]

## 2017-07-25 ENCOUNTER — TELEPHONE (OUTPATIENT)
Dept: PEDIATRIC CARDIOLOGY | Facility: CLINIC | Age: 22
End: 2017-07-25

## 2017-07-25 DIAGNOSIS — Z94.1 TRANSPLANTED HEART (H): ICD-10-CM

## 2017-07-25 LAB
CMV DNA SPEC NAA+PROBE-ACNC: NORMAL [IU]/ML
CMV DNA SPEC NAA+PROBE-LOG#: NORMAL {LOG_IU}/ML
PRA DONOR SPECIFIC ABY: NORMAL
PRA SINGLE ANTIGEN IGG ANTIBODY: NORMAL
SPECIMEN SOURCE: NORMAL

## 2017-07-25 RX ORDER — TACROLIMUS 1 MG/1
CAPSULE ORAL
Qty: 270 CAPSULE | Refills: 11 | Status: SHIPPED | OUTPATIENT
Start: 2017-07-25 | End: 2017-12-07

## 2017-07-25 RX ORDER — TACROLIMUS 0.5 MG/1
CAPSULE ORAL
Qty: 60 CAPSULE | Refills: 11 | Status: SHIPPED | OUTPATIENT
Start: 2017-07-25 | End: 2017-10-03

## 2017-07-25 NOTE — TELEPHONE ENCOUNTER
Called and talked to Obed.  Informed him that his tacrolimus level was 9.5, goal 10-15.  Instructed him to increase his dose to 3.5 mg BID.  He does not have 0.5 mg tabs at home.  I told him I would send those and to increase his dose and soon as they come.  I also informed him that we would recheck his dose in 4 weeks in clinic.  Obed verbalized understanding of the plan.

## 2017-07-26 LAB
EBV DNA # SPEC NAA+PROBE: ABNORMAL {COPIES}/ML
EBV DNA SPEC NAA+PROBE-LOG#: ABNORMAL {LOG_COPIES}/ML

## 2017-08-08 LAB
DONOR IDENTIFICATION: NORMAL
DR51: 541
DSA COMMENTS: NORMAL
DSA PRESENT: YES
DSA TEST METHOD: NORMAL
ORGAN: NORMAL
SA1 CELL: NORMAL
SA1 COMMENTS: NORMAL
SA1 HI RISK ABY: NORMAL
SA1 MOD RISK ABY: NORMAL
SA1 TEST METHOD: NORMAL
SA2 CELL: NORMAL
SA2 COMMENTS: NORMAL
SA2 HI RISK ABY UA: NORMAL
SA2 MOD RISK ABY: NORMAL
SA2 TEST METHOD: NORMAL

## 2017-08-14 ENCOUNTER — TELEPHONE (OUTPATIENT)
Dept: TRANSPLANT | Facility: CLINIC | Age: 22
End: 2017-08-14

## 2017-08-22 ENCOUNTER — TELEPHONE (OUTPATIENT)
Dept: PEDIATRIC CARDIOLOGY | Facility: CLINIC | Age: 22
End: 2017-08-22

## 2017-08-22 NOTE — TELEPHONE ENCOUNTER
I spoke with Obed because we noticed that his upcoming appointment on Thursday is at 2:30 instead of in the morning transplant clinic. I asked Obed what time he was coming and he said that his appointment with Dr Pruitt is at 10:30. I confirmed and said that he should come in the morning. I asked him to arrive at 9:30 to start with labs, xray, and ECHO.     I also left a message with Jolly that Obed is scheduled for Thursday morning.     I sent a message to the call center to have them move his appointment back to the morning.

## 2017-08-23 ENCOUNTER — PRE VISIT (OUTPATIENT)
Dept: PEDIATRIC CARDIOLOGY | Facility: CLINIC | Age: 22
End: 2017-08-23

## 2017-08-23 DIAGNOSIS — Z94.1 HEART REPLACED BY TRANSPLANT (H): Primary | ICD-10-CM

## 2017-08-23 NOTE — TELEPHONE ENCOUNTER
Obed is being seen monthly for follow up after heart transplant rejection.  All orders are placed.

## 2017-08-24 ENCOUNTER — HOSPITAL ENCOUNTER (OUTPATIENT)
Dept: CARDIOLOGY | Facility: CLINIC | Age: 22
Discharge: HOME | End: 2017-08-24
Attending: PEDIATRICS | Admitting: PEDIATRICS
Payer: COMMERCIAL

## 2017-08-24 ENCOUNTER — RESULTS ONLY (OUTPATIENT)
Dept: OTHER | Facility: CLINIC | Age: 22
End: 2017-08-24

## 2017-08-24 ENCOUNTER — APPOINTMENT (OUTPATIENT)
Dept: LAB | Facility: CLINIC | Age: 22
End: 2017-08-24
Attending: PEDIATRICS
Payer: COMMERCIAL

## 2017-08-24 ENCOUNTER — HOSPITAL ENCOUNTER (OUTPATIENT)
Dept: GENERAL RADIOLOGY | Facility: CLINIC | Age: 22
End: 2017-08-24
Attending: PEDIATRICS
Payer: COMMERCIAL

## 2017-08-24 ENCOUNTER — OFFICE VISIT (OUTPATIENT)
Dept: PEDIATRIC CARDIOLOGY | Facility: CLINIC | Age: 22
End: 2017-08-24
Attending: PEDIATRICS
Payer: COMMERCIAL

## 2017-08-24 VITALS
HEIGHT: 73 IN | RESPIRATION RATE: 20 BRPM | DIASTOLIC BLOOD PRESSURE: 80 MMHG | SYSTOLIC BLOOD PRESSURE: 125 MMHG | HEART RATE: 64 BPM | OXYGEN SATURATION: 98 % | WEIGHT: 141.09 LBS | BODY MASS INDEX: 18.7 KG/M2

## 2017-08-24 DIAGNOSIS — Z94.1 HEART REPLACED BY TRANSPLANT (H): ICD-10-CM

## 2017-08-24 DIAGNOSIS — Z94.1 TRANSPLANTED HEART (H): ICD-10-CM

## 2017-08-24 LAB
ALBUMIN SERPL-MCNC: 4.1 G/DL (ref 3.4–5)
ALP SERPL-CCNC: 60 U/L (ref 40–150)
ALT SERPL W P-5'-P-CCNC: 17 U/L (ref 0–70)
ANION GAP SERPL CALCULATED.3IONS-SCNC: 11 MMOL/L (ref 3–14)
AST SERPL W P-5'-P-CCNC: 10 U/L (ref 0–45)
BILIRUB SERPL-MCNC: 0.9 MG/DL (ref 0.2–1.3)
BUN SERPL-MCNC: 15 MG/DL (ref 7–30)
CALCIUM SERPL-MCNC: 8.9 MG/DL (ref 8.5–10.1)
CHLORIDE SERPL-SCNC: 108 MMOL/L (ref 94–109)
CO2 SERPL-SCNC: 23 MMOL/L (ref 20–32)
CREAT SERPL-MCNC: 0.81 MG/DL (ref 0.66–1.25)
ERYTHROCYTE [DISTWIDTH] IN BLOOD BY AUTOMATED COUNT: 12.7 % (ref 10–15)
GFR SERPL CREATININE-BSD FRML MDRD: >90 ML/MIN/1.7M2
GLUCOSE SERPL-MCNC: 121 MG/DL (ref 70–99)
HCT VFR BLD AUTO: 43.3 % (ref 40–53)
HGB BLD-MCNC: 15.3 G/DL (ref 13.3–17.7)
INTERPRETATION ECG - MUSE: NORMAL
MAGNESIUM SERPL-MCNC: 1.8 MG/DL (ref 1.6–2.3)
MCH RBC QN AUTO: 29.4 PG (ref 26.5–33)
MCHC RBC AUTO-ENTMCNC: 35.3 G/DL (ref 31.5–36.5)
MCV RBC AUTO: 83 FL (ref 78–100)
NT-PROBNP SERPL-MCNC: 15 PG/ML (ref 0–125)
PHOSPHATE SERPL-MCNC: 3.4 MG/DL (ref 2.5–4.5)
PLATELET # BLD AUTO: 187 10E9/L (ref 150–450)
POTASSIUM SERPL-SCNC: 4 MMOL/L (ref 3.4–5.3)
PROT SERPL-MCNC: 6.9 G/DL (ref 6.8–8.8)
RBC # BLD AUTO: 5.21 10E12/L (ref 4.4–5.9)
SODIUM SERPL-SCNC: 142 MMOL/L (ref 133–144)
TACROLIMUS BLD-MCNC: 16.8 UG/L (ref 5–15)
TME LAST DOSE: ABNORMAL H
TROPONIN I SERPL-MCNC: 0.04 UG/L (ref 0–0.04)
WBC # BLD AUTO: 5 10E9/L (ref 4–11)

## 2017-08-24 PROCEDURE — 86833 HLA CLASS II HIGH DEFIN QUAL: CPT | Performed by: PEDIATRICS

## 2017-08-24 PROCEDURE — 99213 OFFICE O/P EST LOW 20 MIN: CPT | Mod: 25,ZF

## 2017-08-24 PROCEDURE — 86832 HLA CLASS I HIGH DEFIN QUAL: CPT | Performed by: PEDIATRICS

## 2017-08-24 PROCEDURE — 83880 ASSAY OF NATRIURETIC PEPTIDE: CPT | Performed by: PEDIATRICS

## 2017-08-24 PROCEDURE — 84100 ASSAY OF PHOSPHORUS: CPT | Performed by: PEDIATRICS

## 2017-08-24 PROCEDURE — 71020 XR CHEST 2 VW: CPT

## 2017-08-24 PROCEDURE — 93306 TTE W/DOPPLER COMPLETE: CPT

## 2017-08-24 PROCEDURE — 36415 COLL VENOUS BLD VENIPUNCTURE: CPT | Performed by: PEDIATRICS

## 2017-08-24 PROCEDURE — 87799 DETECT AGENT NOS DNA QUANT: CPT | Performed by: PEDIATRICS

## 2017-08-24 PROCEDURE — 80053 COMPREHEN METABOLIC PANEL: CPT | Performed by: PEDIATRICS

## 2017-08-24 PROCEDURE — 80197 ASSAY OF TACROLIMUS: CPT | Performed by: PEDIATRICS

## 2017-08-24 PROCEDURE — 83735 ASSAY OF MAGNESIUM: CPT | Performed by: PEDIATRICS

## 2017-08-24 PROCEDURE — 85027 COMPLETE CBC AUTOMATED: CPT | Performed by: PEDIATRICS

## 2017-08-24 PROCEDURE — 93005 ELECTROCARDIOGRAM TRACING: CPT | Mod: ZF

## 2017-08-24 PROCEDURE — 84484 ASSAY OF TROPONIN QUANT: CPT | Performed by: PEDIATRICS

## 2017-08-24 ASSESSMENT — PAIN SCALES - GENERAL: PAINLEVEL: NO PAIN (0)

## 2017-08-24 NOTE — PATIENT INSTRUCTIONS
1) Continue taking medications as prescribed.   2) Labs and heart catheterization in September (11, 15, 22 dates available at this time).   3) Use moisturizer on back. Gentle cleansing only.

## 2017-08-24 NOTE — MR AVS SNAPSHOT
After Visit Summary   2017    Obed Viramontes    MRN: [id number]           Patient Information     Date Of Birth          1995        Visit Information        Provider Department      2017 11:00 AM Bobbi Pruitt MD Peds Cardiac Transplant        Today's Diagnoses     Heart replaced by transplant (H)        Transplanted heart (H)          Care Instructions    1) Continue taking medications as prescribed.   2) Labs and heart catheterization in September (11, 15,  dates available at this time).   3) Use moisturizer on back. Gentle cleansing only.           Follow-ups after your visit        Additional Services     Right heart catheterization, angiogram, and biopsy       Schedule patient for:    Right heart catheterization.  Coronary angiogram.  Endomyocardial biopsy.                  Who to contact     Please call your clinic at [phone] to:    Ask questions about your health    Make or cancel appointments    Discuss your medicines    Learn about your test results    Speak to your doctor   If you have compliments or concerns about an experience at your clinic, or if you wish to file a complaint, please contact [facility] Patient Relations at [phone] or email us at [email or web address]         Additional Information About Your Visit        MyChart Information     [employer] is an electronic gateway that provides easy, online access to your medical records. With Cloud[employer], you can request a clinic appointment, read your test results, renew a prescription or communicate with your care team.     To sign up for [employer] visit the website at [email or web address]   You will be asked to enter the access code listed below, as well as some personal information. Please follow the directions to create your username and password.     Your access code is: [email or web address]  Expires: 10/22/2017 11:02 AM     Your access code will  in 90 days. If  "you need help or a new code, please contact your [facility] or call [phone] for assistance.        Care EveryWhere ID     This is your Care EveryWhere ID. This could be used by other organizations to access your [place] medical records  [id number]        Your Vitals Were     Pulse Respirations Height Pulse Oximetry BMI (Body Mass Index)       64 20 6' 1.15\" (185.8 cm) 98% 18.54 kg/m2        Blood Pressure from Last 3 Encounters:   08/24/17 125/80   07/24/17 125/80   06/26/17 122/66    Weight from Last 3 Encounters:   08/24/17 141 lb 1.5 oz (64 kg)   07/24/17 139 lb 5.3 oz (63.2 kg)   06/26/17 138 lb 14.2 oz (63 kg)              We Performed the Following     CBC with platelets     CMV DNA quantification     Comprehensive metabolic panel     EBV DNA PCR Quantitative Whole Blood     Echo pediatric complete     EKG 12 lead - pediatric     EKG 12 lead - pediatric     Magnesium     N terminal pro BNP outpatient     Phosphorus     PRA Donor Specific Antibody     Right heart catheterization, angiogram, and biopsy     Tacrolimus level     Troponin I        Primary Care Provider Office Phone # Fax #    Rayo Schafer [phone]       [facility] [address] [place] [address]        Equal Access to Services     CARLA GREENBERG AH: Hadii estuardo ku matildao Sodeepakali, waaxda luqadaha, qaybta kaalmada adeegyada, waxay idiin haygeorginan carolyn patiño. So [facility] [phone].    ATENCIÓN: Si habla español, tiene a neves disposición servicios gratuitos de asistencia lingüística. itzel al [phone].    We comply with applicable federal civil rights laws and [place] laws. We do not discriminate on the basis of race, color, national origin, age, disability sex, sexual orientation or gender identity.            Thank you!     Thank you for choosing PEDS CARDIAC TRANSPLANT  for your care. Our goal is always to provide you with excellent care. Hearing " back from our patients is one way we can continue to improve our services. Please take a few minutes to complete the written survey that you may receive in the mail after your visit with us. Thank you!             Your Updated Medication List - Protect others around you: Learn how to safely use, store and throw away your medicines at www.disposemymeds.org.          This list is accurate as of: 8/24/17 12:25 PM.  Always use your most recent med list.                   Brand Name Dispense Instructions for use Diagnosis    amLODIPine 5 MG tablet    NORVASC    90 tablet    Take 1 tablet (5 mg) by mouth daily    Heart replaced by transplant (H)       aspirin 81 MG EC tablet     60 tablet    Take 2 tablets (162 mg) by mouth daily    Status post transplant, heart (H)       mycophenolate 500 MG tablet    GENERIC EQUIVALENT    180 tablet    Take 1 tablet (500 mg) by mouth 2 times daily    Heart transplanted (H)       pravastatin 10 MG tablet    PRAVACHOL    90 tablet    Take 1 tablet (10 mg) by mouth daily At bedtime    Heart transplanted (H), Heart transplanted (H)       predniSONE 10 MG tablet    DELTASONE    30 tablet    Take 1 tablet (10 mg) by mouth daily    Heart replaced by transplant (H)       * tacrolimus 1 MG capsule    GENERIC EQUIVALENT    270 capsule    Take 3.5 mg twice daily    Transplanted heart (H)       * tacrolimus 0.5 MG capsule    GENERIC EQUIVALENT    60 capsule    Take 3.5 mg twice daily    Transplanted heart (H)       Vitamin D3 2000 UNITS Tabs     100 tablet    Take 2,000 Units by mouth daily    Heart transplanted (H)       * Notice:  This list has 2 medication(s) that are the same as other medications prescribed for you. Read the directions carefully, and ask your doctor or other care provider to review them with you.

## 2017-08-24 NOTE — NURSING NOTE
"Chief Complaint   Patient presents with     Heart Problem     HEART TRANSPLANT.       Initial /80 (BP Location: Right arm, Patient Position: Chair, Cuff Size: Adult Regular)  Pulse 64  Resp 20  Ht 6' 1.15\" (185.8 cm)  Wt 141 lb 1.5 oz (64 kg)  SpO2 98%  BMI 18.54 kg/m2 Estimated body mass index is 18.54 kg/(m^2) as calculated from the following:    Height as of this encounter: 6' 1.15\" (185.8 cm).    Weight as of this encounter: 141 lb 1.5 oz (64 kg).  Medication Reconciliation: complete       Christina Mason M.A.    "

## 2017-08-24 NOTE — LETTER
2017      RE: Obed Viramontes  [address]  [facility] [address]       Rayo Gilman  [facility]  [address] [place] [address]    Pediatric Heart Failure and Transplant Clinic Note  Date of note: 2017  Re: Obed Viramontes  MR #: [id number]  : 1995    HPI:  Obed is a 21 year old male with history of heart transplant (3/23/2012) and posttransplant lymphoproliferative disorder (diagnosed 2012) who transferred care to [facility] in 2012.   He is in clinic today with his mother for followup after recent rejection episodes. He was seen in April for routine  annual followup, however he had elevated troponin and routine biopsy showed 2R rejection, and he was noncompliant with medications prior to this. He was admitted for 3 days of iv solumedrol and his troponin improved. Following that, he was staying with mom for a few weeks and was on a better schedule, compliant with medications, and eating better and has gained weight. He reported that even since going back to dad's house and going back to work that he was compliant with his medications. However on routine visit on  had elevated troponin again, and so was taken for biopsy on  which showed 2R rejection, which was treated with 5 days of prednisone and increasing his tacrolimus goals. On followup on  his troponin had improved. However, on repeat followup at end of  his troponin was elevated again and he had new PACs on ecg concerning for ongoing rejection. We elected to treat with 3 days prednsione 60mg daily, followed by 20mg daily which he remained on until 1 month ago, then weaned to 10mg daily.  He feels his energy level is stable, no edema, no shortness of breath with activity. no recent fevers, no chest pain, palpitations, tachycardia, dizziness or syncope. Comprehensive review of systems is otherwise negative today.      Past medical/Past  surgical history:  Born 38 weeks, 7 pound birth weight  Hospitalized at age 2 1/2 years with viral myocarditis  Cerebrovascular accident at age 2 1/2 with resultant left hemiplegia  Multiple orthopedic surgeries in past  Cardiomyopathy  Heart transplant 2012  PTLD, EBV positive    In reviewing his history, he was born at 38 weeks with uncomplicated pregnancy and delivery, weighed 7 pounds.  He had uncomplicated  course and was healthy in early childhood.  He was hospitalized at age 2 1/2 years with viral myocarditis, hospitalized for 6 weeks, intubated for 10 days, had recovery of myocardial function and was treated chronically with digoxin, hospital course complicated by cerobrovascular accident with resultant left hemiplegia, transitioned to [facility] for an additional 5 weeks following his hospital course.  He has had several orthopedic surgeries throughout childhood because of his hemiplegia, but otherwise has been healthy.  He was asymptomatic from a cardiac standpoint until February of this year, at which time he presented with chest pain.  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  He was transplanted on 2012, did well post transplant and was discharged after 1 week.  He reportedly has done well from a cardiac standpoint with no rejection, 1R biopsy in May and 1R biopsy in September but otherwise negative biopsies.  He presented in September with mouth sores and difficulty taking medication, initially treated with lowering his immunosuppression, but was rehospitalized with fever and worsening mouth sores.  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  He was  "treated with rituximab x 2, and increased prednisone dose from 2.5mg daily back up 10mg twice daily.  He was scheduled to undergo additional rituximab dose but symptoms improved and repeat CT Scan showed improvement in his lymph node and tonsillar hypertrophy.  Per mom he was EBV negative prior to transplant, and she thinks his donor was EBV positive.     He has done well from an infection/PTLD standpoint and came off valcyte therapy in December 2013 with negative EBV PCR in February 2014.  He did undergo orthopedic surgery for his left hand contracture in October, admitted in January with a cellulitis of that hand that has completely resolved. He is scheduled to undergo plate removal from that hand in October this year.    SOCIAL HISTORY:   Lives with dad, works at [employer].     Family history:  Negative for congenital heart defects, heart transplant, cardiomyopathy, sudden death.  Sister with type 1 DM.  Older sister recently diagnosed with POTS.    Medications:  Prescription Medications as of 8/25/2017             tacrolimus (PROGRAF - GENERIC EQUIVALENT) 1 MG capsule Take 3.5 mg twice daily    tacrolimus (PROGRAF - GENERIC EQUIVALENT) 0.5 MG capsule Take 3.5 mg twice daily    predniSONE (DELTASONE) 10 MG tablet Take 1 tablet (10 mg) by mouth daily    aspirin 81 MG EC tablet Take 2 tablets (162 mg) by mouth daily    Cholecalciferol (VITAMIN D3) 2000 UNITS TABS Take 2,000 Units by mouth daily    amLODIPine (NORVASC) 5 MG tablet Take 1 tablet (5 mg) by mouth daily    mycophenolate (CELLCEPT - GENERIC EQUIVALENT) 500 MG tablet Take 1 tablet (500 mg) by mouth 2 times daily    pravastatin (PRAVACHOL) 10 MG tablet Take 1 tablet (10 mg) by mouth daily At bedtime        Physical Exam:  /80 (BP Location: Right arm, Patient Position: Chair, Cuff Size: Adult Regular)  Pulse 64  Resp 20  Ht 6' 1.15\" (185.8 cm)  Wt 141 lb 1.5 oz (64 kg)  SpO2 98%  BMI 18.54 kg/m2    Gen: Alert, interactive and appropriate, " sitting, no respiratory distress.   Chest:  normal breath sounds bilaterally, no wheeze, crackles, or rhonchi, good air entry  CV: RRR, normal S1 and S2, no murmurs, rubs, gallops  Abd: abdomen is soft without significant tenderness, masses, organomegaly or guarding  Ext: wwp, 2+ pulses upper and 2+ lower extremitiy without delay, no edema  Obed had evaluation today with labs, ecg, echocardiogram.  His echo showed normal post transplant anatomy, normal systolic function, no pericardial effusion.  His ecg showed normal sinus rhythm, rate of 93 beats per minute, interventricular conduction delay, no st or twave changes. His labs included a comprehensive metabolic panel which was normal, specifically bun of 15, creatinine of 0.81, normal LFTs.  troponin elevated again at 0.039 (up from  <0.015) pro-bnp normal at 15.  He had a cbc which had normal wbc count of 5, hemoglobin of 15.3, platelets of [id number].  He had ebv, cmv, pra, and tacrolimus level that are pending.   His last biopsy on 5/26/17 showed mild rejection, grade 2R, pAMR0, negative C3d/C4d staining.    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 Of hypertrophic cardiomyopathy picture and glycogen storage disease).  He also has history of EBV positive PTLD.  From a cardiac standpoint, Obed had a recent 2R rejection likely secondary to noncompliance, treated with 3 days of iv solumedrol, and a followup biopsy on 5/26 with ongoing 2R rejection treated with 5 days prednisone and increasing his tacrolimus goals. At end of  June had again increased troponin, so was treated with 3 days prednsione 60mg daily, followed by 20mg daily. His troponin had cleared on this regimen, however with weaning his prednisone to 10mg daily he has new positive troponin. I am concerned that he has ongoing rejection at this point. I would like to schedule him for a biopsy in 1-2 weeks to further evaluate this. He should continue current medications at this time. .     Diagnosis summary:  1. Viral myocarditis at age 2 1/2  2. Dilated cardiomyopathy           A. S/p orthotopic heart transplant (3/23/12)   1. 2R rejection (4/21/17), treated with iv solumedrol x 3 days   2. 2R rejection (5/26/17), treated with prednisone x 5 days   3. Presumed rejection (6/26/17) based on elevated troponin and new PACs, treated with prednisone x 3 days and then taper  3. Cerebrovascular accident at age 2 1/2 with resultant left hemiplegia  4. Multiple orthopedic surgeries in past  5. PTLD, EBV positive            A.  treated with rituximab x 2, and increased prednisone dose from 2.5mg daily back up 10mg twice daily      Thank you for the opportunity to participate in his care.  Please let me know if you have further questions.    Sincerely,    Bobbi Pruitt MD  Medical Director, Pediatric Heart Failure and Transplant    CC  Patient Care Team:  Rayo Schafer MD as PCP - General (Pediatrics)  Isrrael Trent MD as MD (Oncology)    Copy to patient  OSBALDO ENCARNACION   [address]  [facility] [address]

## 2017-08-24 NOTE — PHARMACY-CONSULT NOTE
Current Outpatient Prescriptions   Medication Sig Dispense Refill     tacrolimus (PROGRAF - GENERIC EQUIVALENT) 1 MG capsule Take 3.5 mg twice daily 270 capsule 11     tacrolimus (PROGRAF - GENERIC EQUIVALENT) 0.5 MG capsule Take 3.5 mg twice daily 60 capsule 11     predniSONE (DELTASONE) 10 MG tablet Take 1 tablet (10 mg) by mouth daily 30 tablet 3     aspirin 81 MG EC tablet Take 2 tablets (162 mg) by mouth daily 60 tablet 99     Cholecalciferol (VITAMIN D3) 2000 UNITS TABS Take 2,000 Units by mouth daily 100 tablet 6     amLODIPine (NORVASC) 5 MG tablet Take 1 tablet (5 mg) by mouth daily 90 tablet 3     mycophenolate (CELLCEPT - GENERIC EQUIVALENT) 500 MG tablet Take 1 tablet (500 mg) by mouth 2 times daily 180 tablet 11     pravastatin (PRAVACHOL) 10 MG tablet Take 1 tablet (10 mg) by mouth daily At bedtime 90 tablet 6     I had the privilege of seeing Obed in clinic today along with Dr. Pruitt and Dr. Lorenzo, Nicci RN coordinator, and Helen (mom).       Current labs are as follows:  (Tacrolimus or CSA) level:  Tacrolimus in process  Goal level: Tacrolimus 10-15  WBC: 5.0 (4-11)   Cr: 0.81 (0.66-1.25)  Other: N terminal Pro BNP 15 (0-125), troponin 0.039 (0.000-0.045), magnesium 1.8 (1.6-2.3), hemoglobin 15.3 (13.3-17.7), platelets 187 (150-450), CMV and EBV in process, /80    Immunosuppressant regimen:  Tacrolimus generic capsules--- 3.5mg twice daily, mycophenolate 500mg tablets-- 500mg twice daily, prednisone tablets--  10mg daily    Visit summary:  Obed is a heart transplant recipient of 3/23/12. Chart is complete per protocol standards.  Dosing accurate per check--- Note:  Mycophenolate keeping dose where it is at currently for now (per note 6/26/17 due to low WBC in past).  Obed gets aspirin and Vit D OTC. He sets alarms on phone and does 1030am/1030pm dosing for his twice daily dosing meds.  He will get cath on Sept 11.

## 2017-08-24 NOTE — NURSING NOTE
It is a pleasure to meet with Obed and his mom today. Obed is doing well per his report. Jolly said that when she sees and talks to him that she feels he is keeping up with his medications and appointments better. He is taking his medications and working.

## 2017-08-25 LAB
CMV DNA SPEC NAA+PROBE-ACNC: NORMAL [IU]/ML
CMV DNA SPEC NAA+PROBE-LOG#: NORMAL {LOG_IU}/ML
EBV DNA # SPEC NAA+PROBE: <500 {COPIES}/ML
EBV DNA SPEC NAA+PROBE-LOG#: <2.7 {LOG_COPIES}/ML
PRA DONOR SPECIFIC ABY: NORMAL
SPECIMEN SOURCE: NORMAL

## 2017-08-26 NOTE — PROGRESS NOTES
Rayo Pabloman  [facility]  [address] [place] [address]    Pediatric Heart Failure and Transplant Clinic Note  Date of note: 2017  Re: Obed Viramontes  MR #: [id number]  : 1995    HPI:  Obed is a 21 year old male with history of heart transplant (3/23/2012) and posttransplant lymphoproliferative disorder (diagnosed 2012) who transferred care to [facility] in 2012.   He is in clinic today with his mother for followup after recent rejection episodes. He was seen in April for routine  annual followup, however he had elevated troponin and routine biopsy showed 2R rejection, and he was noncompliant with medications prior to this. He was admitted for 3 days of iv solumedrol and his troponin improved. Following that, he was staying with mom for a few weeks and was on a better schedule, compliant with medications, and eating better and has gained weight. He reported that even since going back to dad's house and going back to work that he was compliant with his medications. However on routine visit on  had elevated troponin again, and so was taken for biopsy on  which showed 2R rejection, which was treated with 5 days of prednisone and increasing his tacrolimus goals. On followup on  his troponin had improved. However, on repeat followup at end of  his troponin was elevated again and he had new PACs on ecg concerning for ongoing rejection. We elected to treat with 3 days prednsione 60mg daily, followed by 20mg daily which he remained on until 1 month ago, then weaned to 10mg daily.  He feels his energy level is stable, no edema, no shortness of breath with activity. no recent fevers, no chest pain, palpitations, tachycardia, dizziness or syncope. Comprehensive review of systems is otherwise negative today.      Past medical/Past surgical history:  Born 38 weeks, 7 pound birth weight  Hospitalized at age 2 1/2 years  with viral myocarditis  Cerebrovascular accident at age 2 1/2 with resultant left hemiplegia  Multiple orthopedic surgeries in past  Cardiomyopathy  Heart transplant 2012  PTLD, EBV positive    In reviewing his history, he was born at 38 weeks with uncomplicated pregnancy and delivery, weighed 7 pounds.  He had uncomplicated  course and was healthy in early childhood.  He was hospitalized at age 2 1/2 years with viral myocarditis, hospitalized for 6 weeks, intubated for 10 days, had recovery of myocardial function and was treated chronically with digoxin, hospital course complicated by cerobrovascular accident with resultant left hemiplegia, transitioned to [facility] for an additional 5 weeks following his hospital course.  He has had several orthopedic surgeries throughout childhood because of his hemiplegia, but otherwise has been healthy.  He was asymptomatic from a cardiac standpoint until February of this year, at which time he presented with chest pain.  He was found to have significant arrythmias, was admitted to [facility], evaluated and listed for transplant (mom reports underlying diagnosis of hypertrophic cardiomyopathy possibly secondary to glycogen storage disease?, some pathology results on native heart still pending).  He was transplanted on 2012, did well post transplant and was discharged after 1 week.  He reportedly has done well from a cardiac standpoint with no rejection, 1R biopsy in May and 1R biopsy in September but otherwise negative biopsies.  He presented in September with mouth sores and difficulty taking medication, initially treated with lowering his immunosuppression, but was rehospitalized with fever and worsening mouth sores.  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  He was treated with rituximab x 2, and increased prednisone dose from 2.5mg daily back up 10mg twice  "daily.  He was scheduled to undergo additional rituximab dose but symptoms improved and repeat CT Scan showed improvement in his lymph node and tonsillar hypertrophy.  Per mom he was EBV negative prior to transplant, and she thinks his donor was EBV positive.     He has done well from an infection/PTLD standpoint and came off valcyte therapy in December 2013 with negative EBV PCR in February 2014.  He did undergo orthopedic surgery for his left hand contracture in October, admitted in January with a cellulitis of that hand that has completely resolved. He is scheduled to undergo plate removal from that hand in October this year.    SOCIAL HISTORY:   Lives with dad, works at [employer].     Family history:  Negative for congenital heart defects, heart transplant, cardiomyopathy, sudden death.  Sister with type 1 DM.  Older sister recently diagnosed with POTS.    Medications:  Prescription Medications as of 8/25/2017             tacrolimus (PROGRAF - GENERIC EQUIVALENT) 1 MG capsule Take 3.5 mg twice daily    tacrolimus (PROGRAF - GENERIC EQUIVALENT) 0.5 MG capsule Take 3.5 mg twice daily    predniSONE (DELTASONE) 10 MG tablet Take 1 tablet (10 mg) by mouth daily    aspirin 81 MG EC tablet Take 2 tablets (162 mg) by mouth daily    Cholecalciferol (VITAMIN D3) 2000 UNITS TABS Take 2,000 Units by mouth daily    amLODIPine (NORVASC) 5 MG tablet Take 1 tablet (5 mg) by mouth daily    mycophenolate (CELLCEPT - GENERIC EQUIVALENT) 500 MG tablet Take 1 tablet (500 mg) by mouth 2 times daily    pravastatin (PRAVACHOL) 10 MG tablet Take 1 tablet (10 mg) by mouth daily At bedtime        Physical Exam:  /80 (BP Location: Right arm, Patient Position: Chair, Cuff Size: Adult Regular)  Pulse 64  Resp 20  Ht 6' 1.15\" (185.8 cm)  Wt 141 lb 1.5 oz (64 kg)  SpO2 98%  BMI 18.54 kg/m2    Gen: Alert, interactive and appropriate, sitting, no respiratory distress.   Chest:  normal breath sounds bilaterally, no wheeze, " crackles, or rhonchi, good air entry  CV: RRR, normal S1 and S2, no murmurs, rubs, gallops  Abd: abdomen is soft without significant tenderness, masses, organomegaly or guarding  Ext: wwp, 2+ pulses upper and 2+ lower extremitiy without delay, no edema  Obed had evaluation today with labs, ecg, echocardiogram.  His echo showed normal post transplant anatomy, normal systolic function, no pericardial effusion.  His ecg showed normal sinus rhythm, rate of 93 beats per minute, interventricular conduction delay, no st or twave changes. His labs included a comprehensive metabolic panel which was normal, specifically bun of 15, creatinine of 0.81, normal LFTs.  troponin elevated again at 0.039 (up from  <0.015) pro-bnp normal at 15.  He had a cbc which had normal wbc count of 5, hemoglobin of 15.3, platelets of [id number].  He had ebv, cmv, pra, and tacrolimus level that are pending.   His last biopsy on 5/26/17 showed mild rejection, grade 2R, pAMR0, negative C3d/C4d staining.    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 Of hypertrophic cardiomyopathy picture and glycogen storage disease).  He also has history of EBV positive PTLD.  From a cardiac standpoint, Obed had a recent 2R rejection likely secondary to noncompliance, treated with 3 days of iv solumedrol, and a followup biopsy on 5/26 with ongoing 2R rejection treated with 5 days prednisone and increasing his tacrolimus goals. At end of June had again increased troponin, so was treated with 3 days prednsione 60mg daily,  followed by 20mg daily. His troponin had cleared on this regimen, however with weaning his prednisone to 10mg daily he has new positive troponin. I am concerned that he has ongoing rejection at this point. I would like to schedule him for a biopsy in 1-2 weeks to further evaluate this. He should continue current medications at this time. .     Diagnosis summary:  1. Viral myocarditis at age 2 1/2  2. Dilated cardiomyopathy           A. S/p orthotopic heart transplant (3/23/12)   1. 2R rejection (4/21/17), treated with iv solumedrol x 3 days   2. 2R rejection (5/26/17), treated with prednisone x 5 days   3. Presumed rejection (6/26/17) based on elevated troponin and new PACs, treated with prednisone x 3 days and then taper  3. Cerebrovascular accident at age 2 1/2 with resultant left hemiplegia  4. Multiple orthopedic surgeries in past  5. PTLD, EBV positive            A.  treated with rituximab x 2, and increased prednisone dose from 2.5mg daily back up 10mg twice daily      Thank you for the opportunity to participate in his care.  Please let me know if you have further questions.    Sincerely,    Bobbi Pruitt MD  Medical Director, Pediatric Heart Failure and Transplant    CC  Patient Care Team:  Rayo Schafer MD as PCP - General (Pediatrics)  Isrrael Trent MD as MD (Oncology)  Bobbi Pruitt MD as MD (Pediatric Cardiology)      Copy to patient  OSBALDO ENCARNACION   [address]  [facility] [address]

## 2017-09-08 ENCOUNTER — ANESTHESIA EVENT (OUTPATIENT)
Dept: SURGERY | Facility: CLINIC | Age: 22
End: 2017-09-08
Payer: COMMERCIAL

## 2017-09-10 NOTE — ANESTHESIA PREPROCEDURE EVALUATION
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 He was seen in April for routine 5th annual followup, however he had elevated troponin and routine biopsy showed 2R rejection, and he was non-compliant with medications prior to this. He was admitted for 3 days of iv solumedrol and his troponin improved. Following that, he was staying with mom for a few weeks and was on a better schedule, compliant with medications, and eating better and has gained weight. He reported that even since going back to dad's house and going back to work that he was compliant with his medications. However on routine visit on 5/25 had elevated troponin again, and so was taken for biopsy on 5/26 which showed 2R rejection, which was treated with 5 days of prednisone and increasing his tacrolimus goals. On followup on June 7th his troponin had improved. However, on repeat followup at end of June his troponin was elevated again and he had new PACs on ECG concerning for ongoing rejection. He was treated with 3 days prednsione 60mg daily, followed by 20mg daily which he remained on until 1 month ago, then weaned to 10mg daily.  He feels his energy level is stable, no edema, no shortness of breath with activity. no recent fevers, no chest pain, palpitations, tachycardia, dizziness or syncope.  Obed presents today for follow-up right heart cath with biopsies.    He has had multiple general anesthetics in the past and tolerated them without problems.        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  - H/o dilated cardiomyopathy, s/p orthotopic heart transplant (3/23/12)  - H/o 2R rejection (4/21/17), treated with iv  solumedrol x 3 days  - H/o 2R rejection (17), treated with prednisone x 5 days  - H/o presumed rejection (17) based on elevated troponin and new PACs, treated with prednisone x 3 days and then taper      - Review of pathology reports there is questionable hypertrophic cardiomyopathy picture and glycogen storage disease    Neuro Findings   (-) seizures    Comments: - Cerebrovascular accident at age 2 1/2 with resultant left hemiplegia      Pulmonary Findings - negative ROS  (-) asthma and recent URI    HENT Findings - negative HENT ROS    Skin Findings - negative skin ROS     Findings   (-) prematurity      GI/Hepatic/Renal Findings - negative ROS  (-) GERD, liver disease and renal disease    Endocrine/Metabolic Findings   (+) chronic steroid use and adrenal disease        Hematology/Oncology Findings   (-) blood dyscrasia and clotting disorder  Comments: - Post-transplant lymphoproliferative disorder, EBV positive, treated with rituximab x 2, and increased prednisone dose from 2.5mg daily back up 10mg daily  - 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         Additional Notes  - Multiple orthopedic surgeries in past          Past Medical History:   Diagnosis Date     Cardiomyopathy, hypertrophic obstructive (H)      H/O heart transplant (H)     Mar.23, 2012     Hemiplegia following CVA (cerebrovascular accident) (H)     left, improved with rehab     Lymphoproliferative disease (H)      Unspecified cerebral artery occlusion with cerebral infarction     age 2 1/2         Patient Active Problem List   Diagnosis     Aftercare following surgery of the musculoskeletal system     Stiffness of finger joint of left hand     Flexion contracture of left elbow     Mechanical problems with limbs     History of stroke     Cellulitis     Heart transplant rejection (H)     Heart replaced by transplant (H)             Past Surgical History:   Procedure Laterality Date     ARTHRODESIS WRIST Left 10/20/2016     Procedure: ARTHRODESIS WRIST;  Surgeon: Amna Tinoco MD;  Location: [facility] OR     BIOPSY      gingival and tonsillar biopsy     [facility] SURGICAL PATHOLOGY       HEART CATH CHILD  11/13/2012    Procedure: HEART CATH CHILD;  Right Heart Cath Procedure and Biopsy *Latex Allergy*;  Surgeon: Bobbi Pruitt MD;  Location: [facility] OR     HEART CATH CHILD  2/15/2013    Procedure: HEART CATH CHILD;  Right [place] Cath, Angiogram and Biopsy;  Surgeon: Bobbi Pruitt MD;  [facility]: [facility]     HEART CATH CHILD N/A 4/10/2015    Procedure: HEART CATH CHILD;  Surgeon: Bobbi Pruitt MD;  [facility]: [facility]     HEART CATH CHILD N/A 4/21/2017    Procedure: HEART CATH CHILD;  Right Heart Cath Procedure with Angio Biopsy    (latex allergy) ;  Surgeon: Bobbi Pruitt MD;  [facility]     HEART CATH, BIOPSY  3/21/2014    Procedure: HEART CATH, BIOPSY;  Right and Left Heart Cath, Angiogram, Biopsy   *Latex Allergy*;  Surgeon: Bobbi Pruitt MD;  [facility]     HEART CATH, BIOPSY Right 4/18/2016    Procedure: HEART CATH, BIOPSY;  Surgeon: Bobbi Pruitt MD;  [facility]     HEART CATH, BIOPSY Right 5/26/2017    Procedure: HEART CATH, BIOPSY;  Right Heart Cath and Biopsy  (Latex Allergy);  Surgeon: Bobbi Pruitt MD;  [facility]     INTERPOSITION TENDON HAND Left 10/20/2016    Procedure: INTERPOSITION TENDON HAND;  Surgeon: Amna Tinoco MD;  Location: [facility]     ORTHOPEDIC SURGERY      at [place], left lower extremity     PICC INSERTION       PICC REMOVAL       TRANSPLANT HEART RECIPIENT               Allergies:    Allergies   Allergen Reactions     Latex Rash     Other [Seasonal Allergies]      Corn-abdominal pain and diarrhea.           Meds:   Current Outpatient Prescriptions   Medication Sig Dispense Refill     tacrolimus (PROGRAF - GENERIC EQUIVALENT) 1 MG capsule Take 3.5 mg twice daily 270 capsule 11     tacrolimus (PROGRAF - GENERIC EQUIVALENT) 0.5  MG capsule Take 3.5 mg twice daily 60 capsule 11     predniSONE (DELTASONE) 10 MG tablet Take 1 tablet (10 mg) by mouth daily 30 tablet 3     aspirin 81 MG EC tablet Take 2 tablets (162 mg) by mouth daily 60 tablet 99     Cholecalciferol (VITAMIN D3) 2000 UNITS TABS Take 2,000 Units by mouth daily 100 tablet 6     amLODIPine (NORVASC) 5 MG tablet Take 1 tablet (5 mg) by mouth daily 90 tablet 3     mycophenolate (CELLCEPT - GENERIC EQUIVALENT) 500 MG tablet Take 1 tablet (500 mg) by mouth 2 times daily 180 tablet 11     pravastatin (PRAVACHOL) 10 MG tablet Take 1 tablet (10 mg) by mouth daily At bedtime 90 tablet 6             Physical Exam  Normal systems: cardiovascular, pulmonary and dental    Airway   Mallampati: I  TM distance: >3 FB  Neck ROM: full    Dental     Cardiovascular   Rhythm and rate: regular and normal      Pulmonary    breath sounds clear to auscultation            Labs:  BMP:  Recent Labs   Lab Test  08/24/17   1012   NA  142   POTASSIUM  4.0   CHLORIDE  108   CO2  23   BUN  15   CR  0.81   GLC  121*   CARLOS A  8.9     LFTs:   Recent Labs   Lab Test  08/24/17   1012   PROTTOTAL  6.9   ALBUMIN  4.1   BILITOTAL  0.9   ALKPHOS  60   AST  10   ALT  17     CBC:   Recent Labs   Lab Test  08/24/17   1012   WBC  5.0   RBC  5.21   HGB  15.3   HCT  43.3   MCV  83   MCH  29.4   MCHC  35.3   RDW  12.7   PLT  187     Coags:  Recent Labs   Lab Test  01/12/17   1416   INR  1.00   PTT  28         Echo (8/24/2017):  Patient after orthotopic heart transplant. Normal right and left ventricular size and systolic function. The calculated biplane left ventricular ejection fraction is 60-65%. LVRI of 0.7. The ascending aorta is mildly dilated. Trival mitral valve insufficency. Insufficient jet to estimate right ventricular systolic pressure. No pericardial effusion.     Segmental Anatomy:  There is normal atrial arrangement, with concordant atrioventricular and ventriculoarterial connections.     Systemic and pulmonary  veins:  The right superior vena cava and inferior vena cava enter the right atrium with normal flow. Color flow demonstrates flow from at least one pulmonary vein entering the left atrium.     Atria and atrial septum:  There is bi-atrial enlargement consistent with history of heart transplant. There is no atrial level shunting.     Atrioventricular valves:  The tricuspid valve is normal in appearance and motion. Trivial tricuspid valve insufficiency. Insufficient jet to estimate right ventricular systolic pressure. The mitral valve is normal in appearance and motion. There is no mitral valve insufficiency.     Ventricles and Ventricular Septum:  Normal right ventricular size. Normal right ventricular systolic function. Normal left ventricular size. Normal left ventricular systolic function. The calculated biplane left ventricular ejection fraction is 61 %. There is no ventricular level shunting.     Outflow tracts:  Normal great artery relationship. There is unobstructed flow through the right ventricular outflow tract. The pulmonary valve motion is normal. There is normal flow across the pulmonary valve. There is unobstructed flow through the left ventricular outflow tract. Tricuspid aortic valve with normal appearance and motion. There is normal flow across the aortic valve. There is no aortic valve insufficiency.     Great arteries:  The main pulmonary artery has normal appearance. There is unobstructed flow in the main pulmonary artery. The pulmonary artery bifurcation is normal. There is unobstructed flow in both branch pulmonary arteries. The ascending aorta is mildly dilated. The ascending aorta Z-score is 2.0. The aortic arch appears normal. There is unobstructed antegrade flow in the ascending, transverse arch, descending thoracic and abdominal aorta.     Arterial Shunts:  There is no arterial level shunting.     Coronaries:  Normal origin of the right and left proximal coronary arteries from the  corresponding sinus of Valsalva by 2D, without color flow correlation.     Effusions, catheters, cannulas and leads:  No pericardial effusion.          Anesthesia Plan      History & Physical Review  History and physical reviewed and following examination; no interval change.    ASA Status:  3 .    NPO Status:  > 6 hours    Plan for MAC with Intravenous and Propofol induction. Maintenance will be TIVA.  Reason for MAC:  Chronic cardiopulmonary disease (G9)  PONV prophylaxis:  Ondansetron (or other 5HT-3) and Dexamethasone or Solumedrol      - Premedication with IV midazolam  - Will only give stress dose steroids in case there are clinical signs of adrenal insufficiency, plan to use regular oral steroid dose after the procedure  - Anesthetic plan discussed with Obed and his parents, all questions answered with agreement to proceed.      Postoperative Care  Postoperative pain management:  IV analgesics and Multi-modal analgesia.      Consents  Anesthetic plan, risks, benefits and alternatives discussed with:  Patient.  Use of blood products discussed: No .   .          Janett Khan MD  Pediatric Anesthesiologist  Pager: [phone]

## 2017-09-11 ENCOUNTER — APPOINTMENT (OUTPATIENT)
Dept: CARDIOLOGY | Facility: CLINIC | Age: 22
End: 2017-09-11
Attending: PEDIATRICS
Payer: COMMERCIAL

## 2017-09-11 ENCOUNTER — HOSPITAL ENCOUNTER (OUTPATIENT)
Facility: CLINIC | Age: 22
Discharge: HOME | End: 2017-09-11
Attending: PEDIATRICS | Admitting: PEDIATRICS
Payer: COMMERCIAL

## 2017-09-11 ENCOUNTER — SURGERY (OUTPATIENT)
Age: 22
End: 2017-09-11

## 2017-09-11 ENCOUNTER — ANESTHESIA (OUTPATIENT)
Dept: SURGERY | Facility: CLINIC | Age: 22
End: 2017-09-11
Payer: COMMERCIAL

## 2017-09-11 ENCOUNTER — TELEPHONE (OUTPATIENT)
Dept: PEDIATRIC CARDIOLOGY | Facility: CLINIC | Age: 22
End: 2017-09-11

## 2017-09-11 VITALS
WEIGHT: 142.86 LBS | RESPIRATION RATE: 14 BRPM | DIASTOLIC BLOOD PRESSURE: 69 MMHG | OXYGEN SATURATION: 99 % | SYSTOLIC BLOOD PRESSURE: 109 MMHG | BODY MASS INDEX: 18.93 KG/M2 | HEIGHT: 73 IN | TEMPERATURE: 97 F

## 2017-09-11 LAB
ALBUMIN SERPL-MCNC: 3.7 G/DL (ref 3.4–5)
ALP SERPL-CCNC: 49 U/L (ref 40–150)
ALT SERPL W P-5'-P-CCNC: 15 U/L (ref 0–70)
ANION GAP SERPL CALCULATED.3IONS-SCNC: 11 MMOL/L (ref 3–14)
AST SERPL W P-5'-P-CCNC: 12 U/L (ref 0–45)
BASOPHILS # BLD AUTO: 0 10E9/L (ref 0–0.2)
BASOPHILS NFR BLD AUTO: 0.3 %
BILIRUB SERPL-MCNC: 0.6 MG/DL (ref 0.2–1.3)
BUN SERPL-MCNC: 26 MG/DL (ref 7–30)
CALCIUM SERPL-MCNC: 8.1 MG/DL (ref 8.5–10.1)
CHLORIDE SERPL-SCNC: 108 MMOL/L (ref 94–109)
CO2 SERPL-SCNC: 24 MMOL/L (ref 20–32)
CREAT SERPL-MCNC: 0.68 MG/DL (ref 0.66–1.25)
DIFFERENTIAL METHOD BLD: ABNORMAL
EOSINOPHIL # BLD AUTO: 0.1 10E9/L (ref 0–0.7)
EOSINOPHIL NFR BLD AUTO: 1.6 %
ERYTHROCYTE [DISTWIDTH] IN BLOOD BY AUTOMATED COUNT: 13.5 % (ref 10–15)
GFR SERPL CREATININE-BSD FRML MDRD: >90 ML/MIN/1.7M2
GLUCOSE SERPL-MCNC: 55 MG/DL (ref 70–99)
HCT VFR BLD AUTO: 38.4 % (ref 40–53)
HGB BLD-MCNC: 12.9 G/DL (ref 13.3–17.7)
IMM GRANULOCYTES # BLD: 0 10E9/L (ref 0–0.4)
IMM GRANULOCYTES NFR BLD: 0.1 %
LYMPHOCYTES # BLD AUTO: 1.7 10E9/L (ref 0.8–5.3)
LYMPHOCYTES NFR BLD AUTO: 24.9 %
MAGNESIUM SERPL-MCNC: 1.7 MG/DL (ref 1.6–2.3)
MCH RBC QN AUTO: 29.2 PG (ref 26.5–33)
MCHC RBC AUTO-ENTMCNC: 33.6 G/DL (ref 31.5–36.5)
MCV RBC AUTO: 87 FL (ref 78–100)
MONOCYTES # BLD AUTO: 0.8 10E9/L (ref 0–1.3)
MONOCYTES NFR BLD AUTO: 11.9 %
NEUTROPHILS # BLD AUTO: 4.2 10E9/L (ref 1.6–8.3)
NEUTROPHILS NFR BLD AUTO: 61.2 %
NRBC # BLD AUTO: 0 10*3/UL
NRBC BLD AUTO-RTO: 0 /100
NT-PROBNP SERPL-MCNC: 29 PG/ML (ref 0–450)
PHOSPHATE SERPL-MCNC: 2.8 MG/DL (ref 2.5–4.5)
PLATELET # BLD AUTO: 202 10E9/L (ref 150–450)
POTASSIUM SERPL-SCNC: 3.4 MMOL/L (ref 3.4–5.3)
PROT SERPL-MCNC: 6.3 G/DL (ref 6.8–8.8)
RBC # BLD AUTO: 4.42 10E12/L (ref 4.4–5.9)
SODIUM SERPL-SCNC: 143 MMOL/L (ref 133–144)
TROPONIN I SERPL-MCNC: 0.02 UG/L (ref 0–0.04)
WBC # BLD AUTO: 6.8 10E9/L (ref 4–11)

## 2017-09-11 PROCEDURE — 85025 COMPLETE CBC W/AUTO DIFF WBC: CPT | Performed by: PEDIATRICS

## 2017-09-11 PROCEDURE — 02BK3ZX EXCISION OF RIGHT VENTRICLE, PERCUTANEOUS APPROACH, DIAGNOSTIC: ICD-10-PCS | Performed by: PEDIATRICS

## 2017-09-11 PROCEDURE — 27210956 ZZH BALLOON TIP PRESSURE CR7

## 2017-09-11 PROCEDURE — 27210787 ZZH MANIFOLD CR2

## 2017-09-11 PROCEDURE — 88342 IMHCHEM/IMCYTCHM 1ST ANTB: CPT | Mod: 26 | Performed by: PEDIATRICS

## 2017-09-11 PROCEDURE — 84484 ASSAY OF TROPONIN QUANT: CPT | Performed by: PEDIATRICS

## 2017-09-11 PROCEDURE — 88341 IMHCHEM/IMCYTCHM EA ADD ANTB: CPT | Performed by: PEDIATRICS

## 2017-09-11 PROCEDURE — 37000009 ZZH ANESTHESIA TECHNICAL FEE, EACH ADDTL 15 MIN: Performed by: PEDIATRICS

## 2017-09-11 PROCEDURE — 71000015 ZZH RECOVERY PHASE 1 LEVEL 2 EA ADDTL HR: Performed by: PEDIATRICS

## 2017-09-11 PROCEDURE — 83880 ASSAY OF NATRIURETIC PEPTIDE: CPT | Performed by: PEDIATRICS

## 2017-09-11 PROCEDURE — 88341 IMHCHEM/IMCYTCHM EA ADD ANTB: CPT | Mod: 26 | Performed by: PEDIATRICS

## 2017-09-11 PROCEDURE — 71000027 ZZH RECOVERY PHASE 2 EACH 15 MINS: Performed by: PEDIATRICS

## 2017-09-11 PROCEDURE — 71000014 ZZH RECOVERY PHASE 1 LEVEL 2 FIRST HR: Performed by: PEDIATRICS

## 2017-09-11 PROCEDURE — 37000008 ZZH ANESTHESIA TECHNICAL FEE, 1ST 30 MIN: Performed by: PEDIATRICS

## 2017-09-11 PROCEDURE — 88307 TISSUE EXAM BY PATHOLOGIST: CPT | Performed by: PEDIATRICS

## 2017-09-11 PROCEDURE — 83735 ASSAY OF MAGNESIUM: CPT | Performed by: PEDIATRICS

## 2017-09-11 PROCEDURE — 27211047 ZZH FORCEP BIOPSY CR10

## 2017-09-11 PROCEDURE — 88350 IMFLUOR EA ADDL 1ANTB STN PX: CPT | Performed by: PEDIATRICS

## 2017-09-11 PROCEDURE — 40000170 ZZH STATISTIC PRE-PROCEDURE ASSESSMENT II: Performed by: PEDIATRICS

## 2017-09-11 PROCEDURE — 88307 TISSUE EXAM BY PATHOLOGIST: CPT | Mod: 26 | Performed by: PEDIATRICS

## 2017-09-11 PROCEDURE — 93451 RIGHT HEART CATH: CPT

## 2017-09-11 PROCEDURE — 84100 ASSAY OF PHOSPHORUS: CPT | Performed by: PEDIATRICS

## 2017-09-11 PROCEDURE — 25000125 ZZHC RX 250: Performed by: NURSE ANESTHETIST, CERTIFIED REGISTERED

## 2017-09-11 PROCEDURE — 88342 IMHCHEM/IMCYTCHM 1ST ANTB: CPT | Performed by: PEDIATRICS

## 2017-09-11 PROCEDURE — 27210946 ZZH KIT HC TOTES DISP CR8

## 2017-09-11 PROCEDURE — 40000428 ZZHCL STATISTIC R-RUSH PROCESSING: Performed by: PEDIATRICS

## 2017-09-11 PROCEDURE — C1893 INTRO/SHEATH, FIXED,NON-PEEL: HCPCS

## 2017-09-11 PROCEDURE — 88346 IMFLUOR 1ST 1ANTB STAIN PX: CPT | Performed by: PEDIATRICS

## 2017-09-11 PROCEDURE — 93505 ENDOMYOCARDIAL BIOPSY: CPT

## 2017-09-11 PROCEDURE — 25000128 H RX IP 250 OP 636: Performed by: NURSE ANESTHETIST, CERTIFIED REGISTERED

## 2017-09-11 PROCEDURE — 80053 COMPREHEN METABOLIC PANEL: CPT | Performed by: PEDIATRICS

## 2017-09-11 PROCEDURE — 80197 ASSAY OF TACROLIMUS: CPT | Performed by: PEDIATRICS

## 2017-09-11 PROCEDURE — 40000477 ZZHCL STATISTIC IP IF DIRECT UMP PF 88346: Performed by: PEDIATRICS

## 2017-09-11 RX ORDER — NALOXONE HYDROCHLORIDE 0.4 MG/ML
.1-.4 INJECTION, SOLUTION INTRAMUSCULAR; INTRAVENOUS; SUBCUTANEOUS
Status: DISCONTINUED | OUTPATIENT
Start: 2017-09-11 | End: 2017-09-11 | Stop reason: HOSPADM

## 2017-09-11 RX ORDER — SODIUM CHLORIDE, SODIUM LACTATE, POTASSIUM CHLORIDE, CALCIUM CHLORIDE 600; 310; 30; 20 MG/100ML; MG/100ML; MG/100ML; MG/100ML
INJECTION, SOLUTION INTRAVENOUS CONTINUOUS
Status: DISCONTINUED | OUTPATIENT
Start: 2017-09-11 | End: 2017-09-11 | Stop reason: HOSPADM

## 2017-09-11 RX ORDER — PROPOFOL 10 MG/ML
INJECTION, EMULSION INTRAVENOUS CONTINUOUS PRN
Status: DISCONTINUED | OUTPATIENT
Start: 2017-09-11 | End: 2017-09-11

## 2017-09-11 RX ORDER — LIDOCAINE HYDROCHLORIDE 20 MG/ML
INJECTION, SOLUTION INFILTRATION; PERINEURAL PRN
Status: DISCONTINUED | OUTPATIENT
Start: 2017-09-11 | End: 2017-09-11

## 2017-09-11 RX ORDER — FENTANYL CITRATE 50 UG/ML
25-50 INJECTION, SOLUTION INTRAMUSCULAR; INTRAVENOUS
Status: DISCONTINUED | OUTPATIENT
Start: 2017-09-11 | End: 2017-09-11 | Stop reason: HOSPADM

## 2017-09-11 RX ORDER — FENTANYL CITRATE 50 UG/ML
INJECTION, SOLUTION INTRAMUSCULAR; INTRAVENOUS PRN
Status: DISCONTINUED | OUTPATIENT
Start: 2017-09-11 | End: 2017-09-11

## 2017-09-11 RX ORDER — SODIUM CHLORIDE, SODIUM LACTATE, POTASSIUM CHLORIDE, CALCIUM CHLORIDE 600; 310; 30; 20 MG/100ML; MG/100ML; MG/100ML; MG/100ML
INJECTION, SOLUTION INTRAVENOUS CONTINUOUS PRN
Status: DISCONTINUED | OUTPATIENT
Start: 2017-09-11 | End: 2017-09-11

## 2017-09-11 RX ORDER — ACETAMINOPHEN 325 MG/1
10 TABLET ORAL EVERY 4 HOURS PRN
Status: DISCONTINUED | OUTPATIENT
Start: 2017-09-11 | End: 2017-09-11 | Stop reason: HOSPADM

## 2017-09-11 RX ORDER — HYDROMORPHONE HYDROCHLORIDE 1 MG/ML
.3-.5 INJECTION, SOLUTION INTRAMUSCULAR; INTRAVENOUS; SUBCUTANEOUS EVERY 10 MIN PRN
Status: DISCONTINUED | OUTPATIENT
Start: 2017-09-11 | End: 2017-09-11 | Stop reason: HOSPADM

## 2017-09-11 RX ORDER — ONDANSETRON 2 MG/ML
INJECTION INTRAMUSCULAR; INTRAVENOUS PRN
Status: DISCONTINUED | OUTPATIENT
Start: 2017-09-11 | End: 2017-09-11

## 2017-09-11 RX ADMIN — SODIUM CHLORIDE, POTASSIUM CHLORIDE, SODIUM LACTATE AND CALCIUM CHLORIDE: 600; 310; 30; 20 INJECTION, SOLUTION INTRAVENOUS at 07:35

## 2017-09-11 RX ADMIN — MIDAZOLAM HYDROCHLORIDE 2 MG: 1 INJECTION, SOLUTION INTRAMUSCULAR; INTRAVENOUS at 07:41

## 2017-09-11 RX ADMIN — ONDANSETRON 4 MG: 2 INJECTION INTRAMUSCULAR; INTRAVENOUS at 08:33

## 2017-09-11 RX ADMIN — PROPOFOL 50 MCG/KG/MIN: 10 INJECTION, EMULSION INTRAVENOUS at 07:40

## 2017-09-11 RX ADMIN — FENTANYL CITRATE 25 MCG: 50 INJECTION, SOLUTION INTRAMUSCULAR; INTRAVENOUS at 07:40

## 2017-09-11 RX ADMIN — LIDOCAINE HYDROCHLORIDE 60 MG: 20 INJECTION, SOLUTION INFILTRATION; PERINEURAL at 07:40

## 2017-09-11 RX ADMIN — MIDAZOLAM HYDROCHLORIDE 2 MG: 1 INJECTION, SOLUTION INTRAMUSCULAR; INTRAVENOUS at 07:32

## 2017-09-11 ASSESSMENT — ENCOUNTER SYMPTOMS: SEIZURES: 0

## 2017-09-11 NOTE — IP AVS SNAPSHOT
[place] [id number] [facility] [address]    Phone:  [phone]                                       After Visit Summary   9/11/2017    Obed Viramontes    MRN: [id number]           After Visit Summary Signature Page     I have received my discharge instructions, and my questions have been answered. I have discussed any challenges I see with this plan with the nurse or doctor.    ..........................................................................................................................................  Patient/Patient Representative Signature      ..........................................................................................................................................  Patient Representative Print Name and Relationship to Patient    ..................................................               ................................................  Date                                            Time    ..........................................................................................................................................  Reviewed by Signature/Title    ...................................................              ..............................................  Date                                                            Time

## 2017-09-11 NOTE — PLAN OF CARE
"                               [facility]  Cardiac Catheterization & Electrophysiology Laboratory  Discharge Instructions    Obed Viramontes MRN# [id number]   YOB: 1995 Age: 21 year old     Date of Admission:  9/11/2017  Date of Discharge:  9/11/2017  Physician:   Bobbi Pruitt MD    Primary Care Provider: Rayo Schafer           Diagnoses:   Heart transplant          Procedures, Findings, Outcomes, Recommendations, Plans:     Heart catheterization, angiography, endomyocardial biopsy           Pending Results:   Endomyocardial biopsy           Discharge Weight and Vitals:   Blood pressure 127/88, temperature 98.2  F (36.8  C), temperature source Oral, resp. rate 16, height 1.854 m (6' 1\"), weight 64.8 kg (142 lb 13.7 oz), SpO2 99 %.         Follow-Up Appointments:   Primary Care Provider: as needed  Dr. Pruitt:                            Transplant coordinator will contact patient/family regarding biopsy results and plan for follow-up          Wound Care, Monitoring, and Other Instructions:     Watch the right neck site closely for any bleeding, swelling, redness, discharge, or change in color/temperature/sensation of the right neck    Call immediately if there is bleeding or fever    Keep the site clean and dry    You may leave the site uncovered; if you want to cover it with a band-aid be sure to change the band-aid any time it gets wet or dirty    Avoid vigorous activity for 48 hours to reduce the risk of bleeding from the site    Do not soak the site (bathe or swim) for 48 hours; okay to shower or sponge-bathe after 24 hours    If you have any questions about the site, either your primary care provider or your cardiologist can examine it    To reach [facility] cardiologist at any time please call your transplant coordinator or [phone] (M-F 7:30 AM- 4:00 PM) or [phone] and ask for the " on-call pediatric cardiologist (anytime)

## 2017-09-11 NOTE — DISCHARGE INSTRUCTIONS
Follow-Up Appointments:   Primary Care Provider:                     as needed  Dr. Pruitt:                            Transplant coordinator will contact patient/family regarding biopsy results and plan for follow-up           Wound Care, Monitoring, and Other Instructions:     Watch the right neck site closely for any bleeding, swelling, redness, discharge, or change in color/temperature/sensation of the right neck    Call immediately if there is bleeding or fever    Keep the site clean and dry    You may leave the site uncovered; if you want to cover it with a band-aid be sure to change the band-aid any time it gets wet or dirty    Avoid vigorous activity for 48 hours to reduce the risk of bleeding from the site    Do not soak the site (bathe or swim) for 48 hours; okay to shower or sponge-bathe after 24 hours    If you have any questions about the site, either your primary care provider or your cardiologist can examine it    To reach [facility] cardiologist at any time please call your transplant coordinator or [phone] (M-F 7:30 AM- 4:00 PM) or [phone] and ask for the on-call pediatric cardiologist (anytime)     Same-Day Surgery   Adult Discharge Orders & Instructions     For 24 hours after surgery:  1. Get plenty of rest.  A responsible adult must stay with you for at least 24 hours after you leave the hospital.   2. Pain medication can slow your reflexes. Do not drive or use heavy equipment.  If you have weakness or tingling, don't drive or use heavy equipment until this feeling goes away.  3. Mixing alcohol and pain medication can cause dizziness and slow your breathing. It can even be fatal. Do not drink alcohol while taking pain medication.  4. Avoid strenuous or risky activities.  Ask for help when climbing stairs.   5. You may feel lightheaded.  If so, sit for a few minutes before standing.  Have someone help you get up.   6. If you have nausea  (feel sick to your stomach), drink only clear liquids such as apple juice, ginger ale, broth or 7-Up.  Rest may also help.  Be sure to drink enough fluids.  Move to a regular diet as you feel able. Take pain medications with a small amount of solid food, such as toast or crackers, to avoid nausea.   7. A slight fever is normal. Call the doctor if your fever is over 100 F (37.7 C) (taken under the tongue) or lasts longer than 24 hours.  8. You may have a dry mouth, muscle aches, trouble sleeping or a sore throat.  These symptoms should go away after 24 hours.  9. Do not make important or legal decisions.   Pain Management:      1. Take pain medication (if prescribed) for pain as directed by your physician.        2. WARNING: If the pain medication you have been prescribed contains Tylenol  (acetaminophen), DO NOT take additional doses of Tylenol (acetaminophen).     Call your doctor for any of the followin.  Signs of infection (fever, growing tenderness at the surgery site, severe pain, a large amount of drainage or bleeding, foul-smelling drainage, redness, swelling).    2.  It has been over 8 to 10 hours since surgery and you are still not able to urinate (pee).    3.  Headache for over 24 hours.    4.  Numbness, tingling or weakness the day after surgery (if you had spinal anesthesia).  To contact a doctor, call [phone] or:      [phone] and ask for the Resident On Call for:      PEDIATRIC CARDIOLOGY  (answered 24 hours a day)      Emergency Department:  [facility]: [phone]  [facility]: [phone]               Rev. 10/2014

## 2017-09-11 NOTE — IP AVS SNAPSHOT
MRN:[id number]                      After Visit Summary   9/11/2017    Obed Viramontes    MRN: [id number]           Thank you!     Thank you for choosing [facility] for your care. Our goal is always to provide you with excellent care. Hearing back from our patients is one way we can continue to improve our services. Please take a few minutes to complete the written survey that you may receive in the mail after you visit with us. Thank you!        Patient Information     Date Of Birth          1995        About your hospital stay     You were admitted on:  September 11, 2017 You last received care in the:  [facility] PACU    You were discharged on:  September 11, 2017       Who to Call     For medical emergencies, please call 911.  For non-urgent questions about your medical care, please call your primary care provider or clinic, [phone]  For questions related to your surgery, please call your surgery clinic        Attending Provider     Provider Bobbi Fitzpatrick MD Pediatric Cardiology       Primary Care Provider Office Phone # Fax #    Rayo Schafer [phone]      Further instructions from your care team            Follow-Up Appointments:   Primary Care Provider:                     as needed  Dr. Pruitt:                            Transplant coordinator will contact patient/family regarding biopsy results and plan for follow-up           Wound Care, Monitoring, and Other Instructions:     Watch the right neck site closely for any bleeding, swelling, redness, discharge, or change in color/temperature/sensation of the right neck    Call immediately if there is bleeding or fever    Keep the site clean and dry    You may leave the site uncovered; if you want to cover it with a band-aid be sure to change the band-aid any time it gets wet or dirty    Avoid vigorous activity for 48 hours to reduce the risk of bleeding from the site    Do not soak the site  (bathe or swim) for 48 hours; okay to shower or sponge-bathe after 24 hours    If you have any questions about the site, either your primary care provider or your cardiologist can examine it    To reach [facility] cardiologist at any time please call your transplant coordinator or [phone] (M-F 7:30 AM- 4:00 PM) or [phone] and ask for the on-call pediatric cardiologist (anytime)     Same-Day Surgery   Adult Discharge Orders & Instructions     For 24 hours after surgery:  1. Get plenty of rest.  A responsible adult must stay with you for at least 24 hours after you leave the hospital.   2. Pain medication can slow your reflexes. Do not drive or use heavy equipment.  If you have weakness or tingling, don't drive or use heavy equipment until this feeling goes away.  3. Mixing alcohol and pain medication can cause dizziness and slow your breathing. It can even be fatal. Do not drink alcohol while taking pain medication.  4. Avoid strenuous or risky activities.  Ask for help when climbing stairs.   5. You may feel lightheaded.  If so, sit for a few minutes before standing.  Have someone help you get up.   6. If you have nausea (feel sick to your stomach), drink only clear liquids such as apple juice, ginger ale, broth or 7-Up.  Rest may also help.  Be sure to drink enough fluids.  Move to a regular diet as you feel able. Take pain medications with a small amount of solid food, such as toast or crackers, to avoid nausea.   7. A slight fever is normal. Call the doctor if your fever is over 100 F (37.7 C) (taken under the tongue) or lasts longer than 24 hours.  8. You may have a dry mouth, muscle aches, trouble sleeping or a sore throat.  These symptoms should go away after 24 hours.  9. Do not make important or legal decisions.   Pain Management:      1. Take pain medication (if prescribed) for pain as directed by your physician.        2. WARNING: If the pain medication  "you have been prescribed contains Tylenol  (acetaminophen), DO NOT take additional doses of Tylenol (acetaminophen).     Call your doctor for any of the followin.  Signs of infection (fever, growing tenderness at the surgery site, severe pain, a large amount of drainage or bleeding, foul-smelling drainage, redness, swelling).    2.  It has been over 8 to 10 hours since surgery and you are still not able to urinate (pee).    3.  Headache for over 24 hours.    4.  Numbness, tingling or weakness the day after surgery (if you had spinal anesthesia).  To contact a doctor, call [phone] or:      [phone] and ask for the Resident On Call for:      PEDIATRIC CARDIOLOGY  (answered 24 hours a day)      Emergency Department:  [facility]: [phone]  [facility]: [phone]               Rev. 10/2014       Pending Results     Date and Time Order Name Status Description    2017 0849 Surgical pathology exam In process             Admission Information     Date & Time Provider Department Dept. Phone    2017 Bobbi Pruitt MD [facility] PACU [phone]      Your Vitals Were     Blood Pressure Temperature Respirations Height Weight Pulse Oximetry    108/63 96.8  F (36  C) (Axillary) 18 1.854 m (6' 1\") 64.8 kg (142 lb 13.7 oz) 98%    BMI (Body Mass Index)                   18.85 kg/m2           [facility] Information     [facility] [employer] lets you send messages to your doctor, view your test results, renew your prescriptions, schedule appointments and more. To sign up, go to [email or web address] . Click on \"Log in\" on the left side of the screen, which will take you to the Welcome page. Then click on \"Sign up Now\" on the right side of the page.     You will be asked to enter the access code listed below, as well as some personal information. Please follow the directions to create your username and password.     Your access code is: [email or web address]  Expires: 10/22/2017 11:02 " AM     Your access code will  in 90 days. If you need help or a new code, please call your [facility] clinic or [phone].        Care EveryWhere ID     This is your Care EveryWhere ID. This could be used by other organizations to access your [facility] medical records  [id number]        Equal Access to Services     CARLA GREENBERG : Hadii estuardo ku hadasho Soomaali, waaxda luqadaha, qaybta kaalmada adegregoriayada, stephanie yingsinaichristo patiño. So [facility] [phone].    ATENCIÓN: Si habla español, tiene a neves disposición servicios gratuitos de asistencia lingüística. Llame al [phone].    We comply with applicable federal civil rights laws and [place] laws. We do not discriminate on the basis of race, color, national origin, age, disability sex, sexual orientation or gender identity.               Review of your medicines      UNREVIEWED medicines. Ask your doctor about these medicines        Dose / Directions    amLODIPine 5 MG tablet   Commonly known as:  NORVASC   Used for:  Heart replaced by transplant (H)        Dose:  5 mg   Take 1 tablet (5 mg) by mouth daily   Quantity:  90 tablet   Refills:  3       aspirin 81 MG EC tablet   Used for:  Status post transplant, heart (H)        Dose:  162 mg   Take 2 tablets (162 mg) by mouth daily   Quantity:  60 tablet   Refills:  99       mycophenolate 500 MG tablet   Commonly known as:  GENERIC EQUIVALENT   Used for:  Heart transplanted (H)        Dose:  500 mg   Take 1 tablet (500 mg) by mouth 2 times daily   Quantity:  180 tablet   Refills:  11       pravastatin 10 MG tablet   Commonly known as:  PRAVACHOL   Used for:  Heart transplanted (H), Heart transplanted (H)        Dose:  10 mg   Take 1 tablet (10 mg) by mouth daily At bedtime   Quantity:  90 tablet   Refills:  6       predniSONE 10 MG tablet   Commonly known as:  DELTASONE   Used for:  Heart replaced by transplant (H)        Dose:  10 mg   Take 1 tablet (10 mg) by mouth daily   Quantity:  30 tablet    Refills:  3       * tacrolimus 1 MG capsule   Commonly known as:  GENERIC EQUIVALENT   Used for:  Transplanted heart (H)        Take 3.5 mg twice daily   Quantity:  270 capsule   Refills:  11       * tacrolimus 0.5 MG capsule   Commonly known as:  GENERIC EQUIVALENT   Used for:  Transplanted heart (H)        Take 3.5 mg twice daily   Quantity:  60 capsule   Refills:  11       Vitamin D3 2000 UNITS Tabs   Used for:  Heart transplanted (H)        Dose:  2000 Units   Take 2,000 Units by mouth daily   Quantity:  100 tablet   Refills:  6       * Notice:  This list has 2 medication(s) that are the same as other medications prescribed for you. Read the directions carefully, and ask your doctor or other care provider to review them with you.             Protect others around you: Learn how to safely use, store and throw away your medicines at www.[facility].org.             Medication List: This is a list of all your medications and when to take them. Check marks below indicate your daily home schedule. Keep this list as a reference.      Medications           Morning Afternoon Evening Bedtime As Needed    amLODIPine 5 MG tablet   Commonly known as:  NORVASC   Take 1 tablet (5 mg) by mouth daily                                aspirin 81 MG EC tablet   Take 2 tablets (162 mg) by mouth daily                                mycophenolate 500 MG tablet   Commonly known as:  GENERIC EQUIVALENT   Take 1 tablet (500 mg) by mouth 2 times daily                                pravastatin 10 MG tablet   Commonly known as:  PRAVACHOL   Take 1 tablet (10 mg) by mouth daily At bedtime                                predniSONE 10 MG tablet   Commonly known as:  DELTASONE   Take 1 tablet (10 mg) by mouth daily                                * tacrolimus 1 MG capsule   Commonly known as:  GENERIC EQUIVALENT   Take 3.5 mg twice daily                                * tacrolimus 0.5 MG capsule   Commonly known as:  GENERIC EQUIVALENT    Take 3.5 mg twice daily                                Vitamin D3 2000 UNITS Tabs   Take 2,000 Units by mouth daily                                * Notice:  This list has 2 medication(s) that are the same as other medications prescribed for you. Read the directions carefully, and ask your doctor or other care provider to review them with you.

## 2017-09-11 NOTE — PROGRESS NOTES
[facility]      Pre cath progress note    Patient Name: Obed Viramontes   Age: 21 year old    : 1995                                                                      MRN: [id number]   Date of Service: 2017                                                                                       Attending: Dr. Bobbi Pruitt  PCP: Rayo Schafer                                                                                              HPI: Obed Viramontes is a 21 year old male with history of heart transplant (3/23/2012) and posttransplant lymphoproliferative disorder (diagnosed 2012) and multiple episodes of 2R rejection treated with prednisone (last rejection 17).  He is here for a cardiac catheterization, full hemodynamic and angiographic assessment and endomyocardial biopsy today.  The anticipated cath site was inspected, and there is no evidence of infection.  Patient was examined and consented.  Risks and benefits of the procedure were explained in detail and all questions were answered.  Ok to proceed with procedure at this time.       Carlos Lloyd DO  Fellow, Pediatric Cardiology   [facility]  2017 6:33 AM  Pager:  [phone]

## 2017-09-11 NOTE — ANESTHESIA POSTPROCEDURE EVALUATION
Patient: Obed Viramontes    Procedure(s):  Right Heart Cath, Angio and Biopsy  - Wound Class: I-Clean    Diagnosis:Post Heart Transplant   Diagnosis Additional Information: No value filed.    Anesthesia Type:  MAC    Note:  Anesthesia Post Evaluation    Patient location during evaluation: PACU  Patient participation: Able to fully participate in evaluation  Level of consciousness: awake and alert  Pain management: adequate  Airway patency: patent  Cardiovascular status: hemodynamically stable  Respiratory status: room air and spontaneous ventilation  Hydration status: euvolemic  PONV: none             Last vitals:  Vitals:    09/11/17 0945 09/11/17 1000 09/11/17 1030   BP: 105/66 110/84 109/69   Resp: 18 13 14   Temp:   36.1  C (97  F)   SpO2: 98% 99% 99%         Electronically Signed By: Patsy Bashir MD  September 11, 2017  11:01 AM

## 2017-09-11 NOTE — ANESTHESIA CARE TRANSFER NOTE
Patient: Obed Viramontes    Procedure(s):  Right Heart Cath, Angio and Biopsy  - Wound Class: I-Clean    Diagnosis: Post Heart Transplant   Diagnosis Additional Information: No value filed.    Anesthesia Type:   MAC     Note:  Airway :Room Air  Patient transferred to:PACU        Vitals: (Last set prior to Anesthesia Care Transfer)    CRNA VITALS  9/11/2017 0814 - 9/11/2017 0849      9/11/2017             Resp Rate (set): 10                Electronically Signed By: CEZAR Vale CRNA  September 11, 2017  8:49 AM

## 2017-09-11 NOTE — BRIEF OP NOTE
[facility]  BRIEF POST-PROCEDURE NOTE    Patient Name: Obed Viramontes   Age: 21 year old    : 1995                                                                      MRN: [id number]   Date of Service: 2017                                                                                       Attending: Dr. Bobbi Pruitt  PCP: Rayo Schafer         Pre Cath CRISP score 0  Risk Category 1    Pre-procedure diagnosis 1. History of viral myocarditis at 2.6 yo  2. History of cardiomyopathy   3. S/p orthotopic heart transplant (3/23/12)  4. History of PTLD, EBV  5. Cerebrovascular accident at age 2.6 yo with sequelae (left hemiplegia)    Post-procedure diagnosis same   Procedure 1. right and retrograde left heart cath  2. angiography  3. endomyocardial biopsy   Staff Dr Pruitt   Assistant(s) Carlos Lloyd DO; Lizabeth Chua CNP   Anesthesia monitored anesthesia care   Access 7F RIJ    Specimens  4 endomyocardial biopsies; 6 mL of blood for labs   IV contrast 10 mL   Heparinized No   Blood loss 2 mL + 6 mL of blood for labs   Complications None     Preliminary findings:    Normal mixed venous saturation.   Normal SVC, RA pressures.  Normal RV end diastolic pressures and normal RPA.  RPA wedge A wave pressure mildly elevated.      Angiography:  RV angiogram:  No extravasation of contrast following biopsy.      Carlos Lloyd DO  Fellow, Pediatric Cardiology   [facility]  2017 6:37 AM  Pager:  [phone]          Attestation:  This patient has been seen and evaluated by me, Bobbi Pruitt.  Discussed with the medical student, house staff team and/or resident(s) and agree with the findings and plan in this note.  I have reviewed today's vital signs, medications, labs and imaging.  Bobbi Pruitt MD

## 2017-09-12 LAB
COPATH REPORT: NORMAL
TACROLIMUS BLD-MCNC: 10.8 UG/L (ref 5–15)
TME LAST DOSE: NORMAL H

## 2017-09-12 NOTE — TELEPHONE ENCOUNTER
I called Jolly and Obed on Monday evening to let them know that the biopsy was still pending for Obed's heart catheterization. The pathologist wanted to do additional testing to determine if it was a 1R or a 2R. I left a message with Obed to call me on Tuesday morning. I also let Jolly know that the lab has no record of receiving Obed's blood work today. We are looking into it as the cath lab collected them in epic and the physician saw the blood being collected. I spoke with Jolly and she said that she would wait to hear from me on Tuesday.

## 2017-09-12 NOTE — TELEPHONE ENCOUNTER
Obed called back this afternoon. I let him know that his lab results were fine and that we are waiting to hear about the final biopsy results. He said that he is starting work soon so I can leave a message. I also let him know that his August tacrolimus level was high at 16.8 - Jolly does not remember him taking the dose until after he got the lab draw but he may have taken his previous dose late. His level yesterday was 10.8 so we will keep his dose of 3.5 mg BID the same. He verbalized understanding.

## 2017-09-12 NOTE — TELEPHONE ENCOUNTER
Jolly called back on Tuesday morning. I let her the results of Obed's CMP, CBC, and BNP/troponin. She was glad that they were able to be found and collected. Jolly let me know that she will be traveling today. I will call or leave a message today regarding tacrolimus and biopsy results given that she will be traveling.

## 2017-09-13 ENCOUNTER — TELEPHONE (OUTPATIENT)
Dept: PEDIATRIC CARDIOLOGY | Facility: CLINIC | Age: 22
End: 2017-09-13

## 2017-09-13 NOTE — TELEPHONE ENCOUNTER
I left a message for both Obed and Helen his mother.  I informed them both that his biopsy was a 1R and to keep his medications the same for now.  I also told them that we would like him to get in to have his wrist surgery done and after that we will discontinue steroids and start sirolimus.  I asked that they each call Nicci back to confirm that they got the message and understood.  I left her phone number [phone]

## 2017-09-15 ENCOUNTER — TELEPHONE (OUTPATIENT)
Dept: PEDIATRIC CARDIOLOGY | Facility: CLINIC | Age: 22
End: 2017-09-15

## 2017-09-15 ENCOUNTER — TELEPHONE (OUTPATIENT)
Dept: ORTHOPEDICS | Facility: CLINIC | Age: 22
End: 2017-09-15

## 2017-09-15 NOTE — TELEPHONE ENCOUNTER
Orthopedic pt of Dr. Tinoco s/p the following procedures on 10/20/2016:      1.  Left elbow biceps and brachialis lengthening.    2.  Left wrist fusion.    3.  Left distal ulnar resection ([place]).    4.  Left flexor pollicis longus and flexor digitorum superficialis lengthening.    5.  Palmaris longus and flexor carpi ulnaris tenotomy.     6.  Left adductor release.    7.  Left EPL rerouting.    Obed was hospitalized with cellulitis on 1/12/2017, and was to be seen in f/u the end of January 2017 but this appointment was cancelled. Call today from [facility]'s Transplant Coordinator requesting a   one-year f/u appt with Dr. Tinoco. Will schedule as requested and send update to Dr. Tinoco's team. In Basket message sent.

## 2017-09-15 NOTE — TELEPHONE ENCOUNTER
Jolly called wondering if we had heard from Dr Tinoco.     We would like him to see Dr Tinoco sooner rather than later because Dr Pruitt would like to add sirolimus and stop steroids. Sirolimus delays would healing. I spoke with a nurse in the orthopedic department who will relay the message regarding his potential need to be seen soon to Dr Tinoco and her nurse. We paged Dr Tinoco but did not hear back from her today.   He is tentatively scheduled at 10:30 on October 19, 2017 with Dr Tinoco for follow-up. Arrive at 10:15 for imaging. I left a message for Jolly and Obed regarding this appointment. I asked both of them to call back with receipt of this message.

## 2017-09-16 LAB
DONOR IDENTIFICATION: NORMAL
DR51: 793
DSA COMMENTS: NORMAL
DSA PRESENT: YES
DSA TEST METHOD: NORMAL
ORGAN: NORMAL
SA1 CELL: NORMAL
SA1 COMMENTS: NORMAL
SA1 HI RISK ABY: NORMAL
SA1 MOD RISK ABY: NORMAL
SA1 TEST METHOD: NORMAL
SA2 CELL: NORMAL
SA2 COMMENTS: NORMAL
SA2 HI RISK ABY UA: NORMAL
SA2 MOD RISK ABY: NORMAL
SA2 TEST METHOD: NORMAL

## 2017-09-22 ENCOUNTER — TELEPHONE (OUTPATIENT)
Dept: PEDIATRIC CARDIOLOGY | Facility: CLINIC | Age: 22
End: 2017-09-22

## 2017-09-22 ENCOUNTER — TELEPHONE (OUTPATIENT)
Dept: ORTHOPEDICS | Facility: CLINIC | Age: 22
End: 2017-09-22

## 2017-09-22 DIAGNOSIS — Z94.1 HEART REPLACED BY TRANSPLANT (H): ICD-10-CM

## 2017-09-22 RX ORDER — PREDNISONE 5 MG/1
5 TABLET ORAL DAILY
Qty: 30 TABLET | Refills: 3 | Status: SHIPPED | OUTPATIENT
Start: 2017-09-22 | End: 2017-09-26

## 2017-09-22 NOTE — TELEPHONE ENCOUNTER
Voicemail left with both patient and his mother regarding moving up their appointment with Dr. Tinoco. Patient will need to have surgery and needs to see Dr. Tinoco in clinic prior. Appointment made for next Thursday at 8:30.  Appointment date and time left on patients mothers voicemail along with the request for her to call back to confirm that this will work for them.

## 2017-09-22 NOTE — Clinical Note
Obed needs follow-up around 4 weeks from heart cath (thinking 10/9, 10/12 or 10/16)  - ECHO, EKG, xray and labs. Thanks!

## 2017-09-22 NOTE — TELEPHONE ENCOUNTER
Jolly called and said that she had sent a [employer] message last week and had gotten no response. I had not received this message. She was asking about Obed's prednisone dose and refills as well as his wrist surgery. I was able to reach Dr Tinoco today and she apologized for not getting back to us sooner. She had seen the message and would like to have him come to see her for xrays and then get the plate removed. I will be in contact with her nurse at the [facility] to set this up. Dr Pruitt said that it is ok to decrease Obed's prednisone dose in the meantime from 10 to 5 mg daily because of his side effects. We plan to discontinue the prednisone and start sirolimus after his wrist surgery. I left messages for both Obed and Jolly to call me back. We would like to see Obed in 1 month for follow-up from heart cath and his rejection episode.

## 2017-09-26 ENCOUNTER — TELEPHONE (OUTPATIENT)
Dept: PEDIATRIC CARDIOLOGY | Facility: CLINIC | Age: 22
End: 2017-09-26

## 2017-09-26 DIAGNOSIS — Z94.1 HEART REPLACED BY TRANSPLANT (H): ICD-10-CM

## 2017-09-26 DIAGNOSIS — Z47.89 AFTERCARE FOLLOWING SURGERY OF THE MUSCULOSKELETAL SYSTEM: Primary | ICD-10-CM

## 2017-09-26 RX ORDER — PREDNISONE 5 MG/1
5 TABLET ORAL DAILY
Qty: 30 TABLET | Refills: 3 | Status: SHIPPED | OUTPATIENT
Start: 2017-09-26 | End: 2017-10-09

## 2017-09-26 RX ORDER — PREDNISONE 5 MG/1
5 TABLET ORAL DAILY
Qty: 30 TABLET | Refills: 3 | Status: SHIPPED | OUTPATIENT
Start: 2017-09-26 | End: 2017-09-26

## 2017-09-26 NOTE — TELEPHONE ENCOUNTER
I received a message from JollyObed's mom at 2309 on Monday night. She said that he was taking his last dose of prednisone on Monday and had not received any refills. She also called this morning at 7 to leave the name of the pharmacy that is closer to his home since she does not live with him currently. I called both her and Obed back and let them know that I sent Obed's prescription to the [facility] pharmacy last week, but if they did not yet receive it I resent the prescription to the [facility] location. I left them both messages at 1140. I did not receive a call back so I tried again this afternoon and got ahold of Obed.     I resent the prescription to [facility] in [place] to be filled and picked up today. Obed verbalized that his dad can pick it up for him and that he will not run out of medications. I let him know that it would help for him and his parents to clarify which pharmacy they use for medications. He said that he prefers using specialty pharmacy for his transplant medications but anything else can go to the [facility] or [facility] depending on whether or not his mom or dad are picking them up for him. Obed uses the [facility] pharmacy in [place] if he needs to  medications for the next day or two. If his mom will be picking up medications, she uses the [facility] ([facility]) in [place].     I also asked Obed if he had heard from Dr Tinoco's staff about his upcoming ortho appointment. He said that he had and that he and his dad would go to his appointment this Thursday at 8:30. I gave his the information about his upcoming cardiology visit as well for October 9 because he did not know the times. He verbalized understanding. We discussed that we will try the 5 mg prednisone daily (down from 10 mg) and see how he feels and his labs look at his October appointment. He should also know the timing of his wrist surgery after his Thursday visit as well. We will make  medication changes based on the information from these visits. He verbalized understanding.     I left Jolly another voicemail regarding my conversation with Obed and that he had medication available and that he would be at his appointment this Thursday. I asked her to call back.

## 2017-09-28 ENCOUNTER — OFFICE VISIT (OUTPATIENT)
Dept: ORTHOPEDICS | Facility: CLINIC | Age: 22
End: 2017-09-28

## 2017-09-28 VITALS — BODY MASS INDEX: 18.82 KG/M2 | HEIGHT: 73 IN | WEIGHT: 142 LBS

## 2017-09-28 DIAGNOSIS — Z96.9 RETAINED ORTHOPEDIC HARDWARE: Primary | ICD-10-CM

## 2017-09-28 NOTE — MR AVS SNAPSHOT
After Visit Summary   9/28/2017    Obed Viramontes    MRN: [id number]           Patient Information     Date Of Birth          1995        Visit Information        Provider Department      9/28/2017 8:30 AM Amna Tinoco MD [facility]        Today's Diagnoses     Retained orthopedic hardware    -  1       Follow-ups after your visit        Your next 10 appointments already scheduled     Oct 09, 2017  8:15 AM CDT   LAB with EXPLORER LAB [facility] ([facility])    [address] [place] [address].  [facility] [address]   [phone]           Patient must bring picture ID. Patient should be prepared to give a urine specimen  Please do not eat 10-12 hours before your appointment if you are coming in fasting for labs on lipids, cholesterol, or glucose (sugar). Pregnant women should follow their Care Team instructions. Water with medications is okay. Do not drink coffee or other fluids. If you have concerns about taking  your medications, please ask at office or if scheduling via OB10hart, send a message by clicking on Secure Messaging, Message Your Care Team.            Oct 09, 2017  9:00 AM CDT   Ech Pediatric Complete with YESICA   [facility] Echo/EKG ([facility])    [address] [place] [id number]               Oct 09, 2017 10:00 AM CDT   Return Visit with Bobbi Pruitt MD   Peds Cardiac Transplant ([facility])    [facility]  [address]  [address] [place] [address]   [phone]            Oct 12, 2017   Procedure with Amna Tinoco MD   [facility], Same Day Surgery (--)    [address] [place] [address]   [phone]            Oct 19, 2017  1:00 PM CDT   (Arrive by 12:45 PM)   Post-Op with [facility] ([facility])    [address] [place] [address]  "[facility] [address] [phone]            Nov 16, 2017 11:20 AM CST   (Arrive by 11:05 AM)   POST-OP HAND with Amna Tinoco MD   [facility] ([facility])    [address] [place] [address]  [address] [facility] [id number] [phone]              Who to contact     Please call your clinic at [phone] to:    Ask questions about your health    Make or cancel appointments    Discuss your medicines    Learn about your test results    Speak to your doctor   If you have compliments or concerns about an experience at your clinic, or if you wish to file a complaint, please contact [facility] Patient Relations at [phone] or email us at [email or web address]         Additional Information About Your Visit        CamGSMhart Information     Elivart gives you secure access to your electronic health record. If you see a primary care provider, you can also send messages to your care team and make appointments. If you have questions, please call your primary care clinic.  If you do not have a primary care provider, please call [phone] and they will assist you.      ProtonMail is an electronic gateway that provides easy, online access to your medical records. With ProtonMail, you can request a clinic appointment, read your test results, renew a prescription or communicate with your care team.     To access your existing account, please contact your [facility] or call [phone] for assistance.        Care EveryWhere ID     This is your Care EveryWhere ID. This could be used by other organizations to access your [facility] medical records  [id number]        Your Vitals Were     Height BMI (Body Mass Index)                1.854 m (6' 1\") 18.73 kg/m2           Blood Pressure from Last 3 Encounters:   09/11/17 109/69   08/24/17 125/80   07/24/17 125/80    Weight from Last 3 Encounters:   09/28/17 " 64.4 kg (142 lb)   09/11/17 64.8 kg (142 lb 13.7 oz)   08/24/17 64 kg (141 lb 1.5 oz)              We Performed the Following     Dinorah-Operative Worksheet (Hand)        Primary Care Provider Office Phone # Fax #    Rayo Schafer [phone]       [facility] [address]  [place] [address]        Equal Access to Services     CARLA GREENBERG : Hadii aad ku hadasho Soomaali, waaxda luqadaha, qaybta kaalmada adeegyada, waxay idiin hayaan adeeg kharash la'aathee . So [facility] [phone].    ATENCIÓN: Si habla español, tiene a neves disposición servicios gratuitos de asistencia lingüística. [facility] [phone].    We comply with applicable federal civil rights laws and [place] laws. We do not discriminate on the basis of race, color, national origin, age, disability, sex, sexual orientation, or gender identity.            Thank you!     Thank you for choosing [facility]  for your care. Our goal is always to provide you with excellent care. Hearing back from our patients is one way we can continue to improve our services. Please take a few minutes to complete the written survey that you may receive in the mail after your visit with us. Thank you!             Your Updated Medication List - Protect others around you: Learn how to safely use, store and throw away your medicines at www.disposemymeds.org.          This list is accurate as of: 9/28/17 11:59 PM.  Always use your most recent med list.                   Brand Name Dispense Instructions for use Diagnosis    amLODIPine 5 MG tablet    NORVASC    90 tablet    Take 1 tablet (5 mg) by mouth daily    Heart replaced by transplant (H)       aspirin 81 MG EC tablet     60 tablet    Take 2 tablets (162 mg) by mouth daily    Status post transplant, heart (H)       mycophenolate 500 MG tablet    GENERIC EQUIVALENT    180 tablet    Take 1 tablet (500 mg) by mouth 2 times daily    Heart transplanted (H)        pravastatin 10 MG tablet    PRAVACHOL    90 tablet    Take 1 tablet (10 mg) by mouth daily At bedtime    Heart transplanted (H), Heart transplanted (H)       predniSONE 5 MG tablet    DELTASONE    30 tablet    Take 1 tablet (5 mg) by mouth daily    Heart replaced by transplant (H)       * tacrolimus 1 MG capsule    GENERIC EQUIVALENT    270 capsule    Take 3.5 mg twice daily    Transplanted heart (H)       * tacrolimus 0.5 MG capsule    GENERIC EQUIVALENT    60 capsule    Take 3.5 mg twice daily    Transplanted heart (H)       Vitamin D3 2000 UNITS Tabs     100 tablet    Take 2,000 Units by mouth daily    Heart transplanted (H)       * Notice:  This list has 2 medication(s) that are the same as other medications prescribed for you. Read the directions carefully, and ask your doctor or other care provider to review them with you.

## 2017-09-28 NOTE — LETTER
9/28/2017       RE: Obed Viramontes  [address]  [facility] [address]     Dear Colleague,    Thank you for referring your patient, Obed Viramontes, to the [facility] at [facility]. Please see a copy of my visit note below.    HISTORY OF PRESENT ILLNESS:  Obed is a 21-year-old right-hand dominant male who is referred by Francy Perea for evaluation of plate removal on the left hand.      Obed has a left hemiplegia secondary to a stroke and he has had a cardiac transplant with recent medication difficulties.  The transplant team contacted me regarding taking out the metal plate prior to making some medication changes.  He is seen today requesting to have plate removal.  He reports that he is very pleased with his left wrist and arm surgery.      On 10/20/2016, he underwent a left elbow biceps and brachialis lengthening, left wrist fusion with plating, left distal ulna resection, lengthening of the flexor pollicis longus, flexor digitorum superficialis, release of the palmaris longus and flexor carpi ulnaris and lengthening of the thumb adductor with an EPL rerouting.      He works at a theater.  He is pleased with the position of his hand and feels that it has improved his paper weight type function.      PHYSICAL EXAMINATION:  On examination today, his wound is clean and dry.  His wrist sit straight.  He does have a fair amount of spasticity in his fingers and can still do a grasp onto my finger with minimal control on opening.      IMAGING:  X-rays are taken today and reviewed.  He has a wrist plate intact with solid healing of the bone fusion across the radiocarpal joint and resection of the distal ulna.      IMPRESSION:  Satisfactory healing left wrist fusion requesting plate removal.      Risks, benefits, alternatives of plate removal were discussed.  They would like to have this done as soon as possible.  I asked whether any fine tuning finger  or thumb adjustment would be requested at the same time and Obed reports that he is highly satisfied and no other surgical procedures would be done concomitantly.  Orders were written and surgery will be scheduled for Thursday, 10/12/2017.         Again, thank you for allowing me to participate in the care of your patient.      Sincerely,    Amna Tinoco MD    cc: Flor Perea MD

## 2017-10-03 ENCOUNTER — TELEPHONE (OUTPATIENT)
Dept: PEDIATRIC CARDIOLOGY | Facility: CLINIC | Age: 22
End: 2017-10-03

## 2017-10-03 DIAGNOSIS — Z94.1 TRANSPLANTED HEART (H): ICD-10-CM

## 2017-10-03 RX ORDER — TACROLIMUS 0.5 MG/1
CAPSULE ORAL
Qty: 180 CAPSULE | Refills: 3 | Status: SHIPPED | OUTPATIENT
Start: 2017-10-03 | End: 2017-12-07

## 2017-10-03 NOTE — TELEPHONE ENCOUNTER
I left a message with Obed regarding a phone call I had gotten 9/29 from the pharmacy. [facility] said that they had tried to reach him in August, on 9/7, and 9/22 and had not heard back. I asked him to call me and the pharmacy back to let us know if he needed medications.     I was able to reach Anamika. She said she was out of town and travelling often so she had gotten messages but not been able to call back. She said that Obed spent some time with her this weekend and was low on tacrolimus and pravastatin. She had told him to call [facility] to reorder on Monday or Tuesday. He did not answer my call so I said that if she could contact him regarding this or call [facility] herself, that would be best. Anamika said that she would call. She expressed that although she hopes that he can do well in the transition to adult care, she is concerned that some of the issues he had in school are still issues for him. He had an IEP in place and although he is intelligent, he needs support in order to follow through. I verbalized support for this plan and offered to try to consolidate his med changes and ordering to simplify it for him. 90 day supplies are best because he does not need to do it as often and phone call reminders work if he calls back.     We will keep in touch until next week's appointment. She asked me if Dr Pruitt's visit would count as a pre-op visit. I told her that I thought it would, but I would check with pre-op and give her a call back if it is not acceptable. Pre-op said that as long as there is a full physical, than Dr Pruitt's visit will be fine.

## 2017-10-03 NOTE — Clinical Note
Rosa Maria - just forwarding this encounter to you bc it might be relevant for next week. He is seeing Tracy 10/9 and has ortho surgery 10/12.

## 2017-10-05 DIAGNOSIS — Z94.1 HEART REPLACED BY TRANSPLANT (H): Primary | ICD-10-CM

## 2017-10-09 ENCOUNTER — HOSPITAL ENCOUNTER (OUTPATIENT)
Dept: GENERAL RADIOLOGY | Facility: CLINIC | Age: 22
End: 2017-10-09
Attending: PEDIATRICS
Payer: COMMERCIAL

## 2017-10-09 ENCOUNTER — APPOINTMENT (OUTPATIENT)
Dept: LAB | Facility: CLINIC | Age: 22
End: 2017-10-09
Attending: PEDIATRICS
Payer: COMMERCIAL

## 2017-10-09 ENCOUNTER — RESULTS ONLY (OUTPATIENT)
Dept: OTHER | Facility: CLINIC | Age: 22
End: 2017-10-09

## 2017-10-09 ENCOUNTER — HOSPITAL ENCOUNTER (OUTPATIENT)
Dept: CARDIOLOGY | Facility: CLINIC | Age: 22
Discharge: HOME | End: 2017-10-09
Attending: PEDIATRICS | Admitting: PEDIATRICS
Payer: COMMERCIAL

## 2017-10-09 ENCOUNTER — ANESTHESIA EVENT (OUTPATIENT)
Dept: SURGERY | Facility: CLINIC | Age: 22
End: 2017-10-09
Payer: COMMERCIAL

## 2017-10-09 ENCOUNTER — OFFICE VISIT (OUTPATIENT)
Dept: PEDIATRIC CARDIOLOGY | Facility: CLINIC | Age: 22
End: 2017-10-09
Attending: PEDIATRICS
Payer: COMMERCIAL

## 2017-10-09 VITALS
WEIGHT: 139.99 LBS | OXYGEN SATURATION: 98 % | HEIGHT: 73 IN | HEART RATE: 100 BPM | RESPIRATION RATE: 20 BRPM | BODY MASS INDEX: 18.55 KG/M2 | DIASTOLIC BLOOD PRESSURE: 86 MMHG | SYSTOLIC BLOOD PRESSURE: 136 MMHG

## 2017-10-09 DIAGNOSIS — D84.9 IMMUNOSUPPRESSED STATUS (H): ICD-10-CM

## 2017-10-09 DIAGNOSIS — Z94.1 HEART REPLACED BY TRANSPLANT (H): ICD-10-CM

## 2017-10-09 DIAGNOSIS — L70.0 ACNE VULGARIS: ICD-10-CM

## 2017-10-09 DIAGNOSIS — Z94.1 HEART REPLACED BY TRANSPLANT (H): Primary | ICD-10-CM

## 2017-10-09 DIAGNOSIS — K12.0 APHTHOUS ULCER OF PHARYNX OR HYPOPHARYNX: ICD-10-CM

## 2017-10-09 LAB
ALBUMIN SERPL-MCNC: 4.3 G/DL (ref 3.4–5)
ALP SERPL-CCNC: 62 U/L (ref 40–150)
ALT SERPL W P-5'-P-CCNC: 16 U/L (ref 0–70)
ANION GAP SERPL CALCULATED.3IONS-SCNC: 7 MMOL/L (ref 3–14)
AST SERPL W P-5'-P-CCNC: 15 U/L (ref 0–45)
BASOPHILS # BLD AUTO: 0 10E9/L (ref 0–0.2)
BASOPHILS NFR BLD AUTO: 0.4 %
BILIRUB SERPL-MCNC: 0.8 MG/DL (ref 0.2–1.3)
BUN SERPL-MCNC: 19 MG/DL (ref 7–30)
CALCIUM SERPL-MCNC: 8.9 MG/DL (ref 8.5–10.1)
CHLORIDE SERPL-SCNC: 107 MMOL/L (ref 94–109)
CK SERPL-CCNC: 160 U/L (ref 30–300)
CO2 SERPL-SCNC: 27 MMOL/L (ref 20–32)
CREAT SERPL-MCNC: 0.91 MG/DL (ref 0.66–1.25)
DIFFERENTIAL METHOD BLD: NORMAL
EOSINOPHIL # BLD AUTO: 0.1 10E9/L (ref 0–0.7)
EOSINOPHIL NFR BLD AUTO: 2.1 %
ERYTHROCYTE [DISTWIDTH] IN BLOOD BY AUTOMATED COUNT: 12.7 % (ref 10–15)
GFR SERPL CREATININE-BSD FRML MDRD: >90 ML/MIN/1.7M2
GLUCOSE SERPL-MCNC: 96 MG/DL (ref 70–99)
HCT VFR BLD AUTO: 44.9 % (ref 40–53)
HGB BLD-MCNC: 15.9 G/DL (ref 13.3–17.7)
IMM GRANULOCYTES # BLD: 0 10E9/L (ref 0–0.4)
IMM GRANULOCYTES NFR BLD: 0.2 %
INTERPRETATION ECG - MUSE: NORMAL
LYMPHOCYTES # BLD AUTO: 1.5 10E9/L (ref 0.8–5.3)
LYMPHOCYTES NFR BLD AUTO: 30.7 %
MAGNESIUM SERPL-MCNC: 1.5 MG/DL (ref 1.6–2.3)
MCH RBC QN AUTO: 30.2 PG (ref 26.5–33)
MCHC RBC AUTO-ENTMCNC: 35.4 G/DL (ref 31.5–36.5)
MCV RBC AUTO: 85 FL (ref 78–100)
MONOCYTES # BLD AUTO: 0.4 10E9/L (ref 0–1.3)
MONOCYTES NFR BLD AUTO: 8.6 %
NEUTROPHILS # BLD AUTO: 2.8 10E9/L (ref 1.6–8.3)
NEUTROPHILS NFR BLD AUTO: 58 %
NRBC # BLD AUTO: 0 10*3/UL
NRBC BLD AUTO-RTO: 0 /100
NT-PROBNP SERPL-MCNC: 49 PG/ML (ref 0–125)
PHOSPHATE SERPL-MCNC: 4.6 MG/DL (ref 2.5–4.5)
PLATELET # BLD AUTO: 213 10E9/L (ref 150–450)
POTASSIUM SERPL-SCNC: 4.1 MMOL/L (ref 3.4–5.3)
PROT SERPL-MCNC: 7.8 G/DL (ref 6.8–8.8)
RBC # BLD AUTO: 5.27 10E12/L (ref 4.4–5.9)
SODIUM SERPL-SCNC: 141 MMOL/L (ref 133–144)
TACROLIMUS BLD-MCNC: 13.4 UG/L (ref 5–15)
TME LAST DOSE: NORMAL H
TROPONIN I SERPL-MCNC: 0.04 UG/L (ref 0–0.04)
WBC # BLD AUTO: 4.8 10E9/L (ref 4–11)

## 2017-10-09 PROCEDURE — 87799 DETECT AGENT NOS DNA QUANT: CPT | Performed by: PEDIATRICS

## 2017-10-09 PROCEDURE — 86833 HLA CLASS II HIGH DEFIN QUAL: CPT | Performed by: PEDIATRICS

## 2017-10-09 PROCEDURE — 85025 COMPLETE CBC W/AUTO DIFF WBC: CPT | Performed by: PEDIATRICS

## 2017-10-09 PROCEDURE — 71020 XR CHEST 2 VW: CPT

## 2017-10-09 PROCEDURE — 83880 ASSAY OF NATRIURETIC PEPTIDE: CPT | Performed by: PEDIATRICS

## 2017-10-09 PROCEDURE — 80053 COMPREHEN METABOLIC PANEL: CPT | Performed by: PEDIATRICS

## 2017-10-09 PROCEDURE — 84100 ASSAY OF PHOSPHORUS: CPT | Performed by: PEDIATRICS

## 2017-10-09 PROCEDURE — 83735 ASSAY OF MAGNESIUM: CPT | Performed by: PEDIATRICS

## 2017-10-09 PROCEDURE — 84484 ASSAY OF TROPONIN QUANT: CPT | Performed by: PEDIATRICS

## 2017-10-09 PROCEDURE — 80197 ASSAY OF TACROLIMUS: CPT | Performed by: PEDIATRICS

## 2017-10-09 PROCEDURE — 99213 OFFICE O/P EST LOW 20 MIN: CPT | Mod: 25,ZF

## 2017-10-09 PROCEDURE — 36415 COLL VENOUS BLD VENIPUNCTURE: CPT | Performed by: PEDIATRICS

## 2017-10-09 PROCEDURE — 86832 HLA CLASS I HIGH DEFIN QUAL: CPT | Performed by: PEDIATRICS

## 2017-10-09 PROCEDURE — 93005 ELECTROCARDIOGRAM TRACING: CPT | Mod: ZF

## 2017-10-09 PROCEDURE — 93306 TTE W/DOPPLER COMPLETE: CPT

## 2017-10-09 PROCEDURE — 82550 ASSAY OF CK (CPK): CPT | Performed by: PEDIATRICS

## 2017-10-09 RX ORDER — PREDNISONE 10 MG/1
10 TABLET ORAL DAILY
Qty: 30 TABLET | Refills: 3 | Status: SHIPPED | OUTPATIENT
Start: 2017-10-09 | End: 2017-10-09

## 2017-10-09 RX ORDER — PRAVASTATIN SODIUM 10 MG
10 TABLET ORAL DAILY
Qty: 90 TABLET | Refills: 6 | Status: SHIPPED | OUTPATIENT
Start: 2017-10-09 | End: 2018-02-28

## 2017-10-09 RX ORDER — PREDNISONE 10 MG/1
10 TABLET ORAL DAILY
Qty: 90 TABLET | Refills: 3 | Status: SHIPPED | OUTPATIENT
Start: 2017-10-09 | End: 2017-11-16

## 2017-10-09 ASSESSMENT — PAIN SCALES - GENERAL: PAINLEVEL: MILD PAIN (3)

## 2017-10-09 NOTE — NURSING NOTE
"Chief Complaint   Patient presents with     Heart Problem     Heart transplant.       Initial /86 (BP Location: Right arm, Patient Position: Chair, Cuff Size: Adult Regular)  Pulse 100  Resp 20  Ht 6' 0.99\" (185.4 cm)  Wt 139 lb 15.9 oz (63.5 kg)  SpO2 98%  BMI 18.47 kg/m2 Estimated body mass index is 18.47 kg/(m^2) as calculated from the following:    Height as of this encounter: 6' 0.99\" (185.4 cm).    Weight as of this encounter: 139 lb 15.9 oz (63.5 kg).  Medication Reconciliation: complete     Vitals:    10/09/17 0937 10/09/17 0939   BP: 116/76 136/86   BP Location: Right arm Right arm   Patient Position: Supine Chair   Cuff Size: Adult Regular Adult Regular   Pulse: 92 100   Resp: 20    SpO2: 98%    Weight: 139 lb 15.9 oz (63.5 kg)    Height: 6' 0.99\" (185.4 cm)      Christina Mason M.A.    "

## 2017-10-09 NOTE — NURSING NOTE
Obed is here with both parents today. He reports no concerns. He received a 90 day supply of all medications last week and reports taking them. Anamika spoke with the pharmacist but Obed did not call us or the pharmacy back.

## 2017-10-09 NOTE — PATIENT INSTRUCTIONS
1) Follow-up 6 weeks after wrist surgery.   2) We will call with tacrolimus level.   3) Increase prednisone to 10 mg daily.

## 2017-10-09 NOTE — PROGRESS NOTES
[facility]  [facility]  Pediatric Heart Transplant Clinic    2017    RE: Obed Viramontes  : 1995  MRN: [id number]    Dear Rayo and colleagues,    I had the pleasure of evaluating our mutual patient, Obed Viramontes, at the [facility] on 10/09/2017 for regularly scheduled follow-up. As you remember, Obed is a 21 year old male with history of heart transplant (3/23/2012) for dilated cardiomyopathy and post-transplant lymphoproliferative disorder (2012) who transferred care to the [facility] in 2012. He is in clinic today with his mother and father for follow-up after recent rejection episodes. He was seen in April for routine  annual follow-up; however, he had elevated troponin and routine biopsy showed 2R rejection, and he was noncompliant with medications prior to this. He was admitted for 3 days of IV Solumedrol and his troponin improved. Following that, he was staying with mom for a few weeks and was on a better schedule, compliant with medications, and eating better and has gained weight. He reported that even since going back to dad's house and going back to work that he was compliant with his medications. However on routine visit on  had elevated troponin again, and so was taken for biopsy on  which showed 2R rejection, which was treated with 5 days of prednisone and increasing his tacrolimus goals. On follow-up on  his troponin had improved. However, on repeat followup at end of  his troponin was elevated again and he had new PACs on ecg concerning for ongoing rejection. We elected to treat with 3 days prednsione 60 mg daily, followed by 20 mg daily which he remained on until July and then weaned to 10 mg daily.     He was last seen in clinic by my colleague Dr. Bobbi Pruitt, on  and underwent diagnostic right heart  cathterization on . There was no rejection on biopsy and his labs had continued to improved. Prednisone was decreased to 5 mg daily because of side effects (insomnia and voracious appetite). Today, he reports being compliant with his immunosuppressive regimen of tacrolimus 3.5 mg PO BID and prednisone 5 mg PO daily. He has had a sore throat for the past few days, attributed to the recent weather change; however, he denies runny nose, congestion, cough or fever. He feels his energy level is stable, no edema, no shortness of breath with activity. He denies chest pain, palpitations, dizziness or syncope. He reports having a rash on his back, which has been there since the Spring and is attributed to steroids.    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  He had uncomplicated  course and was healthy in early childhood. He was hospitalized at age 2 1/2 years with viral myocarditis, hospitalized for 6 weeks, intubated for 10 days, had recovery of myocardial function and was treated chronically with digoxin, hospital course complicated by cerobrovascular accident with resultant left hemiplegia, transitioned to [place] rehab for an additional 5 weeks  following his hospital course. He has had several orthopedic surgeries throughout childhood because of his hemiplegia, but otherwise has been healthy.  He was asymptomatic from a cardiac standpoint until February 2012, at which time he presented with chest pain.  He was found to have significant arrhythmias, was admitted to [facility], evaluated and listed for transplant (mom reports underlying diagnosis of hypertrophic cardiomyopathy possibly secondary to glycogen storage disease?). He was transplanted on March 23, 2012, did well post transplant and was discharged after 1 week. He reportedly has done well from a cardiac standpoint with no rejection, 1R biopsy in May and 1R biopsy in September but otherwise negative biopsies. He presented in September with mouth sores and difficulty taking medication, initially treated with lowering his immunosuppression, but was rehospitalized with fever and worsening mouth sores. 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 He was treated with rituximab x 2, and increased prednisone dose from 2.5 mg daily back up 10 mg twice daily. He was scheduled to undergo additional rituximab dose but symptoms improved and repeat CT scan showed improvement in his lymph node and tonsillar hypertrophy. Per mom he was EBV negative prior to transplant, and she thinks his donor was EBV positive. He has done well from an infection/PTLD standpoint and came off valcyte therapy in December 2013 with negative EBV PCR in February 2014. He did undergo orthopedic surgery for his left hand contracture in October 2016, admitted in January 2017 with a cellulitis of that hand that has completely resolved. He is scheduled to undergo plate removal from that hand in October this year.    Social History:   Lives with dad, works at [employer].     Family history:  Negative for congenital heart defects, heart transplant,  "cardiomyopathy, sudden death. Sister with type 1 DM.  Older sister recently diagnosed with POTS.    Medications:  Prescription Medications as of 10/9/2017             tacrolimus (GENERIC EQUIVALENT) 0.5 MG capsule Take 3.5 mg twice daily    predniSONE (DELTASONE) 5 MG tablet Take 1 tablet (5 mg) by mouth daily    tacrolimus (PROGRAF - GENERIC EQUIVALENT) 1 MG capsule Take 3.5 mg twice daily    aspirin 81 MG EC tablet Take 2 tablets (162 mg) by mouth daily    Cholecalciferol (VITAMIN D3) 2000 UNITS TABS Take 2,000 Units by mouth daily    amLODIPine (NORVASC) 5 MG tablet Take 1 tablet (5 mg) by mouth daily    mycophenolate (CELLCEPT - GENERIC EQUIVALENT) 500 MG tablet Take 1 tablet (500 mg) by mouth 2 times daily    pravastatin (PRAVACHOL) 10 MG tablet Take 1 tablet (10 mg) by mouth daily At bedtime        Allergies:   Allergies   Allergen Reactions     Latex Rash     Other [Seasonal Allergies]      Corn-abdominal pain and diarrhea.     Physical Exam:  /86 (BP Location: Right arm, Patient Position: Chair, Cuff Size: Adult Regular)  Pulse 100  Resp 20  Ht 1.854 m (6' 0.99\")  Wt 63.5 kg (139 lb 15.9 oz)  SpO2 98%  BMI 18.47 kg/m2   Repeat RA BP: 116/76; HR 92    Gen: Alert, interactive and appropriate, sitting, no respiratory distress.  HEENT: NCAT, EOMI, PERRL 3 mm OU, nares patent with no mucosal erythema or discharge; bilateral OM pearly, translucent with preserved anatomic landmarks; OP moist without erythema. Single erythematous ulcer on left soft palate near uvula.  Neck: no lymphadenopathy  Lungs:  normal breath sounds bilaterall;, no wheeze, crackles, or rhonchi, good air entry  CV: quiet precordium, normal PMI, RRR, normal S1 and S2, no murmurs, rubs, gallops  Abd: abdomen is soft without tenderness, masses, organomegaly or guarding  Ext: WWP, 2+ pulses upper and 2+ lower extremitiy without delay, no edema  Skin: numerous erythematous pustules on the back  Neuro: alert and oriented, mildly " dysarthric; left hemiplegic    Laboratory Studies:  Obed had evaluation today with labs, EKG and echocardiogram. His echo showed normal post-transplant anatomy, normal systolic function and no pericardial effusion. His EKG showed normal sinus rhythm, rate of 93 beats per minute and no interventricular conduction delay, ST segment or T-wave changes. His labs included a comprehensive metabolic panel, which was normal (specifically BUN of 19, creatinine of 0.91 and normal LFTs).  Troponin elevated again to 0.042 (up from 0.023 on 9/11 after prednisone was decreased to 5 mg daily). NT-pro BNP is normal at 49. He had a CBC which had normal WBC of 4.8, hemoglobin of 15.9 and platelets of 788800. He had a CMV PCR, EBV PCR, PRA and tacrolimus level that are pending. His last biopsy on 9/11/17 showed no rejection (ISHLT grade 1R), pAMR0, negative C3d/C4d staining.    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 He also has history of EBV positive PTLD, now in remission after rituximab. He most recently has had a number of episodes of biopsy-proven and presumed cellular rejection over the past 5-6 months in the setting of non-compliance with immunosuppressive regimen. His increased troponin and PACs in late June (presumed rejection) resolved with pulse-dosed steroids and slow taper. On last months cath, there was no evidence  of rejection (ISHLT grade 1R); however, his troponin level is every so slightly increased today from previous. I believe he is now compliant but out of an abundance of caution, I would prefer to put him back on the previous dose of prednisone he was on when troponin levels were lower/undetectable. NT-pro BNP is normal today, which is reassuring that there is no rejection at this time. He has acne on his back, likely from exogenous steroids. Sore throat may be from a possible aphthous ulcer on his soft palate.      Diagnosis summary:  1. Viral myocarditis at age 2 1/2 years  2. Dilated cardiomyopathy           A. S/p orthotopic heart transplant (3/23/12)   1. 2R rejection (4/21/17), treated with Solumedrol x 3 days   2. 2R rejection (5/26/17), treated with prednisone x 5 days   3. Presumed rejection (6/26/17) based on elevated troponin and new PACs, treated with prednisone x 3 days and then taper  3. Cerebrovascular accident at age 2 1/2 years with resultant left hemiplegia  4. Multiple orthopedic surgeries in past  5. PTLD, EBV positive            A.  treated with rituximab x 2    Plan:  - increase pravastatin to 10 mg (usual adult dose)  - given very slight increase in troponin and no change in side effects of prednisone between 10 mg and 5 mg dose, will increase prednisone back to 10 mg daily  - OK from a transplant standpoint to proceed with hand surgery as scheduled  - continue tacrolimus and prednisone as double immunosuppressive therapy until at least 6 weeks post-operatively AND if surgical wound is healing nicely; after that point, will consider switching to just tacrolimus and sirolimus and discontinuing prednisone    Activity Restriction: none  SBE prophylaxis: none    Follow-up: in 6 weeks after surgery    Thank you for the opportunity to participate in [facility]'s care. Please let me know if you have further questions.    Sincerely,     Taj Lorenzo MD    Pediatric Heart Failure and  Transplantation     CC  Patient Care Team:  Rayo Schafer MD as PCP - General (Pediatrics)  Isrrael Trent MD as MD (Oncology)  Bobbi Pruitt MD as MD (Pediatric Cardiology)      Copy to patient  [address]   [address]  [facility] [id number]

## 2017-10-09 NOTE — PHARMACY-CONSULT NOTE
Current Outpatient Prescriptions   Medication Sig Dispense Refill     predniSONE (DELTASONE) 10 MG tablet Take 1 tablet (10 mg) by mouth daily 30 tablet 3     tacrolimus (GENERIC EQUIVALENT) 0.5 MG capsule Take 3.5 mg twice daily 180 capsule 3     tacrolimus (PROGRAF - GENERIC EQUIVALENT) 1 MG capsule Take 3.5 mg twice daily 270 capsule 11     aspirin 81 MG EC tablet Take 2 tablets (162 mg) by mouth daily 60 tablet 99     Cholecalciferol (VITAMIN D3) 2000 UNITS TABS Take 2,000 Units by mouth daily 100 tablet 6     amLODIPine (NORVASC) 5 MG tablet Take 1 tablet (5 mg) by mouth daily 90 tablet 3     mycophenolate (CELLCEPT - GENERIC EQUIVALENT) 500 MG tablet Take 1 tablet (500 mg) by mouth 2 times daily 180 tablet 11     pravastatin (PRAVACHOL) 10 MG tablet Take 1 tablet (10 mg) by mouth daily At bedtime 90 tablet 6     I had the privilege of seeing Obed in clinic today along with Dr. Pruitt and Nicci Hernández  RN coordinator, Jareth (dad), and Anamika(mom).       Current labs are as follows:  (Tacrolimus or CSA) level:  Tacrolimus in process  Goal level: Tacrolimus 10-15  WBC: 4.8 (4-11)   Cr: 0.91 (0.66-1.25)  Other: Troponin 0.042 (0.000-0.045)(was 0.023 on 9/11/17), N terminal Pro BNP 49 (0-125),  (), magnesium 1.5 (1.6-2.3), hemoglobin 15.9 (13.3-17.7), platelets 213(150-450), CMV and EBV in process, /86    Immunosuppressant regimen: Tacrolimus generic capsules--- 3.5mg twice daily, mycophenolate 500mg tablets-- 500mg twice daily, prednisone tablets-- 10mg daily     Visit summary:  Obed is a heart transplant recipient of 3/23/12.   His chart is complete per protocol standards.  His dosing is accurate--keeping myco dose where it is at due to low WBC in past.   He does his medications at 1030am and 1030pm if twice daily dosing.  He gets his Vit D and aspirin OTC.  He will have surgery on 10/12/17 for his wrist (outpatient).    Following surgery ~ 6 weeks, he will have a follow up visit with  heart transplant team.  Plan is to go off prednisone and then will be on tacrolimus and sirolimus to help with rejection.  Once he is healed from surgery, that is the plan.

## 2017-10-09 NOTE — LETTER
10/9/2017      RE: Obed Viramontes  [address]  [facility] [address]       [facility]  Pediatric Heart Transplant Clinic    2017    RE: Obed Viramontes  : 1995  MRN: [id number]    Dear Rayo and colleagues,    I had the pleasure of evaluating our mutual patient, Obed Viramontes, at the [facility] on 10/09/2017 for regularly scheduled follow-up. As you remember, Obed is a 21 year old male with history of heart transplant (3/23/2012) for dilated cardiomyopathy and post-transplant lymphoproliferative disorder (2012) who transferred care to the [facility] in 2012. He is in clinic today with his mother and father for follow-up after recent rejection episodes. He was seen in April for routine  annual follow-up; however, he had elevated troponin and routine biopsy showed 2R rejection, and he was noncompliant with medications prior to this. He was admitted for 3 days of IV Solumedrol and his troponin improved. Following that, he was staying with mom for a few weeks and was on a better schedule, compliant with medications, and eating better and has gained weight. He reported that even since going back to dad's house and going back to work that he was compliant with his medications. However on routine visit on  had elevated troponin again, and so was taken for biopsy on  which showed 2R rejection, which was treated with 5 days of prednisone and increasing his tacrolimus goals. On follow-up on  his troponin had improved. However, on repeat followup at end of  his troponin was elevated again and he had new PACs on ecg concerning for ongoing rejection. We elected to treat with 3 days prednsione 60 mg daily, followed by 20 mg daily which he remained on until July and then weaned to 10 mg daily.     He was last seen in clinic  by my colleague Dr. Bobbi Pruitt, on  and underwent diagnostic right heart cathterization on . There was no rejection on biopsy and his labs had continued to improved. Prednisone was decreased to 5 mg daily because of side effects (insomnia and voracious appetite). Today, he reports being compliant with his immunosuppressive regimen of tacrolimus 3.5 mg PO BID and prednisone 5 mg PO daily. He has had a sore throat for the past few days, attributed to the recent weather change; however, he denies runny nose, congestion, cough or fever. He feels his energy level is stable, no edema, no shortness of breath with activity. He denies chest pain, palpitations, dizziness or syncope. He reports having a rash on his back, which has been there since the Spring and is attributed to steroids.    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  He had uncomplicated  course and was healthy in early childhood. He was hospitalized at age 2 1/2 years with viral myocarditis, hospitalized for 6 weeks, intubated for 10 days, had recovery of myocardial function and was treated chronically with digoxin, hospital course complicated by cerobrovascular accident with  resultant left hemiplegia, transitioned to [facility] for an additional 5 weeks following his hospital course. He has had several orthopedic surgeries throughout childhood because of his hemiplegia, but otherwise has been healthy.  He was asymptomatic from a cardiac standpoint until February 2012, at which time he presented with chest pain.  He was found to have significant arrhythmias, was admitted to [facility], evaluated and listed for transplant (mom reports underlying diagnosis of hypertrophic cardiomyopathy possibly secondary to glycogen storage disease?). He was transplanted on March 23, 2012, did well post transplant and was discharged after 1 week. He reportedly has done well from a cardiac standpoint with no rejection, 1R biopsy in May and 1R biopsy in September but otherwise negative biopsies. He presented in September with mouth sores and difficulty taking medication, initially treated with lowering his immunosuppression, but was rehospitalized with fever and worsening mouth sores. 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 He was treated with rituximab x 2, and increased prednisone dose from 2.5 mg daily back up 10 mg twice daily. He was scheduled to undergo additional rituximab dose but symptoms improved and repeat CT scan showed improvement in his lymph node and tonsillar hypertrophy. Per mom he was EBV negative prior to transplant, and she thinks his donor was EBV positive. He has done well from an infection/PTLD standpoint and came off valcyte therapy in December 2013 with negative EBV PCR in February 2014. He did undergo orthopedic surgery for his left hand contracture in October 2016, admitted in January 2017 with a cellulitis of that hand that has completely resolved. He is scheduled to undergo plate removal from that hand in October this year.    Social History:   Lives with dad, works at [employer].  "    Family history:  Negative for congenital heart defects, heart transplant, cardiomyopathy, sudden death. Sister with type 1 DM.  Older sister recently diagnosed with POTS.    Medications:  Prescription Medications as of 10/9/2017             tacrolimus (GENERIC EQUIVALENT) 0.5 MG capsule Take 3.5 mg twice daily    predniSONE (DELTASONE) 5 MG tablet Take 1 tablet (5 mg) by mouth daily    tacrolimus (PROGRAF - GENERIC EQUIVALENT) 1 MG capsule Take 3.5 mg twice daily    aspirin 81 MG EC tablet Take 2 tablets (162 mg) by mouth daily    Cholecalciferol (VITAMIN D3) 2000 UNITS TABS Take 2,000 Units by mouth daily    amLODIPine (NORVASC) 5 MG tablet Take 1 tablet (5 mg) by mouth daily    mycophenolate (CELLCEPT - GENERIC EQUIVALENT) 500 MG tablet Take 1 tablet (500 mg) by mouth 2 times daily    pravastatin (PRAVACHOL) 10 MG tablet Take 1 tablet (10 mg) by mouth daily At bedtime        Allergies:   Allergies   Allergen Reactions     Latex Rash     Other [Seasonal Allergies]      Corn-abdominal pain and diarrhea.     Physical Exam:  /86 (BP Location: Right arm, Patient Position: Chair, Cuff Size: Adult Regular)  Pulse 100  Resp 20  Ht 1.854 m (6' 0.99\")  Wt 63.5 kg (139 lb 15.9 oz)  SpO2 98%  BMI 18.47 kg/m2   Repeat RA BP: 116/76; HR 92    Gen: Alert, interactive and appropriate, sitting, no respiratory distress.  HEENT: NCAT, EOMI, PERRL 3 mm OU, nares patent with no mucosal erythema or discharge; bilateral OM pearly, translucent with preserved anatomic landmarks; OP moist without erythema. Single erythematous ulcer on left soft palate near uvula.  Neck: no lymphadenopathy  Lungs:  normal breath sounds bilaterall;, no wheeze, crackles, or rhonchi, good air entry  CV: quiet precordium, normal PMI, RRR, normal S1 and S2, no murmurs, rubs, gallops  Abd: abdomen is soft without tenderness, masses, organomegaly or guarding  Ext: WWP, 2+ pulses upper and 2+ lower extremitiy without delay, no edema  Skin: numerous " erythematous pustules on the back  Neuro: alert and oriented, mildly dysarthric; left hemiplegic    Laboratory Studies:  Obed had evaluation today with labs, EKG and echocardiogram. His echo showed normal post-transplant anatomy, normal systolic function and no pericardial effusion. His EKG showed normal sinus rhythm, rate of 93 beats per minute and no interventricular conduction delay, ST segment or T-wave changes. His labs included a comprehensive metabolic panel, which was normal (specifically BUN of 19, creatinine of 0.91 and normal LFTs).  Troponin elevated again to 0.042 (up from 0.023 on 9/11 after prednisone was decreased to 5 mg daily). NT-pro BNP is normal at 49. He had a CBC which had normal WBC of 4.8, hemoglobin of 15.9 and platelets of 268542. He had a CMV PCR, EBV PCR, PRA and tacrolimus level that are pending. His last biopsy on 9/11/17 showed no rejection (ISHLT grade 1R), pAMR0, negative C3d/C4d staining.    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 He also has history of EBV positive PTLD, now in remission after rituximab. He most recently has had a number of episodes of biopsy-proven and presumed cellular rejection over the past 5-6 months in the setting of non-compliance with immunosuppressive regimen. His increased troponin and PACs in late June (presumed rejection) resolved with pulse-dosed  steroids and slow taper. On last months cath, there was no evidence of rejection (ISHLT grade 1R); however, his troponin level is every so slightly increased today from previous. I believe he is now compliant but out of an abundance of caution, I would prefer to put him back on the previous dose of prednisone he was on when troponin levels were lower/undetectable. NT-pro BNP is normal today, which is reassuring that there is no rejection at this time. He has acne on his back, likely from exogenous steroids. Sore throat may be from a possible aphthous ulcer on his soft palate.      Diagnosis summary:  1. Viral myocarditis at age 2 1/2 years  2. Dilated cardiomyopathy           A. S/p orthotopic heart transplant (3/23/12)   1. 2R rejection (4/21/17), treated with Solumedrol x 3 days   2. 2R rejection (5/26/17), treated with prednisone x 5 days   3. Presumed rejection (6/26/17) based on elevated troponin and new PACs, treated with prednisone x 3 days and then taper  3. Cerebrovascular accident at age 2 1/2 years with resultant left hemiplegia  4. Multiple orthopedic surgeries in past  5. PTLD, EBV positive            A.  treated with rituximab x 2    Plan:  - increase pravastatin to 10 mg (usual adult dose)  - given very slight increase in troponin and no change in side effects of prednisone between 10 mg and 5 mg dose, will increase prednisone back to 10 mg daily  - OK from a transplant standpoint to proceed with hand surgery as scheduled  - continue tacrolimus and prednisone as double immunosuppressive therapy until at least 6 weeks post-operatively AND if surgical wound is healing nicely; after that point, will consider switching to just tacrolimus and sirolimus and discontinuing prednisone    Activity Restriction: none  SBE prophylaxis: none    Follow-up: in 6 weeks after surgery    Thank you for the opportunity to participate in Obed's care. Please let me know if you have further questions.    Sincerely,      Taj Lorenzo MD    Pediatric Heart Failure and Transplantation     CC  Patient Care Team:  Rayo Schafer MD as PCP - General (Pediatrics)  Isrrael Trent MD as MD (Oncology)    Copy to patient  [address] ENCARNACION    [address]  [facility] [id number]

## 2017-10-09 NOTE — MR AVS SNAPSHOT
After Visit Summary   10/9/2017    Obed Viramontes    MRN: [id number]           Patient Information     Date Of Birth          1995        Visit Information        Provider Department      10/9/2017 10:00 AM Bobbi Pruitt MD Peds Cardiac Transplant        Today's Diagnoses     Heart replaced by transplant (H)          Care Instructions    1) Follow-up 6 weeks after wrist surgery.   2) We will call with tacrolimus level.   3) Increase prednisone to 10 mg daily.          Follow-ups after your visit        Your next 10 appointments already scheduled     Oct 12, 2017   Procedure with Amna Tinoco MD   [facility] [place], Same Day Surgery (--)    [address] [id number] [phone]            Oct 19, 2017  1:00 PM CDT   (Arrive by 12:45 PM)   Post-Op with  U ORTHO HAND POP   [facility] ([facility]    [address] [place] [id number] [phone]            Nov 16, 2017 11:20 AM CST   (Arrive by 11:05 AM)   POST-OP HAND with Amna Tinoco MD   [facility] ([facility])    [address] [place] [address] [id number] [phone]              Future tests that were ordered for you today     Open Future Orders        Priority Expected Expires Ordered    Echo pediatric complete Routine  1/7/2018 10/9/2017    EKG 12 lead - pediatric Routine  1/7/2018 10/9/2017    X-ray Chest 2 vws* Routine 10/9/2017 1/7/2018 10/9/2017            Who to contact     Please call your clinic at [phone] to:    Ask questions about your health    Make or cancel appointments    Discuss your medicines    Learn about your test results    Speak to your doctor   If you have compliments or concerns about an experience at your clinic, or if you wish to file a complaint, please contact [facility] Patient Relations at [phone] or  "email us at [email or web address]         Additional Information About Your Visit        [employer] Information     [employer] gives you secure access to your electronic health record. If you see a primary care provider, you can also send messages to your care team and make appointments. If you have questions, please call your primary care clinic.  If you do not have a primary care provider, please call [phone] and they will assist you.      [employer] is an electronic gateway that provides easy, online access to your medical records. With [employer], you can request a clinic appointment, read your test results, renew a prescription or communicate with your care team.     To access your existing account, please contact your [facility] or call [phone] for assistance.        Care EveryWhere ID     This is your Care EveryWhere ID. This could be used by other organizations to access your [place] medical records  [id number]        Your Vitals Were     Pulse Respirations Height Pulse Oximetry BMI (Body Mass Index)       100 20 6' 0.99\" (185.4 cm) 98% 18.47 kg/m2        Blood Pressure from Last 3 Encounters:   10/09/17 136/86   09/11/17 109/69   08/24/17 125/80    Weight from Last 3 Encounters:   10/09/17 139 lb 15.9 oz (63.5 kg)   09/28/17 142 lb (64.4 kg)   09/11/17 142 lb 13.7 oz (64.8 kg)              We Performed the Following     CBC with platelets differential     CK total     CMV DNA quantification     Comprehensive Metabolic Panel     EBV DNA PCR Quantitative Whole Blood     EKG 12 lead - pediatric     Magnesium     N terminal pro BNP outpatient     Phosphorus     PRA Donor Specific Antibody     PRA Single Antigen IgG Antibody     Tacrolimus level     Troponin I          Today's Medication Changes          These changes are accurate as of: 10/9/17 11:47 AM.  If you have any questions, ask your nurse or doctor.               These medicines have changed or have updated " prescriptions.        Dose/Directions    predniSONE 10 MG tablet   Commonly known as:  DELTASONE   This may have changed:    - medication strength  - how much to take   Used for:  Heart replaced by transplant (H)   Changed by:  Bobbi Pruitt MD        Dose:  10 mg   Take 1 tablet (10 mg) by mouth daily   Quantity:  30 tablet   Refills:  3            Where to get your medicines      These medications were sent to [id number] - [place], [place]  [address]  [address], [facility] [address]     Phone:  [phone]     predniSONE 10 MG tablet                Primary Care Provider Office Phone # Fax #    Rayo Schafer [phone]       [facility] [address] [place] [address]        Equal Access to Services     [facility] : Jabier matthews Sogiovanna, waaxda lubarry, qaybgregorio kaalmaadele roche, stephanie hayes . So [facility] [phone].    ATENCIÓN: Si habla español, tiene a neves disposición servicios gratuitos de asistencia lingüística. [facility] [phone].    We comply with applicable federal civil rights laws and [place] laws. We do not discriminate on the basis of race, color, national origin, age, disability, sex, sexual orientation, or gender identity.            Thank you!     Thank you for choosing [facility]  for your care. Our goal is always to provide you with excellent care. Hearing back from our patients is one way we can continue to improve our services. Please take a few minutes to complete the written survey that you may receive in the mail after your visit with us. Thank you!             Your Updated Medication List - Protect others around you: Learn how to safely use, store and throw away your medicines at www.disposemymeds.org.          This list is accurate as of: 10/9/17 11:47 AM.  Always use your most recent med list.                   Brand Name Dispense Instructions  for use Diagnosis    amLODIPine 5 MG tablet    NORVASC    90 tablet    Take 1 tablet (5 mg) by mouth daily    Heart replaced by transplant (H)       aspirin 81 MG EC tablet     60 tablet    Take 2 tablets (162 mg) by mouth daily    Status post transplant, heart (H)       mycophenolate 500 MG tablet    GENERIC EQUIVALENT    180 tablet    Take 1 tablet (500 mg) by mouth 2 times daily    Heart transplanted (H)       pravastatin 10 MG tablet    PRAVACHOL    90 tablet    Take 1 tablet (10 mg) by mouth daily At bedtime    Heart transplanted (H), Heart transplanted (H)       predniSONE 10 MG tablet    DELTASONE    30 tablet    Take 1 tablet (10 mg) by mouth daily    Heart replaced by transplant (H)       * tacrolimus 1 MG capsule    GENERIC EQUIVALENT    270 capsule    Take 3.5 mg twice daily    Transplanted heart (H)       * tacrolimus 0.5 MG capsule    GENERIC EQUIVALENT    180 capsule    Take 3.5 mg twice daily    Transplanted heart (H)       Vitamin D3 2000 UNITS Tabs     100 tablet    Take 2,000 Units by mouth daily    Heart transplanted (H)       * Notice:  This list has 2 medication(s) that are the same as other medications prescribed for you. Read the directions carefully, and ask your doctor or other care provider to review them with you.

## 2017-10-11 PROBLEM — L03.90 CELLULITIS: Status: RESOLVED | Noted: 2017-01-12 | Resolved: 2017-10-11

## 2017-10-11 LAB
EBV DNA # SPEC NAA+PROBE: NORMAL {COPIES}/ML
EBV DNA SPEC NAA+PROBE-LOG#: NORMAL {LOG_COPIES}/ML

## 2017-10-12 ENCOUNTER — HOSPITAL ENCOUNTER (OUTPATIENT)
Facility: CLINIC | Age: 22
Discharge: HOME | End: 2017-10-12
Attending: ORTHOPAEDIC SURGERY | Admitting: ORTHOPAEDIC SURGERY
Payer: COMMERCIAL

## 2017-10-12 ENCOUNTER — SURGERY (OUTPATIENT)
Age: 22
End: 2017-10-12

## 2017-10-12 ENCOUNTER — ANESTHESIA (OUTPATIENT)
Dept: SURGERY | Facility: CLINIC | Age: 22
End: 2017-10-12
Payer: COMMERCIAL

## 2017-10-12 VITALS
HEART RATE: 92 BPM | DIASTOLIC BLOOD PRESSURE: 71 MMHG | BODY MASS INDEX: 18.67 KG/M2 | OXYGEN SATURATION: 98 % | SYSTOLIC BLOOD PRESSURE: 120 MMHG | RESPIRATION RATE: 18 BRPM | WEIGHT: 140.87 LBS | TEMPERATURE: 97.8 F | HEIGHT: 73 IN

## 2017-10-12 DIAGNOSIS — Z47.89 AFTERCARE FOLLOWING SURGERY OF THE MUSCULOSKELETAL SYSTEM: Primary | ICD-10-CM

## 2017-10-12 LAB — GLUCOSE BLDC GLUCOMTR-MCNC: 155 MG/DL (ref 70–99)

## 2017-10-12 PROCEDURE — 27210995 ZZH RX 272: Performed by: ORTHOPAEDIC SURGERY

## 2017-10-12 PROCEDURE — 71000027 ZZH RECOVERY PHASE 2 EACH 15 MINS: Performed by: ORTHOPAEDIC SURGERY

## 2017-10-12 PROCEDURE — 36000055 ZZH SURGERY LEVEL 2 W FLUORO 1ST 30 MIN - UMMC: Performed by: ORTHOPAEDIC SURGERY

## 2017-10-12 PROCEDURE — 25000125 ZZHC RX 250: Performed by: NURSE ANESTHETIST, CERTIFIED REGISTERED

## 2017-10-12 PROCEDURE — 25000128 H RX IP 250 OP 636: Performed by: ORTHOPAEDIC SURGERY

## 2017-10-12 PROCEDURE — 25000125 ZZHC RX 250: Performed by: ORTHOPAEDIC SURGERY

## 2017-10-12 PROCEDURE — 25000132 ZZH RX MED GY IP 250 OP 250 PS 637: Performed by: STUDENT IN AN ORGANIZED HEALTH CARE EDUCATION/TRAINING PROGRAM

## 2017-10-12 PROCEDURE — 40000170 ZZH STATISTIC PRE-PROCEDURE ASSESSMENT II: Performed by: ORTHOPAEDIC SURGERY

## 2017-10-12 PROCEDURE — 27210794 ZZH OR GENERAL SUPPLY STERILE: Performed by: ORTHOPAEDIC SURGERY

## 2017-10-12 PROCEDURE — 71000014 ZZH RECOVERY PHASE 1 LEVEL 2 FIRST HR: Performed by: ORTHOPAEDIC SURGERY

## 2017-10-12 PROCEDURE — 37000009 ZZH ANESTHESIA TECHNICAL FEE, EACH ADDTL 15 MIN: Performed by: ORTHOPAEDIC SURGERY

## 2017-10-12 PROCEDURE — 25000128 H RX IP 250 OP 636: Performed by: NURSE ANESTHETIST, CERTIFIED REGISTERED

## 2017-10-12 PROCEDURE — 25000566 ZZH SEVOFLURANE, EA 15 MIN: Performed by: ORTHOPAEDIC SURGERY

## 2017-10-12 PROCEDURE — 37000008 ZZH ANESTHESIA TECHNICAL FEE, 1ST 30 MIN: Performed by: ORTHOPAEDIC SURGERY

## 2017-10-12 PROCEDURE — 36000053 ZZH SURGERY LEVEL 2 EA 15 ADDTL MIN - UMMC: Performed by: ORTHOPAEDIC SURGERY

## 2017-10-12 PROCEDURE — S0020 INJECTION, BUPIVICAINE HYDRO: HCPCS | Performed by: ORTHOPAEDIC SURGERY

## 2017-10-12 PROCEDURE — 82962 GLUCOSE BLOOD TEST: CPT

## 2017-10-12 RX ORDER — ONDANSETRON 4 MG/1
4 TABLET, ORALLY DISINTEGRATING ORAL
Status: DISCONTINUED | OUTPATIENT
Start: 2017-10-12 | End: 2017-10-12 | Stop reason: HOSPADM

## 2017-10-12 RX ORDER — ACETAMINOPHEN 325 MG/1
650 TABLET ORAL
Status: DISCONTINUED | OUTPATIENT
Start: 2017-10-12 | End: 2017-10-12 | Stop reason: HOSPADM

## 2017-10-12 RX ORDER — BUPIVACAINE HYDROCHLORIDE 5 MG/ML
INJECTION, SOLUTION EPIDURAL; INTRACAUDAL PRN
Status: DISCONTINUED | OUTPATIENT
Start: 2017-10-12 | End: 2017-10-12 | Stop reason: HOSPADM

## 2017-10-12 RX ORDER — OXYCODONE HYDROCHLORIDE 5 MG/1
5-10 TABLET ORAL
Status: COMPLETED | OUTPATIENT
Start: 2017-10-12 | End: 2017-10-12

## 2017-10-12 RX ORDER — MAGNESIUM HYDROXIDE 1200 MG/15ML
LIQUID ORAL PRN
Status: DISCONTINUED | OUTPATIENT
Start: 2017-10-12 | End: 2017-10-12 | Stop reason: HOSPADM

## 2017-10-12 RX ORDER — PROPOFOL 10 MG/ML
INJECTION, EMULSION INTRAVENOUS CONTINUOUS PRN
Status: DISCONTINUED | OUTPATIENT
Start: 2017-10-12 | End: 2017-10-12

## 2017-10-12 RX ORDER — FENTANYL CITRATE 50 UG/ML
INJECTION, SOLUTION INTRAMUSCULAR; INTRAVENOUS PRN
Status: DISCONTINUED | OUTPATIENT
Start: 2017-10-12 | End: 2017-10-12

## 2017-10-12 RX ORDER — SODIUM CHLORIDE, SODIUM LACTATE, POTASSIUM CHLORIDE, CALCIUM CHLORIDE 600; 310; 30; 20 MG/100ML; MG/100ML; MG/100ML; MG/100ML
INJECTION, SOLUTION INTRAVENOUS CONTINUOUS PRN
Status: DISCONTINUED | OUTPATIENT
Start: 2017-10-12 | End: 2017-10-12

## 2017-10-12 RX ORDER — CEFAZOLIN SODIUM 2 G/100ML
2 INJECTION, SOLUTION INTRAVENOUS
Status: COMPLETED | OUTPATIENT
Start: 2017-10-12 | End: 2017-10-12

## 2017-10-12 RX ORDER — FENTANYL CITRATE 50 UG/ML
25-50 INJECTION, SOLUTION INTRAMUSCULAR; INTRAVENOUS EVERY 5 MIN PRN
Status: DISCONTINUED | OUTPATIENT
Start: 2017-10-12 | End: 2017-10-12 | Stop reason: HOSPADM

## 2017-10-12 RX ORDER — CEFAZOLIN SODIUM 1 G/3ML
1 INJECTION, POWDER, FOR SOLUTION INTRAMUSCULAR; INTRAVENOUS SEE ADMIN INSTRUCTIONS
Status: DISCONTINUED | OUTPATIENT
Start: 2017-10-12 | End: 2017-10-12 | Stop reason: HOSPADM

## 2017-10-12 RX ORDER — SODIUM CHLORIDE, SODIUM LACTATE, POTASSIUM CHLORIDE, CALCIUM CHLORIDE 600; 310; 30; 20 MG/100ML; MG/100ML; MG/100ML; MG/100ML
INJECTION, SOLUTION INTRAVENOUS CONTINUOUS
Status: DISCONTINUED | OUTPATIENT
Start: 2017-10-12 | End: 2017-10-12 | Stop reason: HOSPADM

## 2017-10-12 RX ORDER — NALOXONE HYDROCHLORIDE 0.4 MG/ML
.1-.4 INJECTION, SOLUTION INTRAMUSCULAR; INTRAVENOUS; SUBCUTANEOUS
Status: DISCONTINUED | OUTPATIENT
Start: 2017-10-12 | End: 2017-10-12 | Stop reason: HOSPADM

## 2017-10-12 RX ORDER — OXYCODONE HYDROCHLORIDE 5 MG/1
5-10 TABLET ORAL
Qty: 20 TABLET | Refills: 0 | Status: SHIPPED | OUTPATIENT
Start: 2017-10-12 | End: 2017-11-16

## 2017-10-12 RX ORDER — ONDANSETRON 2 MG/ML
4 INJECTION INTRAMUSCULAR; INTRAVENOUS EVERY 30 MIN PRN
Status: DISCONTINUED | OUTPATIENT
Start: 2017-10-12 | End: 2017-10-12 | Stop reason: HOSPADM

## 2017-10-12 RX ORDER — ONDANSETRON 2 MG/ML
INJECTION INTRAMUSCULAR; INTRAVENOUS PRN
Status: DISCONTINUED | OUTPATIENT
Start: 2017-10-12 | End: 2017-10-12

## 2017-10-12 RX ORDER — ONDANSETRON 4 MG/1
4 TABLET, ORALLY DISINTEGRATING ORAL EVERY 30 MIN PRN
Status: DISCONTINUED | OUTPATIENT
Start: 2017-10-12 | End: 2017-10-12 | Stop reason: HOSPADM

## 2017-10-12 RX ORDER — BACITRACIN ZINC 500 [USP'U]/G
OINTMENT TOPICAL PRN
Status: DISCONTINUED | OUTPATIENT
Start: 2017-10-12 | End: 2017-10-12 | Stop reason: HOSPADM

## 2017-10-12 RX ORDER — PROPOFOL 10 MG/ML
INJECTION, EMULSION INTRAVENOUS PRN
Status: DISCONTINUED | OUTPATIENT
Start: 2017-10-12 | End: 2017-10-12

## 2017-10-12 RX ADMIN — ACETAMINOPHEN 650 MG: 325 TABLET, FILM COATED ORAL at 19:33

## 2017-10-12 RX ADMIN — MIDAZOLAM HYDROCHLORIDE 1 MG: 1 INJECTION, SOLUTION INTRAMUSCULAR; INTRAVENOUS at 17:59

## 2017-10-12 RX ADMIN — OXYCODONE HYDROCHLORIDE 10 MG: 5 TABLET ORAL at 19:32

## 2017-10-12 RX ADMIN — MIDAZOLAM HYDROCHLORIDE 1 MG: 1 INJECTION, SOLUTION INTRAMUSCULAR; INTRAVENOUS at 18:02

## 2017-10-12 RX ADMIN — SODIUM CHLORIDE 120 ML: 900 IRRIGANT IRRIGATION at 18:44

## 2017-10-12 RX ADMIN — OSELTAMIVIR PHOSPHATE 0.9 G: 75 CAPSULE ORAL at 18:48

## 2017-10-12 RX ADMIN — HYDROCORTISONE SODIUM SUCCINATE 50 MG: 100 INJECTION, POWDER, FOR SOLUTION INTRAMUSCULAR; INTRAVENOUS at 18:01

## 2017-10-12 RX ADMIN — ONDANSETRON 4 MG: 2 INJECTION INTRAMUSCULAR; INTRAVENOUS at 18:20

## 2017-10-12 RX ADMIN — FENTANYL CITRATE 25 MCG: 50 INJECTION, SOLUTION INTRAMUSCULAR; INTRAVENOUS at 18:58

## 2017-10-12 RX ADMIN — SODIUM CHLORIDE, POTASSIUM CHLORIDE, SODIUM LACTATE AND CALCIUM CHLORIDE: 600; 310; 30; 20 INJECTION, SOLUTION INTRAVENOUS at 17:59

## 2017-10-12 RX ADMIN — FENTANYL CITRATE 25 MCG: 50 INJECTION, SOLUTION INTRAMUSCULAR; INTRAVENOUS at 18:59

## 2017-10-12 RX ADMIN — FENTANYL CITRATE 100 MCG: 50 INJECTION, SOLUTION INTRAMUSCULAR; INTRAVENOUS at 18:04

## 2017-10-12 RX ADMIN — PROPOFOL 150 MCG/KG/MIN: 10 INJECTION, EMULSION INTRAVENOUS at 18:08

## 2017-10-12 RX ADMIN — FENTANYL CITRATE 50 MCG: 50 INJECTION, SOLUTION INTRAMUSCULAR; INTRAVENOUS at 18:36

## 2017-10-12 RX ADMIN — BUPIVACAINE HYDROCHLORIDE 5 ML: 5 INJECTION, SOLUTION EPIDURAL; INTRACAUDAL at 18:58

## 2017-10-12 RX ADMIN — BUPIVACAINE HYDROCHLORIDE 2 ML: 5 INJECTION, SOLUTION EPIDURAL; INTRACAUDAL at 18:59

## 2017-10-12 RX ADMIN — PROPOFOL 250 MG: 10 INJECTION, EMULSION INTRAVENOUS at 18:02

## 2017-10-12 RX ADMIN — CEFAZOLIN SODIUM 2 G: 2 INJECTION, SOLUTION INTRAVENOUS at 18:08

## 2017-10-12 ASSESSMENT — ENCOUNTER SYMPTOMS: SEIZURES: 0

## 2017-10-12 NOTE — IP AVS SNAPSHOT
MRN:[id number]                      After Visit Summary   10/12/2017    Obed Viramontes    MRN: [id number]           Thank you!     Thank you for choosing [facility] for your care. Our goal is always to provide you with excellent care. Hearing back from our patients is one way we can continue to improve our services. Please take a few minutes to complete the written survey that you may receive in the mail after you visit with us. Thank you!        Patient Information     Date Of Birth          1995        About your hospital stay     You were admitted on:  October 12, 2017 You last received care in the:  [facility] PACU    You were discharged on:  October 12, 2017       Who to Call     For medical emergencies, please call 911.  For non-urgent questions about your medical care, please call your primary care provider or clinic, [phone]  For questions related to your surgery, please call your surgery clinic        Attending Provider     Provider Specialty    Amna Tinoco MD Orthopedics       Primary Care Provider Office Phone # Fax #    Rayo Schafer [phone]      After Care Instructions      Diet as Tolerated       Return to diet before surgery, unless instructed otherwise.            Discharge Instructions       Review outpatient procedure discharge instructions with patient as directed by Provider            Dressing Change        IF leaking, remove and redress. Wash hands before and after and use gloves.            Ice to affected area       Ice pack to surgical site every 15 minutes per hour for 24 hours            Return to clinic       Return to clinic in 1 week            Weight bearing - Partial                 Your next 10 appointments already scheduled     Oct 19, 2017  1:00 PM CDT   (Arrive by 12:45 PM)   Post-Op with [facility] ORTHO HAND [facility] ([facility])    [address] [place] [address]  Floor  [facility] [address]   [phone]            Nov 16, 2017  7:30 AM CST   LAB with [facility]PLOR[facility] ([facility])    [address] [place] [address].  Formerly [facility] [id number] [phone]           Patient must bring picture ID. Patient should be prepared to give a urine specimen  Please do not eat 10-12 hours before your appointment if you are coming in fasting for labs on lipids, cholesterol, or glucose (sugar). Pregnant women should follow their Care Team instructions. Water with medications is okay. Do not drink coffee or other fluids. If you have concerns about taking  your medications, please ask at office or if scheduling via [employer], send a message by clicking on Secure Messaging, Message Your Care Team.            Nov 16, 2017  9:00 AM CST   Ech Pediatric Complete with [facility]   [facility] Echo/EKG ([facility])    [address] [place] [id number]               Nov 16, 2017 10:00 AM CST   Return Visit with Bobbi Pruitt MD   Peds Cardiac Transplant ([facility])    [facility]  [address]th Fl[address],East Bld  [address] [place] [id number] [phone]            Nov 16, 2017 11:20 AM CST   (Arrive by 11:05 AM)   POST-OP HAND with Amna Tinoco MD   [facility] ([facility])    [address] [place] [address]  4th Floor  [facility] [id number] [phone]              Further instructions from your care team       [facility]  Same-Day Surgery   Adult Discharge Orders & Instructions     For 24 hours after surgery    1. Get plenty of rest.  A responsible adult must stay with you for at least 24 hours after you leave the hospital.   2. Do not drive or use heavy equipment.  If you have weakness or tingling, don't drive or use heavy equipment until this feeling goes away.  3. Do not drink alcohol.  4. Avoid  "strenuous or risky activities.  Ask for help when climbing stairs.   5. You may feel lightheaded.  IF so, sit for a few minutes before standing.  Have someone help you get up.   6. If you have nausea (feel sick to your stomach): Drink only clear liquids such as apple juice, ginger ale, broth or 7-Up.  Rest may also help.  Be sure to drink enough fluids.  Move to a regular diet as you feel able.  7. You may have a slight fever. Call the doctor if your fever is over 100 F (37.7 C) (taken under the tongue) or lasts longer than 24 hours.  8. You may have a dry mouth, a sore throat, muscle aches or trouble sleeping.  These should go away after 24 hours.  9. Do not make important or legal decisions.   Call your doctor for any of the followin.  Signs of infection (fever, growing tenderness at the surgery site, a large amount of drainage or bleeding, severe pain, foul-smelling drainage, redness, swelling).    2. It has been over 8 to 10 hours since surgery and you are still not able to urinate (pass water).    3.  Headache for over 24 hours.    4.  Numbness, tingling or weakness the day after surgery (if you had spinal anesthesia).  To contact a doctor, call ________________________________________ or:        [phone] and ask for the resident on call for   ______________________________________________ (answered 24 hours a day)      Emergency Department:    [facility]: [phone]       (TTY for hearing impaired: [phone])    [facility]: [phone]       (TTY for hearing impaired: [phone])        Pending Results     No orders found from 10/10/2017 to 10/13/2017.            Admission Information     Date & Time Provider Department Dept. Phone    10/12/2017 Amna Tinoco MD [facility] PACU [phone]      Your Vitals Were     Blood Pressure Pulse Temperature Respirations Height Weight    122/74 92 98.2  F (36.8  C) (Oral) 18 1.854 m (6' 1\") 63.9 kg (140 lb 14 oz)    Pulse Oximetry " BMI (Body Mass Index)                95% 18.59 kg/m2          MyChart Information     "[employer] gives you secure access to your electronic health record. If you see a primary care provider, you can also send messages to your care team and make appointments. If you have questions, please call your primary care clinic.  If you do not have a primary care provider, please call [phone] and they will assist you.        Care EveryWhere ID     This is your Care EveryWhere ID. This could be used by other organizations to access your [place] medical records  [id number]        Equal Access to Services     [facility] : Hadmaria luz Maddox, brenda rob, nigel roche, stephanie hayes . So [facility] [phone].    ATENCIÓN: Si habla español, tiene a neves disposición servicios gratuitos de asistencia lingüística. Llame al [phone].    We comply with applicable federal civil rights laws and [place] laws. We do not discriminate on the basis of race, color, national origin, age, disability, sex, sexual orientation, or gender identity.               Review of your medicines      START taking        Dose / Directions    oxyCODONE 5 MG IR tablet   Commonly known as:  ROXICODONE   Used for:  Aftercare following surgery of the musculoskeletal system        Dose:  5-10 mg   Take 1-2 tablets (5-10 mg) by mouth every 3 hours as needed for pain or other (Moderate to Severe)   Quantity:  20 tablet   Refills:  0         CONTINUE these medicines which have NOT CHANGED        Dose / Directions    amLODIPine 5 MG tablet   Commonly known as:  NORVASC   Used for:  Heart replaced by transplant (H)        Dose:  5 mg   Take 1 tablet (5 mg) by mouth daily   Quantity:  90 tablet   Refills:  3       aspirin 81 MG EC tablet   Used for:  Status post transplant, heart (H)        Dose:  162 mg   Take 2 tablets (162 mg) by mouth daily   Quantity:  60 tablet   Refills:  99       mycophenolate 500 MG  tablet   Commonly known as:  GENERIC EQUIVALENT   Used for:  Heart transplanted (H)        Dose:  500 mg   Take 1 tablet (500 mg) by mouth 2 times daily   Quantity:  180 tablet   Refills:  11       pravastatin 10 MG tablet   Commonly known as:  PRAVACHOL        Dose:  10 mg   Take 1 tablet (10 mg) by mouth daily At bedtime   Quantity:  90 tablet   Refills:  6       predniSONE 10 MG tablet   Commonly known as:  DELTASONE   Used for:  Heart replaced by transplant (H)        Dose:  10 mg   Take 1 tablet (10 mg) by mouth daily   Quantity:  90 tablet   Refills:  3       * tacrolimus 1 MG capsule   Commonly known as:  GENERIC EQUIVALENT   Used for:  Transplanted heart (H)        Take 3.5 mg twice daily   Quantity:  270 capsule   Refills:  11       * tacrolimus 0.5 MG capsule   Commonly known as:  GENERIC EQUIVALENT   Used for:  Transplanted heart (H)        Take 3.5 mg twice daily   Quantity:  180 capsule   Refills:  3       Vitamin D3 2000 UNITS Tabs   Used for:  Heart transplanted (H)        Dose:  2000 Units   Take 2,000 Units by mouth daily   Quantity:  100 tablet   Refills:  6       * Notice:  This list has 2 medication(s) that are the same as other medications prescribed for you. Read the directions carefully, and ask your doctor or other care provider to review them with you.         Where to get your medicines      Some of these will need a paper prescription and others can be bought over the counter. Ask your nurse if you have questions.     Bring a paper prescription for each of these medications     oxyCODONE 5 MG IR tablet                Protect others around you: Learn how to safely use, store and throw away your medicines at www.disposemymeds.org.             Medication List: This is a list of all your medications and when to take them. Check marks below indicate your daily home schedule. Keep this list as a reference.      Medications           Morning Afternoon Evening Bedtime As Needed    amLODIPine 5 MG  tablet   Commonly known as:  NORVASC   Take 1 tablet (5 mg) by mouth daily                                aspirin 81 MG EC tablet   Take 2 tablets (162 mg) by mouth daily                                mycophenolate 500 MG tablet   Commonly known as:  GENERIC EQUIVALENT   Take 1 tablet (500 mg) by mouth 2 times daily                                oxyCODONE 5 MG IR tablet   Commonly known as:  ROXICODONE   Take 1-2 tablets (5-10 mg) by mouth every 3 hours as needed for pain or other (Moderate to Severe)   Last time this was given:  10 mg on 10/12/2017  7:32 PM                                pravastatin 10 MG tablet   Commonly known as:  PRAVACHOL   Take 1 tablet (10 mg) by mouth daily At bedtime                                predniSONE 10 MG tablet   Commonly known as:  DELTASONE   Take 1 tablet (10 mg) by mouth daily                                * tacrolimus 1 MG capsule   Commonly known as:  GENERIC EQUIVALENT   Take 3.5 mg twice daily                                * tacrolimus 0.5 MG capsule   Commonly known as:  GENERIC EQUIVALENT   Take 3.5 mg twice daily                                Vitamin D3 2000 UNITS Tabs   Take 2,000 Units by mouth daily                                * Notice:  This list has 2 medication(s) that are the same as other medications prescribed for you. Read the directions carefully, and ask your doctor or other care provider to review them with you.

## 2017-10-12 NOTE — ANESTHESIA PREPROCEDURE EVALUATION
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 in November 2012. He was seen in April for routine 5th annual followup, however he had elevated troponin and routine biopsy showed 2R rejection, and he was non-compliant with medications prior to this. He was admitted for 3 days of iv solumedrol and his troponin improved. Following that, he was staying with mom for a few weeks and was on a better schedule, compliant with medications, and eating better and has gained weight. He reported that even since going back to dad's house and going back to work that he was compliant with his medications. However on routine visit on 5/25 had elevated troponin again, and so was taken for biopsy on 5/26 which showed 2R rejection, which was treated with 5 days of prednisone and increasing his tacrolimus goals. On followup on June 7th his troponin had improved. However, on repeat followup at end of June his troponin was elevated again and he had new PACs on ECG concerning for ongoing rejection. He was treated with 3 days prednsione 60mg daily, followed by 20mg daily which he remained on until 1 month ago, then weaned to 10mg daily.  He feels his energy level is stable, no edema, no shortness of breath with activity. no recent fevers, no chest pain, palpitations, tachycardia, dizziness or syncope.      He has had multiple general anesthetics in the past and tolerated them without problems.        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  - H/o dilated cardiomyopathy, s/p orthotopic heart transplant (3/23/12)  - H/o 2R rejection (4/21/17), treated with iv solumedrol x 3 days  - H/o 2R rejection (5/26/17), treated with  prednisone x 5 days  - H/o presumed rejection (17) based on elevated troponin and new PACs, treated with prednisone x 3 days and then taper      - Review of pathology reports there is questionable hypertrophic cardiomyopathy picture and glycogen storage disease    Neuro Findings   (-) seizures    Comments: - Cerebrovascular accident at age 2 1/2 with resultant left hemiplegia      Pulmonary Findings - negative ROS  (-) asthma and recent URI    HENT Findings - negative HENT ROS    Skin Findings - negative skin ROS     Findings   (-) prematurity      GI/Hepatic/Renal Findings - negative ROS  (-) GERD, liver disease and renal disease    Endocrine/Metabolic Findings   (+) chronic steroid use and adrenal disease        Hematology/Oncology Findings   (-) blood dyscrasia and clotting disorder  Comments: - Post-transplant lymphoproliferative disorder, EBV positive, treated with rituximab x 2, and increased prednisone dose from 2.5mg daily back up 10mg daily  - 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 Normal right and left ventricular  size and systolic function. The ascending aorta is mildly dilated. Trival  mitral valve insufficency. No pericardial effusion.  The calculated single plane left ventricular ejection fraction from the 2  chamber view is 57 %.          Past Medical History:   Diagnosis Date     Cardiomyopathy, hypertrophic obstructive (H)      H/O heart transplant (H)     Mar.23, 2012     Hemiplegia following CVA (cerebrovascular accident) (H)     left, improved with rehab     Lymphoproliferative disease (H)      Unspecified cerebral artery occlusion with cerebral infarction     age 2 1/2         Patient Active Problem List   Diagnosis     Aftercare following surgery of the musculoskeletal system     Stiffness of finger joint  of left hand     Flexion contracture of left elbow     Mechanical problems with limbs     History of stroke     Heart replaced by transplant (H)             Past Surgical History:   Procedure Laterality Date     ARTHRODESIS WRIST Left 10/20/2016    Procedure: ARTHRODESIS WRIST;  Surgeon: Amna Tinoco MD;  Location: [facility] OR     BIOPSY      gingival and tonsillar biopsy     [facility] AFF SURGICAL PATHOLOGY       HEART CATH CHILD  11/13/2012    Procedure: HEART CATH CHILD;  Right Heart Cath Procedure and Biopsy *Latex Allergy*;  Surgeon: Bobbi Pruitt MD;  Location: [facility] OR     HEART CATH CHILD  2/15/2013    Procedure: HEART CATH CHILD;  Right [place] Cath, Angiogram and Biopsy;  Surgeon: Bobbi Pruitt MD;  Location: [facility] OR     HEART CATH CHILD N/A 4/10/2015    Procedure: HEART CATH CHILD;  Surgeon: Bobbi Pruitt MD;  Location: [facility] OR     HEART CATH CHILD N/A 4/21/2017    Procedure: HEART CATH CHILD;  Right Heart Cath Procedure with Angio Biopsy    (latex allergy) ;  Surgeon: Bobbi Pruitt MD;  Location: [facility] OR     HEART CATH, BIOPSY  3/21/2014    Procedure: HEART CATH, BIOPSY;  Right and Left Heart Cath, Angiogram, Biopsy   *Latex Allergy*;  Surgeon: Bobbi Pruitt MD;  [facility]: [facility] OR     HEART CATH, BIOPSY Right 4/18/2016    Procedure: HEART CATH, BIOPSY;  Surgeon: Bobbi Pruitt MD;  [facility]: [facility] OR     HEART CATH, BIOPSY Right 5/26/2017    Procedure: HEART CATH, BIOPSY;  Right Heart Cath and Biopsy  (Latex Allergy);  Surgeon: Bobbi Pruitt MD;  [facility]: [facility] OR     HEART CATH, BIOPSY N/A 9/11/2017    Procedure: HEART CATH, BIOPSY;  Right Heart Cath, Angio and Biopsy ;  Surgeon: Bobbi Pruitt MD;  Location: [facility] OR     INTERPOSITION TENDON HAND Left 10/20/2016    Procedure: INTERPOSITION TENDON HAND;  Surgeon: Amna Tinoco MD;  Location: [facility] OR     ORTHOPEDIC SURGERY      at [place], left lower extremity     PICC INSERTION       PICC  REMOVAL       TRANSPLANT HEART RECIPIENT               Allergies:    Allergies   Allergen Reactions     Latex Rash     Other [Seasonal Allergies]      Corn-abdominal pain and diarrhea.           Meds:   No current outpatient prescriptions on file.             Physical Exam  Normal systems: cardiovascular, pulmonary and dental    Airway   Mallampati: I  TM distance: >3 FB  Neck ROM: full    Dental     Cardiovascular   Rhythm and rate: regular and normal      Pulmonary    breath sounds clear to auscultation            Labs:  BMP:  Recent Labs   Lab Test  08/24/17   1012   NA  142   POTASSIUM  4.0   CHLORIDE  108   CO2  23   BUN  15   CR  0.81   GLC  121*   CARLOS A  8.9     LFTs:   Recent Labs   Lab Test  08/24/17   1012   PROTTOTAL  6.9   ALBUMIN  4.1   BILITOTAL  0.9   ALKPHOS  60   AST  10   ALT  17     CBC:   Recent Labs   Lab Test  08/24/17   1012   WBC  5.0   RBC  5.21   HGB  15.3   HCT  43.3   MCV  83   MCH  29.4   MCHC  35.3   RDW  12.7   PLT  187     Coags:  Recent Labs   Lab Test  01/12/17   1416   INR  1.00   PTT  28         Echo (8/24/2017):  Patient after orthotopic heart transplant. Normal right and left ventricular size and systolic function. The calculated biplane left ventricular ejection fraction is 60-65%. LVRI of 0.7. The ascending aorta is mildly dilated. Trival mitral valve insufficency. Insufficient jet to estimate right ventricular systolic pressure. No pericardial effusion.     Segmental Anatomy:  There is normal atrial arrangement, with concordant atrioventricular and ventriculoarterial connections.     Systemic and pulmonary veins:  The right superior vena cava and inferior vena cava enter the right atrium with normal flow. Color flow demonstrates flow from at least one pulmonary vein entering the left atrium.     Atria and atrial septum:  There is bi-atrial enlargement consistent with history of heart transplant. There is no atrial level shunting.     Atrioventricular valves:  The tricuspid  valve is normal in appearance and motion. Trivial tricuspid valve insufficiency. Insufficient jet to estimate right ventricular systolic pressure. The mitral valve is normal in appearance and motion. There is no mitral valve insufficiency.     Ventricles and Ventricular Septum:  Normal right ventricular size. Normal right ventricular systolic function. Normal left ventricular size. Normal left ventricular systolic function. The calculated biplane left ventricular ejection fraction is 61 %. There is no ventricular level shunting.     Outflow tracts:  Normal great artery relationship. There is unobstructed flow through the right ventricular outflow tract. The pulmonary valve motion is normal. There is normal flow across the pulmonary valve. There is unobstructed flow through the left ventricular outflow tract. Tricuspid aortic valve with normal appearance and motion. There is normal flow across the aortic valve. There is no aortic valve insufficiency.     Great arteries:  The main pulmonary artery has normal appearance. There is unobstructed flow in the main pulmonary artery. The pulmonary artery bifurcation is normal. There is unobstructed flow in both branch pulmonary arteries. The ascending aorta is mildly dilated. The ascending aorta Z-score is 2.0. The aortic arch appears normal. There is unobstructed antegrade flow in the ascending, transverse arch, descending thoracic and abdominal aorta.     Arterial Shunts:  There is no arterial level shunting.     Coronaries:  Normal origin of the right and left proximal coronary arteries from the corresponding sinus of Valsalva by 2D, without color flow correlation.     Effusions, catheters, cannulas and leads:  No pericardial effusion.          Anesthesia Plan      History & Physical Review  History and physical reviewed and following examination; no interval change.    ASA Status:  3 .    NPO Status:  > 6 hours    Plan for General and LMA with Intravenous and Propofol  induction. Maintenance will be TIVA.    PONV prophylaxis:  Ondansetron (or other 5HT-3) and Dexamethasone or Solumedrol  Will plan to administer stress-dose steroids prior to induction.    Discussed risks, benefits, and alternatives of general anesthesia with the patient. Risks discussed included nausea/vomiting, sore throat, dental damage, soft tissue damage, problems with heart, brain, lungs, and rare allergic reactions. Patient was given a chance to ask questions. Patient consents to procedure.         Postoperative Care  Postoperative pain management:  IV analgesics and Multi-modal analgesia.      Consents  Anesthetic plan, risks, benefits and alternatives discussed with:  Patient and Parent (Mother and/or Father).  Use of blood products discussed: No .   .          Janett Khan MD  Pediatric Anesthesiologist  Pager: [phone]

## 2017-10-12 NOTE — IP AVS SNAPSHOT
[place] [address] [facility] [address]    Phone:  [phone]                                       After Visit Summary   10/12/2017    Obed Viramontes    MRN: [id number]           After Visit Summary Signature Page     I have received my discharge instructions, and my questions have been answered. I have discussed any challenges I see with this plan with the nurse or doctor.    ..........................................................................................................................................  Patient/Patient Representative Signature      ..........................................................................................................................................  Patient Representative Print Name and Relationship to Patient    ..................................................               ................................................  Date                                            Time    ..........................................................................................................................................  Reviewed by Signature/Title    ...................................................              ..............................................  Date                                                            Time

## 2017-10-13 NOTE — ANESTHESIA CARE TRANSFER NOTE
Patient: Obed Viramontes    Procedure(s):  Left Wrist Fusion Plate Removal     (latex allergy)  - Wound Class: I-Clean    Diagnosis: Left Wrist Fusion with Retained Hardware   Diagnosis Additional Information: No value filed.    Anesthesia Type:   General, LMA     Note:  Airway :Face Mask  Patient transferred to:PACU  Handoff Report: Identifed the Patient, Identified the Reponsible Provider, Reviewed the pertinent medical history, Discussed the surgical course, Reviewed Intra-OP anesthesia mangement and issues during anesthesia, Set expectations for post-procedure period and Allowed opportunity for questions and acknowledgement of understanding      Vitals: (Last set prior to Anesthesia Care Transfer)    CRNA VITALS  10/12/2017 1840 - 10/12/2017 1917      10/12/2017             NIBP: 123/77    NIBP Mean: 91                Electronically Signed By: CEZAR Byrd CRNA  October 12, 2017  7:17 PM

## 2017-10-13 NOTE — OP NOTE
PREOPERATIVE DIAGNOSIS:  Retained left wrist fusion hardware.      POSTOPERATIVE DIAGNOSIS:  Retained left wrist fusion hardware.      PROCEDURE PERFORMED:  Left wrist removal of hardware.      TOURNIQUET TIME:  46 minutes.      COMPLICATIONS:  None.      SPECIMENS:  None.      HARDWARE REMOVED:  Wrist fusion plate with 8 screws.      ANESTHESIA:  General.      ESTIMATED BLOOD LOSS:  5 mL.      ATTENDING SURGEON:  Amna Tinoco MD      RESIDENT SURGEON:  Saud Rice MD, G4.      INDICATIONS:  The patient is a very pleasant 21-year-old gentleman with a complicated medical history involving heart transplant and multiple changes to his antirejection drug regimen.  He also had reconstructive surgeries of his left upper extremity from which a left wrist fusion plate is still in place.  There were some concerns in regards to changing his medication regimen for his antirejection drugs with a retained plate, and for this reason surgical removal was discussed.  We did go over the risks and benefits including the risks of bleeding, infection, damage to surrounding structures, fractures, need for additional surgeries, as well as anesthetic complications such as damage to heart, brain, lungs or even death, and after this the patient did wish to proceed.      DESCRIPTION OF PROCEDURE:  The patient was greeted in the preoperative holding area.  He was brought into the operative suite and placed supine on the operative table.  All prominences were padded.  Endotracheal anesthesia was administered.  Left arm was sterilely prepped and draped with a nonsterile tourniquet.  A timeout was performed.  The limb was exsanguinated.  An incision was made overlying the previous incision scar and taken through subcutaneous tissues.  Extensor tendons as well as the EPL tendon were identified and retracted as needed.  The plate was identified and cleared of any soft tissue, and all 8 screws were removed without incident.  The plate was then  freed using osteotome and [facility] elevator and removed without difficulty.  The previous screw hole sites were debrided of bony edges as well as soft tissue interposition, and the entire wound bed was irrigated.  Of note, there was instability of the distal radioulnar articulation with pronation and supination of the forearm.  This was reduced back into its previous position.  We did remove 1 Ethibond suture from the distal ulna as well.  We then closed subcutaneous tissues using a 3-0 Vicryl suture and then closed the skin using a 4-0 PDS running subcuticular suture.  Dressings were applied using bacitracin, Adaptic, gauze, ABDs, Geraldine and Coban.  The patient was then brought out of anesthesia and transferred to a hospital bed in good condition.  He was brought to the postoperative recovery unit.  Dr. Tinoco had been present and scrubbed for all critical portions.  All sponge, needle, instrument counts were correct at the end of the case.      POSTOPERATIVE PLAN:  The patient does not have any strict restrictions to his motion or activities.  He should remain in the bandages for the next week until he returns to clinic for wound check.  He will be sent home with oxycodone for pain control.      Dictated by Meaghan Rice MD   Resident G4         DAR TINOCO MD       As dictated by MEAGHAN RICE MD            D: 10/12/2017 23:39   T: 10/13/2017 00:15   MT: EN      Name:     RODNEY ENCARNACION   MRN:      [id number]        Account:        [id number]   :      1995           Procedure Date: 10/12/2017      Document: [id number]

## 2017-10-13 NOTE — ANESTHESIA POSTPROCEDURE EVALUATION
Patient: Obed Viramontes    Procedure(s):  Left Wrist Fusion Plate Removal     (latex allergy)  - Wound Class: I-Clean    Diagnosis:Left Wrist Fusion with Retained Hardware   Diagnosis Additional Information: No value filed.    Anesthesia Type:  General, LMA    Note:  Anesthesia Post Evaluation    Patient location during evaluation: PACU  Patient participation: Able to fully participate in evaluation  Level of consciousness: awake and alert  Pain management: adequate  Airway patency: patent  Cardiovascular status: acceptable  Respiratory status: acceptable  Hydration status: acceptable  PONV: none     Anesthetic complications: None          Last vitals:  Vitals:    10/12/17 1454 10/12/17 1913   BP: 105/74 122/74   Pulse: 92    Resp: 18    Temp: 36.8  C (98.2  F)    SpO2: 96% 95%         Electronically Signed By: Elissa Figueroa MD  October 12, 2017  7:56 PM

## 2017-10-13 NOTE — DISCHARGE INSTRUCTIONS
[facility]  Same-Day Surgery   Adult Discharge Orders & Instructions     For 24 hours after surgery    1. Get plenty of rest.  A responsible adult must stay with you for at least 24 hours after you leave the hospital.   2. Do not drive or use heavy equipment.  If you have weakness or tingling, don't drive or use heavy equipment until this feeling goes away.  3. Do not drink alcohol.  4. Avoid strenuous or risky activities.  Ask for help when climbing stairs.   5. You may feel lightheaded.  IF so, sit for a few minutes before standing.  Have someone help you get up.   6. If you have nausea (feel sick to your stomach): Drink only clear liquids such as apple juice, ginger ale, broth or 7-Up.  Rest may also help.  Be sure to drink enough fluids.  Move to a regular diet as you feel able.  7. You may have a slight fever. Call the doctor if your fever is over 100 F (37.7 C) (taken under the tongue) or lasts longer than 24 hours.  8. You may have a dry mouth, a sore throat, muscle aches or trouble sleeping.  These should go away after 24 hours.  9. Do not make important or legal decisions.   Call your doctor for any of the followin.  Signs of infection (fever, growing tenderness at the surgery site, a large amount of drainage or bleeding, severe pain, foul-smelling drainage, redness, swelling).    2. It has been over 8 to 10 hours since surgery and you are still not able to urinate (pass water).    3.  Headache for over 24 hours.    4.  Numbness, tingling or weakness the day after surgery (if you had spinal anesthesia).  To contact a doctor, call ________________________________________ or:        [phone] and ask for the resident on call for   ______________________________________________ (answered 24 hours a day)      Emergency Department:    [facility]: [phone]       (TTY for hearing impaired: [phone])    [facility]: [phone]       (TTY for  hearing impaired: [phone])

## 2017-10-13 NOTE — BRIEF OP NOTE
[facility] Orthopedic Brief Operative Note      Patient name: Obed Viramontes  Patient MRN: [id number]  Date of procedure: 2017    Pre-operative diagnosis:  Left Wrist Fusion with Retained Hardware   Post-operative diagnosis:  Same  Procedure(s):  REMOVE HARDWARE WRIST      Anaesthesia: Gen  Surgeon: Dr. Tinoco  Assist: Saud Rice MD  EBL: 5 mL  Findings: as anticipated  Complications: none   Specimens:   * No specimens in log *    Post-op Plan:  -Activity: WBAT LUE  -Pain control: home w oxycodone  -Diet: ADAT  -Dressings: soft dressings to remain until clinic visit    -Follow-up: 1 wk in clinic    Saud Rice MD  PGY-4, Orthopaedic Surgery  [phone]

## 2017-10-19 ENCOUNTER — OFFICE VISIT (OUTPATIENT)
Dept: ORTHOPEDICS | Facility: CLINIC | Age: 22
End: 2017-10-19

## 2017-10-19 ENCOUNTER — TELEPHONE (OUTPATIENT)
Dept: PEDIATRIC CARDIOLOGY | Facility: CLINIC | Age: 22
End: 2017-10-19

## 2017-10-19 DIAGNOSIS — Z98.890 POSTOPERATIVE STATE: Primary | ICD-10-CM

## 2017-10-19 NOTE — PROGRESS NOTES
Reason for visit:    Obed Viramontes came in to the clinic for a one week post op check.    His surgery was done 10/12/2017 by Dr Tinoco.  He had Left wrist fusion plate removal.     Assessment:    Obed came into the clinic with his mother.  His dressing was clean, dry and intact.    The Surgical wounds were exposed and found to be healing without evidence of infection; suture end was clipped.  Incision was steri stripped and new dressing in place.      Patient advised that he can get it wet in the shower with warm water and soap running over it but not submerging under water.  He can continue to take tylenol for pain relief.  He rates his pain 3/10.    He currently works at a [employer] and feels that he cannot return for one more week due to pain and fearful that someone will bump his wrist.  Note written today to excuse Obed for one more week out of work.  He felt that this was an appropriate time frame.    Plan:  He has a follow up with Dr. Tinoco in November.      They have our phone number and will call with questions or problems.

## 2017-10-19 NOTE — LETTER
2017     Seen today: yes    Patient:  Obed Viramontes  :   1995  MRN:     [id number]  Physician: [facility]    Obed Viramontes may not return to work until Friday, 2017 due to recovery from surgery.              Electronically signed by Ortho Hand Post Op

## 2017-10-19 NOTE — MR AVS SNAPSHOT
After Visit Summary   10/19/2017    Obed Viramontes    MRN: [id number]           Patient Information     Date Of Birth          1995        Visit Information        Provider Department      10/19/2017 1:00 PM  [facility]        Today's Diagnoses     Postoperative state    -  1       Follow-ups after your visit        Your next 10 appointments already scheduled     Nov 16, 2017  7:30 AM CST   LAB with [facility] ([facility])    [address] [place] [id number] [phone]           Patient must bring picture ID. Patient should be prepared to give a urine specimen  Please do not eat 10-12 hours before your appointment if you are coming in fasting for labs on lipids, cholesterol, or glucose (sugar). Pregnant women should follow their Care Team instructions. Water with medications is okay. Do not drink coffee or other fluids. If you have concerns about taking  your medications, please ask at office or if scheduling via [email or web address], send a message by clicking on Secure Messaging, Message Your Care Team.            Nov 16, 2017  9:00 AM CST   Return Visit with Bobbi Pruitt MD   Peds Cardiac Transplant ([facility])    [facility]  [address]  [address] [place] [id number] [phone]            Nov 16, 2017 11:20 AM CST   (Arrive by 11:05 AM)   POST-OP HAND with Amna Tinoco MD   [facility] ([facility])    [address] [facility]  4th Floor  [facility] [id number] [phone]              Who to contact     Please call your clinic at [phone] to:    Ask questions about your health    Make or cancel appointments    Discuss your medicines    Learn about your test results    Speak to your doctor   If you have compliments or concerns about an experience at your clinic, or if you  wish to file a complaint, please contact [facility] Patient Relations at [phone] or email us at [email or web address]         Additional Information About Your Visit        [employer] Information     PublishThis gives you secure access to your electronic health record. If you see a primary care provider, you can also send messages to your care team and make appointments. If you have questions, please call your primary care clinic.  If you do not have a primary care provider, please call [phone] and they will assist you.      PublishThis is an electronic gateway that provides easy, online access to your medical records. With PublishThis, you can request a clinic appointment, read your test results, renew a prescription or communicate with your care team.     To access your existing account, please contact your [facility] or call [phone] for assistance.        Care EveryWhere ID     This is your Care EveryWhere ID. This could be used by other organizations to access your [facility] medical records  [id number]         Blood Pressure from Last 3 Encounters:   10/12/17 120/71   10/09/17 136/86   09/11/17 109/69    Weight from Last 3 Encounters:   10/12/17 63.9 kg (140 lb 14 oz)   10/09/17 63.5 kg (139 lb 15.9 oz)   09/28/17 64.4 kg (142 lb)              Today, you had the following     No orders found for display       Primary Care Provider Office Phone # Fax #    Rayo Schafer [phone]       [facility] [address] [place] [address]        Equal Access to Services     [facility] : Hadii aad ku hadasho Soomaali, waaxda luqadaha, qaybta kaalmada lincolnegyada, stephanie patiño. So [facility] [phone].    ATENCIÓN: Si habla español, tiene a neves disposición servicios gratuitos de asistencia lingüística. Llame al [phone].    We comply with applicable federal civil rights laws  and [place] laws. We do not discriminate on the basis of race, color, national origin, age, disability, sex, sexual orientation, or gender identity.            Thank you!     Thank you for choosing [facility]  for your care. Our goal is always to provide you with excellent care. Hearing back from our patients is one way we can continue to improve our services. Please take a few minutes to complete the written survey that you may receive in the mail after your visit with us. Thank you!             Your Updated Medication List - Protect others around you: Learn how to safely use, store and throw away your medicines at www.disposemymeds.org.          This list is accurate as of: 10/19/17  1:44 PM.  Always use your most recent med list.                   Brand Name Dispense Instructions for use Diagnosis    amLODIPine 5 MG tablet    NORVASC    90 tablet    Take 1 tablet (5 mg) by mouth daily    Heart replaced by transplant (H)       aspirin 81 MG EC tablet     60 tablet    Take 2 tablets (162 mg) by mouth daily    Status post transplant, heart (H)       mycophenolate 500 MG tablet    GENERIC EQUIVALENT    180 tablet    Take 1 tablet (500 mg) by mouth 2 times daily    Heart transplanted (H)       oxyCODONE 5 MG IR tablet    ROXICODONE    20 tablet    Take 1-2 tablets (5-10 mg) by mouth every 3 hours as needed for pain or other (Moderate to Severe)    Aftercare following surgery of the musculoskeletal system       pravastatin 10 MG tablet    PRAVACHOL    90 tablet    Take 1 tablet (10 mg) by mouth daily At bedtime        predniSONE 10 MG tablet    DELTASONE    90 tablet    Take 1 tablet (10 mg) by mouth daily    Heart replaced by transplant (H)       * tacrolimus 1 MG capsule    GENERIC EQUIVALENT    270 capsule    Take 3.5 mg twice daily    Transplanted heart (H)       * tacrolimus 0.5 MG capsule    GENERIC EQUIVALENT    180 capsule    Take 3.5 mg twice daily    Transplanted heart (H)       Vitamin D3  2000 UNITS Tabs     100 tablet    Take 2,000 Units by mouth daily    Heart transplanted (H)       * Notice:  This list has 2 medication(s) that are the same as other medications prescribed for you. Read the directions carefully, and ask your doctor or other care provider to review them with you.

## 2017-10-20 NOTE — TELEPHONE ENCOUNTER
Jolly paged me because they had seen Dr Tinoco today for Obed's follow up visit. He has been having considerable pain in his wrist but otherwise he is healing well. They talked to him about stopping taking oxycodone in spite of his pain so that he is not on it long term. He is rating his pain at a 3/5. Ortho was wondering if he could take tylenol PM or something else to help him sleep since he is the most uncomfortable overnight. We checked with cardiology since benadryl and tacro both have the potential to prolong qtc. Given his most recent EKG, DR Lorenzo said that it would be fine.     I called Jolly back. Per pharmacy and Dr Lorenzo, Obed could try tylenol PM or melatonin for sleep. He will continue to take tylenol for pain. Jolly said that they do not want to try a dose or two of  Ibuprofen because ortho does not like how it jazmine wound healing. She will let us or the ortho team know if his pain does not improve over the next week.

## 2017-10-31 ENCOUNTER — TELEPHONE (OUTPATIENT)
Dept: PEDIATRIC CARDIOLOGY | Facility: CLINIC | Age: 22
End: 2017-10-31

## 2017-10-31 NOTE — TELEPHONE ENCOUNTER
I called Jolly regarding Obed's lab values. She did not answer so I left a message. I also called Obed and spoke to him. I asked him how his pain is and he said it was almost gone. He said that he is sleeping better. He started taking a dose of benadryl before he goes to bed and that seems to help with the steroid side effects. I also gave him the most recent PRA results. He had had one moderate level DSA on the past few PRAs but did not show that this month. He verbalized understanding. I told him to continue taking his medications as instructed and that we would see him in November. He verbalized understanding.

## 2017-11-01 ENCOUNTER — TELEPHONE (OUTPATIENT)
Dept: PEDIATRIC CARDIOLOGY | Facility: CLINIC | Age: 22
End: 2017-11-01

## 2017-11-01 NOTE — TELEPHONE ENCOUNTER
Jolly called and left a message wondering if Obed had shown the antibody noted earlier this year since the time of transplant or if he had shown it all along. From what I can see in our records, he did not have any DSAs in [id number], even into 2015. The last test in October 2015 and a test in early 2016 first showed the DR51 antibody. He continued to show low-mod levels of it until his most recent lab draw. I also mentioned that he had a type of rejection in April that is not necessarily shown by antibodies. I let her know to call back to confirm receipt of this message and to ask any further questions.

## 2017-11-09 ENCOUNTER — TELEPHONE (OUTPATIENT)
Dept: PEDIATRIC CARDIOLOGY | Facility: CLINIC | Age: 22
End: 2017-11-09

## 2017-11-09 NOTE — TELEPHONE ENCOUNTER
Jolly called and left a message that Obed was concerned about the appointment time for his labs next week. He was concerned that the appointment is for 7:30 but he takes his medications at 10. I called both Obed and Jolly back. He does not need to arrive at 7:30. I cancelled this lab appointment and moved it back to a more appropriate time. He can arrive at 8:30 to get his EKG and ECHO, then labs prior to seeing Dr Pruitt. I asked for both of them to call with further questions and left my phone number.

## 2017-11-14 ENCOUNTER — PRE VISIT (OUTPATIENT)
Dept: PEDIATRIC CARDIOLOGY | Facility: CLINIC | Age: 22
End: 2017-11-14

## 2017-11-14 DIAGNOSIS — Z94.1 HEART REPLACED BY TRANSPLANT (H): Primary | ICD-10-CM

## 2017-11-16 ENCOUNTER — HOSPITAL ENCOUNTER (OUTPATIENT)
Dept: GENERAL RADIOLOGY | Facility: CLINIC | Age: 22
Discharge: HOME | End: 2017-11-16
Attending: PEDIATRICS | Admitting: PEDIATRICS
Payer: COMMERCIAL

## 2017-11-16 ENCOUNTER — OFFICE VISIT (OUTPATIENT)
Dept: ORTHOPEDICS | Facility: CLINIC | Age: 22
End: 2017-11-16

## 2017-11-16 ENCOUNTER — APPOINTMENT (OUTPATIENT)
Dept: LAB | Facility: CLINIC | Age: 22
End: 2017-11-16
Attending: PEDIATRICS
Payer: COMMERCIAL

## 2017-11-16 ENCOUNTER — HOSPITAL ENCOUNTER (OUTPATIENT)
Dept: CARDIOLOGY | Facility: CLINIC | Age: 22
End: 2017-11-16
Attending: PEDIATRICS
Payer: COMMERCIAL

## 2017-11-16 ENCOUNTER — TELEPHONE (OUTPATIENT)
Dept: PEDIATRIC CARDIOLOGY | Facility: CLINIC | Age: 22
End: 2017-11-16

## 2017-11-16 ENCOUNTER — OFFICE VISIT (OUTPATIENT)
Dept: PEDIATRIC CARDIOLOGY | Facility: CLINIC | Age: 22
End: 2017-11-16
Attending: PEDIATRICS
Payer: COMMERCIAL

## 2017-11-16 VITALS
BODY MASS INDEX: 18.2 KG/M2 | WEIGHT: 137.35 LBS | SYSTOLIC BLOOD PRESSURE: 125 MMHG | HEART RATE: 102 BPM | HEIGHT: 73 IN | RESPIRATION RATE: 20 BRPM | OXYGEN SATURATION: 100 % | DIASTOLIC BLOOD PRESSURE: 88 MMHG

## 2017-11-16 DIAGNOSIS — Z94.1 HEART REPLACED BY TRANSPLANT (H): ICD-10-CM

## 2017-11-16 DIAGNOSIS — Z98.1 STATUS POST FUSION OF WRIST: Primary | ICD-10-CM

## 2017-11-16 LAB
ALBUMIN SERPL-MCNC: 4.1 G/DL (ref 3.4–5)
ALP SERPL-CCNC: 54 U/L (ref 40–150)
ALT SERPL W P-5'-P-CCNC: 14 U/L (ref 0–70)
ANION GAP SERPL CALCULATED.3IONS-SCNC: 5 MMOL/L (ref 3–14)
AST SERPL W P-5'-P-CCNC: 13 U/L (ref 0–45)
BASOPHILS # BLD AUTO: 0 10E9/L (ref 0–0.2)
BASOPHILS NFR BLD AUTO: 0.2 %
BILIRUB SERPL-MCNC: 0.8 MG/DL (ref 0.2–1.3)
BUN SERPL-MCNC: 20 MG/DL (ref 7–30)
CALCIUM SERPL-MCNC: 8.9 MG/DL (ref 8.5–10.1)
CHLORIDE SERPL-SCNC: 108 MMOL/L (ref 94–109)
CO2 SERPL-SCNC: 29 MMOL/L (ref 20–32)
CREAT SERPL-MCNC: 0.89 MG/DL (ref 0.66–1.25)
DIFFERENTIAL METHOD BLD: NORMAL
EOSINOPHIL # BLD AUTO: 0.1 10E9/L (ref 0–0.7)
EOSINOPHIL NFR BLD AUTO: 1.7 %
ERYTHROCYTE [DISTWIDTH] IN BLOOD BY AUTOMATED COUNT: 13.1 % (ref 10–15)
GFR SERPL CREATININE-BSD FRML MDRD: >90 ML/MIN/1.7M2
GLUCOSE SERPL-MCNC: 96 MG/DL (ref 70–99)
HCT VFR BLD AUTO: 43.3 % (ref 40–53)
HGB BLD-MCNC: 15 G/DL (ref 13.3–17.7)
IMM GRANULOCYTES # BLD: 0 10E9/L (ref 0–0.4)
IMM GRANULOCYTES NFR BLD: 0.3 %
LYMPHOCYTES # BLD AUTO: 1.6 10E9/L (ref 0.8–5.3)
LYMPHOCYTES NFR BLD AUTO: 27.2 %
MAGNESIUM SERPL-MCNC: 1.7 MG/DL (ref 1.6–2.3)
MCH RBC QN AUTO: 29.8 PG (ref 26.5–33)
MCHC RBC AUTO-ENTMCNC: 34.6 G/DL (ref 31.5–36.5)
MCV RBC AUTO: 86 FL (ref 78–100)
MONOCYTES # BLD AUTO: 0.9 10E9/L (ref 0–1.3)
MONOCYTES NFR BLD AUTO: 14.7 %
NEUTROPHILS # BLD AUTO: 3.3 10E9/L (ref 1.6–8.3)
NEUTROPHILS NFR BLD AUTO: 55.9 %
NRBC # BLD AUTO: 0 10*3/UL
NRBC BLD AUTO-RTO: 0 /100
NT-PROBNP SERPL-MCNC: 34 PG/ML (ref 0–125)
PHOSPHATE SERPL-MCNC: 2.7 MG/DL (ref 2.5–4.5)
PLATELET # BLD AUTO: 212 10E9/L (ref 150–450)
POTASSIUM SERPL-SCNC: 3.6 MMOL/L (ref 3.4–5.3)
PROT SERPL-MCNC: 7.3 G/DL (ref 6.8–8.8)
RBC # BLD AUTO: 5.04 10E12/L (ref 4.4–5.9)
SODIUM SERPL-SCNC: 142 MMOL/L (ref 133–144)
TACROLIMUS BLD-MCNC: 13.2 UG/L (ref 5–15)
TME LAST DOSE: NORMAL H
TROPONIN I SERPL-MCNC: 0.02 UG/L (ref 0–0.04)
WBC # BLD AUTO: 5.9 10E9/L (ref 4–11)

## 2017-11-16 PROCEDURE — 80197 ASSAY OF TACROLIMUS: CPT | Performed by: PEDIATRICS

## 2017-11-16 PROCEDURE — 83735 ASSAY OF MAGNESIUM: CPT | Performed by: PEDIATRICS

## 2017-11-16 PROCEDURE — 86665 EPSTEIN-BARR CAPSID VCA: CPT | Performed by: PEDIATRICS

## 2017-11-16 PROCEDURE — 71020 XR CHEST 2 VW: CPT

## 2017-11-16 PROCEDURE — 86644 CMV ANTIBODY: CPT | Performed by: PEDIATRICS

## 2017-11-16 PROCEDURE — 80053 COMPREHEN METABOLIC PANEL: CPT | Performed by: PEDIATRICS

## 2017-11-16 PROCEDURE — 83880 ASSAY OF NATRIURETIC PEPTIDE: CPT | Performed by: PEDIATRICS

## 2017-11-16 PROCEDURE — 86664 EPSTEIN-BARR NUCLEAR ANTIGEN: CPT | Performed by: PEDIATRICS

## 2017-11-16 PROCEDURE — 93005 ELECTROCARDIOGRAM TRACING: CPT | Mod: ZF

## 2017-11-16 PROCEDURE — 84100 ASSAY OF PHOSPHORUS: CPT | Performed by: PEDIATRICS

## 2017-11-16 PROCEDURE — 36415 COLL VENOUS BLD VENIPUNCTURE: CPT | Performed by: PEDIATRICS

## 2017-11-16 PROCEDURE — 99213 OFFICE O/P EST LOW 20 MIN: CPT | Mod: ZF

## 2017-11-16 PROCEDURE — 85025 COMPLETE CBC W/AUTO DIFF WBC: CPT | Performed by: PEDIATRICS

## 2017-11-16 PROCEDURE — 87799 DETECT AGENT NOS DNA QUANT: CPT | Performed by: PEDIATRICS

## 2017-11-16 PROCEDURE — 93306 TTE W/DOPPLER COMPLETE: CPT

## 2017-11-16 PROCEDURE — 84484 ASSAY OF TROPONIN QUANT: CPT | Performed by: PEDIATRICS

## 2017-11-16 RX ORDER — PREDNISONE 10 MG/1
10 TABLET ORAL DAILY
Qty: 90 TABLET | Refills: 3 | Status: SHIPPED | OUTPATIENT
Start: 2017-11-16 | End: 2017-11-16 | Stop reason: DRUGHIGH

## 2017-11-16 RX ORDER — PREDNISONE 5 MG/1
5 TABLET ORAL DAILY
Qty: 30 TABLET | Refills: 1 | Status: SHIPPED | OUTPATIENT
Start: 2017-11-16 | End: 2017-12-07

## 2017-11-16 RX ORDER — SIROLIMUS 1 MG
2 TABLET ORAL DAILY
Qty: 60 TABLET | Refills: 3 | Status: SHIPPED | OUTPATIENT
Start: 2017-11-16 | End: 2017-12-22

## 2017-11-16 NOTE — PROGRESS NOTES
[facility]  Pediatric Heart Transplant Clinic    2017    RE: Obed Viramontes  : 1995  MRN: [id number]    Dear Rayo and colleagues,    I had the pleasure of evaluating our mutual patient, Obed Viramontes, at the [facility] on 2017 for regularly scheduled follow-up. As you remember, Obed is a 21 year old male with history of heart transplant (3/23/2012) for dilated cardiomyopathy and post-transplant lymphoproliferative disorder (2012) who transferred care to the [facility] in 2012. He is in clinic today with his mother and father for follow-up after recent rejection episodes. He was seen in April for routine  annual follow-up; however, he had elevated troponin and routine biopsy showed 2R rejection, and he was noncompliant with medications prior to this. He was admitted for 3 days of IV Solumedrol and his troponin improved. Following that, he was staying with mom for a few weeks and was on a better schedule, compliant with medications, and eating better and has gained weight. He reported that even since going back to dad's house and going back to work that he was compliant with his medications. However on routine visit on  had elevated troponin again, and so was taken for biopsy on  which showed 2R rejection, which was treated with 5 days of prednisone and increasing his tacrolimus goals. On follow-up on  his troponin had improved. However, on repeat followup at end of  his troponin was elevated again and he had new PACs on ecg concerning for ongoing rejection. We elected to treat with 3 days of prednisone 60 mg daily, followed by 20 mg daily which he remained on until July and then weaned to 10 mg daily. He underwent diagnostic right heart cathterization on . There was no rejection on biopsy and his labs  had continued to improved. Prednisone dose was decreased to 5 mg daily because of side effects (insomnia and voracious appetite); however, given slight bump in troponin, was increased back to 10 mg. He has not noted a difference in side effect profile now between 5 and 10 mg dosage.    He underwent an orthopedic procedure last month to remove the plate in his left hand placed to correct contracture. He tolerated the procedure well and has had no pain since the immediate post-operative period. He has been compliant with his medications. Aside from some insomnia, which improved with Benadryl, he has no other complaints    Review Of Systems  Skin: rash on back  Eyes: glasses  Ears/Nose/Throat: sore throat  Respiratory: No shortness of breath, dyspnea on exertion, cough, or hemoptysis  Cardiovascular: as above  Gastrointestinal: negative  Genitourinary: negative  Musculoskeletal: negative  Neurologic: as above  Psychiatric: negative  Hematologic/Lymphatic/Immunologic: negative  Endocrine: negative    Past medical/Past surgical history:  Born 38 weeks, 7 pound birth weight  Hospitalized at age 2 1/2 years with viral myocarditis  Cerebrovascular accident at age 2 1/2 with resultant left hemiplegia  Multiple orthopedic surgeries in pastd  Dilated cardiomyopathy  Heart transplant 2012  PTLD, EBV-positive; resolved    In reviewing his history, he was born at 38 weeks with uncomplicated pregnancy and delivery, weighed 7 pounds. He had uncomplicated  course and was healthy in early childhood. He was hospitalized at age 2 1/2 years with viral myocarditis, hospitalized for 6 weeks, intubated for 10 days, had recovery of myocardial function and was treated chronically with digoxin, hospital course complicated by cerobrovascular accident with resultant left hemiplegia, transitioned to [place] rehab for an additional 5 weeks following his hospital course. He has had several orthopedic surgeries throughout  childhood because of his hemiplegia, but otherwise has been healthy. He was asymptomatic from a cardiac standpoint until February 2012, at which time he presented with chest pain. He was found to have significant arrhythmias, was admitted to [facility], evaluated and listed for transplant (mom reports underlying diagnosis of hypertrophic cardiomyopathy possibly secondary to glycogen storage disease?). He was transplanted on March 23, 2012, did well post transplant and was discharged after 1 week. He reportedly has done well from a cardiac standpoint with no rejection, 1R biopsy in May and 1R biopsy in September but otherwise negative biopsies. He presented in September with mouth sores and difficulty taking medication, initially treated with lowering his immunosuppression, but was rehospitalized with fever and worsening mouth sores. 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 He was treated with rituximab x 2, and increased prednisone dose from 2.5 mg daily back up 10 mg twice daily. He was scheduled to undergo additional rituximab dose but symptoms improved and repeat CT scan showed improvement in his lymph node and tonsillar hypertrophy. Per mom he was EBV negative prior to transplant, and she thinks his donor was EBV positive. He has done well from an infection/PTLD standpoint and came off valcyte therapy in December 2013 with negative EBV PCR in February 2014. He did undergo orthopedic surgery for his left hand contracture in October 2016, admitted in January 2017 with a cellulitis of that hand that has completely resolved. He underwent plate removal from that hand October 2017.    Social History:   Lives with dad, works at [employer].     Family history:  Negative for congenital heart defects, heart transplant, cardiomyopathy or sudden death. Sister with type 1 DM. Older sister recently diagnosed with  "POTS.    Medications:  Prescription Medications as of 11/16/2017             pravastatin (PRAVACHOL) 10 MG tablet Take 1 tablet (10 mg) by mouth daily At bedtime    predniSONE (DELTASONE) 10 MG tablet Take 1 tablet (10 mg) by mouth daily    tacrolimus (GENERIC EQUIVALENT) 0.5 MG capsule Take 3.5 mg twice daily    tacrolimus (PROGRAF - GENERIC EQUIVALENT) 1 MG capsule Take 3.5 mg twice daily    aspirin 81 MG EC tablet Take 2 tablets (162 mg) by mouth daily    Cholecalciferol (VITAMIN D3) 2000 UNITS TABS Take 2,000 Units by mouth daily    amLODIPine (NORVASC) 5 MG tablet Take 1 tablet (5 mg) by mouth daily    mycophenolate (CELLCEPT - GENERIC EQUIVALENT) 500 MG tablet Take 1 tablet (500 mg) by mouth 2 times daily        Allergies:   Allergies   Allergen Reactions     Latex Rash     Other [Seasonal Allergies]      Corn-abdominal pain and diarrhea.     Physical Exam:  /88 (BP Location: Right arm, Patient Position: Sitting, Cuff Size: Adult Regular)  Pulse 102  Resp 20  Ht 6' 0.56\" (184.3 cm)  Wt 137 lb 5.6 oz (62.3 kg)  SpO2 100%  BMI 18.34 kg/m2     Gen: Alert, interactive and appropriate, sitting, no respiratory distress.  HEENT: NCAT, EOMI, PERRL 3 mm OU, nares patent with no mucosal erythema or discharge; bilateral OM pearly, translucent with preserved anatomic landmarks; OP moist without erythema.  Neck: no lymphadenopathy  Lungs:  normal breath sounds bilaterall; no wheeze, crackles, or rhonchi, good air entry  CV: quiet precordium, normal PMI, RRR, normal S1 and S2, no murmurs, rubs, gallops  Abd: abdomen is soft without tenderness, masses, organomegaly or guarding  Ext: WWP, 2+ pulses upper and 2+ lower extremitiy without delay, no edema; well-healed surgical scar over the posterior left hand  Skin: numerous erythematous pustules on the back  Neuro: alert and oriented, mildly dysarthric; left hemiplegic    Laboratory Studies:  Obed had evaluation today with labs, EKG and echocardiogram. His echo " showed normal post-transplant anatomy, low normal systolic function and no pericardial effusion. His EKG showed normal sinus rhythm, rate of 94 beats per minute and no interventricular conduction delay, ST segment or T-wave changes. His labs included a comprehensive metabolic panel, which was normal (specifically BUN of 20, creatinine of 0.89 and normal LFTs). Troponin is 0.018 (down from 0.042 on 10/9 after prednisone was increased to 10 mg daily). NT-pro BNP is normal at 35. He had a CBC which had normal WBC of 5.9, hemoglobin of 15 and platelets of [id number]. CMV DNA by PCR is not detected. A tacrolimus level is 13.2 (goal 10-15). EBV PCR and serology are pending.    His last biopsy on 9/11/17 showed no rejection (ISHLT grade 1R), pAMR0, negative C3d/C4d staining.    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 He also has history of EBV-positive PTLD, now in remission after rituximab. He most recently has had a number of episodes of biopsy-proven and presumed cellular rejection over the past 5-6 months in the setting of non-compliance with immunosuppressive regimen. His increased troponin and PACs in late June (presumed rejection) resolved with pulse-dosed steroids and slow taper. On 9/11/2017 cath, there was no evidence of rejection (ISHLT grade 1R). His troponin level had not been negative and although  in the normal range, has fluctuated a bit toward the upper limit of normal. Today it is nearly undetectable, along with a very low NT-pro BNP. I believe he is now compliant and he is at a safe point post-operatively to start sirolimus and wean prednisone.      Diagnosis summary:  1. Viral myocarditis at age 2 1/2 years  2. Dilated cardiomyopathy           A. S/p orthotopic heart transplant (3/23/12)   1. 2R rejection (4/21/17), treated with Solumedrol x 3 days   2. 2R rejection (5/26/17), treated with prednisone x 5 days   3. Presumed rejection (6/26/17) based on elevated troponin and new PACs, treated with prednisone x 3 days and then taper  3. Cerebrovascular accident at age 2 1/2 years with resultant left hemiplegia  4. Multiple orthopedic surgeries in past  5. PTLD, EBV positive            A.  treated with rituximab x 2    Plan:  - if BNP and troponin OK, will start sirolimus 2 mg daily (goal 5-10) and decrease prednisone to 5 mg daily  - sirolimus level in 2 weeks  - continue all other medications    Activity Restriction: none  SBE prophylaxis: none    Follow-up: in 2 months with labs, EKG and echocardiogram    Thank you for the opportunity to participate in Obed's care. Please let me know if you have further questions.    Sincerely,     Taj Lorenzo MD    Pediatric Heart Failure and Transplantation     CC  Patient Care Team:  Rayo Schafer MD as PCP - General (Pediatrics)  Isrrael Trent MD as MD (Oncology)  Bobbi Pruitt MD as MD (Pediatric Cardiology)    Copy to patient  OSBALDO ENCARNACION   [address]  [place] [address]

## 2017-11-16 NOTE — LETTER
2017      RE: Obed Viramontes  [address]  [facility] [id number]       [facility]  [facility]  Pediatric Heart Transplant Clinic    2017    RE: Obed Viramontes  : 1995  MRN: [id number]    Dear Rayo and colleagues,    I had the pleasure of evaluating our mutual patient, Obed Viramontes, at the [facility] Transplant [facility] on 2017 for regularly scheduled follow-up. As you remember, Obed is a 21 year old male with history of heart transplant (3/23/2012) for dilated cardiomyopathy and post-transplant lymphoproliferative disorder (2012) who transferred care to the [facility] in 2012. He is in clinic today with his mother and father for follow-up after recent rejection episodes. He was seen in April for routine  annual follow-up; however, he had elevated troponin and routine biopsy showed 2R rejection, and he was noncompliant with medications prior to this. He was admitted for 3 days of IV Solumedrol and his troponin improved. Following that, he was staying with mom for a few weeks and was on a better schedule, compliant with medications, and eating better and has gained weight. He reported that even since going back to dad's house and going back to work that he was compliant with his medications. However on routine visit on  had elevated troponin again, and so was taken for biopsy on  which showed 2R rejection, which was treated with 5 days of prednisone and increasing his tacrolimus goals. On follow-up on  his troponin had improved. However, on repeat followup at end of  his troponin was elevated again and he had new PACs on ecg concerning for ongoing rejection. We elected to treat with 3 days of prednisone 60 mg daily, followed by 20 mg daily which he remained on until July and then weaned to 10 mg daily. He underwent diagnostic  right heart cathterization on . There was no rejection on biopsy and his labs had continued to improved. Prednisone dose was decreased to 5 mg daily because of side effects (insomnia and voracious appetite); however, given slight bump in troponin, was increased back to 10 mg. He has not noted a difference in side effect profile now between 5 and 10 mg dosage.    He underwent an orthopedic procedure last month to remove the plate in his left hand placed to correct contracture. He tolerated the procedure well and has had no pain since the immediate post-operative period. He has been compliant with his medications. Aside from some insomnia, which improved with Benadryl, he has no other complaints    Review Of Systems  Skin: rash on back  Eyes: glasses  Ears/Nose/Throat: sore throat  Respiratory: No shortness of breath, dyspnea on exertion, cough, or hemoptysis  Cardiovascular: as above  Gastrointestinal: negative  Genitourinary: negative  Musculoskeletal: negative  Neurologic: as above  Psychiatric: negative  Hematologic/Lymphatic/Immunologic: negative  Endocrine: negative    Past medical/Past surgical history:  Born 38 weeks, 7 pound birth weight  Hospitalized at age 2 1/2 years with viral myocarditis  Cerebrovascular accident at age 2 1/2 with resultant left hemiplegia  Multiple orthopedic surgeries in pastd  Dilated cardiomyopathy  Heart transplant 2012  PTLD, EBV-positive; resolved    In reviewing his history, he was born at 38 weeks with uncomplicated pregnancy and delivery, weighed 7 pounds. He had uncomplicated  course and was healthy in early childhood. He was hospitalized at age 2 1/2 years with viral myocarditis, hospitalized for 6 weeks, intubated for 10 days, had recovery of myocardial function and was treated chronically with digoxin, hospital course complicated by cerobrovascular accident with resultant left hemiplegia, transitioned to [place] rehab for an additional 5 weeks  following his hospital course. He has had several orthopedic surgeries throughout childhood because of his hemiplegia, but otherwise has been healthy. He was asymptomatic from a cardiac standpoint until February 2012, at which time he presented with chest pain. He was found to have significant arrhythmias, was admitted to [facility], evaluated and listed for transplant (mom reports underlying diagnosis of hypertrophic cardiomyopathy possibly secondary to glycogen storage disease?). He was transplanted on March 23, 2012, did well post transplant and was discharged after 1 week. He reportedly has done well from a cardiac standpoint with no rejection, 1R biopsy in May and 1R biopsy in September but otherwise negative biopsies. He presented in September with mouth sores and difficulty taking medication, initially treated with lowering his immunosuppression, but was rehospitalized with fever and worsening mouth sores. 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 He was treated with rituximab x 2, and increased prednisone dose from 2.5 mg daily back up 10 mg twice daily. He was scheduled to undergo additional rituximab dose but symptoms improved and repeat CT scan showed improvement in his lymph node and tonsillar hypertrophy. Per mom he was EBV negative prior to transplant, and she thinks his donor was EBV positive. He has done well from an infection/PTLD standpoint and came off valcyte therapy in December 2013 with negative EBV PCR in February 2014. He did undergo orthopedic surgery for his left hand contracture in October 2016, admitted in January 2017 with a cellulitis of that hand that has completely resolved. He underwent plate removal from that hand October 2017.    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     Family history:  Negative for congenital heart defects, heart transplant, cardiomyopathy or sudden death.  "Sister with type 1 DM. Older sister recently diagnosed with POTS.    Medications:  Prescription Medications as of 11/16/2017             pravastatin (PRAVACHOL) 10 MG tablet Take 1 tablet (10 mg) by mouth daily At bedtime    predniSONE (DELTASONE) 10 MG tablet Take 1 tablet (10 mg) by mouth daily    tacrolimus (GENERIC EQUIVALENT) 0.5 MG capsule Take 3.5 mg twice daily    tacrolimus (PROGRAF - GENERIC EQUIVALENT) 1 MG capsule Take 3.5 mg twice daily    aspirin 81 MG EC tablet Take 2 tablets (162 mg) by mouth daily    Cholecalciferol (VITAMIN D3) 2000 UNITS TABS Take 2,000 Units by mouth daily    amLODIPine (NORVASC) 5 MG tablet Take 1 tablet (5 mg) by mouth daily    mycophenolate (CELLCEPT - GENERIC EQUIVALENT) 500 MG tablet Take 1 tablet (500 mg) by mouth 2 times daily        Allergies:   Allergies   Allergen Reactions     Latex Rash     Other [Seasonal Allergies]      Corn-abdominal pain and diarrhea.     Physical Exam:  /88 (BP Location: Right arm, Patient Position: Sitting, Cuff Size: Adult Regular)  Pulse 102  Resp 20  Ht 6' 0.56\" (184.3 cm)  Wt 137 lb 5.6 oz (62.3 kg)  SpO2 100%  BMI 18.34 kg/m2     Gen: Alert, interactive and appropriate, sitting, no respiratory distress.  HEENT: NCAT, EOMI, PERRL 3 mm OU, nares patent with no mucosal erythema or discharge; bilateral OM pearly, translucent with preserved anatomic landmarks; OP moist without erythema.  Neck: no lymphadenopathy  Lungs:  normal breath sounds bilaterall; no wheeze, crackles, or rhonchi, good air entry  CV: quiet precordium, normal PMI, RRR, normal S1 and S2, no murmurs, rubs, gallops  Abd: abdomen is soft without tenderness, masses, organomegaly or guarding  Ext: WWP, 2+ pulses upper and 2+ lower extremitiy without delay, no edema; well-healed surgical scar over the posterior left hand  Skin: numerous erythematous pustules on the back  Neuro: alert and oriented, mildly dysarthric; left hemiplegic    Laboratory Studies:  Obed had " evaluation today with labs, EKG and echocardiogram. His echo showed normal post-transplant anatomy, low normal systolic function and no pericardial effusion. His EKG showed normal sinus rhythm, rate of 94 beats per minute and no interventricular conduction delay, ST segment or T-wave changes. His labs included a comprehensive metabolic panel, which was normal (specifically BUN of 20, creatinine of 0.89 and normal LFTs). Troponin is 0.018 (down from 0.042 on 10/9 after prednisone was increased to 10 mg daily). NT-pro BNP is normal at 35. He had a CBC which had normal WBC of 5.9, hemoglobin of 15 and platelets of [id number]. CMV DNA by PCR is not detected. A tacrolimus level is 13.2 (goal 10-15). EBV PCR and serology are pending.    His last biopsy on 9/11/17 showed no rejection (ISHLT grade 1R), pAMR0, negative C3d/C4d staining.    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 He also has history of EBV-positive PTLD, now in remission after rituximab. He most recently has had a number of episodes of biopsy-proven and presumed cellular rejection over the past 5-6 months in the setting of non-compliance with immunosuppressive regimen. His increased troponin and PACs in late June (presumed rejection) resolved with pulse-dosed steroids and slow taper. On 9/11/2017 cath, there was no evidence of rejection (ISHLT  grade 1R). His troponin level had not been negative and although in the normal range, has fluctuated a bit toward the upper limit of normal. Today it is nearly undetectable, along with a very low NT-pro BNP. I believe he is now compliant and he is at a safe point post-operatively to start sirolimus and wean prednisone.      Diagnosis summary:  1. Viral myocarditis at age 2 1/2 years  2. Dilated cardiomyopathy           A. S/p orthotopic heart transplant (3/23/12)   1. 2R rejection (4/21/17), treated with Solumedrol x 3 days   2. 2R rejection (5/26/17), treated with prednisone x 5 days   3. Presumed rejection (6/26/17) based on elevated troponin and new PACs, treated with prednisone x 3 days and then taper  3. Cerebrovascular accident at age 2 1/2 years with resultant left hemiplegia  4. Multiple orthopedic surgeries in past  5. PTLD, EBV positive            A.  treated with rituximab x 2    Plan:  - if BNP and troponin OK, will start sirolimus 2 mg daily (goal 5-10) and decrease prednisone to 5 mg daily  - sirolimus level in 2 weeks  - continue all other medications    Activity Restriction: none  SBE prophylaxis: none    Follow-up: in 2 months with labs, EKG and echocardiogram    Thank you for the opportunity to participate in Obed's care. Please let me know if you have further questions.    Sincerely,     Taj Lorenzo MD    Pediatric Heart Failure and Transplantation     CC  Patient Care Team:  Rayo Schafer MD as PCP - General (Pediatrics)  Isrrael Trent MD as MD (Oncology)  Bobbi Pruitt MD as MD (Pediatric Cardiology)    Copy to patient  OSBALDO ENCARNACION   [address]  [place] [address]

## 2017-11-16 NOTE — PATIENT INSTRUCTIONS
1) We will call you once his BNP and troponin are resulted. If they are stable or improved, we will start to transition his medications:  Prednisone 1/2 - 10 mg tab (5 mg) daily  Sirolimus 2 mg daily  For 2 weeks and then will recheck levels and BNP and troponin    2) Follow up with us in 2 months.

## 2017-11-16 NOTE — LETTER
11/16/2017       RE: Obed Viramontes  [address]  [facility] [address]     Dear Colleague,    Thank you for referring your patient, Obed Viramontes, to the [facility]TH[facility] at [facility]. Please see a copy of my visit note below.                                                 A partnership of [place] and [facility]    Orthopaedic Post-Operative Visit     Obed Viramontes MRN# [id number]   Age: 21 year old YOB: 1995          Subjective:   Obed returns for follow up of left wrist plate removal on  on 10/12/17.      Obed is not doing physical therapy at this time.    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  He has no pain.  He is pleased with the position of the left hand.                 Past Medical History:   I have reviewed this patient's past medical history and commented on sigificant events within the HPI         Past Surgical History:    has a past surgical history that includes Transplant heart recipient; SURGICAL PATHOLOGY; biopsy; picc insertion; PICC removal; orthopedic surgery; Heart cath child (11/13/2012); Heart cath child (2/15/2013); Heart Cath, Biopsy (3/21/2014); Heart cath child (N/A, 4/10/2015); Heart Cath, Biopsy (Right, 4/18/2016); Arthrodesis wrist (Left, 10/20/2016); Interposition tendon hand (Left, 10/20/2016); Heart cath child (N/A, 4/21/2017); Heart Cath, Biopsy (Right, 5/26/2017); Heart Cath, Biopsy (N/A, 9/11/2017); and Remove hardware wrist (Left, 10/12/2017).           Physical Exam:     Today his wounds were clean and dry.   no steri-strips were on the wound, and none were replaced as his wound is healing nicely. He was told that he can enter the pool/bathtub when all the scabs are completely gone.  His fingers have spasticity but can be fully extended.  His wrist is fused solid in a straight position.            Plan of Care:     Obed has returned to work. He will be advancing on his  cardiac pain meds, now that his wound is all healed.  He has no further concerns, nor does his mother.      Patient will be seen back on an as needed basis, should any further questions or problems arise.                   Again, thank you for allowing me to participate in the care of your patient.      Sincerely,    Amna Tinoco MD

## 2017-11-16 NOTE — MR AVS SNAPSHOT
After Visit Summary   11/16/2017    Obed Viramontes    MRN: [id number]           Patient Information     Date Of Birth          1995        Visit Information        Provider Department      11/16/2017 9:00 AM Bobbi Pruitt MD Peds Cardiac Transplant        Today's Diagnoses     Heart replaced by transplant (H)          Care Instructions    1) We will call you once his BNP and troponin are resulted. If they are stable or improved, we will start to transition his medications:  Prednisone 1/2 - 10 mg tab (5 mg) daily  Sirolimus 2 mg daily  For 2 weeks and then will recheck levels and BNP and troponin    2) Follow up with us in 2 months.           Follow-ups after your visit        Your next 10 appointments already scheduled     Nov 16, 2017 11:20 AM CST   (Arrive by 11:05 AM)   POST-OP HAND with Amna Tinoco MD   [facility] ([facility])    [address] [place] [address] [phone]              Who to contact     Please call your clinic at [phone] to:    Ask questions about your health    Make or cancel appointments    Discuss your medicines    Learn about your test results    Speak to your doctor   If you have compliments or concerns about an experience at your clinic, or if you wish to file a complaint, please contact [facility] Patient Relations at [phone] or email us at [email or web address]         Additional Information About Your Visit        MyChart Information     [employer] gives you secure access to your electronic health record. If you see a primary care provider, you can also send messages to your care team and make appointments. If you have questions, please call your primary care clinic.  If you do not have a primary care provider, please call [phone] and they will assist you.      [employer] is an electronic gateway that provides easy, online  "access to your medical records. With "[employer] you can request a clinic appointment, read your test results, renew a prescription or communicate with your care team.     To access your existing account, please contact your [facility] or call [phone] for assistance.        Care EveryWhere ID     This is your Care EveryWhere ID. This could be used by other organizations to access your [place] medical records  [id number]        Your Vitals Were     Pulse Respirations Height Pulse Oximetry BMI (Body Mass Index)       102 20 6' 0.56\" (184.3 cm) 100% 18.34 kg/m2        Blood Pressure from Last 3 Encounters:   11/16/17 125/88   10/12/17 120/71   10/09/17 136/86    Weight from Last 3 Encounters:   11/16/17 137 lb 5.6 oz (62.3 kg)   10/12/17 140 lb 14 oz (63.9 kg)   10/09/17 139 lb 15.9 oz (63.5 kg)              We Performed the Following     EKG 12 lead - pediatric          Today's Medication Changes          These changes are accurate as of: 11/16/17 10:29 AM.  If you have any questions, ask your nurse or doctor.               Start taking these medicines.        Dose/Directions    sirolimus 1 MG Tabs tablet   Used for:  Heart replaced by transplant (H)   Started by:  Bobbi Pruitt MD        Dose:  2 mg   Take 2 tablets (2 mg) by mouth daily   Quantity:  60 tablet   Refills:  3         Stop taking these medicines if you haven't already. Please contact your care team if you have questions.     oxyCODONE IR 5 MG tablet   Commonly known as:  ROXICODONE   Stopped by:  Bobbi Pruitt MD                Where to get your medicines      These medications were sent to [facility] - [place], [place] - [address] [place] [address]  [address] [facility], [facility] [address]    Hours:  Mon-Fri 8:30am-5:00pm Toll Free [phone] Phone:  [phone]     predniSONE 10 MG tablet    sirolimus 1 MG Tabs tablet                Primary Care Provider Office Phone # Fax " #    Rayo Schafer [phone]       [facility] [address]  [place] [address]        Equal Access to Services     CARLA GREENBERG : Hadii aad ku hadnaomyo Sogiovanna, waaxda luqadaha, qaybta kaalmada adeegyada, stephanie arevalothee starkgregoria deonnasinaichristo patiño. So [facility] [phone].    ATENCIÓN: Si habla español, tiene a neves disposición servicios gratuitos de asistencia lingüística. Llame al [phone].    We comply with applicable federal civil rights laws and [place] laws. We do not discriminate on the basis of race, color, national origin, age, disability, sex, sexual orientation, or gender identity.            Thank you!     Thank you for choosing PEDS CARDIAC TRANSPLANT  for your care. Our goal is always to provide you with excellent care. Hearing back from our patients is one way we can continue to improve our services. Please take a few minutes to complete the written survey that you may receive in the mail after your visit with us. Thank you!             Your Updated Medication List - Protect others around you: Learn how to safely use, store and throw away your medicines at www.disposemymeds.org.          This list is accurate as of: 11/16/17 10:29 AM.  Always use your most recent med list.                   Brand Name Dispense Instructions for use Diagnosis    amLODIPine 5 MG tablet    NORVASC    90 tablet    Take 1 tablet (5 mg) by mouth daily    Heart replaced by transplant (H)       aspirin 81 MG EC tablet     60 tablet    Take 2 tablets (162 mg) by mouth daily    Status post transplant, heart (H)       mycophenolate 500 MG tablet    GENERIC EQUIVALENT    180 tablet    Take 1 tablet (500 mg) by mouth 2 times daily    Heart transplanted (H)       pravastatin 10 MG tablet    PRAVACHOL    90 tablet    Take 1 tablet (10 mg) by mouth daily At bedtime        predniSONE 10 MG tablet    DELTASONE    90 tablet    Take 1 tablet (10 mg) by mouth daily    Heart replaced by  transplant (H)       sirolimus 1 MG Tabs tablet     60 tablet    Take 2 tablets (2 mg) by mouth daily    Heart replaced by transplant (H)       * tacrolimus 1 MG capsule    GENERIC EQUIVALENT    270 capsule    Take 3.5 mg twice daily    Transplanted heart (H)       * tacrolimus 0.5 MG capsule    GENERIC EQUIVALENT    180 capsule    Take 3.5 mg twice daily    Transplanted heart (H)       Vitamin D3 2000 UNITS Tabs     100 tablet    Take 2,000 Units by mouth daily    Heart transplanted (H)       * Notice:  This list has 2 medication(s) that are the same as other medications prescribed for you. Read the directions carefully, and ask your doctor or other care provider to review them with you.

## 2017-11-16 NOTE — MR AVS SNAPSHOT
After Visit Summary   11/16/2017    Obed Viramontes    MRN: [id number]           Patient Information     Date Of Birth          1995        Visit Information        Provider Department      11/16/2017 11:20 AM Amna Tinoco MD [facility]        Today's Diagnoses     Status post fusion of wrist    -  1       Follow-ups after your visit        Future tests that were ordered for you today     Open Standing Orders        Priority Remaining Interval Expires Ordered    Sirolimus level Routine 4/4 Bi-Weekly 11/16/2018 11/16/2017    Tacrolimus level Routine 4/4 Bi-Weekly 11/16/2018 11/16/2017    Basic Metabolic Panel Routine 4/4 Bi-Weekly 11/16/2018 11/16/2017    N Terminal Pro BNP Routine 4/4 Bi-Weekly 11/16/2018 11/16/2017    Troponin I Routine 4/4 Bi-Weekly 11/16/2018 11/16/2017          Open Future Orders        Priority Expected Expires Ordered    Echo pediatric complete Routine  2/14/2018 11/16/2017    EKG 12 lead - pediatric Routine  2/14/2018 11/16/2017    X-ray Chest 2 vws* Routine 11/16/2017 2/14/2018 11/16/2017            Who to contact     Please call your clinic at [phone] to:    Ask questions about your health    Make or cancel appointments    Discuss your medicines    Learn about your test results    Speak to your doctor   If you have compliments or concerns about an experience at your clinic, or if you wish to file a complaint, please contact [facility] Patient Relations at [phone] or email us at [email or web address]         Additional Information About Your Visit        MyChart Information     [employer] gives you secure access to your electronic health record. If you see a primary care provider, you can also send messages to your care team and make appointments. If you have questions, please call your primary care clinic.  If you do not have a primary care provider, please call [phone] and they will assist  you.      [employer] is an electronic gateway that provides easy, online access to your medical records. With [employer], you can request a clinic appointment, read your test results, renew a prescription or communicate with your care team.     To access your existing account, please contact your [facility] or call [phone] for assistance.        Care EveryWhere ID     This is your Care EveryWhere ID. This could be used by other organizations to access your [place] medical records  [id number]         Blood Pressure from Last 3 Encounters:   11/16/17 125/88   10/12/17 120/71   10/09/17 136/86    Weight from Last 3 Encounters:   11/16/17 62.3 kg (137 lb 5.6 oz)   10/12/17 63.9 kg (140 lb 14 oz)   10/09/17 63.5 kg (139 lb 15.9 oz)              Today, you had the following     No orders found for display         Today's Medication Changes          These changes are accurate as of: 11/16/17  1:20 PM.  If you have any questions, ask your nurse or doctor.               Start taking these medicines.        Dose/Directions    sirolimus 1 MG Tabs tablet   Used for:  Heart replaced by transplant (H)   Started by:  Bobbi Pruitt MD        Dose:  2 mg   Take 2 tablets (2 mg) by mouth daily   Quantity:  60 tablet   Refills:  3         These medicines have changed or have updated prescriptions.        Dose/Directions    predniSONE 5 MG tablet   Commonly known as:  DELTASONE   This may have changed:    - medication strength  - how much to take   Used for:  Heart replaced by transplant (H)   Changed by:  Bobbi Pruitt MD        Dose:  5 mg   Take 1 tablet (5 mg) by mouth daily   Quantity:  30 tablet   Refills:  1         Stop taking these medicines if you haven't already. Please contact your care team if you have questions.     oxyCODONE IR 5 MG tablet   Commonly known as:  ROXICODONE   Stopped by:  Bobbi Pruitt MD                Where to get your medicines      These  medications were sent to [facility] - [place], [place] - [address]  [address] , [facility] [address]    Hours:  Mon-Fri 8:30am-5:00pm Toll Free [phone] Phone:  [phone]     predniSONE 5 MG tablet    sirolimus 1 MG Tabs tablet                Primary Care Provider Office Phone # Fax #    Rayo Schafer [phone]       [facility] [address]  [place] [address]        Equal Access to Services     [facility]: Hadii aad ku hadasho Soomaali, waaxda luqadaha, qaybta kaalmada adeegyada, stephanie campbell haypako hayes . So [facility] [phone].    ATENCIÓN: Si habla español, tiene a neves disposición servicios gratuitos de asistencia lingüística. [facility] [phone].    We comply with applicable federal civil rights laws and [place] laws. We do not discriminate on the basis of race, color, national origin, age, disability, sex, sexual orientation, or gender identity.            Thank you!     Thank you for choosing [facility]  for your care. Our goal is always to provide you with excellent care. Hearing back from our patients is one way we can continue to improve our services. Please take a few minutes to complete the written survey that you may receive in the mail after your visit with us. Thank you!             Your Updated Medication List - Protect others around you: Learn how to safely use, store and throw away your medicines at www.disposemymeds.org.          This list is accurate as of: 11/16/17  1:20 PM.  Always use your most recent med list.                   Brand Name Dispense Instructions for use Diagnosis    amLODIPine 5 MG tablet    NORVASC    90 tablet    Take 1 tablet (5 mg) by mouth daily    Heart replaced by transplant (H)       aspirin 81 MG EC tablet     60 tablet    Take 2 tablets (162 mg) by mouth daily    Status post transplant, heart (H)       mycophenolate 500 MG  tablet    GENERIC EQUIVALENT    180 tablet    Take 1 tablet (500 mg) by mouth 2 times daily    Heart transplanted (H)       pravastatin 10 MG tablet    PRAVACHOL    90 tablet    Take 1 tablet (10 mg) by mouth daily At bedtime        predniSONE 5 MG tablet    DELTASONE    30 tablet    Take 1 tablet (5 mg) by mouth daily    Heart replaced by transplant (H)       sirolimus 1 MG Tabs tablet     60 tablet    Take 2 tablets (2 mg) by mouth daily    Heart replaced by transplant (H)       * tacrolimus 1 MG capsule    GENERIC EQUIVALENT    270 capsule    Take 3.5 mg twice daily    Transplanted heart (H)       * tacrolimus 0.5 MG capsule    GENERIC EQUIVALENT    180 capsule    Take 3.5 mg twice daily    Transplanted heart (H)       Vitamin D3 2000 UNITS Tabs     100 tablet    Take 2,000 Units by mouth daily    Heart transplanted (H)       * Notice:  This list has 2 medication(s) that are the same as other medications prescribed for you. Read the directions carefully, and ask your doctor or other care provider to review them with you.

## 2017-11-16 NOTE — PROGRESS NOTES
A partnership of [place] and [facility]    Orthopaedic Post-Operative Visit     Obed Viramontes MRN# [id number]   Age: 21 year old YOB: 1995          Subjective:   Obed returns for follow up of left wrist plate removal on  on 10/12/17.      Obed is not doing physical therapy at this time.    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  He has no pain.  He is pleased with the position of the left hand.                 Past Medical History:   I have reviewed this patient's past medical history and commented on sigificant events within the HPI         Past Surgical History:    has a past surgical history that includes Transplant heart recipient; SURGICAL PATHOLOGY; biopsy; picc insertion; PICC removal; orthopedic surgery; Heart cath child (11/13/2012); Heart cath child (2/15/2013); Heart Cath, Biopsy (3/21/2014); Heart cath child (N/A, 4/10/2015); Heart Cath, Biopsy (Right, 4/18/2016); Arthrodesis wrist (Left, 10/20/2016); Interposition tendon hand (Left, 10/20/2016); Heart cath child (N/A, 4/21/2017); Heart Cath, Biopsy (Right, 5/26/2017); Heart Cath, Biopsy (N/A, 9/11/2017); and Remove hardware wrist (Left, 10/12/2017).           Physical Exam:     Today his wounds were clean and dry.   no steri-strips were on the wound, and none were replaced as his wound is healing nicely. He was told that he can enter the pool/bathtub when all the scabs are completely gone.  His fingers have spasticity but can be fully extended.  His wrist is fused solid in a straight position.            Plan of Care:     Obed has returned to work. He will be advancing on his cardiac pain meds, now that his wound is all healed.  He has no further concerns, nor does his mother.      Patient will be seen back on an as needed basis, should any further questions or problems arise.

## 2017-11-16 NOTE — NURSING NOTE
"Chief Complaint   Patient presents with     Follow Up For     Heart transplant f/u     /88 (BP Location: Right arm, Patient Position: Sitting, Cuff Size: Adult Regular)  Pulse 102  Resp 20  Ht 6' 0.56\" (184.3 cm)  Wt 137 lb 5.6 oz (62.3 kg)  SpO2 100%  BMI 18.34 kg/m2    Hannah Contreras LPN    "

## 2017-11-16 NOTE — NURSING NOTE
Obed is doing well per his report. Helen's concern today is that we contact [facility] to try to keep his medications at [facility]. I will do this and contact her once this letter has been submitted.

## 2017-11-17 ENCOUNTER — TELEPHONE (OUTPATIENT)
Dept: PEDIATRIC CARDIOLOGY | Facility: CLINIC | Age: 22
End: 2017-11-17

## 2017-11-17 LAB
CMV DNA SPEC NAA+PROBE-ACNC: NORMAL [IU]/ML
CMV DNA SPEC NAA+PROBE-LOG#: NORMAL {LOG_IU}/ML
CMV IGG SERPL QL IA: <0.2 AI (ref 0–0.8)
EBV DNA # SPEC NAA+PROBE: NORMAL {COPIES}/ML
EBV DNA SPEC NAA+PROBE-LOG#: NORMAL {LOG_COPIES}/ML
EBV NA IGG SER QL IA: 0.3 AI (ref 0–0.8)
EBV VCA IGG SER QL IA: 4.7 AI (ref 0–0.8)
EBV VCA IGM SER QL IA: 0.2 AI (ref 0–0.8)
SPECIMEN SOURCE: NORMAL

## 2017-11-17 NOTE — TELEPHONE ENCOUNTER
I called and left a message for Obed.  I informed him that his labs from the other day were good and that we are going to change his medications around a little bit.  I instructed him to call my office number today and left the number, I told him that if he doesn't call today then he needs to call Nicci on Monday and I left her number.

## 2017-11-17 NOTE — TELEPHONE ENCOUNTER
Jolyl called this morning and left a voicemail on Nicci's office phone.  She stated that she received the message and would like the medications ordered (prednisone and sirolimus) and sent to her house on Monday.  She asked that the prescriptions be put through.    Nicci had already put the medication orders in the computer.

## 2017-11-20 NOTE — TELEPHONE ENCOUNTER
I confirmed with Jolly that she had received my message last week about Obed's labs. She said that she had. She has been working with the specialty pharmacy today to get Obed's sirolimus and 5 mg prednisone prescriptions mailed. The pharmacy confirmed that they should be available today. I told her to call us back if this is not the case. Per Obed's schedule, we will have labs drawn at 10 am on 12/6 to check a sirolimus and tacro level. Jolly verbalized understanding. I called Obed again and left a message for him to call me back.

## 2017-11-21 ENCOUNTER — TELEPHONE (OUTPATIENT)
Dept: PEDIATRICS | Age: 22
End: 2017-11-21

## 2017-11-22 LAB — INTERPRETATION ECG - MUSE: NORMAL

## 2017-12-01 ENCOUNTER — TELEPHONE (OUTPATIENT)
Dept: TRANSPLANT | Facility: CLINIC | Age: 22
End: 2017-12-01

## 2017-12-06 DIAGNOSIS — Z94.1 HEART REPLACED BY TRANSPLANT (H): ICD-10-CM

## 2017-12-06 LAB
ANION GAP SERPL CALCULATED.3IONS-SCNC: 6 MMOL/L (ref 3–14)
BUN SERPL-MCNC: 17 MG/DL (ref 7–30)
CALCIUM SERPL-MCNC: 8.8 MG/DL (ref 8.5–10.1)
CHLORIDE SERPL-SCNC: 110 MMOL/L (ref 94–109)
CO2 SERPL-SCNC: 27 MMOL/L (ref 20–32)
CREAT SERPL-MCNC: 0.7 MG/DL (ref 0.66–1.25)
GFR SERPL CREATININE-BSD FRML MDRD: >90 ML/MIN/1.7M2
GLUCOSE SERPL-MCNC: 92 MG/DL (ref 70–99)
NT-PROBNP SERPL-MCNC: 38 PG/ML (ref 0–125)
POTASSIUM SERPL-SCNC: 4.2 MMOL/L (ref 3.4–5.3)
SIROLIMUS BLD-MCNC: 6 UG/L (ref 5–15)
SODIUM SERPL-SCNC: 143 MMOL/L (ref 133–144)
TACROLIMUS BLD-MCNC: 6.3 UG/L (ref 5–15)
TME LAST DOSE: NORMAL H
TME LAST DOSE: NORMAL H
TROPONIN I SERPL-MCNC: <0.015 UG/L (ref 0–0.04)

## 2017-12-06 PROCEDURE — 84484 ASSAY OF TROPONIN QUANT: CPT | Performed by: PEDIATRICS

## 2017-12-06 PROCEDURE — 83880 ASSAY OF NATRIURETIC PEPTIDE: CPT | Performed by: PEDIATRICS

## 2017-12-06 PROCEDURE — 36415 COLL VENOUS BLD VENIPUNCTURE: CPT | Performed by: PEDIATRICS

## 2017-12-06 PROCEDURE — 80048 BASIC METABOLIC PNL TOTAL CA: CPT | Performed by: PEDIATRICS

## 2017-12-06 PROCEDURE — 80197 ASSAY OF TACROLIMUS: CPT | Performed by: PEDIATRICS

## 2017-12-06 PROCEDURE — 80195 ASSAY OF SIROLIMUS: CPT | Performed by: PEDIATRICS

## 2017-12-07 ENCOUNTER — TELEPHONE (OUTPATIENT)
Dept: PEDIATRIC CARDIOLOGY | Facility: CLINIC | Age: 22
End: 2017-12-07

## 2017-12-07 DIAGNOSIS — Z94.1 HEART REPLACED BY TRANSPLANT (H): Primary | ICD-10-CM

## 2017-12-07 DIAGNOSIS — Z94.1 TRANSPLANTED HEART (H): ICD-10-CM

## 2017-12-07 RX ORDER — TACROLIMUS 0.5 MG/1
CAPSULE ORAL
Qty: 180 CAPSULE | Refills: 3 | COMMUNITY
Start: 2017-12-07 | End: 2018-01-12

## 2017-12-07 RX ORDER — PREDNISONE 2.5 MG/1
2.5 TABLET ORAL DAILY
Qty: 30 TABLET | Refills: 1 | Status: SHIPPED | OUTPATIENT
Start: 2017-12-07 | End: 2017-12-26

## 2017-12-07 RX ORDER — TACROLIMUS 1 MG/1
3 CAPSULE ORAL 2 TIMES DAILY
Qty: 180 CAPSULE | Refills: 11 | Status: SHIPPED | OUTPATIENT
Start: 2017-12-07 | End: 2017-12-29

## 2017-12-07 NOTE — TELEPHONE ENCOUNTER
I called Obed to confirm which medications he was taking because of his new labs. He said that he is taking:  Prednisone 5 mg  Tacrolimus 3 mg AM/2 mg PM  Sirolimus 2 mg    We did not have the dose change of the tacrolimus in his summary from last visit but he may have gone ahead and made a change with the titration of other medication doses. Based on his levels, drawn yesterday, he should start taking:  Prednisone 2.5 mg  Tacrolimus 3 mg morning/3 mg evening  Sirolimus 2 mg    I had Obed repeat this back to me, which he did correctly. He verbalized understanding. He prefers not to have to cut pills so I sent a new prescription for 2.5 mg prednisone to the [facility] in [place]. He would like to come back to get labs drawn in 2 weeks on Wednesday, so I will make him a lab appointment for Wednesday 12/20 at 1030. He verbalized understanding.

## 2017-12-22 ENCOUNTER — TELEPHONE (OUTPATIENT)
Dept: PEDIATRIC CARDIOLOGY | Facility: CLINIC | Age: 22
End: 2017-12-22

## 2017-12-22 ENCOUNTER — OFFICE VISIT (OUTPATIENT)
Dept: PEDIATRIC CARDIOLOGY | Facility: CLINIC | Age: 22
End: 2017-12-22
Attending: PEDIATRICS
Payer: COMMERCIAL

## 2017-12-22 VITALS
OXYGEN SATURATION: 98 % | HEART RATE: 109 BPM | TEMPERATURE: 98.5 F | DIASTOLIC BLOOD PRESSURE: 83 MMHG | SYSTOLIC BLOOD PRESSURE: 124 MMHG | RESPIRATION RATE: 20 BRPM

## 2017-12-22 DIAGNOSIS — D84.9 IMMUNOSUPPRESSION (H): ICD-10-CM

## 2017-12-22 DIAGNOSIS — Z94.1 HEART REPLACED BY TRANSPLANT (H): Primary | ICD-10-CM

## 2017-12-22 DIAGNOSIS — K05.10 GINGIVOSTOMATITIS: ICD-10-CM

## 2017-12-22 DIAGNOSIS — Z94.1 HEART REPLACED BY TRANSPLANT (H): ICD-10-CM

## 2017-12-22 LAB
ALBUMIN SERPL-MCNC: 4.1 G/DL (ref 3.4–5)
ALP SERPL-CCNC: 76 U/L (ref 40–150)
ALT SERPL W P-5'-P-CCNC: 14 U/L (ref 0–70)
ANION GAP SERPL CALCULATED.3IONS-SCNC: 5 MMOL/L (ref 3–14)
AST SERPL W P-5'-P-CCNC: 16 U/L (ref 0–45)
BASOPHILS # BLD AUTO: 0 10E9/L (ref 0–0.2)
BASOPHILS NFR BLD AUTO: 0.4 %
BILIRUB SERPL-MCNC: 0.6 MG/DL (ref 0.2–1.3)
BUN SERPL-MCNC: 16 MG/DL (ref 7–30)
CALCIUM SERPL-MCNC: 9.3 MG/DL (ref 8.5–10.1)
CHLORIDE SERPL-SCNC: 107 MMOL/L (ref 94–109)
CO2 SERPL-SCNC: 30 MMOL/L (ref 20–32)
CREAT SERPL-MCNC: 0.71 MG/DL (ref 0.66–1.25)
DIFFERENTIAL METHOD BLD: NORMAL
EOSINOPHIL # BLD AUTO: 0.1 10E9/L (ref 0–0.7)
EOSINOPHIL NFR BLD AUTO: 1.8 %
ERYTHROCYTE [DISTWIDTH] IN BLOOD BY AUTOMATED COUNT: 12 % (ref 10–15)
GFR SERPL CREATININE-BSD FRML MDRD: >90 ML/MIN/1.7M2
GLUCOSE SERPL-MCNC: 90 MG/DL (ref 70–99)
HCT VFR BLD AUTO: 43.2 % (ref 40–53)
HGB BLD-MCNC: 14.8 G/DL (ref 13.3–17.7)
IMM GRANULOCYTES # BLD: 0 10E9/L (ref 0–0.4)
IMM GRANULOCYTES NFR BLD: 0.2 %
LYMPHOCYTES # BLD AUTO: 1.1 10E9/L (ref 0.8–5.3)
LYMPHOCYTES NFR BLD AUTO: 20.3 %
MAGNESIUM SERPL-MCNC: 1.7 MG/DL (ref 1.6–2.3)
MCH RBC QN AUTO: 29 PG (ref 26.5–33)
MCHC RBC AUTO-ENTMCNC: 34.3 G/DL (ref 31.5–36.5)
MCV RBC AUTO: 85 FL (ref 78–100)
MISCELLANEOUS TEST: NORMAL
MONOCYTES # BLD AUTO: 0.7 10E9/L (ref 0–1.3)
MONOCYTES NFR BLD AUTO: 11.9 %
NEUTROPHILS # BLD AUTO: 3.6 10E9/L (ref 1.6–8.3)
NEUTROPHILS NFR BLD AUTO: 65.4 %
NRBC # BLD AUTO: 0 10*3/UL
NRBC BLD AUTO-RTO: 0 /100
NT-PROBNP SERPL-MCNC: 30 PG/ML (ref 0–125)
PHOSPHATE SERPL-MCNC: 3.2 MG/DL (ref 2.5–4.5)
PLATELET # BLD AUTO: 171 10E9/L (ref 150–450)
POTASSIUM SERPL-SCNC: 3.8 MMOL/L (ref 3.4–5.3)
PROT SERPL-MCNC: 7.6 G/DL (ref 6.8–8.8)
RBC # BLD AUTO: 5.1 10E12/L (ref 4.4–5.9)
SIROLIMUS BLD-MCNC: 5.6 UG/L (ref 5–15)
SODIUM SERPL-SCNC: 142 MMOL/L (ref 133–144)
TACROLIMUS BLD-MCNC: 8.1 UG/L (ref 5–15)
TME LAST DOSE: NORMAL H
TME LAST DOSE: NORMAL H
TROPONIN I SERPL-MCNC: 0.04 UG/L (ref 0–0.04)
WBC # BLD AUTO: 5.5 10E9/L (ref 4–11)

## 2017-12-22 PROCEDURE — 84484 ASSAY OF TROPONIN QUANT: CPT | Performed by: PEDIATRICS

## 2017-12-22 PROCEDURE — 86664 EPSTEIN-BARR NUCLEAR ANTIGEN: CPT | Performed by: PEDIATRICS

## 2017-12-22 PROCEDURE — 84100 ASSAY OF PHOSPHORUS: CPT | Performed by: PEDIATRICS

## 2017-12-22 PROCEDURE — 86665 EPSTEIN-BARR CAPSID VCA: CPT | Performed by: PEDIATRICS

## 2017-12-22 PROCEDURE — 36415 COLL VENOUS BLD VENIPUNCTURE: CPT | Performed by: PEDIATRICS

## 2017-12-22 PROCEDURE — 80053 COMPREHEN METABOLIC PANEL: CPT | Performed by: PEDIATRICS

## 2017-12-22 PROCEDURE — 87253 VIRUS INOCULATE TISSUE ADDL: CPT | Performed by: PEDIATRICS

## 2017-12-22 PROCEDURE — 80195 ASSAY OF SIROLIMUS: CPT | Performed by: PEDIATRICS

## 2017-12-22 PROCEDURE — 83735 ASSAY OF MAGNESIUM: CPT | Performed by: PEDIATRICS

## 2017-12-22 PROCEDURE — 84999 UNLISTED CHEMISTRY PROCEDURE: CPT | Performed by: PEDIATRICS

## 2017-12-22 PROCEDURE — 87529 HSV DNA AMP PROBE: CPT | Performed by: PEDIATRICS

## 2017-12-22 PROCEDURE — 99212 OFFICE O/P EST SF 10 MIN: CPT | Mod: ZF

## 2017-12-22 PROCEDURE — 87252 VIRUS INOCULATION TISSUE: CPT | Performed by: PEDIATRICS

## 2017-12-22 PROCEDURE — 85025 COMPLETE CBC W/AUTO DIFF WBC: CPT | Performed by: PEDIATRICS

## 2017-12-22 PROCEDURE — 86644 CMV ANTIBODY: CPT | Performed by: PEDIATRICS

## 2017-12-22 PROCEDURE — 80197 ASSAY OF TACROLIMUS: CPT | Performed by: PEDIATRICS

## 2017-12-22 PROCEDURE — 87799 DETECT AGENT NOS DNA QUANT: CPT | Performed by: PEDIATRICS

## 2017-12-22 PROCEDURE — 87252 VIRUS INOCULATION TISSUE: CPT | Mod: 91 | Performed by: PEDIATRICS

## 2017-12-22 PROCEDURE — 83880 ASSAY OF NATRIURETIC PEPTIDE: CPT | Performed by: PEDIATRICS

## 2017-12-22 RX ORDER — VALACYCLOVIR HYDROCHLORIDE 1 G/1
2000 TABLET, FILM COATED ORAL 2 TIMES DAILY
Qty: 4 TABLET | Refills: 0 | Status: SHIPPED | OUTPATIENT
Start: 2017-12-22 | End: 2017-12-27

## 2017-12-22 RX ORDER — AMLODIPINE BESYLATE 5 MG/1
5 TABLET ORAL DAILY
Qty: 90 TABLET | Refills: 3 | Status: SHIPPED | OUTPATIENT
Start: 2017-12-22 | End: 2019-03-14

## 2017-12-22 RX ORDER — MYCOPHENOLATE MOFETIL 500 MG/1
500 TABLET ORAL 2 TIMES DAILY
Qty: 180 TABLET | Refills: 5 | Status: SHIPPED | OUTPATIENT
Start: 2017-12-22 | End: 2019-03-14

## 2017-12-22 RX ORDER — SIROLIMUS 1 MG
2 TABLET ORAL DAILY
Qty: 60 TABLET | Refills: 3 | Status: SHIPPED | OUTPATIENT
Start: 2017-12-22 | End: 2017-12-29 | Stop reason: ALTCHOICE

## 2017-12-22 NOTE — LETTER
To: [facility]    Re: Obed Viramontes   1995  [facility] [id number]; ID [id number]  Rx group [id number]    To whomever it may concern,     Obed had a heart transplant over 5 years ago. He has a high volume of specialty medications (all of his immune suppression) that are being titrated regularly due to a recent episode of rejection. In order to assure that there is no lapse in his specialty pharmacy benefits and ability to obtain medications, we are requesting that he be able to continue using [facility].

## 2017-12-22 NOTE — PROGRESS NOTES
It is a pleasure to see Obed and his parent, Jolly, in clinic today. His concern is sores in his mouth and overall feeling of being unwell. He had a 24 hours stomach bug earlier in December and then has had cold-like symptoms for the past week to two weeks.     I also notified Jolly that I had spoken with provider services at [facility]. Per their information and that given to [place] [facility] pharmacy, all [facility] patients will move from [facility] to [facility] on 1/1/2018. They said that their is not a petition to keep him at [facility] and that all accounts are being preemptively transferred. I relayed this info to Jolly. She said that they are going to have refills for all medications sent this week so hopefully this will give them enough time to get the new pharmacy working well.

## 2017-12-22 NOTE — PATIENT INSTRUCTIONS
1) We will call this afternoon with lab results.   2) Call the on call with any worsening symptoms or concerns. [phone], option 4, job code [id number]  3) Take anti-viral medication as prescribed. Valacyclovir 2000 mg twice (doses 12 hours apart).   4) Use tylenol for pain. Can use over the counter benzocaine products for relief of oral symptoms.

## 2017-12-22 NOTE — MR AVS SNAPSHOT
After Visit Summary   12/22/2017    Obed Viramontes    MRN: [id number]           Patient Information     Date Of Birth          1995        Visit Information        Provider Department      12/22/2017 10:30 AM Taj Lorenzo MD Peds Cardiac Transplant        Today's Diagnoses     Heart replaced by transplant (H)    -  1    Heart transplanted (H)          Care Instructions    1) We will call this afternoon with lab results.   2) Call the on call with any worsening symptoms or concerns. [phone], option 4, job code 0802  3) Take anti-viral medication as prescribed. Valacyclovir 2000 mg twice (doses 12 hours apart).   4) Use tylenol for pain. Can use over the counter benzocaine products for relief of oral symptoms.              Follow-ups after your visit        Future tests that were ordered for you today     Open Future Orders        Priority Expected Expires Ordered    Herpes Simplex Virus 1 and 2 IgG Routine  12/29/2017 12/22/2017    HSV IgM antibody Routine  12/29/2017 12/22/2017            Who to contact     Please call your clinic at [phone] to:    Ask questions about your health    Make or cancel appointments    Discuss your medicines    Learn about your test results    Speak to your doctor   If you have compliments or concerns about an experience at your clinic, or if you wish to file a complaint, please contact [facility] Patient Relations at [phone] or email us at [email or web address]         Additional Information About Your Visit        MyChart Information     [employer] gives you secure access to your electronic health record. If you see a primary care provider, you can also send messages to your care team and make appointments. If you have questions, please call your primary care clinic.  If you do not have a primary care provider, please call [phone] and they will assist you.      [employer] is an electronic gateway that  provides easy, online access to your medical records. With [employer], you can request a clinic appointment, read your test results, renew a prescription or communicate with your care team.     To access your existing account, please contact your [facility] or call [phone] for assistance.        Care EveryWhere ID     This is your Care EveryWhere ID. This could be used by other organizations to access your [place] medical records  [id number]        Your Vitals Were     Pulse Temperature Respirations Pulse Oximetry          109 98.5  F (36.9  C) (Oral) 20 98%         Blood Pressure from Last 3 Encounters:   12/22/17 124/83   11/16/17 125/88   10/12/17 120/71    Weight from Last 3 Encounters:   11/16/17 137 lb 5.6 oz (62.3 kg)   10/12/17 140 lb 14 oz (63.9 kg)   10/09/17 139 lb 15.9 oz (63.5 kg)              We Performed the Following     HSV 1 and 2 DNA by PCR          Today's Medication Changes          These changes are accurate as of: 12/22/17 12:06 PM.  If you have any questions, ask your nurse or doctor.               Start taking these medicines.        Dose/Directions    valACYclovir 1000 mg tablet   Commonly known as:  VALTREX   Used for:  Heart replaced by transplant (H)   Started by:  Taj Lorenzo MD        Dose:  2000 mg   Take 2 tablets (2,000 mg) by mouth 2 times daily   Quantity:  4 tablet   Refills:  0            Where to get your medicines      These medications were sent to [facility] - [facility] [address]  [address] [facility] [address]    Hours:  Mon-Fri 8:30am-5:00pm Toll Free [phone] Phone:  [phone]     amLODIPine 5 MG tablet    mycophenolate 500 MG tablet         These medications were [id number] [place] - [address] [facility]  [address] [facility] [address]     Phone:  [phone]     sirolimus 1 MG Tabs tablet    valACYclovir 1000 mg tablet                 Primary Care Provider Office Phone # Fax #    Rayo Schafer [phone]       [facility] [address]  [place] [address]        Equal Access to Services     CARLA GREENBERG : Hadii estuardo rojas matildao Sodeepakali, waaxda luqadaha, qaybta kaalmada adeegyada, stephanie hunterin hayaan lincolngregoria luis freddy patiño. So [facility] [phone].    ATENCIÓN: Si habla español, tiene a neves disposición servicios gratuitos de asistencia lingüística. Llame al [phone].    We comply with applicable federal civil rights laws and [place] laws. We do not discriminate on the basis of race, color, national origin, age, disability, sex, sexual orientation, or gender identity.            Thank you!     Thank you for choosing [facility]S CARDIAC TRANSPLANT  for your care. Our goal is always to provide you with excellent care. Hearing back from our patients is one way we can continue to improve our services. Please take a few minutes to complete the written survey that you may receive in the mail after your visit with us. Thank you!             Your Updated Medication List - Protect others around you: Learn how to safely use, store and throw away your medicines at www.disposemymeds.org.          This list is accurate as of: 12/22/17 12:06 PM.  Always use your most recent med list.                   Brand Name Dispense Instructions for use Diagnosis    amLODIPine 5 MG tablet    NORVASC    90 tablet    Take 1 tablet (5 mg) by mouth daily    Heart replaced by transplant (H)       aspirin 81 MG EC tablet     60 tablet    Take 2 tablets (162 mg) by mouth daily    Status post transplant, heart (H)       mycophenolate 500 MG tablet    GENERIC EQUIVALENT    180 tablet    Take 1 tablet (500 mg) by mouth 2 times daily    Heart transplanted (H)       pravastatin 10 MG tablet    PRAVACHOL    90 tablet    Take 1 tablet (10 mg) by mouth daily At bedtime        predniSONE 2.5 MG tablet    DELTASONE    30 tablet     Take 1 tablet (2.5 mg) by mouth daily    Heart replaced by transplant (H)       sirolimus 1 MG Tabs tablet     60 tablet    Take 2 tablets (2 mg) by mouth daily    Heart replaced by transplant (H)       * tacrolimus 1 MG capsule    GENERIC EQUIVALENT    180 capsule    Take 3 capsules (3 mg) by mouth 2 times daily    Transplanted heart (H)       * tacrolimus 0.5 MG capsule    GENERIC EQUIVALENT    180 capsule    On hold per changes in level    Transplanted heart (H)       valACYclovir 1000 mg tablet    VALTREX    4 tablet    Take 2 tablets (2,000 mg) by mouth 2 times daily    Heart replaced by transplant (H)       Vitamin D3 2000 UNITS Tabs     100 tablet    Take 2,000 Units by mouth daily    Heart transplanted (H)       * Notice:  This list has 2 medication(s) that are the same as other medications prescribed for you. Read the directions carefully, and ask your doctor or other care provider to review them with you.

## 2017-12-22 NOTE — NURSING NOTE
Chief Complaint   Patient presents with     Follow Up For     Ill for last few weeks and now has mouth sores.     /83 (BP Location: Right arm, Patient Position: Chair, Cuff Size: Adult Regular)  Pulse 109  Temp 98.5  F (36.9  C) (Oral)  Resp 20  SpO2 98%    Hannah Contreras LPN

## 2017-12-22 NOTE — LETTER
2017      RE: Obed Viramontes  [address]  [facility] [address]       It is a pleasure to see Obed and his parent, Jolly, in clinic today. His concern is sores in his mouth and overall feeling of being unwell. He had a 24 hours stomach bug earlier in December and then has had cold-like symptoms for the past week to two weeks.     I also notified Jolly that I had spoken with provider services at [facility]. Per their information and that given to [facility], all [facility] patients will move from [facility] to [facility] on 2018. They said that their is not a petition to keep him at [facility] and that all accounts are being preemptively transferred. I relayed this info to Jolly. She said that they are going to have refills for all medications sent this week so hopefully this will give them enough time to get the new pharmacy working well.       [facility]  Pediatric Heart Transplant Clinic    2017    RE: Obed Viramontes  : 1995  MRN: [id number]    Dear Rayo and colleagues,    I had the pleasure of evaluating our mutual patient, Obed Viramontes, at the [facility] on 2017 for regularly scheduled follow-up. As you remember, Obed is a 22 year old male with history of heart transplant (3/23/2012) for dilated cardiomyopathy and post-transplant lymphoproliferative disorder (2012) who transferred care from [facility] to the [facility] in 2012. He is in clinic today with his mother for an acute illness evaluation. follow-up after recent rejection episodes. To review his most recent course, he was seen in 2017 for routine 5th annual follow-up; however, he had elevated troponin and routine biopsy showed 2R rejection, and he was non-compliant with medications prior to this. He was treated with IV methylprednisolone and a slow taper  of oral prednisone. After going home to live at his mother's house for a few weeks, his compliance improved, as did his schedule. He was eating better and had also gained some weight. Even after going to live with his dad for a bit, he confirmed being compliant with his medications. In late May, his troponin bumped again and a biopsy on 5/26 revealed 2R rejection, which was treated with 5 days of prednisone and increasing his tacrolimus goals. On follow-up on June 7th his troponin had improved. However, on repeat followup at end of June his troponin was elevated again and he had new PACs on EKG concerning for ongoing rejection. We elected to treat with 3 days of prednisone 60 mg daily, followed by 20 mg daily, which he remained on until July. He was then weaned to 10 mg daily. He underwent diagnostic right heart cathterization on 9/11/2017, which revealed no rejection on biopsy, and his labs had continued to improve. He was switched from prednisone to sirolimus in the late Fall of 2017 after he fully recovered from his hand surgery.    Since early December, he has had a brief diarrheal illness from which he recovered without issues. He reports having not felt well since starting sirolimus. Currently he has mouth sores, which appeared over the past week. This has never happened before. He is able to eat and drink normally and doesn't feel dehydrated. 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 He had uncomplicated  course and was healthy in early childhood. He was hospitalized at age 2 1/2 years with viral myocarditis, hospitalized for 6 weeks, intubated for 10 days, had recovery of myocardial function and was treated chronically with digoxin, hospital course complicated by cerobrovascular accident with resultant left hemiplegia, transitioned to [facility] for an additional 5 weeks following his hospital course. He has had several orthopedic surgeries throughout childhood because of his hemiplegia, but otherwise has been healthy. He was asymptomatic from a cardiac standpoint until 2012, at which time he presented with chest pain. He was found to have significant arrhythmias, was admitted to [facility], evaluated and listed for transplant (mom reports underlying diagnosis of hypertrophic cardiomyopathy possibly secondary to glycogen storage disease?). He was transplanted on 2012, did well post transplant and was discharged after 1 week. He reportedly has done well from a cardiac standpoint with no rejection, 1R biopsy in May and 1R biopsy in September but otherwise negative biopsies. He presented in September with mouth sores and difficulty taking medication, initially treated with lowering his immunosuppression, but was rehospitalized with fever and worsening mouth sores. 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 He was treated with rituximab x 2, and increased prednisone dose from 2.5 mg daily back up 10 mg twice daily. He was scheduled to undergo additional rituximab dose but symptoms improved and  repeat CT scan showed improvement in his lymph node and tonsillar hypertrophy. Per mom he was EBV negative prior to transplant, and she thinks his donor was EBV positive. He has done well from an infection/PTLD standpoint and came off valcyte therapy in December 2013 with negative EBV PCR in February 2014. He did undergo orthopedic surgery for his left hand contracture in October 2016, admitted in January 2017 with a cellulitis of that hand that has completely resolved. He underwent plate removal from that hand October 2017.    Social History:   Lives with dad, works at [employer].     Family history:  Negative for congenital heart defects, heart transplant, cardiomyopathy or sudden death. Sister with type 1 DM. Older sister recently diagnosed with POTS.    Medications:  Prescription Medications as of 12/27/2017             predniSONE (DELTASONE) 2.5 MG tablet Hold per wean    valACYclovir (VALTREX) 1000 mg tablet Take 2 tablets (2,000 mg) by mouth 2 times daily    RAPAMUNE (BRAND) 1 MG PO TABLET Take 2 tablets (2 mg) by mouth daily    amLODIPine (NORVASC) 5 MG tablet Take 1 tablet (5 mg) by mouth daily    mycophenolate (GENERIC EQUIVALENT) 500 MG tablet Take 1 tablet (500 mg) by mouth 2 times daily    tacrolimus (GENERIC EQUIVALENT) 1 MG capsule Take 3 capsules (3 mg) by mouth 2 times daily    tacrolimus (GENERIC EQUIVALENT) 0.5 MG capsule On hold per changes in level    pravastatin (PRAVACHOL) 10 MG tablet Take 1 tablet (10 mg) by mouth daily At bedtime    aspirin 81 MG EC tablet Take 2 tablets (162 mg) by mouth daily    Cholecalciferol (VITAMIN D3) 2000 UNITS TABS Take 2,000 Units by mouth daily        Allergies:   Allergies   Allergen Reactions     Latex Rash     Other [Seasonal Allergies]      Corn-abdominal pain and diarrhea.     Physical Exam:  /83 (BP Location: Right arm, Patient Position: Chair, Cuff Size: Adult Regular)  Pulse 109  Temp 98.5  F (36.9  C) (Oral)  Resp 20  SpO2 98%     Gen:  Alert, interactive and appropriate, sitting, no respiratory distress.  HEENT: NCAT, EOMI, OP with multiple vesicles surrounded by erythema--2 are unroofed  Neck: no lymphadenopathy  Lungs:  normal breath sounds bilaterall; no wheeze, crackles, or rhonchi, good air entry  CV: quiet precordium, normal PMI, RRR, normal S1 and S2, no murmurs, rubs, gallops  Abd: abdomen is soft without tenderness, masses, organomegaly or guarding  Ext: WWP, 2+ pulses upper and 2+ lower extremitiy without delay, no edema; well-healed surgical scar over the posterior left hand  Skin: numerous erythematous pustules on the back  Neuro: alert and oriented, mildly dysarthric; left hemiplegic    Laboratory Studies:  Obed had evaluation today with labs, EKG and echocardiogram. His echo showed normal post-transplant anatomy, low normal systolic function and no pericardial effusion. His EKG showed normal sinus rhythm, rate of 94 beats per minute and no interventricular conduction delay, ST segment or T-wave changes. His labs included a comprehensive metabolic panel, which was normal (specifically BUN of 16, creatinine of 0.89 and normal LFTs). Troponin is 0.039 (stable). NT-pro BNP is normal at 30. He had a CBC which had normal WBC of 5.5, hemoglobin of 14.8 and platelets of [id number]. CMV and EBV DNA by PCR are not detected. Tacrolimus and sirolimus levels are 8.1 and 5.6 (combined goal 8-12).    His last biopsy on 9/11/17 showed no rejection (ISHLT grade 1R), pAMR0, negative C3d/C4d staining.    PRA history:  10/9/17: none > 3000 MFI  8/24/17: donor specific antibody to DR51 ()  7/24/17: donor specific antibody to DR51 ()  6/26/2017: donor specific antibody to DR51 ()  5/25/2017: no donor specific antibodies  4/20/2017: no donor specific antibodies  4/7/16: donor specific antibody to DR51 (MFI 2081)  2/4/16 showed 1 new donor specific antibody to DR51 (MFI 2706)    Assessment  Obed is a 22 year old now 5 1/2 years  post-transplant for dilated cardiomyopathy secondary to viral myocarditis in early childhood (although on review of pathology reports there is a question of hypertrophic cardiomyopathy picture and glycogen storage disease). He also has history of EBV-positive PTLD, now in remission after rituximab. He most recently has had a number of episodes of biopsy-proven and presumed cellular rejection in the first half of 2017 in the setting of non-compliance with immunosuppressive regimen. His increased troponin and PACs in late June (presumed rejection) resolved with pulse-dosed steroids and slow taper. On 9/11/2017 cath, there was no evidence of rejection (ISHLT grade 1R). His troponin level had not been negative and although in the normal range, has fluctuated a bit toward the upper limit of normal. Today it is nearly undetectable, along with a very low NT-pro BNP. I believe he is now compliant and he is at a safe point post-operatively to start sirolimus and wean prednisone.      Diagnosis summary:  1. Viral myocarditis at age 2 1/2 years  2. Dilated cardiomyopathy           A. S/p orthotopic heart transplant (3/23/12)   1. 2R rejection (4/21/17), treated with Solumedrol x 3 days   2. 2R rejection (5/26/17), treated with prednisone x 5 days   3. Presumed rejection (6/26/17) based on elevated troponin and new PACs, treated with prednisone x 3 days and then taper  3. Cerebrovascular accident at age 2 1/2 years with resultant left hemiplegia  4. Multiple orthopedic surgeries in past  5. PTLD, EBV positive            A.  treated with rituximab x 2  6. Possible HSV gingivostomatitis    Plan:  - HSV DNA PCR from oral swabs  - Valcyte 2 g PO Q12 hrs x 2 doses  - continue all other medications    Activity Restriction: none  SBE prophylaxis: none    Follow-up: in 2 months with labs, EKG and echocardiogram    Thank you for the opportunity to participate in [facility]'s care. Please let me know if you have further  questions.    Sincerely,     Taj Lorenzo MD    Pediatric Heart Failure and Transplantation     CC  Patient Care Team:  Rayo Schafer MD as PCP - General (Pediatrics)  Isrrael Trent MD as MD (Oncology)  Bobbi Pruitt MD as MD (Pediatric Cardiology)    Copy to patient    Parent(s) of Obed Viramontes  [address]  [facility] [address]

## 2017-12-22 NOTE — TELEPHONE ENCOUNTER
Jolly paged this morning. She came back from [place] last night, where she had been visiting her daughter. She checked in with Obed and found that he had been sick for a couple of weeks. He has sores in his mouth. They are both concerned about PTLD because he had mouth sores the last time that he had it. I told Jolly that he should have labs, at a minimum, today and that I would talk to transplant cardiology. Dr Lorenzo and Edmond are concerned about over immune suppression with the sirolimus and tacrolimus combination. We will see him this morning. He is scheduled for labs at 10 and an appointment at 10:30. I called Jolly; she verbalized understanding.

## 2017-12-23 LAB
CMV DNA SPEC NAA+PROBE-ACNC: NORMAL [IU]/ML
CMV DNA SPEC NAA+PROBE-LOG#: NORMAL {LOG_IU}/ML
CMV IGG SERPL QL IA: <0.2 AI (ref 0–0.8)
EBV NA IGG SER QL IA: <0.2 AI (ref 0–0.8)
EBV VCA IGG SER QL IA: 5.3 AI (ref 0–0.8)
EBV VCA IGM SER QL IA: <0.2 AI (ref 0–0.8)
SPECIMEN SOURCE: NORMAL

## 2017-12-23 NOTE — TELEPHONE ENCOUNTER
Left Raphael message regarding his immune suppression levels. He should continue taking the same dose of sirolimus but decrease his tacrolimus to 3 mg in the morning and 2 mg in the evening. His tacrolimus level is slightly higher than it needs to be but this does not explain the mouth sores. I said that I would call them with his EBV results over the weekend.

## 2017-12-26 ENCOUNTER — TELEPHONE (OUTPATIENT)
Dept: PEDIATRIC CARDIOLOGY | Facility: CLINIC | Age: 22
End: 2017-12-26

## 2017-12-26 DIAGNOSIS — Z94.1 HEART REPLACED BY TRANSPLANT (H): ICD-10-CM

## 2017-12-26 LAB
EBV DNA # SPEC NAA+PROBE: <500 {COPIES}/ML
EBV DNA SPEC NAA+PROBE-LOG#: <2.7 {LOG_COPIES}/ML
HSV1 DNA SPEC QL NAA+PROBE: NEGATIVE
HSV2 DNA SPEC QL NAA+PROBE: NEGATIVE
SPECIMEN SOURCE: NORMAL

## 2017-12-26 RX ORDER — PREDNISONE 2.5 MG/1
TABLET ORAL
Qty: 30 TABLET | Refills: 1 | Status: SHIPPED | OUTPATIENT
Start: 2017-12-26 | End: 2017-12-26

## 2017-12-26 NOTE — TELEPHONE ENCOUNTER
I left a message with Jolly. Obed did not answer. Obed's HSV culture is negative. His EBV is pending but will be back later. I asked her to call me back directly on my cell phone since I will not be in the office today.

## 2017-12-26 NOTE — TELEPHONE ENCOUNTER
Results for RODNEY ENCARNACION (MRN [id number]) as of 12/26/2017 16:56   Ref. Range 12/22/2017 10:07   EBV DNA Copies/mL Latest Ref Range: EBVNEG^EBV DNA Not Detected Copies/mL <500 (A)   EBV DNA Log of Copies Latest Ref Range: <2.7 Log_copies/mL <2.7     I relayed the results of Rodney's lab tests. Rodney has an undetectable level of EBV and Jolly verbalized understanding. She said that she would talk to Rodney about our conversation since he was with her now. Rodney is having difficulty eating but they were going to try some topical pain relief for his mouth and tongue. I said that I would check in tomorrow and that they could plan for labs on Thursday or Friday morning.

## 2017-12-27 ENCOUNTER — TELEPHONE (OUTPATIENT)
Dept: PEDIATRIC CARDIOLOGY | Facility: CLINIC | Age: 22
End: 2017-12-27

## 2017-12-27 ENCOUNTER — TELEPHONE (OUTPATIENT)
Dept: PEDIATRICS | Age: 22
End: 2017-12-27

## 2017-12-27 DIAGNOSIS — Z94.1 HEART REPLACED BY TRANSPLANT (H): Primary | ICD-10-CM

## 2017-12-27 LAB
RESULT: NORMAL
SEND OUTS MISC TEST CODE: NORMAL
SEND OUTS MISC TEST SPECIMEN: NORMAL
TEST NAME: NORMAL

## 2017-12-27 NOTE — TELEPHONE ENCOUNTER
I spoke with Jolly Obed's mom. She said that he feels like the sores have not gotten worse since yesterday, which he felt was an improvement. He is making efforts to stay hydrated and eat enough. He will come in tomorrow morning at about 11 for a lab draw. I also let her know that we are sending additional cultures from the specimen received last week. Jolly verbalized understanding. She will let Obed know about this conversation as well.

## 2017-12-27 NOTE — PROGRESS NOTES
[facility]  Pediatric Heart Transplant Clinic    2017    RE: Obed Viramontes  : 1995  MRN: [id number]    Dear Rayo and colleagues,    I had the pleasure of evaluating our mutual patient, Obed Viramontes, at the [facility] on 2017 for regularly scheduled follow-up. As you remember, Obed is a 22 year old male with history of heart transplant (3/23/2012) for dilated cardiomyopathy and post-transplant lymphoproliferative disorder (2012) who transferred care from [facility] to the [facility] in 2012. He is in clinic today with his mother for an acute illness evaluation. follow-up after recent rejection episodes. To review his most recent course, he was seen in 2017 for routine  annual follow-up; however, he had elevated troponin and routine biopsy showed 2R rejection, and he was non-compliant with medications prior to this. He was treated with IV methylprednisolone and a slow taper of oral prednisone. After going home to live at his mother's house for a few weeks, his compliance improved, as did his schedule. He was eating better and had also gained some weight. Even after going to live with his dad for a bit, he confirmed being compliant with his medications. In late May, his troponin bumped again and a biopsy on  revealed 2R rejection, which was treated with 5 days of prednisone and increasing his tacrolimus goals. On follow-up on  his troponin had improved. However, on repeat followup at end of  his troponin was elevated again and he had new PACs on EKG concerning for ongoing rejection. We elected to treat with 3 days of prednisone 60 mg daily, followed by 20 mg daily, which he remained on until July. He was then weaned to 10 mg daily. He underwent diagnostic right heart cathterization on 2017, which revealed no rejection on biopsy, and his labs  had continued to improve. He was switched from prednisone to sirolimus in the late Fall of  after he fully recovered from his hand surgery.    Since early December, he has had a brief diarrheal illness from which he recovered without issues. He reports having not felt well since starting sirolimus. Currently he has mouth sores, which appeared over the past week. This has never happened before. He is able to eat and drink normally and doesn't feel dehydrated. 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 He had uncomplicated  course and was healthy in early childhood. He was hospitalized at age 2 1/2 years with viral myocarditis, hospitalized for 6 weeks, intubated for 10 days, had recovery of myocardial function and was treated chronically with digoxin, hospital course complicated by cerobrovascular accident with resultant left hemiplegia, transitioned to [place] rehab for an additional 5 weeks following his hospital course. He has had several orthopedic surgeries throughout childhood because of his hemiplegia, but otherwise has been healthy. He was asymptomatic from a cardiac standpoint until  February 2012, at which time he presented with chest pain. He was found to have significant arrhythmias, was admitted to [facility], evaluated and listed for transplant (mom reports underlying diagnosis of hypertrophic cardiomyopathy possibly secondary to glycogen storage disease?). He was transplanted on March 23, 2012, did well post transplant and was discharged after 1 week. He reportedly has done well from a cardiac standpoint with no rejection, 1R biopsy in May and 1R biopsy in September but otherwise negative biopsies. He presented in September with mouth sores and difficulty taking medication, initially treated with lowering his immunosuppression, but was rehospitalized with fever and worsening mouth sores. 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 He was treated with rituximab x 2, and increased prednisone dose from 2.5 mg daily back up 10 mg twice daily. He was scheduled to undergo additional rituximab dose but symptoms improved and repeat CT scan showed improvement in his lymph node and tonsillar hypertrophy. Per mom he was EBV negative prior to transplant, and she thinks his donor was EBV positive. He has done well from an infection/PTLD standpoint and came off valcyte therapy in December 2013 with negative EBV PCR in February 2014. He did undergo orthopedic surgery for his left hand contracture in October 2016, admitted in January 2017 with a cellulitis of that hand that has completely resolved. He underwent plate removal from that hand October 2017.    Social History:   Lives with dad, works at [employer].     Family history:  Negative for congenital heart defects, heart transplant, cardiomyopathy or sudden death. Sister with type 1 DM. Older sister recently diagnosed with POTS.    Medications:  Prescription Medications as of 12/27/2017             predniSONE (DELTASONE) 2.5 MG tablet Hold per wean    valACYclovir  (VALTREX) 1000 mg tablet Take 2 tablets (2,000 mg) by mouth 2 times daily    RAPAMUNE (BRAND) 1 MG PO TABLET Take 2 tablets (2 mg) by mouth daily    amLODIPine (NORVASC) 5 MG tablet Take 1 tablet (5 mg) by mouth daily    mycophenolate (GENERIC EQUIVALENT) 500 MG tablet Take 1 tablet (500 mg) by mouth 2 times daily    tacrolimus (GENERIC EQUIVALENT) 1 MG capsule Take 3 capsules (3 mg) by mouth 2 times daily    tacrolimus (GENERIC EQUIVALENT) 0.5 MG capsule On hold per changes in level    pravastatin (PRAVACHOL) 10 MG tablet Take 1 tablet (10 mg) by mouth daily At bedtime    aspirin 81 MG EC tablet Take 2 tablets (162 mg) by mouth daily    Cholecalciferol (VITAMIN D3) 2000 UNITS TABS Take 2,000 Units by mouth daily        Allergies:   Allergies   Allergen Reactions     Latex Rash     Other [Seasonal Allergies]      Corn-abdominal pain and diarrhea.     Physical Exam:  /83 (BP Location: Right arm, Patient Position: Chair, Cuff Size: Adult Regular)  Pulse 109  Temp 98.5  F (36.9  C) (Oral)  Resp 20  SpO2 98%     Gen: Alert, interactive and appropriate, sitting, no respiratory distress.  HEENT: NCAT, EOMI, OP with multiple vesicles surrounded by erythema--2 are unroofed  Neck: no lymphadenopathy  Lungs:  normal breath sounds bilaterall; no wheeze, crackles, or rhonchi, good air entry  CV: quiet precordium, normal PMI, RRR, normal S1 and S2, no murmurs, rubs, gallops  Abd: abdomen is soft without tenderness, masses, organomegaly or guarding  Ext: WWP, 2+ pulses upper and 2+ lower extremitiy without delay, no edema; well-healed surgical scar over the posterior left hand  Skin: numerous erythematous pustules on the back  Neuro: alert and oriented, mildly dysarthric; left hemiplegic    Laboratory Studies:  Obed had evaluation today with labs, EKG and echocardiogram. His echo showed normal post-transplant anatomy, low normal systolic function and no pericardial effusion. His EKG showed normal sinus rhythm, rate of  94 beats per minute and no interventricular conduction delay, ST segment or T-wave changes. His labs included a comprehensive metabolic panel, which was normal (specifically BUN of 16, creatinine of 0.89 and normal LFTs). Troponin is 0.039 (stable). NT-pro BNP is normal at 30. He had a CBC which had normal WBC of 5.5, hemoglobin of 14.8 and platelets of 661811. CMV and EBV DNA by PCR are not detected. Tacrolimus and sirolimus levels are 8.1 and 5.6 (combined goal 8-12).    His last biopsy on 9/11/17 showed no rejection (ISHLT grade 1R), pAMR0, negative C3d/C4d staining.    PRA history:  10/9/17: none > 3000 MFI  8/24/17: donor specific antibody to DR51 ()  7/24/17: donor specific antibody to DR51 ()  6/26/2017: donor specific antibody to DR51 ()  5/25/2017: no donor specific antibodies  4/20/2017: no donor specific antibodies  4/7/16: donor specific antibody to DR51 (MFI 2081)  2/4/16 showed 1 new donor specific antibody to DR51 (MFI 2706)    Yaz Clay is a 22 year old now 5 1/2 years post-transplant for dilated cardiomyopathy secondary to viral myocarditis in early childhood (although on review of pathology reports there is a question of hypertrophic cardiomyopathy picture and glycogen storage disease). He also has history of EBV-positive PTLD, now in remission after rituximab. He most recently has had a number of episodes of biopsy-proven and presumed cellular rejection in the first half of 2017 in the setting of non-compliance with immunosuppressive regimen. His increased troponin and PACs in late June (presumed rejection) resolved with pulse-dosed steroids and slow taper. On 9/11/2017 cath, there was no evidence of rejection (ISHLT grade 1R). His troponin level had not been negative and although in the normal range, has fluctuated a bit toward the upper limit of normal. Today it is nearly undetectable, along with a very low NT-pro BNP. I believe he is now compliant and he is at a safe  point post-operatively to start sirolimus and wean prednisone.      Diagnosis summary:  1. Viral myocarditis at age 2 1/2 years  2. Dilated cardiomyopathy           A. S/p orthotopic heart transplant (3/23/12)   1. 2R rejection (4/21/17), treated with Solumedrol x 3 days   2. 2R rejection (5/26/17), treated with prednisone x 5 days   3. Presumed rejection (6/26/17) based on elevated troponin and new PACs, treated with prednisone x 3 days and then taper  3. Cerebrovascular accident at age 2 1/2 years with resultant left hemiplegia  4. Multiple orthopedic surgeries in past  5. PTLD, EBV positive            A.  treated with rituximab x 2  6. Possible HSV gingivostomatitis    Plan:  - HSV DNA PCR from oral swabs  - Valcyte 2 g PO Q12 hrs x 2 doses  - continue all other medications    Activity Restriction: none  SBE prophylaxis: none    Follow-up: in 2 months with labs, EKG and echocardiogram    Thank you for the opportunity to participate in Obed's care. Please let me know if you have further questions.    Sincerely,     Taj Lorenzo MD    Pediatric Heart Failure and Transplantation     CC  Patient Care Team:  Rayo Schafer MD as PCP - General (Pediatrics)  Isrrael Trent MD as MD (Oncology)  Bobbi Pruitt MD as MD (Pediatric Cardiology)    Copy to patient  OSBALDO ENCARNACION   [address]  [place] [address]

## 2017-12-28 DIAGNOSIS — Z94.1 HEART REPLACED BY TRANSPLANT (H): ICD-10-CM

## 2017-12-28 LAB
ANION GAP SERPL CALCULATED.3IONS-SCNC: 10 MMOL/L (ref 3–14)
BASOPHILS # BLD AUTO: 0 10E9/L (ref 0–0.2)
BASOPHILS NFR BLD AUTO: 0.2 %
BUN SERPL-MCNC: 15 MG/DL (ref 7–30)
CALCIUM SERPL-MCNC: 9.2 MG/DL (ref 8.5–10.1)
CHLORIDE SERPL-SCNC: 106 MMOL/L (ref 94–109)
CO2 SERPL-SCNC: 27 MMOL/L (ref 20–32)
CREAT SERPL-MCNC: 0.73 MG/DL (ref 0.66–1.25)
DIFFERENTIAL METHOD BLD: NORMAL
EOSINOPHIL # BLD AUTO: 0.1 10E9/L (ref 0–0.7)
EOSINOPHIL NFR BLD AUTO: 2.4 %
ERYTHROCYTE [DISTWIDTH] IN BLOOD BY AUTOMATED COUNT: 11.9 % (ref 10–15)
GFR SERPL CREATININE-BSD FRML MDRD: >90 ML/MIN/1.7M2
GLUCOSE SERPL-MCNC: 92 MG/DL (ref 70–99)
HCT VFR BLD AUTO: 45.8 % (ref 40–53)
HGB BLD-MCNC: 15.6 G/DL (ref 13.3–17.7)
IMM GRANULOCYTES # BLD: 0 10E9/L (ref 0–0.4)
IMM GRANULOCYTES NFR BLD: 0.2 %
LYMPHOCYTES # BLD AUTO: 1.2 10E9/L (ref 0.8–5.3)
LYMPHOCYTES NFR BLD AUTO: 21.7 %
MCH RBC QN AUTO: 28.8 PG (ref 26.5–33)
MCHC RBC AUTO-ENTMCNC: 34.1 G/DL (ref 31.5–36.5)
MCV RBC AUTO: 85 FL (ref 78–100)
MONOCYTES # BLD AUTO: 0.5 10E9/L (ref 0–1.3)
MONOCYTES NFR BLD AUTO: 9.2 %
NEUTROPHILS # BLD AUTO: 3.5 10E9/L (ref 1.6–8.3)
NEUTROPHILS NFR BLD AUTO: 66.3 %
NRBC # BLD AUTO: 0 10*3/UL
NRBC BLD AUTO-RTO: 0 /100
NT-PROBNP SERPL-MCNC: 29 PG/ML (ref 0–125)
PLATELET # BLD AUTO: 175 10E9/L (ref 150–450)
POTASSIUM SERPL-SCNC: 3.8 MMOL/L (ref 3.4–5.3)
RBC # BLD AUTO: 5.42 10E12/L (ref 4.4–5.9)
SIROLIMUS BLD-MCNC: 7 UG/L (ref 5–15)
SODIUM SERPL-SCNC: 143 MMOL/L (ref 133–144)
TACROLIMUS BLD-MCNC: 7.1 UG/L (ref 5–15)
TME LAST DOSE: NORMAL H
TME LAST DOSE: NORMAL H
TROPONIN I SERPL-MCNC: 0.04 UG/L (ref 0–0.04)
WBC # BLD AUTO: 5.3 10E9/L (ref 4–11)

## 2017-12-28 PROCEDURE — 85025 COMPLETE CBC W/AUTO DIFF WBC: CPT | Performed by: PEDIATRICS

## 2017-12-28 PROCEDURE — 86696 HERPES SIMPLEX TYPE 2 TEST: CPT | Performed by: PEDIATRICS

## 2017-12-28 PROCEDURE — 80048 BASIC METABOLIC PNL TOTAL CA: CPT | Performed by: PEDIATRICS

## 2017-12-28 PROCEDURE — 86695 HERPES SIMPLEX TYPE 1 TEST: CPT | Performed by: PEDIATRICS

## 2017-12-28 PROCEDURE — 80197 ASSAY OF TACROLIMUS: CPT | Performed by: PEDIATRICS

## 2017-12-28 PROCEDURE — 86694 HERPES SIMPLEX NES ANTBDY: CPT | Performed by: PEDIATRICS

## 2017-12-28 PROCEDURE — 80195 ASSAY OF SIROLIMUS: CPT | Performed by: PEDIATRICS

## 2017-12-28 PROCEDURE — 84484 ASSAY OF TROPONIN QUANT: CPT | Performed by: PEDIATRICS

## 2017-12-28 PROCEDURE — 36415 COLL VENOUS BLD VENIPUNCTURE: CPT | Performed by: PEDIATRICS

## 2017-12-28 PROCEDURE — 83880 ASSAY OF NATRIURETIC PEPTIDE: CPT | Performed by: PEDIATRICS

## 2017-12-29 ENCOUNTER — TELEPHONE (OUTPATIENT)
Dept: PEDIATRIC CARDIOLOGY | Facility: CLINIC | Age: 22
End: 2017-12-29

## 2017-12-29 DIAGNOSIS — Z94.1 HEART REPLACED BY TRANSPLANT (H): Primary | ICD-10-CM

## 2017-12-29 DIAGNOSIS — Z94.1 TRANSPLANTED HEART (H): ICD-10-CM

## 2017-12-29 LAB
HSV1 IGG SERPL QL IA: <0.2 AI (ref 0–0.8)
HSV2 IGG SERPL QL IA: <0.2 AI (ref 0–0.8)

## 2017-12-29 RX ORDER — TACROLIMUS 1 MG/1
CAPSULE ORAL
Qty: 180 CAPSULE | Refills: 11 | Status: SHIPPED | OUTPATIENT
Start: 2017-12-29 | End: 2018-01-12

## 2017-12-29 RX ORDER — SIROLIMUS 1 MG/1
1 TABLET, FILM COATED ORAL DAILY
Qty: 90 TABLET | Refills: 3 | Status: SHIPPED | OUTPATIENT
Start: 2017-12-29 | End: 2018-01-12 | Stop reason: ALTCHOICE

## 2017-12-29 RX ORDER — SIROLIMUS 0.5 MG/1
0.5 TABLET, FILM COATED ORAL DAILY
Qty: 90 TABLET | Refills: 1 | Status: SHIPPED | OUTPATIENT
Start: 2017-12-29 | End: 2018-01-12 | Stop reason: ALTCHOICE

## 2017-12-29 NOTE — TELEPHONE ENCOUNTER
Jolly called this morning to aske about Rodney's lab results. I reviewed his CMP, CBC, and drug levels. I let her know that I was just waiting to hear back from Dr Lorenzo regarding adjustments to his sirolimus or tacrolimus.     Results for RODNEY ENCARNACION (MRN [id number]) as of 12/29/2017 11:31   Ref. Range 12/28/2017 11:35 12/28/2017 11:36   Sirolimus Level Latest Ref Range: 5.0 - 15.0 ug/L 7.0    Tacrolimus Last Dose Unknown  12/27/17 2332   Tacrolimus Level Latest Ref Range: 5.0 - 15.0 ug/L  7.1     His tacrolimus level is down from last time but the sirolimus is increased. Looking at total daily levels, he is increased from last week. Jolly verbalized understanding. She said that Rodney would prefer to decrease the sirolimus given his recent illness since starting it. I agreed and said that this may be a possibility. His mouth sores are not worsening but ebb and flow in painfulness/severity over the past couple of days. I also told her about the ID appointment next Wednesday at 10 am. I told her that we will check in on Tuesday to see if he needs to see them or not.     When I heard back from Dr Lorenzo, I called Jolly back. Dr Lorenzo would like to decrease the sirolimus to 1.5 mg and keep Rodney's tacrolimus dosing the same. The sirolimus pills are not scored so I told Jolly to have Rodney take 1 mg today. The specialty pharmacy should be able to ship the 0.5 mg tabs today and he can start taking them when he receives them tomorrow. Rodney was still sleeping so Jolly will communicate with him. She verbalized understanding of the plan. I told her that if he continues to have mouth sores next week, then we will need to get labs again. Otherwise we can wait for two weeks to recheck the drug levels.

## 2018-01-02 ENCOUNTER — TELEPHONE (OUTPATIENT)
Dept: PEDIATRIC CARDIOLOGY | Facility: CLINIC | Age: 23
End: 2018-01-02

## 2018-01-02 DIAGNOSIS — Z94.1 HEART REPLACED BY TRANSPLANT (H): Primary | ICD-10-CM

## 2018-01-02 LAB — HSV 1+2 IGM SER-IMP: 0.53 INDEX VALUE (ref 0–0.89)

## 2018-01-02 NOTE — TELEPHONE ENCOUNTER
Jolly called back this afternoon. She said that she had spoken with Obed both of the last two days. His sores are not gone but getting much better. She would like to cancel his ID appointment for tomorrow. I said that this is fine given his improvement in status. He will need labs next week. Jolly said Thursday 1/11 should be fine. I scheduled him for labs at 10:30 on 1/11. I told Jolly that she or Obed should call if his mouth sores return.

## 2018-01-06 LAB
SPECIMEN SOURCE: NORMAL
VIRUS SPEC CULT: NORMAL
VIRUS SPEC CULT: NORMAL

## 2018-01-11 DIAGNOSIS — Z94.1 HEART REPLACED BY TRANSPLANT (H): ICD-10-CM

## 2018-01-11 LAB
ANION GAP SERPL CALCULATED.3IONS-SCNC: 5 MMOL/L (ref 3–14)
BASOPHILS # BLD AUTO: 0 10E9/L (ref 0–0.2)
BASOPHILS NFR BLD AUTO: 0.2 %
BUN SERPL-MCNC: 11 MG/DL (ref 7–30)
CALCIUM SERPL-MCNC: 8.7 MG/DL (ref 8.5–10.1)
CHLORIDE SERPL-SCNC: 109 MMOL/L (ref 94–109)
CO2 SERPL-SCNC: 29 MMOL/L (ref 20–32)
CREAT SERPL-MCNC: 0.74 MG/DL (ref 0.66–1.25)
DIFFERENTIAL METHOD BLD: NORMAL
EOSINOPHIL # BLD AUTO: 0.1 10E9/L (ref 0–0.7)
EOSINOPHIL NFR BLD AUTO: 2.7 %
ERYTHROCYTE [DISTWIDTH] IN BLOOD BY AUTOMATED COUNT: 12 % (ref 10–15)
GFR SERPL CREATININE-BSD FRML MDRD: >90 ML/MIN/1.7M2
GLUCOSE SERPL-MCNC: 104 MG/DL (ref 70–99)
HCT VFR BLD AUTO: 43.8 % (ref 40–53)
HGB BLD-MCNC: 14.8 G/DL (ref 13.3–17.7)
IMM GRANULOCYTES # BLD: 0 10E9/L (ref 0–0.4)
IMM GRANULOCYTES NFR BLD: 0 %
LYMPHOCYTES # BLD AUTO: 1.4 10E9/L (ref 0.8–5.3)
LYMPHOCYTES NFR BLD AUTO: 28 %
MCH RBC QN AUTO: 28.6 PG (ref 26.5–33)
MCHC RBC AUTO-ENTMCNC: 33.8 G/DL (ref 31.5–36.5)
MCV RBC AUTO: 85 FL (ref 78–100)
MONOCYTES # BLD AUTO: 0.5 10E9/L (ref 0–1.3)
MONOCYTES NFR BLD AUTO: 9 %
NEUTROPHILS # BLD AUTO: 3.1 10E9/L (ref 1.6–8.3)
NEUTROPHILS NFR BLD AUTO: 60.1 %
NRBC # BLD AUTO: 0 10*3/UL
NRBC BLD AUTO-RTO: 0 /100
NT-PROBNP SERPL-MCNC: 45 PG/ML (ref 0–125)
PLATELET # BLD AUTO: 214 10E9/L (ref 150–450)
POTASSIUM SERPL-SCNC: 4.3 MMOL/L (ref 3.4–5.3)
RBC # BLD AUTO: 5.18 10E12/L (ref 4.4–5.9)
SIROLIMUS BLD-MCNC: 6.1 UG/L (ref 5–15)
SODIUM SERPL-SCNC: 143 MMOL/L (ref 133–144)
TACROLIMUS BLD-MCNC: 8.1 UG/L (ref 5–15)
TME LAST DOSE: NORMAL H
TME LAST DOSE: NORMAL H
TROPONIN I SERPL-MCNC: 0.04 UG/L (ref 0–0.04)
WBC # BLD AUTO: 5.1 10E9/L (ref 4–11)

## 2018-01-11 PROCEDURE — 36415 COLL VENOUS BLD VENIPUNCTURE: CPT | Performed by: PEDIATRICS

## 2018-01-11 PROCEDURE — 83880 ASSAY OF NATRIURETIC PEPTIDE: CPT | Performed by: PEDIATRICS

## 2018-01-11 PROCEDURE — 80197 ASSAY OF TACROLIMUS: CPT | Performed by: PEDIATRICS

## 2018-01-11 PROCEDURE — 84484 ASSAY OF TROPONIN QUANT: CPT | Performed by: PEDIATRICS

## 2018-01-11 PROCEDURE — 80048 BASIC METABOLIC PNL TOTAL CA: CPT | Performed by: PEDIATRICS

## 2018-01-11 PROCEDURE — 80195 ASSAY OF SIROLIMUS: CPT | Performed by: PEDIATRICS

## 2018-01-11 PROCEDURE — 85025 COMPLETE CBC W/AUTO DIFF WBC: CPT | Performed by: PEDIATRICS

## 2018-01-12 ENCOUNTER — TELEPHONE (OUTPATIENT)
Dept: PEDIATRIC CARDIOLOGY | Facility: CLINIC | Age: 23
End: 2018-01-12

## 2018-01-12 DIAGNOSIS — Z94.1 TRANSPLANTED HEART (H): ICD-10-CM

## 2018-01-12 DIAGNOSIS — Z94.1 HEART REPLACED BY TRANSPLANT (H): Primary | ICD-10-CM

## 2018-01-12 RX ORDER — TACROLIMUS 1 MG/1
3 CAPSULE ORAL 2 TIMES DAILY
Qty: 180 CAPSULE | Refills: 11 | Status: SHIPPED | OUTPATIENT
Start: 2018-01-12 | End: 2018-03-12

## 2018-01-12 RX ORDER — PREDNISONE 5 MG/1
5 TABLET ORAL DAILY
Qty: 90 TABLET | Refills: 3 | Status: SHIPPED | OUTPATIENT
Start: 2018-01-12 | End: 2018-03-22

## 2018-01-12 RX ORDER — TACROLIMUS 0.5 MG/1
0.5 CAPSULE ORAL 2 TIMES DAILY
Qty: 180 CAPSULE | Refills: 3 | Status: SHIPPED | OUTPATIENT
Start: 2018-01-12 | End: 2018-03-22

## 2018-01-12 NOTE — TELEPHONE ENCOUNTER
Jolly called back. She said Obed has a bad cold. His mouth sores are resolved. She said that he has told her that it is the sirolimus making him sick because he started taking it when this all started. She also would like him to see primary care. I explained that our primary care will not necessarily see him now for a sick care visit but that I can work to establish care with a provider for long term management. I let her know that I would call back after speaking with the providers.     I spoke with Obed this afternoon. He said that he feels like he has a bad cold. He feels more tired but is doing his normal activity and has not had aches and chills. He verbalized that he feels like he has been sick since starting sirolimus. He said that he would much rather be on the lower dose prednisone that feel like he does on sirolimus. I let him know that his illnesses are not directly caused by the sirolimus or a side effect of it but may be due to the way the tacrolimus and sirolimus are affecting his immune system together.

## 2018-01-12 NOTE — TELEPHONE ENCOUNTER
I left Obed and Jolly messages on their secure voicemails. I asked them to call back to discuss Obed's lab results and follow up.

## 2018-01-12 NOTE — TELEPHONE ENCOUNTER
I notified Dr Pruitt and Bj of Obed's concerns. They said that given his recent illnesses and how he feels, he can switch from sirolimus back to prednisone. We will check labs in 2 weeks.     I called and notified Jolly of these changes. I left a message for her on her secure voicemail. I also called Obed back and gave him the following information:  1) Start 5 mg prednisone as soon as it is available.   2) Start 3.5 mg tacrolimus twice a day tonight. He thinks that he has 0.5 mg tablets but if he does not, the he will call us.   3) Follow up labs in 2 weeks.   4) See primary care or urgent care tomorrow or Sunday.  5) Follow up with us in 2-3 weeks if still feeling unwell or in March if symptoms improve.   He verbalized understanding.

## 2018-01-13 ENCOUNTER — OFFICE VISIT (OUTPATIENT)
Dept: FAMILY MEDICINE | Facility: CLINIC | Age: 23
End: 2018-01-13
Payer: COMMERCIAL

## 2018-01-13 VITALS
BODY MASS INDEX: 18.1 KG/M2 | DIASTOLIC BLOOD PRESSURE: 80 MMHG | RESPIRATION RATE: 20 BRPM | SYSTOLIC BLOOD PRESSURE: 112 MMHG | HEART RATE: 102 BPM | OXYGEN SATURATION: 98 % | WEIGHT: 135.5 LBS | TEMPERATURE: 97.3 F

## 2018-01-13 DIAGNOSIS — Z94.1 HEART REPLACED BY TRANSPLANT (H): Primary | ICD-10-CM

## 2018-01-13 DIAGNOSIS — E44.1 MILD PROTEIN-CALORIE MALNUTRITION (H): ICD-10-CM

## 2018-01-13 DIAGNOSIS — D84.9 IMMUNOSUPPRESSION (H): ICD-10-CM

## 2018-01-13 DIAGNOSIS — R07.0 THROAT PAIN: ICD-10-CM

## 2018-01-13 PROBLEM — K05.10 GINGIVOSTOMATITIS: Status: ACTIVE | Noted: 2018-01-13

## 2018-01-13 LAB
FLUAV+FLUBV AG SPEC QL: NEGATIVE
FLUAV+FLUBV AG SPEC QL: NEGATIVE
SPECIMEN SOURCE: NORMAL

## 2018-01-13 PROCEDURE — 87880 STREP A ASSAY W/OPTIC: CPT | Performed by: FAMILY MEDICINE

## 2018-01-13 PROCEDURE — 87081 CULTURE SCREEN ONLY: CPT | Performed by: FAMILY MEDICINE

## 2018-01-13 PROCEDURE — 87804 INFLUENZA ASSAY W/OPTIC: CPT | Performed by: FAMILY MEDICINE

## 2018-01-13 PROCEDURE — 99214 OFFICE O/P EST MOD 30 MIN: CPT | Performed by: FAMILY MEDICINE

## 2018-01-13 NOTE — PATIENT INSTRUCTIONS
Boil water and breath the warm vapors 2-3 times a day to try to open up the sinuses. Run a steamer or cold air vaporizer as much as possible. Take Mucinex plain blue  1200 mg  One tablet twice a day (This may come as 600mg/tablet and you need to take 2 tabs twice a day) or you could buy generic 400mgm / tab and take 2 tablets 3 x a day or 1 and 1/2 tablets 4 x a day . . Mucinex is guaifenesin, the major component of most cough syrups, because it makes the mucus less thick, and therefore it drains out better and you are less likely to cough from it dripping on the back of your throat.  Irrigate the  nose with plain water under the kitchen sink faucet or the shower.  Amaya pots, spray bottles, etc accumulate bacteria and are not recommended.   The tickle in the throat is also helped by gargling with vinegar and honey mixture, or pop or mouth wash as these coat the throat.  Please try to rinse teeth with water after using these .

## 2018-01-13 NOTE — NURSING NOTE
"Chief Complaint   Patient presents with     URI     x 3 weeks       Initial /80 (BP Location: Right arm, Patient Position: Sitting, Cuff Size: Adult Regular)  Pulse 102  Temp 97.3  F (36.3  C) (Tympanic)  Resp 20  Wt 135 lb 8 oz (61.5 kg)  SpO2 98%  BMI 18.1 kg/m2 Estimated body mass index is 18.1 kg/(m^2) as calculated from the following:    Height as of 11/16/17: 6' 0.56\" (1.843 m).    Weight as of this encounter: 135 lb 8 oz (61.5 kg).  Medication Reconciliation: complete     Princess IVÁN Barnett CMA      "

## 2018-01-13 NOTE — MR AVS SNAPSHOT
After Visit Summary   1/13/2018    Obed Viramontes    MRN: [id number]           Patient Information     Date Of Birth          1995        Visit Information        Provider Department      1/13/2018 10:00 AM Yesi Montanez MD [facility]        Today's Diagnoses     Heart replaced by transplant (H)    -  1    Immunosuppression (H)          Care Instructions    Boil water and breath the warm vapors 2-3 times a day to try to open up the sinuses. Run a steamer or cold air vaporizer as much as possible. Take Mucinex plain blue  1200 mg  One tablet twice a day (This may come as 600mg/tablet and you need to take 2 tabs twice a day) or you could buy generic 400mgm / tab and take 2 tablets 3 x a day or 1 and 1/2 tablets 4 x a day . . Mucinex is guaifenesin, the major component of most cough syrups, because it makes the mucus less thick, and therefore it drains out better and you are less likely to cough from it dripping on the back of your throat.  Irrigate the  nose with plain water under the kitchen sink faucet or the shower.  Amaya pots, spray bottles, etc accumulate bacteria and are not recommended.   The tickle in the throat is also helped by gargling with vinegar and honey mixture, or pop or mouth wash as these coat the throat.  Please try to rinse teeth with water after using these .             Follow-ups after your visit        Your next 10 appointments already scheduled     Jan 31, 2018 11:40 AM CST   (Arrive by 11:25 AM)   New Patient Visit with Nnamdi Kaufmna MD   [facility] ([facility])    [address] [place] [id number] [phone]              Who to contact     If you have questions or need follow up information about today's clinic visit or your schedule please contact [facility] directly at [phone].  Normal or non-critical lab and  imaging results will be communicated to you by MyChart, letter or phone within 4 business days after the clinic has received the results. If you do not hear from us within 7 days, please contact the clinic through [employer] or phone. If you have a critical or abnormal lab result, we will notify you by phone as soon as possible.  Submit refill requests through [employer] or call your pharmacy and they will forward the refill request to us. Please allow 3 business days for your refill to be completed.          Additional Information About Your Visit        [employer] Information     [employer] gives you secure access to your electronic health record. If you see a primary care provider, you can also send messages to your care team and make appointments. If you have questions, please call your primary care clinic.  If you do not have a primary care provider, please call [phone] and they will assist you.        Care EveryWhere ID     This is your Care EveryWhere ID. This could be used by other organizations to access your [place] medical records  [id number]        Your Vitals Were     Pulse Temperature Respirations Pulse Oximetry BMI (Body Mass Index)       102 97.3  F (36.3  C) (Tympanic) 20 98% 18.1 kg/m2        Blood Pressure from Last 3 Encounters:   01/13/18 112/80   12/22/17 124/83   11/16/17 125/88    Weight from Last 3 Encounters:   01/13/18 135 lb 8 oz (61.5 kg)   11/16/17 137 lb 5.6 oz (62.3 kg)   10/12/17 140 lb 14 oz (63.9 kg)              Today, you had the following     No orders found for display       Primary Care Provider Office Phone # Fax #    Bobbi Marilynn Pruitt [phone]       [facility] [address] [place] [address]        Equal Access to Services     CARLA GREENBERG : Jabier Maddox, brenda rob, nigel roche, stephanie patiño. So [facility] [phone].    ATENCIÓN: Si habla español, tiene a neves disposición servicios  sudheer de asistencia lingüística. Ashly arrington [phone].    We comply with applicable federal civil rights laws and [place] laws. We do not discriminate on the basis of race, color, national origin, age, disability, sex, sexual orientation, or gender identity.            Thank you!     Thank you for choosing [facility]  for your care. Our goal is always to provide you with excellent care. Hearing back from our patients is one way we can continue to improve our services. Please take a few minutes to complete the written survey that you may receive in the mail after your visit with us. Thank you!             Your Updated Medication List - Protect others around you: Learn how to safely use, store and throw away your medicines at www.disposemymeds.org.          This list is accurate as of: 1/13/18 11:14 AM.  Always use your most recent med list.                   Brand Name Dispense Instructions for use Diagnosis    amLODIPine 5 MG tablet    NORVASC    90 tablet    Take 1 tablet (5 mg) by mouth daily    Heart replaced by transplant (H)       aspirin 81 MG EC tablet     60 tablet    Take 2 tablets (162 mg) by mouth daily    Status post transplant, heart (H)       mycophenolate 500 MG tablet    GENERIC EQUIVALENT    180 tablet    Take 1 tablet (500 mg) by mouth 2 times daily        pravastatin 10 MG tablet    PRAVACHOL    90 tablet    Take 1 tablet (10 mg) by mouth daily At bedtime        predniSONE 5 MG tablet    DELTASONE    90 tablet    Take 1 tablet (5 mg) by mouth daily    Heart replaced by transplant (H)       * tacrolimus 0.5 MG capsule    GENERIC EQUIVALENT    180 capsule    Take 1 capsule (0.5 mg) by mouth 2 times daily with 3 - 1 mg capsules for a total dose of 3.5 mg twice a day    Transplanted heart (H)       * tacrolimus 1 MG capsule    GENERIC EQUIVALENT    180 capsule    Take 3 capsules (3 mg) by mouth 2 times daily With 1 - 0.5 mg capsule for a total of 3.5 mg twice a  day.    Transplanted heart (H)       Vitamin D3 2000 UNITS Tabs     100 tablet    Take 2,000 Units by mouth daily    Heart transplanted (H)       * Notice:  This list has 2 medication(s) that are the same as other medications prescribed for you. Read the directions carefully, and ask your doctor or other care provider to review them with you.

## 2018-01-15 LAB
BACTERIA SPEC CULT: NORMAL
DEPRECATED S PYO AG THROAT QL EIA: NORMAL
SPECIMEN SOURCE: NORMAL
SPECIMEN SOURCE: NORMAL

## 2018-01-29 ENCOUNTER — TELEPHONE (OUTPATIENT)
Dept: PEDIATRIC CARDIOLOGY | Facility: CLINIC | Age: 23
End: 2018-01-29

## 2018-01-29 DIAGNOSIS — Z94.1 HEART REPLACED BY TRANSPLANT (H): Primary | ICD-10-CM

## 2018-01-29 NOTE — TELEPHONE ENCOUNTER
I called and left a message for both Obed and Jolly, his mother.  Informed them that he was due for labs last week and needs to come in this week to have labs drawn.  I asked them both to call Nicci back and tell her when he will come in to have labs drawn.

## 2018-01-30 NOTE — TELEPHONE ENCOUNTER
I left a message for Jolly on Monday evening that I had cancelled Obed's appointment for this Wednesday because I had not heard from them and they missed labs last week. I apologized for the misunderstanding. Jolly and I spoke this morning. She said that she and Obed were unaware that he needed labs last week and that they had completed all MyChart forms and pre-check in for his appointment on 1/31 with Dr Kaufman. Jolly said she worked really hard to get Obed to use MyChart and does not want him not to trust it now. I asked her if this would be a preferential way to contact him, since he often does not respond to phone calls. She said no but that it may be in the future. When I called the [facility] to move the appointment, I was not told that they had already completed his appointment check in. He is now rescheduled with Dr Kaufman on 3/7 at 1 pm. I also have a call out to one of the nurses from the primary care clinic to see if there are other providers that would be a good fit for him and have more availability. I will also look into the MyChart issue with checking in so that we are notified of this in the future. Jolly verbalized understanding. She said her and Obed will come for labs tomorrow.

## 2018-01-31 ENCOUNTER — RESULTS ONLY (OUTPATIENT)
Dept: OTHER | Facility: CLINIC | Age: 23
End: 2018-01-31

## 2018-01-31 DIAGNOSIS — Z94.1 HEART REPLACED BY TRANSPLANT (H): ICD-10-CM

## 2018-01-31 LAB
ALBUMIN SERPL-MCNC: 3.9 G/DL (ref 3.4–5)
ALP SERPL-CCNC: 71 U/L (ref 40–150)
ALT SERPL W P-5'-P-CCNC: 14 U/L (ref 0–70)
ANION GAP SERPL CALCULATED.3IONS-SCNC: 5 MMOL/L (ref 3–14)
AST SERPL W P-5'-P-CCNC: 15 U/L (ref 0–45)
BASOPHILS # BLD AUTO: 0 10E9/L (ref 0–0.2)
BASOPHILS NFR BLD AUTO: 0.4 %
BILIRUB SERPL-MCNC: 0.6 MG/DL (ref 0.2–1.3)
BUN SERPL-MCNC: 19 MG/DL (ref 7–30)
CALCIUM SERPL-MCNC: 8.6 MG/DL (ref 8.5–10.1)
CHLORIDE SERPL-SCNC: 109 MMOL/L (ref 94–109)
CMV IGG SERPL QL IA: <0.2 AI (ref 0–0.8)
CO2 SERPL-SCNC: 30 MMOL/L (ref 20–32)
CREAT SERPL-MCNC: 0.79 MG/DL (ref 0.66–1.25)
DIFFERENTIAL METHOD BLD: NORMAL
EBV NA IGG SER QL IA: <0.2 AI (ref 0–0.8)
EBV VCA IGG SER QL IA: 4.5 AI (ref 0–0.8)
EBV VCA IGM SER QL IA: <0.2 AI (ref 0–0.8)
EOSINOPHIL # BLD AUTO: 0.1 10E9/L (ref 0–0.7)
EOSINOPHIL NFR BLD AUTO: 2.2 %
ERYTHROCYTE [DISTWIDTH] IN BLOOD BY AUTOMATED COUNT: 13.4 % (ref 10–15)
GFR SERPL CREATININE-BSD FRML MDRD: >90 ML/MIN/1.7M2
GLUCOSE SERPL-MCNC: 86 MG/DL (ref 70–99)
HCT VFR BLD AUTO: 42.5 % (ref 40–53)
HGB BLD-MCNC: 14.1 G/DL (ref 13.3–17.7)
IMM GRANULOCYTES # BLD: 0 10E9/L (ref 0–0.4)
IMM GRANULOCYTES NFR BLD: 0.4 %
LYMPHOCYTES # BLD AUTO: 1.7 10E9/L (ref 0.8–5.3)
LYMPHOCYTES NFR BLD AUTO: 34.2 %
MAGNESIUM SERPL-MCNC: 1.7 MG/DL (ref 1.6–2.3)
MCH RBC QN AUTO: 28.2 PG (ref 26.5–33)
MCHC RBC AUTO-ENTMCNC: 33.2 G/DL (ref 31.5–36.5)
MCV RBC AUTO: 85 FL (ref 78–100)
MONOCYTES # BLD AUTO: 0.4 10E9/L (ref 0–1.3)
MONOCYTES NFR BLD AUTO: 9 %
NEUTROPHILS # BLD AUTO: 2.6 10E9/L (ref 1.6–8.3)
NEUTROPHILS NFR BLD AUTO: 53.8 %
NRBC # BLD AUTO: 0 10*3/UL
NRBC BLD AUTO-RTO: 0 /100
NT-PROBNP SERPL-MCNC: 49 PG/ML (ref 0–125)
PHOSPHATE SERPL-MCNC: 3.7 MG/DL (ref 2.5–4.5)
PLATELET # BLD AUTO: 172 10E9/L (ref 150–450)
POTASSIUM SERPL-SCNC: 3.7 MMOL/L (ref 3.4–5.3)
PROT SERPL-MCNC: 7 G/DL (ref 6.8–8.8)
RBC # BLD AUTO: 5 10E12/L (ref 4.4–5.9)
SIROLIMUS BLD-MCNC: <2 UG/L (ref 5–15)
SODIUM SERPL-SCNC: 144 MMOL/L (ref 133–144)
TACROLIMUS BLD-MCNC: 9.7 UG/L (ref 5–15)
TME LAST DOSE: ABNORMAL H
TME LAST DOSE: NORMAL H
TROPONIN I SERPL-MCNC: 0.05 UG/L (ref 0–0.04)
WBC # BLD AUTO: 4.9 10E9/L (ref 4–11)

## 2018-01-31 PROCEDURE — 86665 EPSTEIN-BARR CAPSID VCA: CPT | Performed by: PEDIATRICS

## 2018-01-31 PROCEDURE — 85025 COMPLETE CBC W/AUTO DIFF WBC: CPT | Performed by: PEDIATRICS

## 2018-01-31 PROCEDURE — 36415 COLL VENOUS BLD VENIPUNCTURE: CPT | Performed by: PEDIATRICS

## 2018-01-31 PROCEDURE — 86833 HLA CLASS II HIGH DEFIN QUAL: CPT | Performed by: PEDIATRICS

## 2018-01-31 PROCEDURE — 80195 ASSAY OF SIROLIMUS: CPT | Performed by: PEDIATRICS

## 2018-01-31 PROCEDURE — 83735 ASSAY OF MAGNESIUM: CPT | Performed by: PEDIATRICS

## 2018-01-31 PROCEDURE — 84484 ASSAY OF TROPONIN QUANT: CPT | Performed by: PEDIATRICS

## 2018-01-31 PROCEDURE — 80197 ASSAY OF TACROLIMUS: CPT | Performed by: PEDIATRICS

## 2018-01-31 PROCEDURE — 87799 DETECT AGENT NOS DNA QUANT: CPT | Performed by: PEDIATRICS

## 2018-01-31 PROCEDURE — 86644 CMV ANTIBODY: CPT | Performed by: PEDIATRICS

## 2018-01-31 PROCEDURE — 84100 ASSAY OF PHOSPHORUS: CPT | Performed by: PEDIATRICS

## 2018-01-31 PROCEDURE — 86832 HLA CLASS I HIGH DEFIN QUAL: CPT | Performed by: PEDIATRICS

## 2018-01-31 PROCEDURE — 83880 ASSAY OF NATRIURETIC PEPTIDE: CPT | Performed by: PEDIATRICS

## 2018-01-31 PROCEDURE — 86664 EPSTEIN-BARR NUCLEAR ANTIGEN: CPT | Performed by: PEDIATRICS

## 2018-01-31 PROCEDURE — 80053 COMPREHEN METABOLIC PANEL: CPT | Performed by: PEDIATRICS

## 2018-02-01 ENCOUNTER — TELEPHONE (OUTPATIENT)
Dept: PEDIATRIC CARDIOLOGY | Facility: CLINIC | Age: 23
End: 2018-02-01

## 2018-02-01 DIAGNOSIS — Z94.1 HEART REPLACED BY TRANSPLANT (H): Primary | ICD-10-CM

## 2018-02-01 RX ORDER — PREDNISONE 2.5 MG/1
2.5 TABLET ORAL DAILY
Qty: 30 TABLET | Refills: 3 | Status: SHIPPED | OUTPATIENT
Start: 2018-02-01 | End: 2018-03-22

## 2018-02-01 RX ORDER — PREDNISONE 2.5 MG/1
2.5 TABLET ORAL DAILY
Status: CANCELLED | OUTPATIENT
Start: 2018-02-01

## 2018-02-01 NOTE — Clinical Note
[facility] needs to be scheduled for a full annual visit in March with heart cath, DEXA scan, xrays, renal US, etc. Thanks!

## 2018-02-01 NOTE — TELEPHONE ENCOUNTER
I called Obed and Jolly with his lab results. I spoke with Jolly at length about his lab results and his elevated troponin being a possible early sign of rejection for him. I let her know that we will monitor him carefully. I also spoke to Obed this afternoon about his labs. He said he understood that it is not good that his troponin is increased. He verbalized that he is taking tacrolimus 3.5 mg twice a day (at 11 and 2300). He should increase his prednisone to 7.5 mg daily and have troponin and a tacrolimus level rechecked in 2 weeks. Jolly and Obed both said that 2/16 should work for them.     I also spoke with both of them regarding transitioning to the adult transplant team. They understand that this is needed because of his age. We will see him for his annual visit in late March. I reinforced to Obed and Jolly that it is important that all members of his family that will be involved in his care be present at the visit. With adult providers, they will expect Obed to be in charge of his care, so it will be his responsibility to decide who he wants involved and if any of his family members need to have medical power of . I also told Obed that we will have him do a consultation with our adult neuropsychology team in order to better help him and us prepare for his needs when transitioning to adult services. He verbalized understanding.

## 2018-02-02 LAB
DONOR IDENTIFICATION: NORMAL
DR51: 1073
DSA COMMENTS: NORMAL
DSA PRESENT: YES
DSA TEST METHOD: NORMAL
ORGAN: NORMAL
SA1 CELL: NORMAL
SA1 COMMENTS: NORMAL
SA1 HI RISK ABY: NORMAL
SA1 MOD RISK ABY: NORMAL
SA1 TEST METHOD: NORMAL
SA2 CELL: NORMAL
SA2 COMMENTS: NORMAL
SA2 HI RISK ABY UA: NORMAL
SA2 MOD RISK ABY: NORMAL
SA2 TEST METHOD: NORMAL

## 2018-02-02 NOTE — TELEPHONE ENCOUNTER
I left Jolly a message to let her know about the neuropsychology consult in preparation for transitioning to the adult team. The consult is with Dr Curry and ait is not urgent. I asked her to call back with any questions.

## 2018-02-05 ENCOUNTER — TELEPHONE (OUTPATIENT)
Dept: TRANSPLANT | Facility: CLINIC | Age: 23
End: 2018-02-05

## 2018-02-16 ENCOUNTER — TELEPHONE (OUTPATIENT)
Dept: PEDIATRIC CARDIOLOGY | Facility: CLINIC | Age: 23
End: 2018-02-16

## 2018-02-16 DIAGNOSIS — Z94.1 HEART REPLACED BY TRANSPLANT (H): ICD-10-CM

## 2018-02-16 LAB
NT-PROBNP SERPL-MCNC: 63 PG/ML (ref 0–125)
TACROLIMUS BLD-MCNC: 9.8 UG/L (ref 5–15)
TME LAST DOSE: NORMAL H
TROPONIN I SERPL-MCNC: 0.04 UG/L (ref 0–0.04)
WBC # BLD AUTO: 5.7 10E9/L (ref 4–11)

## 2018-02-16 PROCEDURE — 85048 AUTOMATED LEUKOCYTE COUNT: CPT | Performed by: PEDIATRICS

## 2018-02-16 PROCEDURE — 84484 ASSAY OF TROPONIN QUANT: CPT | Performed by: PEDIATRICS

## 2018-02-16 PROCEDURE — 80197 ASSAY OF TACROLIMUS: CPT | Performed by: PEDIATRICS

## 2018-02-16 PROCEDURE — 83880 ASSAY OF NATRIURETIC PEPTIDE: CPT | Performed by: PEDIATRICS

## 2018-02-16 PROCEDURE — 36415 COLL VENOUS BLD VENIPUNCTURE: CPT | Performed by: PEDIATRICS

## 2018-02-16 NOTE — TELEPHONE ENCOUNTER
I spoke with Obed and Jolly regarding his lab results. His troponin is down from his last two levels. His WBC is stable. I will call this weekend with his tacrolimus level if adjustments need to be made; otherwise they will hear from me early next week. Both Obed and Jolly verbalized understanding. I also reminded Obed to call scheduling for his March annual visit. I gave him and Jolly the number to reach Avani.

## 2018-02-19 NOTE — TELEPHONE ENCOUNTER
Pt called and confirmed for Thurs 3/22 for annual appts and hrt cath on Fri 3/23, mailing schedule

## 2018-02-20 ENCOUNTER — TELEPHONE (OUTPATIENT)
Dept: PEDIATRIC CARDIOLOGY | Facility: CLINIC | Age: 23
End: 2018-02-20

## 2018-02-20 NOTE — TELEPHONE ENCOUNTER
Patient called with tacrolimus level result of 9.8, BNP 63, troponin 0.037 and WBC 5.7; Per Dr. Lorenzo, will change patient's goal tacrolimus level to 6-10 and instructed patient to continue current dose of tacrolimus 3.5 mg twice daily and repeat labs with next office visit on 3/22/18.     Obed verbalized understanding of above and agree with plan.

## 2018-02-28 ENCOUNTER — TELEPHONE (OUTPATIENT)
Dept: PEDIATRIC CARDIOLOGY | Facility: CLINIC | Age: 23
End: 2018-02-28

## 2018-02-28 DIAGNOSIS — Z94.1 HEART REPLACED BY TRANSPLANT (H): Primary | ICD-10-CM

## 2018-02-28 RX ORDER — PRAVASTATIN SODIUM 10 MG
10 TABLET ORAL DAILY
Qty: 30 TABLET | Refills: 0 | Status: SHIPPED | OUTPATIENT
Start: 2018-02-28 | End: 2018-03-01

## 2018-03-01 RX ORDER — PRAVASTATIN SODIUM 10 MG
10 TABLET ORAL DAILY
Qty: 90 TABLET | Refills: 3 | Status: SHIPPED | OUTPATIENT
Start: 2018-03-01 | End: 2018-03-07

## 2018-03-01 NOTE — TELEPHONE ENCOUNTER
Obed's mom, Jolly, paged me on call because he does not have any more pravastatin. He thinks that it was accidentally thrown out by the person who cleans their house. I sent a 30 day supply to the pharmacy near mom's home. She will get it to him tomorrow. I will also resent the prescription to specialty pharmacy so he can continue getting 90 day supplies once this supply is out. Jolly verbalized understanding.

## 2018-03-06 ENCOUNTER — TELEPHONE (OUTPATIENT)
Dept: PEDIATRIC CARDIOLOGY | Facility: CLINIC | Age: 23
End: 2018-03-06

## 2018-03-06 NOTE — TELEPHONE ENCOUNTER
"Jolly called because Obed has been ill since Saturday. He had nausea and vomiting starting on Saturday and as mom described it, \"was quite ill\" with weakness and fatigue. He had emesis until early Monday. He has had diarrhea throughout. He has been able to keep hydrated and take his immune suppression medications. Today he has been able to eat a little bit more. Jolly thinks that this seems like norovirus and is wondering if this course is normal. I told her that it was and that we have had reports of norovirus in the community. Jolly is wondering if it is fine for him to have a new patient visit with a PCP tomorrow. I said that since he is not at risk of spreading the illness (not incontinent or unable/unwilling to wash hands) that it should be fine. I told her that they should let the provider know, in case it affects his physical exam. Jolly verbalized understanding.   "

## 2018-03-07 ENCOUNTER — OFFICE VISIT (OUTPATIENT)
Dept: INTERNAL MEDICINE | Facility: CLINIC | Age: 23
End: 2018-03-07
Payer: COMMERCIAL

## 2018-03-07 VITALS
BODY MASS INDEX: 17.28 KG/M2 | HEART RATE: 101 BPM | DIASTOLIC BLOOD PRESSURE: 69 MMHG | SYSTOLIC BLOOD PRESSURE: 106 MMHG | WEIGHT: 129.4 LBS

## 2018-03-07 DIAGNOSIS — K52.9 GE (GASTROENTERITIS): ICD-10-CM

## 2018-03-07 DIAGNOSIS — Z94.1 HEART REPLACED BY TRANSPLANT (H): ICD-10-CM

## 2018-03-07 DIAGNOSIS — R29.898 MECHANICAL PROBLEMS WITH LIMBS: ICD-10-CM

## 2018-03-07 DIAGNOSIS — Z00.00 ENCOUNTER FOR ROUTINE HISTORY AND PHYSICAL EXAM FOR MALE: Primary | ICD-10-CM

## 2018-03-07 RX ORDER — PRAVASTATIN SODIUM 10 MG
10 TABLET ORAL DAILY
Qty: 90 TABLET | Refills: 3 | Status: SHIPPED | OUTPATIENT
Start: 2018-03-07 | End: 2018-03-12

## 2018-03-07 ASSESSMENT — PAIN SCALES - GENERAL: PAINLEVEL: MODERATE PAIN (4)

## 2018-03-07 NOTE — NURSING NOTE
Patient received TDAP vaccine.  See immunization list for administration details.  Patient tolerated injection well.     Clair Sosa at 1:46 PM on 3/7/2018.

## 2018-03-07 NOTE — PATIENT INSTRUCTIONS
[facility]: [phone]     [facility] Medication Refill Request Information:  * Please contact your pharmacy regarding ANY request for medication refills.  ** [facility] Prescription Fax = [phone]  * Please allow 3 business days for routine medication refills.  * Please allow 5 business days for controlled substance medication refills.     [facility] Test Result notification information:  *You will be notified with in 7-10 days of your appointment day regarding the results of your test.  If you are on MyChart you will be notified as soon as the provider has reviewed the results and signed off on them.

## 2018-03-07 NOTE — MR AVS SNAPSHOT
After Visit Summary   3/7/2018    Obed Viramontes    MRN: [id number]           Patient Information     Date Of Birth          1995        Visit Information        Provider Department      3/7/2018 1:00 PM Nnamdi Kaufman MD [facility]        Today's Diagnoses     Encounter for routine history and physical exam for male    -  1    Mechanical problems with limbs        Heart replaced by transplant (H)        GE (gastroenteritis)          Care Instructions    Primary Care Center: [phone]     Primary Care Center Medication Refill Request Information:  * Please contact your pharmacy regarding ANY request for medication refills.  ** [facility] Prescription Fax = [phone]  * Please allow 3 business days for routine medication refills.  * Please allow 5 business days for controlled substance medication refills.     Primary [facility] Center Test Result notification information:  *You will be notified with in 7-10 days of your appointment day regarding the results of your test.  If you are on MyChart you will be notified as soon as the provider has reviewed the results and signed off on them.            Follow-ups after your visit        Your next 10 appointments already scheduled     Mar 22, 2018  7:00 AM CDT   XR THORACIC SPINE 2 VIEWS with URXR3   [facility] [place],  Radiology ([facility])    [address] [place] [id number] [phone]           Please bring a list of your current medicines to your exam. (Include vitamins, minerals and over-thecounter medicines.) Leave your valuables at home.  Tell your doctor if there is a chance you may be pregnant.  You do not need to do anything special for this exam.            Mar 22, 2018  7:10 AM CDT   (Arrive by 6:55 AM)   XR LUMBAR SPINE 2/3 VIEWS with URXR3   [facility] [place],  Radiology ([facility])    [address] [place] [address]  [facility] [id number] [phone]           Please bring a list of your current medicines to your exam. (Include vitamins, minerals and over-thecounter medicines.) Leave your valuables at home.  Tell your doctor if there is a chance you may be pregnant.  You do not need to do anything special for this exam.            Mar 22, 2018  7:20 AM CDT   XR HIP BILATERAL 2 VIEWS EACH with URXR3   [facility]  [facility] ([facility])    [address] [place] [id number] [phone]           Please bring a list of your current medicines to your exam. (Include vitamins, minerals and over-thecounter medicines.) Leave your valuables at home.  Tell your doctor if there is a chance you may be pregnant.  You do not need to do anything special for this exam.            Mar 22, 2018  7:30 AM CDT   (Arrive by 7:15 AM)   XR CHEST 2 VIEWS with URXR3   [facility]  Radiology ([facility])    [address] [place] [id number] [phone]           Please bring a list of your current medicines to your exam. (Include vitamins, minerals and over-thecounter medicines.) Leave your valuables at home.  Tell your doctor if there is a chance you may be pregnant.  You do not need to do anything special for this exam.            Mar 22, 2018  8:00 AM CDT   LAB with EXPLORER LAB UR   [facility] ([facility])    [address] [place] [id number] [phone]           Please do not eat 10-12 hours before your appointment if you are coming in fasting for labs on lipids, cholesterol, or glucose (sugar). This does not apply to pregnant women. Water, hot tea and black coffee (with nothing added) are okay. Do not drink other fluids, diet soda or chew gum.            Mar 22, 2018  8:30 AM CDT   US RENAL COMPLETE with URUS1   [facility],  Ultrasound ([facility])    [address] [place] [address] [phone]           Please bring a list of your medicines (including vitamins, minerals and over-the-counter drugs). Also, tell your doctor about any allergies you may have. Wear comfortable clothes and leave your valuables at home.  You do not need to do anything special to prepare for your exam.  Please call the Imaging Department at your exam site with any questions.            Mar 22, 2018  9:00 AM CDT   Return Visit with MD Mallory Bates Cardiac Transplant ([facility])    [facility]  [address]  [address] [place] [address] [phone]            Mar 22, 2018  1:45 PM CDT   DX HIP/PELVIS/SPINE with URXR4   [facility] ([facility])    [address] [place] [address] [phone]           Please do not take any of the following 24 hours prior to the day of your exam: vitamins, calcium tablets, antacids.  If possible, please wear clothes without metal (snaps, zippers). A sweatsuit works well.            Mar 23, 2018   Procedure with Aileen Sarabia MD   [facility] [place], Same Day Surgery (--)    [address] [place] [id number] [phone]              Who to contact     Please call your clinic at [phone] to:    Ask questions about your health    Make or cancel appointments    Discuss your medicines    Learn about your test results    Speak to your doctor            Additional Information About Your Visit        MyChart Information     [employer] gives you secure access to your electronic health record. If you see a primary care provider, you can also send messages to your care team and make appointments. If you have questions, please call your primary care clinic.  If you do not have a primary care provider, please call [phone] and they will assist  you.      [employer] is an electronic gateway that provides easy, online access to your medical records. With [employer], you can request a clinic appointment, read your test results, renew a prescription or communicate with your care team.     To access your existing account, please contact your [facility] or call [phone] for assistance.        Care EveryWhere ID     This is your Care EveryWhere ID. This could be used by other organizations to access your [facility] medical records  [id number]        Your Vitals Were     Pulse BMI (Body Mass Index)                101 17.28 kg/m2           Blood Pressure from Last 3 Encounters:   03/07/18 106/69   01/13/18 112/80   12/22/17 124/83    Weight from Last 3 Encounters:   03/07/18 58.7 kg (129 lb 6.4 oz)   01/13/18 61.5 kg (135 lb 8 oz)   11/16/17 62.3 kg (137 lb 5.6 oz)              We Performed the Following     TDAP VACCINE (BOOSTRIX)          Where to get your medicines      These medications were sent to [address] [address]     Phone:  [phone]     pravastatin 10 MG tablet          Primary Care Provider Office Phone # Fax #    Bobbi Marilynn Pruitt [phone]       [facility] [address] [facility] [address]        Equal Access to Services     NAOMI GREENBERG : Hadii aad ku hadasho Sogiovanna, waaxda luqadaha, qaybta kaalmada kaley, stephanie hayes . So [facility] [phone].    ATENCIÓN: Si habla español, tiene a neves disposición servicios gratuitos de asistencia lingüística. Ashly al [phone].    We comply with applicable federal civil rights laws and [place] laws. We do not discriminate on the basis of race, color, national origin, age, disability, sex, sexual orientation, or gender identity.            Thank you!     Thank you for choosing [facility]  for your care. Our goal is  always to provide you with excellent care. Hearing back from our patients is one way we can continue to improve our services. Please take a few minutes to complete the written survey that you may receive in the mail after your visit with us. Thank you!             Your Updated Medication List - Protect others around you: Learn how to safely use, store and throw away your medicines at www.disposemymeds.org.          This list is accurate as of 3/7/18  4:15 PM.  Always use your most recent med list.                   Brand Name Dispense Instructions for use Diagnosis    amLODIPine 5 MG tablet    NORVASC    90 tablet    Take 1 tablet (5 mg) by mouth daily    Heart replaced by transplant (H)       aspirin 81 MG EC tablet     60 tablet    Take 2 tablets (162 mg) by mouth daily    Status post transplant, heart (H)       mycophenolate 500 MG tablet    GENERIC EQUIVALENT    180 tablet    Take 1 tablet (500 mg) by mouth 2 times daily        pravastatin 10 MG tablet    PRAVACHOL    90 tablet    Take 1 tablet (10 mg) by mouth daily At bedtime    Heart replaced by transplant (H)       * predniSONE 5 MG tablet    DELTASONE    90 tablet    Take 1 tablet (5 mg) by mouth daily    Heart replaced by transplant (H)       * predniSONE 2.5 MG tablet    DELTASONE    30 tablet    Take 1 tablet (2.5 mg) by mouth daily    Heart replaced by transplant (H)       * tacrolimus 0.5 MG capsule    GENERIC EQUIVALENT    180 capsule    Take 1 capsule (0.5 mg) by mouth 2 times daily with 3 - 1 mg capsules for a total dose of 3.5 mg twice a day    Transplanted heart (H)       * tacrolimus 1 MG capsule    GENERIC EQUIVALENT    180 capsule    Take 3 capsules (3 mg) by mouth 2 times daily With 1 - 0.5 mg capsule for a total of 3.5 mg twice a day.    Transplanted heart (H)       Vitamin D3 2000 UNITS Tabs     100 tablet    Take 2,000 Units by mouth daily    Heart transplanted (H)       * Notice:  This list has 4 medication(s) that are the same as other  medications prescribed for you. Read the directions carefully, and ask your doctor or other care provider to review them with you.

## 2018-03-07 NOTE — NURSING NOTE
Chief Complaint   Patient presents with     Establish Care     Patient here to establish care.       Irasema Barger CMA at 12:58 PM on 3/7/2018.

## 2018-03-12 DIAGNOSIS — Z94.1 TRANSPLANTED HEART (H): ICD-10-CM

## 2018-03-12 DIAGNOSIS — Z94.1 HEART REPLACED BY TRANSPLANT (H): ICD-10-CM

## 2018-03-12 RX ORDER — TACROLIMUS 1 MG/1
3 CAPSULE ORAL 2 TIMES DAILY
Qty: 180 CAPSULE | Refills: 11 | Status: SHIPPED | OUTPATIENT
Start: 2018-03-12 | End: 2018-03-22

## 2018-03-12 RX ORDER — PRAVASTATIN SODIUM 10 MG
10 TABLET ORAL DAILY
Qty: 90 TABLET | Refills: 3 | Status: SHIPPED | OUTPATIENT
Start: 2018-03-12 | End: 2019-03-14

## 2018-03-20 ENCOUNTER — PRE VISIT (OUTPATIENT)
Dept: PEDIATRIC CARDIOLOGY | Facility: CLINIC | Age: 23
End: 2018-03-20

## 2018-03-20 DIAGNOSIS — Z94.1 HEART REPLACED BY TRANSPLANT (H): Primary | ICD-10-CM

## 2018-03-20 NOTE — TELEPHONE ENCOUNTER
Obed is being seen for his routine annual visit post heart transplant.  All orders are labs are placed.

## 2018-03-22 ENCOUNTER — HOSPITAL ENCOUNTER (OUTPATIENT)
Dept: GENERAL RADIOLOGY | Facility: CLINIC | Age: 23
End: 2018-03-22
Attending: PEDIATRICS
Payer: COMMERCIAL

## 2018-03-22 ENCOUNTER — RESULTS ONLY (OUTPATIENT)
Dept: OTHER | Facility: CLINIC | Age: 23
End: 2018-03-22

## 2018-03-22 ENCOUNTER — APPOINTMENT (OUTPATIENT)
Dept: LAB | Facility: CLINIC | Age: 23
End: 2018-03-22
Attending: PEDIATRICS
Payer: COMMERCIAL

## 2018-03-22 ENCOUNTER — HOSPITAL ENCOUNTER (OUTPATIENT)
Dept: ULTRASOUND IMAGING | Facility: CLINIC | Age: 23
Discharge: HOME | End: 2018-03-22
Attending: PEDIATRICS | Admitting: PEDIATRICS
Payer: COMMERCIAL

## 2018-03-22 ENCOUNTER — HOSPITAL ENCOUNTER (OUTPATIENT)
Dept: CARDIOLOGY | Facility: CLINIC | Age: 23
End: 2018-03-22
Attending: PEDIATRICS
Payer: COMMERCIAL

## 2018-03-22 ENCOUNTER — ANESTHESIA EVENT (OUTPATIENT)
Dept: SURGERY | Facility: CLINIC | Age: 23
End: 2018-03-22
Payer: COMMERCIAL

## 2018-03-22 ENCOUNTER — RADIANT APPOINTMENT (OUTPATIENT)
Dept: BONE DENSITY | Facility: CLINIC | Age: 23
End: 2018-03-22
Attending: PEDIATRICS
Payer: COMMERCIAL

## 2018-03-22 ENCOUNTER — OFFICE VISIT (OUTPATIENT)
Dept: PEDIATRIC CARDIOLOGY | Facility: CLINIC | Age: 23
End: 2018-03-22
Attending: PEDIATRICS
Payer: COMMERCIAL

## 2018-03-22 VITALS
HEIGHT: 73 IN | HEART RATE: 100 BPM | BODY MASS INDEX: 18.2 KG/M2 | DIASTOLIC BLOOD PRESSURE: 85 MMHG | OXYGEN SATURATION: 100 % | WEIGHT: 137.35 LBS | SYSTOLIC BLOOD PRESSURE: 131 MMHG | RESPIRATION RATE: 20 BRPM

## 2018-03-22 DIAGNOSIS — Z94.1 HEART REPLACED BY TRANSPLANT (H): ICD-10-CM

## 2018-03-22 DIAGNOSIS — I10 HYPERTENSION, UNSPECIFIED TYPE: ICD-10-CM

## 2018-03-22 DIAGNOSIS — D84.9 IMMUNOSUPPRESSION (H): ICD-10-CM

## 2018-03-22 DIAGNOSIS — Z94.1 HEART REPLACED BY TRANSPLANT (H): Primary | ICD-10-CM

## 2018-03-22 PROBLEM — K05.10 GINGIVOSTOMATITIS: Status: RESOLVED | Noted: 2018-01-13 | Resolved: 2018-03-22

## 2018-03-22 LAB
ALBUMIN SERPL-MCNC: 4 G/DL (ref 3.4–5)
ALP SERPL-CCNC: 72 U/L (ref 40–150)
ALT SERPL W P-5'-P-CCNC: 17 U/L (ref 0–70)
ANION GAP SERPL CALCULATED.3IONS-SCNC: 7 MMOL/L (ref 3–14)
AST SERPL W P-5'-P-CCNC: 12 U/L (ref 0–45)
BASOPHILS # BLD AUTO: 0 10E9/L (ref 0–0.2)
BASOPHILS NFR BLD AUTO: 0.2 %
BILIRUB SERPL-MCNC: 0.4 MG/DL (ref 0.2–1.3)
BUN SERPL-MCNC: 25 MG/DL (ref 7–30)
CALCIUM SERPL-MCNC: 8.6 MG/DL (ref 8.5–10.1)
CHLORIDE SERPL-SCNC: 109 MMOL/L (ref 94–109)
CK SERPL-CCNC: 96 U/L (ref 30–300)
CMV IGG SERPL QL IA: <0.2 AI (ref 0–0.8)
CO2 SERPL-SCNC: 28 MMOL/L (ref 20–32)
CREAT SERPL-MCNC: 0.87 MG/DL (ref 0.66–1.25)
DIFFERENTIAL METHOD BLD: NORMAL
EBV NA IGG SER QL IA: <0.2 AI (ref 0–0.8)
EBV VCA IGG SER QL IA: 5.9 AI (ref 0–0.8)
EBV VCA IGM SER QL IA: <0.2 AI (ref 0–0.8)
EOSINOPHIL # BLD AUTO: 0.1 10E9/L (ref 0–0.7)
EOSINOPHIL NFR BLD AUTO: 2.1 %
ERYTHROCYTE [DISTWIDTH] IN BLOOD BY AUTOMATED COUNT: 13.7 % (ref 10–15)
GFR SERPL CREATININE-BSD FRML MDRD: >90 ML/MIN/1.7M2
GLUCOSE SERPL-MCNC: 101 MG/DL (ref 70–99)
HCT VFR BLD AUTO: 40.2 % (ref 40–53)
HGB BLD-MCNC: 13.4 G/DL (ref 13.3–17.7)
IMM GRANULOCYTES # BLD: 0 10E9/L (ref 0–0.4)
IMM GRANULOCYTES NFR BLD: 0.2 %
INTERPRETATION ECG - MUSE: NORMAL
LYMPHOCYTES # BLD AUTO: 1.7 10E9/L (ref 0.8–5.3)
LYMPHOCYTES NFR BLD AUTO: 26.5 %
MAGNESIUM SERPL-MCNC: 1.7 MG/DL (ref 1.6–2.3)
MCH RBC QN AUTO: 28.3 PG (ref 26.5–33)
MCHC RBC AUTO-ENTMCNC: 33.3 G/DL (ref 31.5–36.5)
MCV RBC AUTO: 85 FL (ref 78–100)
MONOCYTES # BLD AUTO: 0.5 10E9/L (ref 0–1.3)
MONOCYTES NFR BLD AUTO: 8.5 %
NEUTROPHILS # BLD AUTO: 4 10E9/L (ref 1.6–8.3)
NEUTROPHILS NFR BLD AUTO: 62.5 %
NRBC # BLD AUTO: 0 10*3/UL
NRBC BLD AUTO-RTO: 0 /100
NT-PROBNP SERPL-MCNC: 99 PG/ML (ref 0–125)
PHOSPHATE SERPL-MCNC: 3.7 MG/DL (ref 2.5–4.5)
PLATELET # BLD AUTO: 217 10E9/L (ref 150–450)
POTASSIUM SERPL-SCNC: 3.6 MMOL/L (ref 3.4–5.3)
PROT SERPL-MCNC: 7 G/DL (ref 6.8–8.8)
RBC # BLD AUTO: 4.74 10E12/L (ref 4.4–5.9)
SODIUM SERPL-SCNC: 144 MMOL/L (ref 133–144)
TACROLIMUS BLD-MCNC: 11.6 UG/L (ref 5–15)
TME LAST DOSE: NORMAL H
TROPONIN I SERPL-MCNC: <0.015 UG/L (ref 0–0.04)
WBC # BLD AUTO: 6.3 10E9/L (ref 4–11)

## 2018-03-22 PROCEDURE — 76770 US EXAM ABDO BACK WALL COMP: CPT

## 2018-03-22 PROCEDURE — 80053 COMPREHEN METABOLIC PANEL: CPT | Performed by: PEDIATRICS

## 2018-03-22 PROCEDURE — G0463 HOSPITAL OUTPT CLINIC VISIT: HCPCS | Mod: 25,ZF

## 2018-03-22 PROCEDURE — 36415 COLL VENOUS BLD VENIPUNCTURE: CPT | Performed by: PEDIATRICS

## 2018-03-22 PROCEDURE — 71046 X-RAY EXAM CHEST 2 VIEWS: CPT

## 2018-03-22 PROCEDURE — 86833 HLA CLASS II HIGH DEFIN QUAL: CPT | Performed by: PEDIATRICS

## 2018-03-22 PROCEDURE — 84100 ASSAY OF PHOSPHORUS: CPT | Performed by: PEDIATRICS

## 2018-03-22 PROCEDURE — 83735 ASSAY OF MAGNESIUM: CPT | Performed by: PEDIATRICS

## 2018-03-22 PROCEDURE — 82550 ASSAY OF CK (CPK): CPT | Performed by: PEDIATRICS

## 2018-03-22 PROCEDURE — 93005 ELECTROCARDIOGRAM TRACING: CPT | Mod: ZF

## 2018-03-22 PROCEDURE — 85025 COMPLETE CBC W/AUTO DIFF WBC: CPT | Performed by: PEDIATRICS

## 2018-03-22 PROCEDURE — 86665 EPSTEIN-BARR CAPSID VCA: CPT | Performed by: PEDIATRICS

## 2018-03-22 PROCEDURE — 77080 DXA BONE DENSITY AXIAL: CPT

## 2018-03-22 PROCEDURE — 72100 X-RAY EXAM L-S SPINE 2/3 VWS: CPT

## 2018-03-22 PROCEDURE — 83880 ASSAY OF NATRIURETIC PEPTIDE: CPT | Performed by: PEDIATRICS

## 2018-03-22 PROCEDURE — 93306 TTE W/DOPPLER COMPLETE: CPT

## 2018-03-22 PROCEDURE — 86832 HLA CLASS I HIGH DEFIN QUAL: CPT | Performed by: PEDIATRICS

## 2018-03-22 PROCEDURE — 80197 ASSAY OF TACROLIMUS: CPT | Performed by: PEDIATRICS

## 2018-03-22 PROCEDURE — 86644 CMV ANTIBODY: CPT | Performed by: PEDIATRICS

## 2018-03-22 PROCEDURE — 86664 EPSTEIN-BARR NUCLEAR ANTIGEN: CPT | Performed by: PEDIATRICS

## 2018-03-22 PROCEDURE — 72070 X-RAY EXAM THORAC SPINE 2VWS: CPT

## 2018-03-22 PROCEDURE — 73522 X-RAY EXAM HIPS BI 3-4 VIEWS: CPT

## 2018-03-22 PROCEDURE — 84484 ASSAY OF TROPONIN QUANT: CPT | Performed by: PEDIATRICS

## 2018-03-22 PROCEDURE — 82306 VITAMIN D 25 HYDROXY: CPT | Performed by: PEDIATRICS

## 2018-03-22 PROCEDURE — 87799 DETECT AGENT NOS DNA QUANT: CPT | Performed by: PEDIATRICS

## 2018-03-22 RX ORDER — PREDNISONE 2.5 MG/1
2.5 TABLET ORAL DAILY
Qty: 90 TABLET | Refills: 11 | Status: SHIPPED | OUTPATIENT
Start: 2018-03-22 | End: 2019-03-14

## 2018-03-22 RX ORDER — PREDNISONE 5 MG/1
5 TABLET ORAL DAILY
Qty: 90 TABLET | Refills: 11 | Status: SHIPPED | OUTPATIENT
Start: 2018-03-22 | End: 2019-03-14

## 2018-03-22 RX ORDER — TACROLIMUS 1 MG/1
3 CAPSULE ORAL 2 TIMES DAILY
Qty: 540 CAPSULE | Refills: 11 | Status: SHIPPED | OUTPATIENT
Start: 2018-03-22 | End: 2018-06-27

## 2018-03-22 RX ORDER — TACROLIMUS 0.5 MG/1
0.5 CAPSULE ORAL 2 TIMES DAILY
Qty: 180 CAPSULE | Refills: 11 | Status: SHIPPED | OUTPATIENT
Start: 2018-03-22 | End: 2018-03-26

## 2018-03-22 ASSESSMENT — PAIN SCALES - GENERAL: PAINLEVEL: NO PAIN (0)

## 2018-03-22 NOTE — NURSING NOTE
"Chief Complaint   Patient presents with     Heart Problem     Heart transplant.       Initial /85 (BP Location: Right arm, Patient Position: Chair, Cuff Size: Adult Regular)  Pulse 100  Resp 20  Ht 6' 0.56\" (184.3 cm)  Wt 137 lb 5.6 oz (62.3 kg)  SpO2 100%  BMI 18.34 kg/m2 Estimated body mass index is 18.34 kg/(m^2) as calculated from the following:    Height as of this encounter: 6' 0.56\" (184.3 cm).    Weight as of this encounter: 137 lb 5.6 oz (62.3 kg).  Medication Reconciliation: Complete.       Christina Mason M.A.    "

## 2018-03-22 NOTE — PHARMACY-CONSULT NOTE
Current Outpatient Prescriptions   Medication Sig Dispense Refill     tacrolimus (GENERIC EQUIVALENT) 1 MG capsule Take 3 capsules (3 mg) by mouth 2 times daily With 1 - 0.5 mg capsule for a total of 3.5 mg twice a day. 180 capsule 11     pravastatin (PRAVACHOL) 10 MG tablet Take 1 tablet (10 mg) by mouth daily At bedtime 90 tablet 3     predniSONE (DELTASONE) 2.5 MG tablet Take 1 tablet (2.5 mg) by mouth daily 30 tablet 3     predniSONE (DELTASONE) 5 MG tablet Take 1 tablet (5 mg) by mouth daily 90 tablet 3     tacrolimus (GENERIC EQUIVALENT) 0.5 MG capsule Take 1 capsule (0.5 mg) by mouth 2 times daily with 3 - 1 mg capsules for a total dose of 3.5 mg twice a day 180 capsule 3     amLODIPine (NORVASC) 5 MG tablet Take 1 tablet (5 mg) by mouth daily 90 tablet 3     mycophenolate (GENERIC EQUIVALENT) 500 MG tablet Take 1 tablet (500 mg) by mouth 2 times daily 180 tablet 5     aspirin 81 MG EC tablet Take 2 tablets (162 mg) by mouth daily 60 tablet 99     Cholecalciferol (VITAMIN D3) 2000 UNITS TABS Take 2,000 Units by mouth daily 100 tablet 6     I had the privilege of seeing Obed in clinic today along with Dr. Lorenzo, Diana and Nicci RN coordinators, and Jolly (mom).      Current labs are as follows:  (Tacrolimus or CSA) level: Tacrolimus in process  Goal level: Tacrolimus 6-10   WBC: 6.3 (4-11)   Cr: 0.87 (0.66-1.25)   Other:  Troponin <0.015 (0.000-0.045), N terminal Pro BNP 99 (0-125), magnesium 1.7 (1.6-2.3), hemoglobin 13.4 (13.3-17.7), phosphorous 3.7 (2.5-4.5), CK 96 (), platelets 217 (150-450), CMV and EBV in process, /85 (little elevated today-- was good last check 3/7/18)    Immunosuppressant regimen: Tacrolimus generic capsules--  3.5mg twice daily, mycophenolate generic tablets--  500mg twice daily,  Prednisone tablets-- 7.5mg every day     Visit summary: Obed is a heart transplant recipient of 3/23/12.   Chart is complete per heart protocol standards.   Dosing is good.   Medlist  reviewed.  Compliance is good per check--ordered recently.   WBC low normal for past several visits--keeping mycophenolate dosing at current.   Vitamin D and Aspirin get OTC.   Obed does 10,10 schedule for his twice daily dosing.

## 2018-03-22 NOTE — NURSING NOTE
It is a pleasure to see Obed and Jolly today. Obed reports no new concerns. 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 He and Jolly asked about a release of information. Obed said that he feels like he signed it previously. I was unable to reach our  so I will have him do this paperwork in pre-op tomorrow.    We spent time discussing the pediatric to adult team transition. As long as everything goes well with his heart cath tomorrow, we will plan for Obed's next visit to be with the adult cardiology team in September.      The following topics were discussed in preparation for this transition: My Chart, medications, labs, primary and specialty care, and general health follow up.   Obed and Jolly put MyChart on their phones but became locked out because of an incorrect password. We unlocked the account but they will need to create a new password from their home computer. I will check with them before heart cath to see if they were able to do this. We reviewed how to use application for checking labs viewing and making appointments, and sending messages to care team.  Medications were reviewed, Obed knows his doses and the general purpose of each medication. Currently Obed is completing refills and uses [facility], with his contact being D[facility].  Obed lost his pill box so he had not been using it for 2-3 weeks but has a new one that he plans to use for medication administration. We ensured that he has enough of all medications at this time. He prefers 90 day prescriptions so we also updated the number of pills.  We discussed why it is important to know these details as transitioning to adult care.  Lab schedule was discussed and [facility]. We discussed why having an appointment is helpful, both [facility]  Compliance has been better in the past year. He has needed monthly labs or even more frequent due to  medication changes. We discussed that we plan to move him to every 2 month monitoring after this visit if his biopsy is negative for rejection. I also explained that he will be responsible for  setting up lab appointments. I will make sure he gets these phone numbers tomorrow.  He said that he can remember to do every 2 month labs for now, and then every 3 months after that if all is well. Jolly said that she would set reminders in her calendar and then text him a week before he needed to get them. Currently Jolly uses My Chart as a way to view labs. Obed will be able to use [employer] on his phone in the future to do this as well. He usually waits for a call from the coordinators regarding his lab results. Obed knows how to contact Nicci Campo, transplant coordinator, also was given main transplant phone number [phone].  Obed SALONI Viramontes primary care provider is Dr Balaji Kaufman.  Specialty care providers are Dr Tinoco with orthopedics.  He needs to get in with dermatology for a skin check. We will try to have this done at the same time as [place] [facility]. Explained the differences in pediatric care and adult care, primary care provider will be needed for general health maintenance and to refill non transplant medications.  For your first adult visit have questions ready and be prepared to answer questions regarding your health and habits, the provider will want to take to you not your parents.    General health follow up discussion on the following topics was reviewed:  have annual eye exams, wear sunscreen SPF 30, get annual flu shot, and have annual physical with primary care provider. When I brought up dental care, Obed was evasive and Jolly told me that he hates the dentist. Obed gets extremely anxious. The last time he went was the day before he got admitted to [facility] with heart failure 5 years ago. I reinforced that the cleanings are very important post-transplant because he can  have gum disease or infections with his immune suppression agents that can be difficult to treat if not recognized early. He said that he would go to see the hygienist that his dad sees for dentistry (she is a family friend). I asked him to try to get this scheduled within the next 2 months. He does not seem happy about it but said that if his dad makes the appointment, then he will go.

## 2018-03-22 NOTE — PATIENT INSTRUCTIONS
Plan:   1. Follow Up appt: 6 months with adult team -Transplant office will call you to schedule.   To cancel or reschedule appointments please call Avani Stafford at [phone]  2. Labs in 2 months and 4 months if no additional lab work needed.  3. Take tacrolimus and prednisone at 10 am/10 pm.    4. Can add protein shake with whey protein to diet. Avoid other supplements.   5. Transplant office will call you with immunosuppression lab results     Please call your transplant coordinator at the Transplant office M-F from 8 AM - 4:30 PM if you have future questions/concerns or change in status such as:    Fever above 100.4    High heart rate   Nausea/vomitting   Fatigue or generally feeling unwell  Diana Puckett: [phone] or Nicci Campo: [phone].   For after hour and weekend concerns please page on-call transplant coordinator at [phone] Job Code Pager [phone]    For emergencies call 911 AND call Transplant coordinator on-call at [phone] Job Code Pager [phone]

## 2018-03-22 NOTE — PROGRESS NOTES
[facility]plant Clinic    2018    RE: Obed Viramontes  : 1995  MRN: [id number]    Dear Balaji and colleagues,    I had the pleasure of evaluating our mutual patient, Obed Viramontes, at the [facility] on 3/22/2018 for regularly scheduled follow-up. As you remember, Obed is a 22 year old male with history of heart transplant (3/23/2012) for dilated cardiomyopathy and post-transplant lymphoproliferative disorder (2012) who transferred care from [facility] to the [facility] in 2012. He is in clinic today with his mother for an acute illness evaluation. follow-up after recent rejection episodes. To review his most recent course, he was seen in 2017 for routine  annual follow-up; however, he had elevated troponin and routine biopsy showed 2R rejection, and he was non-compliant with medications prior to this. He was treated with IV methylprednisolone and a slow taper of oral prednisone. After going home to live at his mother's house for a few weeks, his compliance improved, as did his schedule. He was eating better and had also gained some weight. Even after going to live with his dad for a bit, he confirmed being compliant with his medications. In late May, his troponin bumped again and a biopsy on  revealed 2R rejection, which was treated with 5 days of prednisone and increasing his tacrolimus goals. On follow-up on  his troponin had improved. However, on repeat followup at end of  his troponin was elevated again and he had new PACs on EKG concerning for ongoing rejection. We elected to treat with 3 days of prednisone 60 mg daily, followed by 20 mg daily, which he remained on until July. He was then weaned to 10 mg daily. He underwent diagnostic right heart cathterization on 2017, which revealed no rejection on biopsy, and his labs had  continued to improve. He was switched from prednisone to sirolimus in the late Fall of  after he fully recovered from his hand surgery. Sirolimus was discontinued for recurrent viral infections and mouth sores. Tacrolimus was started in place of sirolimus.    Since his last visit, Obed has done well. He had some intermittent slightly bumps in his troponin level, for which prednisone was increased to 7.5 mg daily. 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 He recently established care with Dr. Balaji Kaufman ([facility] internist). He has no dyspnea on exertion, chest pain, shortness of breath, nausea, vomiting, feeding intolerance, palpitations, dizziness, lightheadedness or fever.    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  Cerebrovascular accident at age 2 1/2 with resultant left hemiplegia  Multiple orthopedic surgeries in pastd  Dilated cardiomyopathy  Heart transplant (3/23/2012)  EBV-positive PTLD (2012); 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 He had uncomplicated  course and was healthy  in early childhood. He was hospitalized at age 2 1/2 years with viral myocarditis, hospitalized for 6 weeks, intubated for 10 days, had recovery of myocardial function and was treated chronically with digoxin, hospital course complicated by cerobrovascular accident with resultant left hemiplegia, transitioned to [facility] rehab for an additional 5 weeks following his hospital course. He has had several orthopedic surgeries throughout childhood because of his hemiplegia, but otherwise has been healthy. He was asymptomatic from a cardiac standpoint until February 2012, at which time he presented with chest pain. 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 He was transplanted on March 23, 2012, did well post transplant and was discharged after 1 week. He reportedly has done well from a cardiac standpoint with no rejection, 1R biopsy in May and 1R biopsy in September but otherwise negative biopsies. He presented in September with mouth sores and difficulty taking medication, initially treated with lowering his immunosuppression, but was rehospitalized with fever and worsening mouth sores. 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 He was treated with rituximab x 2, and increased prednisone dose from 2.5 mg daily back up 10 mg twice daily. He was scheduled to undergo additional rituximab dose but symptoms improved and repeat CT scan showed improvement in his lymph node and tonsillar hypertrophy. Per mom he was EBV negative prior to transplant, and she thinks his donor was EBV positive. He has done well from an infection/PTLD standpoint and came off Valcyte therapy in December 2013 with negative EBV PCR in February 2014. He did undergo orthopedic surgery for his  "left hand contracture in October 2016, admitted in January 2017 with a cellulitis of that hand that has completely resolved. He underwent plate removal from that hand October 2017.    Family History:  Negative for congenital heart defects, heart transplant, cardiomyopathy or sudden death. Sister with type 1 DM. Older sister recently diagnosed with POTS.    Social History:   Parents are . Lives with dad. Mother still very involved in his healthcare.  Employment: at [employer].  Highest education level completed: high school  Preferred method of communication: cell phone    Medications:  Prescription Medications as of 3/22/2018             tacrolimus (GENERIC EQUIVALENT) 1 MG capsule Take 3 capsules (3 mg) by mouth 2 times daily With 1 - 0.5 mg capsule for a total of 3.5 mg twice a day.    tacrolimus (GENERIC EQUIVALENT) 0.5 MG capsule Take 1 capsule (0.5 mg) by mouth 2 times daily with 3 - 1 mg capsules for a total dose of 3.5 mg twice a day    predniSONE (DELTASONE) 2.5 MG tablet Take 1 tablet (2.5 mg) by mouth daily (Total dose 7.5mg every day)    predniSONE (DELTASONE) 5 MG tablet Take 1 tablet (5 mg) by mouth daily (Total dose 7.5mg every day)    pravastatin (PRAVACHOL) 10 MG tablet Take 1 tablet (10 mg) by mouth daily At bedtime    amLODIPine (NORVASC) 5 MG tablet Take 1 tablet (5 mg) by mouth daily    mycophenolate (GENERIC EQUIVALENT) 500 MG tablet Take 1 tablet (500 mg) by mouth 2 times daily    aspirin 81 MG EC tablet Take 2 tablets (162 mg) by mouth daily    Cholecalciferol (VITAMIN D3) 2000 UNITS TABS Take 2,000 Units by mouth daily        Allergies:   Allergies   Allergen Reactions     Latex Rash     Other [Seasonal Allergies]      Corn-abdominal pain and diarrhea.     Physical Exam:  /85 (BP Location: Right arm, Patient Position: Chair, Cuff Size: Adult Regular)  Pulse 100  Resp 20  Ht 6' 0.56\" (184.3 cm)  Wt 137 lb 5.6 oz (62.3 kg)  SpO2 100%  BMI 18.34 kg/m2     Gen: Alert, " interactive and appropriate, sitting, no respiratory distress  HEENT: NCAT, EOMI, OP clear without lesions, JVP estimated at 7 cm  Neck: no lymphadenopathy  Lungs:  normal breath sounds bilaterall; no wheeze, crackles, or rhonchi, good air entry  CV: quiet precordium, normal PMI, RRR, normal S1 and S2, no murmurs, rubs, gallops  Abd: abdomen is soft without tenderness, masses, organomegaly or guarding  Ext: WWP, 2+ pulses upper and 2+ lower extremitiy without delay, no edema  Skin: numerous erythematous pustules on the back  Neuro: alert and oriented, mildly dysarthric; left hemiplegic    Laboratory Studies:  Obed had evaluation today with labs, radiographs, renal ultrasound, EKG and echocardiogram. His echocardiogram today showed no post transplant changes, an LV ejection fraction 59%, LVRI of 1.4, E to E prime ratio is consistent with normal LV end-diastolic pressure. His EKG showed normal sinus rhythm, rate of 89 beats per minute and no interventricular conduction delay, ST segment or T-wave changes. His labs today include a CBC with WBC 6.3, hemoglobin 13.4, normal platelet count and normal differential. CMP was performed today, as well, which showed normal electrolytes, BUN 25, creatinine 0.87. NT-proBNP is reassuringly normal at 99, troponin is normal, as well is less than 0.015. CMV, vitamin D, CK, PRA, EBV, DEXA scan, and tacrolimus levels are pending at the time of this dictation.      His last biopsy on 9/11/17 showed no rejection (ISHLT grade 1R), pAMR0, negative C3d/C4d staining.    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 He also has history of EBV-positive PTLD, now in remission after rituximab. He most recently has had a number of episodes of biopsy-proven and presumed cellular rejection in the first half of 2017 in the setting of non-compliance with immunosuppressive regimen. His increased troponin and PACs in late June (presumed rejection) resolved with pulse-dosed steroids and slow taper. On 9/11/2017 cath, there was no evidence of rejection (ISHLT grade 1R). Reassuringly, his troponin level has now trended down and is nondetectable and his BNP is normal.    Diagnosis summary:  1. Viral myocarditis at age 2 1/2 years  2. Dilated cardiomyopathy           A. S/p orthotopic heart transplant (3/23/12)   1. 2R rejection (4/21/17), treated with Solumedrol x 3 days   2. 2R rejection (5/26/17), treated with prednisone x 5 days   3. Presumed rejection (6/26/17) based on elevated troponin and new PACs, treated with prednisone x 3 days and then taper  3. Cerebrovascular accident at age 2 1/2 years with resultant left hemiplegia  4. Multiple orthopedic surgeries in past  5. PTLD, EBV positive            A.  treated with rituximab x 2    Plan:  - Planned annual cardiac catheterization tomorrow; will encourage not using sedation  - Continue all other medications  - Recommended whey protein supplement to help with weight gain  - Plan for coordinated transition to adult heart failure/transplant team    Activity Restriction: none  SBE prophylaxis: none    Follow-up: Q 2 months with labs; will transition to adult service in September    Thank you for the opportunity to participate in [facility]'s care. Please let me know if you have further questions.    Sincerely,     Taj Lorenzo MD   of  Pediatrics  Pediatric Cardiology   [facility]    CC  Patient Care Team:  Bobbi Pruitt MD as PCP - General (Pediatric Cardiology)  Bobbi Pruitt MD as MD (Pediatric Cardiology)  Avani Willis MD as MD (Pediatric Infectious Diseases)  Santa Klein APRN CNP as Nurse Practitioner (Nurse Practitioner)  Nnamdi Kaufman MD as MD (Internal Medicine)    Copy to patient  ENCARNACION, OSBALDO   [address]  [place] [address]

## 2018-03-22 NOTE — LETTER
3/22/2018      RE: Obed Viramontes  [address]  [facility] [address]         Heart Center  Pediatric Heart Transplant Clinic    2018    RE: Obed Viramontes  : 1995  MRN: [id number]    Dear Balaji and colleagues,    I had the pleasure of evaluating our mutual patient, Obed Viramontes, at the [facility] on 3/22/2018 for regularly scheduled follow-up. As you remember, Obed is a 22 year old male with history of heart transplant (3/23/2012) for dilated cardiomyopathy and post-transplant lymphoproliferative disorder (2012) who transferred care from [facility] to the [facility] in 2012. He is in clinic today with his mother for an acute illness evaluation. follow-up after recent rejection episodes. To review his most recent course, he was seen in 2017 for routine  annual follow-up; however, he had elevated troponin and routine biopsy showed 2R rejection, and he was non-compliant with medications prior to this. He was treated with IV methylprednisolone and a slow taper of oral prednisone. After going home to live at his mother's house for a few weeks, his compliance improved, as did his schedule. He was eating better and had also gained some weight. Even after going to live with his dad for a bit, he confirmed being compliant with his medications. In late May, his troponin bumped again and a biopsy on  revealed 2R rejection, which was treated with 5 days of prednisone and increasing his tacrolimus goals. On follow-up on  his troponin had improved. However, on repeat followup at end of  his troponin was elevated again and he had new PACs on EKG concerning for ongoing rejection. We elected to treat with 3 days of prednisone 60 mg daily, followed by 20 mg daily, which he remained on until July. He was then weaned to 10 mg daily. He underwent diagnostic right heart  cathterization on 9/11/2017, which revealed no rejection on biopsy, and his labs had continued to improve. He was switched from prednisone to sirolimus in the late Fall of 2017 after he fully recovered from his hand surgery. Sirolimus was discontinued for recurrent viral infections and mouth sores. Tacrolimus was started in place of sirolimus.    Since his last visit, Obed has done well. He had some intermittent slightly bumps in his troponin level, for which prednisone was increased to 7.5 mg daily. He continues working at the [employer] and stays active. He recently established care with Dr. Balaji Kaufman (med-peds internist). He has no dyspnea on exertion, chest pain, shortness of breath, nausea, vomiting, feeding intolerance, palpitations, dizziness, lightheadedness or fever.    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  Cerebrovascular accident at age 2 1/2 with resultant left hemiplegia  Multiple orthopedic surgeries in pastd  Dilated cardiomyopathy  Heart transplant (3/23/2012)  EBV-positive PTLD (9/2012); resolved after rituximab x 2  Acute graft rejection:   4/2017-Grade 2R cellular rejection in setting of medication non-compliance; initially treated with Solumedrol   5/26/2017-Grade 2R cellular rejection; treated with prednisone x 5 days   6/2017-troponin leak concerning for rejection; pulse dose prednisone 60 mg x 3 days followed by slow wean over the next few months    In reviewing his history, he was born at 38 weeks with uncomplicated pregnancy  and delivery, weighed 7 pounds. He had uncomplicated  course and was healthy in early childhood. He was hospitalized at age 2 1/2 years with viral myocarditis, hospitalized for 6 weeks, intubated for 10 days, had recovery of myocardial function and was treated chronically with digoxin, hospital course complicated by cerobrovascular accident with resultant left hemiplegia, transitioned to [place] rehab for an additional 5 weeks following his hospital course. He has had several orthopedic surgeries throughout childhood because of his hemiplegia, but otherwise has been healthy. He was asymptomatic from a cardiac standpoint until 2012, at which time he presented with chest pain. He was found to have significant arrhythmias, was admitted to [facility], evaluated and listed for transplant (mom reports underlying diagnosis of hypertrophic cardiomyopathy possibly secondary to glycogen storage disease?). He was transplanted on 2012, did well post transplant and was discharged after 1 week. He reportedly has done well from a cardiac standpoint with no rejection, 1R biopsy in May and 1R biopsy in September but otherwise negative biopsies. He presented in September with mouth sores and difficulty taking medication, initially treated with lowering his immunosuppression, but was rehospitalized with fever and worsening mouth sores. 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 He was treated with rituximab x 2, and increased prednisone dose from 2.5 mg daily back up 10 mg twice daily. He was scheduled to undergo additional rituximab dose but symptoms improved and repeat CT scan showed improvement in his lymph node and tonsillar hypertrophy. Per mom he was EBV negative prior to transplant, and she thinks his donor was EBV positive. He has done well from an infection/PTLD standpoint and came off Valcyte therapy in December  "2013 with negative EBV PCR in February 2014. He did undergo orthopedic surgery for his left hand contracture in October 2016, admitted in January 2017 with a cellulitis of that hand that has completely resolved. He underwent plate removal from that hand October 2017.    Family History:  Negative for congenital heart defects, heart transplant, cardiomyopathy or sudden death. Sister with type 1 DM. Older sister recently diagnosed with POTS.    Social History:   Parents are . Lives with dad. Mother still very involved in his healthcare.  Employment: at [email or web address].  Highest education level completed: high school  Preferred method of communication: cell phone    Medications:  Prescription Medications as of 3/22/2018             tacrolimus (GENERIC EQUIVALENT) 1 MG capsule Take 3 capsules (3 mg) by mouth 2 times daily With 1 - 0.5 mg capsule for a total of 3.5 mg twice a day.    tacrolimus (GENERIC EQUIVALENT) 0.5 MG capsule Take 1 capsule (0.5 mg) by mouth 2 times daily with 3 - 1 mg capsules for a total dose of 3.5 mg twice a day    predniSONE (DELTASONE) 2.5 MG tablet Take 1 tablet (2.5 mg) by mouth daily (Total dose 7.5mg every day)    predniSONE (DELTASONE) 5 MG tablet Take 1 tablet (5 mg) by mouth daily (Total dose 7.5mg every day)    pravastatin (PRAVACHOL) 10 MG tablet Take 1 tablet (10 mg) by mouth daily At bedtime    amLODIPine (NORVASC) 5 MG tablet Take 1 tablet (5 mg) by mouth daily    mycophenolate (GENERIC EQUIVALENT) 500 MG tablet Take 1 tablet (500 mg) by mouth 2 times daily    aspirin 81 MG EC tablet Take 2 tablets (162 mg) by mouth daily    Cholecalciferol (VITAMIN D3) 2000 UNITS TABS Take 2,000 Units by mouth daily        Allergies:   Allergies   Allergen Reactions     Latex Rash     Other [Seasonal Allergies]      Corn-abdominal pain and diarrhea.     Physical Exam:  /85 (BP Location: Right arm, Patient Position: Chair, Cuff Size: Adult Regular)  Pulse 100  Resp 20  Ht 6' 0.56\" " (184.3 cm)  Wt 137 lb 5.6 oz (62.3 kg)  SpO2 100%  BMI 18.34 kg/m2     Gen: Alert, interactive and appropriate, sitting, no respiratory distress  HEENT: NCAT, EOMI, OP clear without lesions, JVP estimated at 7 cm  Neck: no lymphadenopathy  Lungs:  normal breath sounds bilaterall; no wheeze, crackles, or rhonchi, good air entry  CV: quiet precordium, normal PMI, RRR, normal S1 and S2, no murmurs, rubs, gallops  Abd: abdomen is soft without tenderness, masses, organomegaly or guarding  Ext: WWP, 2+ pulses upper and 2+ lower extremitiy without delay, no edema  Skin: numerous erythematous pustules on the back  Neuro: alert and oriented, mildly dysarthric; left hemiplegic    Laboratory Studies:  Obed had evaluation today with labs, radiographs, renal ultrasound, EKG and echocardiogram. His echocardiogram today showed no post transplant changes, an LV ejection fraction 59%, LVRI of 1.4, E to E prime ratio is consistent with normal LV end-diastolic pressure. His EKG showed normal sinus rhythm, rate of 89 beats per minute and no interventricular conduction delay, ST segment or T-wave changes. His labs today include a CBC with WBC 6.3, hemoglobin 13.4, normal platelet count and normal differential. CMP was performed today, as well, which showed normal electrolytes, BUN 25, creatinine 0.87. NT-proBNP is reassuringly normal at 99, troponin is normal, as well is less than 0.015. CMV, vitamin D, CK, PRA, EBV, DEXA scan, and tacrolimus levels are pending at the time of this dictation.      His last biopsy on 9/11/17 showed no rejection (ISHLT grade 1R), pAMR0, negative C3d/C4d staining.    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 He also has history of EBV-positive PTLD, now in remission after rituximab. He most recently has had a number of episodes of biopsy-proven and presumed cellular rejection in the first half of 2017 in the setting of non-compliance with immunosuppressive regimen. His increased troponin and PACs in late June (presumed rejection) resolved with pulse-dosed steroids and slow taper. On 9/11/2017 cath, there was no evidence of rejection (ISHLT grade 1R). Reassuringly, his troponin level has now trended down and is nondetectable and his BNP is normal.    Diagnosis summary:  1. Viral myocarditis at age 2 1/2 years  2. Dilated cardiomyopathy           A. S/p orthotopic heart transplant (3/23/12)   1. 2R rejection (4/21/17), treated with Solumedrol x 3 days   2. 2R rejection (5/26/17), treated with prednisone x 5 days   3. Presumed rejection (6/26/17) based on elevated troponin and new PACs, treated with prednisone x 3 days and then taper  3. Cerebrovascular accident at age 2 1/2 years with resultant left hemiplegia  4. Multiple orthopedic surgeries in past  5. PTLD, EBV positive            A.  treated with rituximab x 2    Plan:  - Planned annual cardiac catheterization tomorrow; will encourage not using sedation  - Continue all other medications  - Recommended whey protein supplement to help with weight gain  - Plan for coordinated transition to adult heart failure/transplant team    Activity Restriction: none  SBE prophylaxis: none    Follow-up: Q 2 months with labs; will transition to adult service in September    Thank you for the opportunity to participate in Obed's care. Please let me know if you  have further questions.    Sincerely,     Taj Lorenzo MD   of Pediatrics  Pediatric Cardiology   [facility]    CC  Patient Care Team:  Bobbi Pruitt MD as PCP - General (Pediatric Cardiology)  Avani Willis MD as MD (Pediatric Infectious Diseases)  Santa Klein APRN CNP as Nurse Practitioner (Nurse Practitioner)  Nnamdi Kaufman MD as MD (Internal Medicine)    Copy to patient  [facility] [address]   [address]  [place] [address]

## 2018-03-22 NOTE — MR AVS SNAPSHOT
After Visit Summary   3/22/2018    Obed Viramontes    MRN: [id number]           Patient Information     Date Of Birth          1995        Visit Information        Provider Department      3/22/2018 9:00 AM Taj Lorenzo MD Peds Cardiac Transplant        Today's Diagnoses     Heart replaced by transplant (H)          Care Instructions    Plan:   1. Follow Up appt: 6 months with adult team -Transplant office will call you to schedule.   To cancel or reschedule appointments please call Avani Stafford at [phone]  2. Labs in 2 months and 4 months if no additional lab work needed.  3. Take tacrolimus and prednisone at 10 am/10 pm.    4. Can add protein shake with whey protein to diet. Avoid other supplements.   5. Transplant office will call you with immunosuppression lab results     Please call your transplant coordinator at the Transplant office M-F from 8 AM - 4:30 PM if you have future questions/concerns or change in status such as:    Fever above 100.4    High heart rate   Nausea/vomitting   Fatigue or generally feeling unwell  Diana Puckett: [phone] or Nicci Campo: [phone].   For after hour and weekend concerns please page on-call transplant coordinator at [phone] Job Code Pager [phone]    For emergencies call 911 AND call Transplant coordinator on-call at [phone] Job Code Pager [phone]                  Follow-ups after your visit        Your next 10 appointments already scheduled     Mar 22, 2018  1:45 PM CDT   DX HIP/PELVIS/SPINE with URXR4   [facility] ([facility])    [address] [place] [address] [phone]           Please do not take any of the following 24 hours prior to the day of your exam: vitamins, calcium tablets, antacids.  If possible, please wear clothes without metal (snaps, zippers). A sweatsuit works well.            Mar 23, 2018   Procedure with Aileen Sarabia,  "MD   [facility] [place], Same Day Surgery (--)    [address]  [place] [id number] [phone]              Who to contact     Please call your clinic at [phone] to:    Ask questions about your health    Make or cancel appointments    Discuss your medicines    Learn about your test results    Speak to your doctor            Additional Information About Your Visit        Apertiohar[employer]booly Information     Twilio gives you secure access to your electronic health record. If you see a primary care provider, you can also send messages to your care team and make appointments. If you have questions, please call your primary care clinic.  If you do not have a primary care provider, please call [phone] and they will assist you.      Twilio is an electronic gateway that provides easy, online access to your medical records. With Twilio, you can request a clinic appointment, read your test results, renew a prescription or communicate with your care team.     To access your existing account, please contact your [facility] or call [phone] for assistance.        Care EveryWhere ID     This is your Care EveryWhere ID. This could be used by other organizations to access your [place] medical records  [id number]        Your Vitals Were     Pulse Respirations Height Pulse Oximetry BMI (Body Mass Index)       100 20 6' 0.56\" (184.3 cm) 100% 18.34 kg/m2        Blood Pressure from Last 3 Encounters:   03/22/18 131/85   03/07/18 106/69   01/13/18 112/80    Weight from Last 3 Encounters:   03/22/18 137 lb 5.6 oz (62.3 kg)   03/07/18 129 lb 6.4 oz (58.7 kg)   01/13/18 135 lb 8 oz (61.5 kg)              We Performed the Following     25 OH Vit D therapy monitoring     CBC with platelets differential     CK total     CMV Antibody IgG     CMV DNA quantification     Comprehensive metabolic panel     EBV Capsid Antibody IgG     EBV Capsid Antibody IgM     EBV DNA PCR Quantitative Whole Blood     EBV " Nuclear Antigen EBNA Antibody IgG     Echo pediatric complete     EKG 12 lead - pediatric     Magnesium     N terminal pro BNP outpatient     Phosphorus     PRA Donor Specific Antibody     PRA Single Antigen IgG Antibody     Tacrolimus level     Troponin I        Primary Care Provider Office Phone # Fax #    Bobbi Marilynn Pruitt [phone]       [facility] [address] [place] [address]        Equal Access to Services     [facility]: Hadii aad ku hadasho Soomaali, waaxda luqadaha, qaybta kaalmada adeegyada, waxay idiin hayaan adeeg kharash la'aan . So [facility] [phone].    ATENCIÓN: Si habla español, tiene a neves disposición servicios gratuitos de asistencia lingüística. Ashly al [phone].    We comply with applicable federal civil rights laws and [place] laws. We do not discriminate on the basis of race, color, national origin, age, disability, sex, sexual orientation, or gender identity.            Thank you!     Thank you for choosing PEDS CARDIAC TRANSPLANT  for your care. Our goal is always to provide you with excellent care. Hearing back from our patients is one way we can continue to improve our services. Please take a few minutes to complete the written survey that you may receive in the mail after your visit with us. Thank you!             Your Updated Medication List - Protect others around you: Learn how to safely use, store and throw away your medicines at www.disposemymeds.org.          This list is accurate as of 3/22/18 10:36 AM.  Always use your most recent med list.                   Brand Name Dispense Instructions for use Diagnosis    amLODIPine 5 MG tablet    NORVASC    90 tablet    Take 1 tablet (5 mg) by mouth daily    Heart replaced by transplant (H)       aspirin 81 MG EC tablet     60 tablet    Take 2 tablets (162 mg) by mouth daily    Status post transplant, heart (H)       mycophenolate 500 MG tablet    GENERIC EQUIVALENT    180 tablet    Take 1  tablet (500 mg) by mouth 2 times daily        pravastatin 10 MG tablet    PRAVACHOL    90 tablet    Take 1 tablet (10 mg) by mouth daily At bedtime    Heart replaced by transplant (H)       * predniSONE 5 MG tablet    DELTASONE    90 tablet    Take 1 tablet (5 mg) by mouth daily    Heart replaced by transplant (H)       * predniSONE 2.5 MG tablet    DELTASONE    30 tablet    Take 1 tablet (2.5 mg) by mouth daily    Heart replaced by transplant (H)       * tacrolimus 0.5 MG capsule    GENERIC EQUIVALENT    180 capsule    Take 1 capsule (0.5 mg) by mouth 2 times daily with 3 - 1 mg capsules for a total dose of 3.5 mg twice a day    Transplanted heart (H)       * tacrolimus 1 MG capsule    GENERIC EQUIVALENT    180 capsule    Take 3 capsules (3 mg) by mouth 2 times daily With 1 - 0.5 mg capsule for a total of 3.5 mg twice a day.    Transplanted heart (H)       Vitamin D3 2000 UNITS Tabs     100 tablet    Take 2,000 Units by mouth daily    Heart transplanted (H)       * Notice:  This list has 4 medication(s) that are the same as other medications prescribed for you. Read the directions carefully, and ask your doctor or other care provider to review them with you.

## 2018-03-23 ENCOUNTER — TELEPHONE (OUTPATIENT)
Dept: ENDOCRINOLOGY | Facility: CLINIC | Age: 23
End: 2018-03-23

## 2018-03-23 ENCOUNTER — APPOINTMENT (OUTPATIENT)
Dept: CARDIOLOGY | Facility: CLINIC | Age: 23
End: 2018-03-23
Attending: PEDIATRICS
Payer: COMMERCIAL

## 2018-03-23 ENCOUNTER — SURGERY (OUTPATIENT)
Age: 23
End: 2018-03-23

## 2018-03-23 ENCOUNTER — ANESTHESIA (OUTPATIENT)
Dept: SURGERY | Facility: CLINIC | Age: 23
End: 2018-03-23
Payer: COMMERCIAL

## 2018-03-23 ENCOUNTER — HOSPITAL ENCOUNTER (OUTPATIENT)
Facility: CLINIC | Age: 23
Discharge: HOME | End: 2018-03-23
Attending: PEDIATRICS | Admitting: PEDIATRICS
Payer: COMMERCIAL

## 2018-03-23 ENCOUNTER — CARE COORDINATION (OUTPATIENT)
Dept: PEDIATRIC CARDIOLOGY | Facility: CLINIC | Age: 23
End: 2018-03-23

## 2018-03-23 VITALS
WEIGHT: 138.45 LBS | SYSTOLIC BLOOD PRESSURE: 110 MMHG | HEIGHT: 74 IN | BODY MASS INDEX: 17.77 KG/M2 | TEMPERATURE: 98.8 F | RESPIRATION RATE: 19 BRPM | OXYGEN SATURATION: 100 % | DIASTOLIC BLOOD PRESSURE: 75 MMHG

## 2018-03-23 DIAGNOSIS — R93.7 ABNORMAL BONE DENSITY SCREENING: Primary | ICD-10-CM

## 2018-03-23 DIAGNOSIS — Z94.1 S/P ORTHOTOPIC HEART TRANSPLANT (H): ICD-10-CM

## 2018-03-23 LAB
CHOLEST SERPL-MCNC: 151 MG/DL
EBV DNA # SPEC NAA+PROBE: <500 {COPIES}/ML
EBV DNA SPEC NAA+PROBE-LOG#: <2.7 {LOG_COPIES}/ML
GLUCOSE BLDC GLUCOMTR-MCNC: 98 MG/DL (ref 70–99)
HDLC SERPL-MCNC: 63 MG/DL
LDLC SERPL CALC-MCNC: 64 MG/DL
NONHDLC SERPL-MCNC: 88 MG/DL
PRA DONOR SPECIFIC ABY: NORMAL
PRA SINGLE ANTIGEN IGG ANTIBODY: NORMAL
TRIGL SERPL-MCNC: 118 MG/DL

## 2018-03-23 PROCEDURE — 83605 ASSAY OF LACTIC ACID: CPT

## 2018-03-23 PROCEDURE — 40000477 ZZHCL STATISTIC IP IF DIRECT UMP PF 88346: Performed by: PEDIATRICS

## 2018-03-23 PROCEDURE — 80061 LIPID PANEL: CPT | Performed by: PEDIATRICS

## 2018-03-23 PROCEDURE — 37000008 ZZH ANESTHESIA TECHNICAL FEE, 1ST 30 MIN: Performed by: PEDIATRICS

## 2018-03-23 PROCEDURE — 93460 R&L HRT ART/VENTRICLE ANGIO: CPT

## 2018-03-23 PROCEDURE — 82947 ASSAY GLUCOSE BLOOD QUANT: CPT

## 2018-03-23 PROCEDURE — 25000125 ZZHC RX 250: Performed by: NURSE ANESTHETIST, CERTIFIED REGISTERED

## 2018-03-23 PROCEDURE — 82803 BLOOD GASES ANY COMBINATION: CPT

## 2018-03-23 PROCEDURE — 93505 ENDOMYOCARDIAL BIOPSY: CPT

## 2018-03-23 PROCEDURE — 37000009 ZZH ANESTHESIA TECHNICAL FEE, EACH ADDTL 15 MIN: Performed by: PEDIATRICS

## 2018-03-23 PROCEDURE — 25000128 H RX IP 250 OP 636: Performed by: PEDIATRICS

## 2018-03-23 PROCEDURE — 71000027 ZZH RECOVERY PHASE 2 EACH 15 MINS: Performed by: PEDIATRICS

## 2018-03-23 PROCEDURE — 88350 IMFLUOR EA ADDL 1ANTB STN PX: CPT | Performed by: PEDIATRICS

## 2018-03-23 PROCEDURE — 27210956 ZZH BALLOON TIP PRESSURE CR7

## 2018-03-23 PROCEDURE — 25000128 H RX IP 250 OP 636: Performed by: NURSE ANESTHETIST, CERTIFIED REGISTERED

## 2018-03-23 PROCEDURE — 88346 IMFLUOR 1ST 1ANTB STAIN PX: CPT | Performed by: PEDIATRICS

## 2018-03-23 PROCEDURE — 27211047 ZZH FORCEP BIOPSY CR10

## 2018-03-23 PROCEDURE — 40000170 ZZH STATISTIC PRE-PROCEDURE ASSESSMENT II: Performed by: PEDIATRICS

## 2018-03-23 PROCEDURE — 82330 ASSAY OF CALCIUM: CPT

## 2018-03-23 PROCEDURE — C1887 CATHETER, GUIDING: HCPCS

## 2018-03-23 PROCEDURE — 84132 ASSAY OF SERUM POTASSIUM: CPT

## 2018-03-23 PROCEDURE — 27210769 ZZH KIT ACIST INJECTOR CR4

## 2018-03-23 PROCEDURE — 88307 TISSUE EXAM BY PATHOLOGIST: CPT | Performed by: PEDIATRICS

## 2018-03-23 PROCEDURE — 27210841 ZZH MANIFOLD CR5

## 2018-03-23 PROCEDURE — 82962 GLUCOSE BLOOD TEST: CPT

## 2018-03-23 PROCEDURE — 88307 TISSUE EXAM BY PATHOLOGIST: CPT | Mod: 26 | Performed by: PEDIATRICS

## 2018-03-23 PROCEDURE — 71000014 ZZH RECOVERY PHASE 1 LEVEL 2 FIRST HR: Performed by: PEDIATRICS

## 2018-03-23 PROCEDURE — 82810 BLOOD GASES O2 SAT ONLY: CPT

## 2018-03-23 PROCEDURE — 27210946 ZZH KIT HC TOTES DISP CR8

## 2018-03-23 PROCEDURE — 71000015 ZZH RECOVERY PHASE 1 LEVEL 2 EA ADDTL HR: Performed by: PEDIATRICS

## 2018-03-23 PROCEDURE — 84295 ASSAY OF SERUM SODIUM: CPT

## 2018-03-23 RX ORDER — ONDANSETRON 2 MG/ML
INJECTION INTRAMUSCULAR; INTRAVENOUS PRN
Status: DISCONTINUED | OUTPATIENT
Start: 2018-03-23 | End: 2018-03-23

## 2018-03-23 RX ORDER — SODIUM CHLORIDE, SODIUM LACTATE, POTASSIUM CHLORIDE, CALCIUM CHLORIDE 600; 310; 30; 20 MG/100ML; MG/100ML; MG/100ML; MG/100ML
INJECTION, SOLUTION INTRAVENOUS CONTINUOUS PRN
Status: DISCONTINUED | OUTPATIENT
Start: 2018-03-23 | End: 2018-03-23

## 2018-03-23 RX ORDER — FENTANYL CITRATE 50 UG/ML
INJECTION, SOLUTION INTRAMUSCULAR; INTRAVENOUS PRN
Status: DISCONTINUED | OUTPATIENT
Start: 2018-03-23 | End: 2018-03-23

## 2018-03-23 RX ORDER — IODIXANOL 320 MG/ML
INJECTION, SOLUTION INTRAVASCULAR PRN
Status: DISCONTINUED | OUTPATIENT
Start: 2018-03-23 | End: 2018-03-23 | Stop reason: HOSPADM

## 2018-03-23 RX ADMIN — DEXMEDETOMIDINE HYDROCHLORIDE 20 MCG: 100 INJECTION, SOLUTION INTRAVENOUS at 12:32

## 2018-03-23 RX ADMIN — FENTANYL CITRATE 50 MCG: 50 INJECTION, SOLUTION INTRAMUSCULAR; INTRAVENOUS at 10:54

## 2018-03-23 RX ADMIN — MIDAZOLAM 1 MG: 1 INJECTION INTRAMUSCULAR; INTRAVENOUS at 11:01

## 2018-03-23 RX ADMIN — MIDAZOLAM 2 MG: 1 INJECTION INTRAMUSCULAR; INTRAVENOUS at 10:47

## 2018-03-23 RX ADMIN — FENTANYL CITRATE 50 MCG: 50 INJECTION, SOLUTION INTRAMUSCULAR; INTRAVENOUS at 11:02

## 2018-03-23 RX ADMIN — ONDANSETRON 4 MG: 2 INJECTION INTRAMUSCULAR; INTRAVENOUS at 12:23

## 2018-03-23 RX ADMIN — MIDAZOLAM 1 MG: 1 INJECTION INTRAMUSCULAR; INTRAVENOUS at 11:10

## 2018-03-23 RX ADMIN — SODIUM CHLORIDE, POTASSIUM CHLORIDE, SODIUM LACTATE AND CALCIUM CHLORIDE: 600; 310; 30; 20 INJECTION, SOLUTION INTRAVENOUS at 10:48

## 2018-03-23 RX ADMIN — MIDAZOLAM 1 MG: 1 INJECTION INTRAMUSCULAR; INTRAVENOUS at 12:11

## 2018-03-23 RX ADMIN — IODIXANOL 23 ML: 320 INJECTION, SOLUTION INTRAVASCULAR at 12:28

## 2018-03-23 RX ADMIN — MIDAZOLAM 1 MG: 1 INJECTION INTRAMUSCULAR; INTRAVENOUS at 11:23

## 2018-03-23 RX ADMIN — MIDAZOLAM 1 MG: 1 INJECTION INTRAMUSCULAR; INTRAVENOUS at 11:39

## 2018-03-23 RX ADMIN — MIDAZOLAM 1 MG: 1 INJECTION INTRAMUSCULAR; INTRAVENOUS at 12:03

## 2018-03-23 NOTE — ANESTHESIA POSTPROCEDURE EVALUATION
Patient: Obed Viramontes    Procedure(s):  Right Heart Catheter, Angiogram and Biopsy  **Latex Allergy** - Wound Class: I-Clean    Diagnosis:Post Heart Transplant  Diagnosis Additional Information: No value filed.    Anesthesia Type:  MAC    Note:  Anesthesia Post Evaluation    Patient location during evaluation: PACU and Bedside  Patient participation: Able to fully participate in evaluation  Level of consciousness: awake and alert  Pain management: adequate  Airway patency: patent  Cardiovascular status: stable  Respiratory status: room air and spontaneous ventilation  Hydration status: acceptable  PONV: none     Anesthetic complications: None          Last vitals:  Vitals:    03/23/18 1345 03/23/18 1400 03/23/18 1415   BP: 102/65 101/64 105/63   Resp: 12 16 16   Temp:      SpO2: 98% 97% 97%         Electronically Signed By: Britney Calderon MD  March 23, 2018  2:32 PM

## 2018-03-23 NOTE — BRIEF OP NOTE
[facility] [place]  BRIEF POST-PROCEDURE NOTE    Pre Cath CRISP score 3  Risk Category 2    Pre-procedure diagnosis Heart transplant   Post-procedure diagnosis same   Procedure 1. right and retrograde left heart cath  2. angiography  3. endomyocardial biopsy   Staff Dr. Sarabia   Assistant(s) Lizabeth Chua   Anesthesia anxiolysis / minimal sedation   Access 4F RFA , 7F RIJ   Specimens  4 RV endomyocardial biopsy   IV contrast 35 mL   Heparinized No   Blood loss 5  mL   Complications None     Preliminary findings:      CI = 2.89 L/m2 by thermodilution    LA and LVEDP = 11    No pressure gradient in aorta    No pressure gradient in RPA    PVR=< 1 woods units  Endomyocardial RV biopsies  Sent          CEZAR Yañez CNP  Pediatric Cardiology  [facility]

## 2018-03-23 NOTE — IP AVS SNAPSHOT
"                  MRN:[id number]                      After Visit Summary   3/23/2018    Obed Viramontes    MRN: [id number]           Thank you!     Thank you for choosing [facility] for your care. Our goal is always to provide you with excellent care. Hearing back from our patients is one way we can continue to improve our services. Please take a few minutes to complete the written survey that you may receive in the mail after you visit with us. Thank you!        Patient Information     Date Of Birth          1995        About your hospital stay     You were admitted on:  March 23, 2018 You last received care in the:  [facility] PACU    You were discharged on:  March 23, 2018       Who to Call     For medical emergencies, please call 911.  For non-urgent questions about your medical care, please call your primary care provider or clinic, [phone]  For questions related to your surgery, please call your surgery clinic        Attending Provider     Provider Specialty    Aileen Sarabia MD Pediatric Cardiology       Primary Care Provider Office Phone # Fax #    Nnamdi Kaufman [phone]      Further instructions from your care team                                      [facility]  Cardiac Catheterization & Electrophysiology Laboratory  Discharge Instructions    Obed Viramontes MRN# [id number]   YOB: 1995 Age: 22 year old     Date of Admission:  3/23/2018  Date of Discharge:  3/23/2018  Physician:   Aileen Sarabia MD    Primary Care Provider: Nnamdi Kaufman           Diagnoses:   Heart transplant          Procedures, Findings, Outcomes, Recommendations, Plans:     Heart catheterization, angiography, endomyocardial biopsy           Pending Results:   Endomyocardial biopsy           Discharge Weight and Vitals:   Blood pressure (!) 131/94, temperature 98.1  F (36.7  C), temperature source Oral, resp. rate 20, height 1.88 m (6' 2\"), weight " "62.8 kg (138 lb 7.2 oz), SpO2 100 %.         Follow-Up Appointments:   Primary Care Provider: as needed  Dr. Pruitt:                            Transplant coordinator will contact patient/family regarding biopsy results and plan for follow-up          Wound Care, Monitoring, and Other Instructions:     Watch the right groin site closely for any bleeding, swelling, redness, discharge, or change in color/temperature/sensation of the R Leg and R neck    Call immediately if there is bleeding or fever    Keep the site clean and dry    You may leave the site uncovered; if you want to cover it with a band-aid be sure to change the band-aid any time it gets wet or dirty    Avoid vigorous activity for 48 hours to reduce the risk of bleeding from the site    Do not soak the site (bathe or swim) for 48 hours; okay to shower or sponge-bathe after 24 hours    If you have any questions about the site, either your primary care provider or your cardiologist can examine it    To reach [facility] cardiologist at any time please call your transplant coordinator or [phone] (M-F 7:30 AM- 4:00 PM) or [phone] and ask for the on-call pediatric cardiologist (anytime)          Pending Results     Date and Time Order Name Status Description    3/23/2018 1257 Surgical pathology exam In process     3/22/2018 0816 CMV DNA QUANTIFICATION In process     3/22/2018 0816 25 HYDROXYVITAMIN D2 AND D3 In process     3/22/2018 0816 EBV DNA PCR QUANTITATIVE WHOLE BLOOD In process             Admission Information     Date & Time Provider Department Dept. Phone    3/23/2018 Aileen Sarabia MD [facility] PACU [phone]      Your Vitals Were     Blood Pressure Temperature Respirations Height Weight Pulse Oximetry    105/66 97.7  F (36.5  C) (Axillary) 17 1.88 m (6' 2\") 62.8 kg (138 lb 7.2 oz) 97%    BMI (Body Mass Index)                   17.78 kg/m2           MyChart Information     [employer] gives you secure " access to your electronic health record. If you see a primary care provider, you can also send messages to your care team and make appointments. If you have questions, please call your primary care clinic.  If you do not have a primary care provider, please call [phone] and they will assist you.        Care EveryWhere ID     This is your Care EveryWhere ID. This could be used by other organizations to access your [place] medical records  [id number]        Equal Access to Services     CARLA GREENBERG : Hadii estuardo greyo Soomaali, waaxda luqadaha, qaybta kaalmada adeegyada, stephanie patiño. So [facility] [phone].    ATENCIÓN: Si habla espjosue, tiene a neves disposición servicios gratuitos de asistencia lingüística. Llame al [phone].    We comply with applicable federal civil rights laws and [place] laws. We do not discriminate on the basis of race, color, national origin, age, disability, sex, sexual orientation, or gender identity.               Review of your medicines      CONTINUE these medicines which have NOT CHANGED        Dose / Directions    amLODIPine 5 MG tablet   Commonly known as:  NORVASC   Used for:  Heart replaced by transplant (H)        Dose:  5 mg   Take 1 tablet (5 mg) by mouth daily   Quantity:  90 tablet   Refills:  3       aspirin 81 MG EC tablet   Used for:  Status post transplant, heart (H)        Dose:  162 mg   Take 2 tablets (162 mg) by mouth daily   Quantity:  60 tablet   Refills:  99       mycophenolate 500 MG tablet   Commonly known as:  GENERIC EQUIVALENT        Dose:  500 mg   Take 1 tablet (500 mg) by mouth 2 times daily   Quantity:  180 tablet   Refills:  5       pravastatin 10 MG tablet   Commonly known as:  PRAVACHOL   Used for:  Heart replaced by transplant (H)        Dose:  10 mg   Take 1 tablet (10 mg) by mouth daily At bedtime   Quantity:  90 tablet   Refills:  3       * predniSONE 2.5 MG tablet   Commonly known as:  DELTASONE   Used for:   Heart replaced by transplant (H)        Dose:  2.5 mg   Take 1 tablet (2.5 mg) by mouth daily (Total dose 7.5mg every day)   Quantity:  90 tablet   Refills:  11       * predniSONE 5 MG tablet   Commonly known as:  DELTASONE   Used for:  Heart replaced by transplant (H)        Dose:  5 mg   Take 1 tablet (5 mg) by mouth daily (Total dose 7.5mg every day)   Quantity:  90 tablet   Refills:  11       * tacrolimus 1 MG capsule   Commonly known as:  GENERIC EQUIVALENT        Dose:  3 mg   Take 3 capsules (3 mg) by mouth 2 times daily With 1 - 0.5 mg capsule for a total of 3.5 mg twice a day.   Quantity:  540 capsule   Refills:  11       * tacrolimus 0.5 MG capsule   Commonly known as:  GENERIC EQUIVALENT        Dose:  0.5 mg   Take 1 capsule (0.5 mg) by mouth 2 times daily with 3 - 1 mg capsules for a total dose of 3.5 mg twice a day   Quantity:  180 capsule   Refills:  11       Vitamin D3 2000 UNITS Tabs   Used for:  Heart transplanted (H)        Dose:  2000 Units   Take 2,000 Units by mouth daily   Quantity:  100 tablet   Refills:  6       * Notice:  This list has 4 medication(s) that are the same as other medications prescribed for you. Read the directions carefully, and ask your doctor or other care provider to review them with you.             Protect others around you: Learn how to safely use, store and throw away your medicines at www.disposemymeds.org.             Medication List: This is a list of all your medications and when to take them. Check marks below indicate your daily home schedule. Keep this list as a reference.      Medications           Morning Afternoon Evening Bedtime As Needed    amLODIPine 5 MG tablet   Commonly known as:  NORVASC   Take 1 tablet (5 mg) by mouth daily                                aspirin 81 MG EC tablet   Take 2 tablets (162 mg) by mouth daily                                mycophenolate 500 MG tablet   Commonly known as:  GENERIC EQUIVALENT   Take 1 tablet (500 mg) by mouth 2  times daily                                pravastatin 10 MG tablet   Commonly known as:  PRAVACHOL   Take 1 tablet (10 mg) by mouth daily At bedtime                                * predniSONE 2.5 MG tablet   Commonly known as:  DELTASONE   Take 1 tablet (2.5 mg) by mouth daily (Total dose 7.5mg every day)                                * predniSONE 5 MG tablet   Commonly known as:  DELTASONE   Take 1 tablet (5 mg) by mouth daily (Total dose 7.5mg every day)                                * tacrolimus 1 MG capsule   Commonly known as:  GENERIC EQUIVALENT   Take 3 capsules (3 mg) by mouth 2 times daily With 1 - 0.5 mg capsule for a total of 3.5 mg twice a day.                                * tacrolimus 0.5 MG capsule   Commonly known as:  GENERIC EQUIVALENT   Take 1 capsule (0.5 mg) by mouth 2 times daily with 3 - 1 mg capsules for a total dose of 3.5 mg twice a day                                Vitamin D3 2000 UNITS Tabs   Take 2,000 Units by mouth daily                                * Notice:  This list has 4 medication(s) that are the same as other medications prescribed for you. Read the directions carefully, and ask your doctor or other care provider to review them with you.

## 2018-03-23 NOTE — LETTER
[facility] PACU  [address]  [place] [address]  [phone]    3/23/2018    Obed Viramontes  [address]  [facility] [place] [address]  [phone] (home)     :  1995    To Whom it May Concern:    Obed Viramontes was seen on 3/23/2018 for a medical procedure and should not vigorously walk, climb stairs, or exert himself for 48hrs following this procedure. He should be excused from work until the afternoon of 3/25/17. Please do not hesitate to call with questions.      Sincerely,          Mat Jean Baptiste MD  Pediatric Cardiology Fellow

## 2018-03-23 NOTE — PROGRESS NOTES
I met with Obed and Jolly in the pre-op area. Obed and Jolly were both able to log in to [email or web address] last night and had it on their phones as well. I had Obed fill out release of information forms for his mom, dad, brother Trace, and sister Ashwin as he wants them all to participate in his care when he needs it. I also explained the difference of the release of information compared to a power of . We also clarified for him that a power of  would allow his parent(s) or other designee to sign consent for him. The release of information does not. In case of emergency, his legal next of kin would be his parents so at that time, then they would be authorized to make decisions. He verbalized understanding.     When Obed was in the procedure, I spoke with Jolly. Obed's heart function looks very good. His lipid levels are within normal range. We reviewed his dexa scan results as well. He has lower than expected bone mineral density. Given his history of fracture and not being able to bear weight on his left side, he will be referred to adult endocrinology. Jolly said that they had discuss this with orthopedics at times but she was glad for the full evaluation. The endocrine clinic will contact Obed to set up this appointment. I let her know that I will contact her and Obed with a plan for his tacrolimus dosing and his biopsy results. Jolly verbalized understanding. I also gave Jolly information for [facility] as a resource for vishnu and Obed.     I was notified later that he filled out the incorrect forms. I sent a pdf of the correct form to Jolly's email for vishnu and Obed to look over at home. I asked them to email back or contact me with questions.

## 2018-03-23 NOTE — TELEPHONE ENCOUNTER
To schedulers: Please schedule  for lst available endocrine    Keri Nunez MD  Endocrine triage    ----- Message from Javier Galvin sent at 3/23/2018 11:13 AM CDT -----  Regarding: Referral in Epic - Abnormal bone density screening  ThanksJavier    Referral / Discharge Coordinator

## 2018-03-23 NOTE — ANESTHESIA CARE TRANSFER NOTE
Patient: Obed Viramontes    Procedure(s):  Right Heart Catheter, Angiogram and Biopsy  **Latex Allergy** - Wound Class: I-Clean    Diagnosis: Post Heart Transplant  Diagnosis Additional Information: No value filed.    Anesthesia Type:   MAC     Note:  Airway :Room Air  Patient transferred to:PACU  Handoff Report: Identifed the Patient, Identified the Reponsible Provider, Reviewed the pertinent medical history, Discussed the surgical course, Reviewed Intra-OP anesthesia mangement and issues during anesthesia, Set expectations for post-procedure period and Allowed opportunity for questions and acknowledgement of understanding      Vitals: (Last set prior to Anesthesia Care Transfer)    CRNA VITALS  3/23/2018 1208 - 3/23/2018 1249      3/23/2018             Pulse: 78    SpO2: 96 %                Electronically Signed By: CEZAR Vale CRNA  March 23, 2018  12:49 PM

## 2018-03-23 NOTE — ANESTHESIA PREPROCEDURE EVALUATION
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 He has had episodes of 2R rejection, with most recent biopsy on 9/11/17 showing no rejection. Plan for right heart cath with biopsy.       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    TTE 3/22/18:  Patient after orthotopic heart transplant. There is no tricuspid  insufficiency.No pericardial effusion.The LVRI is 1.4.  Normal right and left ventricular size and function.    Neuro Findings   Comments:   - History of CVA at age 2 1/2 with resultant left hemiplegia. Able to walk.      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 storage disease.)    Hematology/Oncology Findings   Comments:   - EBV-positive PTLD (9/2012); resolved after rituximab x 2    Additional Notes    - Orthopedic surgery for his left hand contracture in October 2016    Procedure: Procedure(s):  Right Heart Catheter, Angiogram and Biopsy  **Latex Allergy** - Wound Class: I-Clean      PMHx/PSHx:  Past Medical History:   Diagnosis Date     Cardiomyopathy, hypertrophic obstructive (H)      H/O heart transplant  (H)     Mar.23, 2012     Hemiplegia following CVA (cerebrovascular accident) (H)     left, improved with rehab     Lymphoproliferative disease (H)      Unspecified cerebral artery occlusion with cerebral infarction     age 2 1/2       Past Surgical History:   Procedure Laterality Date     ARTHRODESIS WRIST Left 10/20/2016    Procedure: ARTHRODESIS WRIST;  Surgeon: Amna Tinoco MD;  Location: [facility] OR     BIOPSY      gingival and tonsillar biopsy     [facility] SURGICAL PATHOLOGY       HEART CATH CHILD  11/13/2012    Procedure: HEART CATH CHILD;  Right Heart Cath Procedure and Biopsy *Latex Allergy*;  Surgeon: Bobbi Pruitt MD;  Location: [facility] OR     HEART CATH CHILD  2/15/2013    Procedure: HEART CATH CHILD;  Right [place] Cath, Angiogram and Biopsy;  Surgeon: Bobbi Pruitt MD;  [facility]: [facility] OR     HEART CATH CHILD N/A 4/10/2015    Procedure: HEART CATH CHILD;  Surgeon: Bobbi Pruitt MD;  [facility]: [facility] OR     HEART CATH CHILD N/A 4/21/2017    Procedure: HEART CATH CHILD;  Right Heart Cath Procedure with Angio Biopsy    (latex allergy) ;  Surgeon: Bobbi Pruitt MD;  [facility] OR     HEART CATH, BIOPSY  3/21/2014    Procedure: HEART CATH, BIOPSY;  Right and Left Heart Cath, Angiogram, Biopsy   *Latex Allergy*;  Surgeon: Bobbi Pruitt MD;  [facility] OR     HEART CATH, BIOPSY Right 4/18/2016    Procedure: HEART CATH, BIOPSY;  Surgeon: Bobbi Pruitt MD;  [facility]     HEART CATH, BIOPSY Right 5/26/2017    Procedure: HEART CATH, BIOPSY;  Right Heart Cath and Biopsy  (Latex Allergy);  Surgeon: Bobbi Pruitt MD;  [facility]     HEART CATH, BIOPSY N/A 9/11/2017    Procedure: HEART CATH, BIOPSY;  Right Heart Cath, Angio and Biopsy ;  Surgeon: Bobbi Pruitt MD;  [facility] OR     INTERPOSITION TENDON HAND Left 10/20/2016    Procedure: INTERPOSITION TENDON HAND;  Surgeon: Amna Tinoco MD;  [facility]     ORTHOPEDIC  SURGERY      at [place], left lower extremity     PICC INSERTION       PICC REMOVAL       REMOVE HARDWARE WRIST Left 10/12/2017    Procedure: REMOVE HARDWARE WRIST;  Left Wrist Fusion Plate Removal     ;  Surgeon: Amna Tinoco MD;  Location: [facility] OR     TRANSPLANT HEART RECIPIENT           No current facility-administered medications on file prior to encounter.   Current Outpatient Prescriptions on File Prior to Encounter:  amLODIPine (NORVASC) 5 MG tablet Take 1 tablet (5 mg) by mouth daily   mycophenolate (GENERIC EQUIVALENT) 500 MG tablet Take 1 tablet (500 mg) by mouth 2 times daily   aspirin 81 MG EC tablet Take 2 tablets (162 mg) by mouth daily   Cholecalciferol (VITAMIN D3) 2000 UNITS TABS Take 2,000 Units by mouth daily       PCP: Bobbi Pruitt    Lab Results   Component Value Date    WBC 6.3 03/22/2018    HGB 13.4 03/22/2018    HCT 40.2 03/22/2018     03/22/2018    CRP 61.4 (H) 01/14/2017     03/22/2018    POTASSIUM 3.6 03/22/2018    CHLORIDE 109 03/22/2018    CO2 28 03/22/2018    BUN 25 03/22/2018    CR 0.87 03/22/2018     (H) 03/22/2018    CARLOS A 8.6 03/22/2018    PHOS 3.7 03/22/2018    MAG 1.7 03/22/2018    ALBUMIN 4.0 03/22/2018    PROTTOTAL 7.0 03/22/2018    ALT 17 03/22/2018    AST 12 03/22/2018    ALKPHOS 72 03/22/2018    BILITOTAL 0.4 03/22/2018    PTT 28 01/12/2017    INR 1.00 01/12/2017         Preop Vitals  BP Readings from Last 3 Encounters:   03/23/18 (!) 131/94   03/22/18 131/85   03/07/18 106/69    Pulse Readings from Last 3 Encounters:   03/22/18 100   03/07/18 101   01/13/18 102      Resp Readings from Last 3 Encounters:   03/23/18 20   03/22/18 20   01/13/18 20    SpO2 Readings from Last 3 Encounters:   03/23/18 100%   03/22/18 100%   01/13/18 98%      Temp Readings from Last 3 Encounters:   03/23/18 36.7  C (98.1  F) (Oral)   01/13/18 36.3  C (97.3  F) (Tympanic)   12/22/17 36.9  C (98.5  F) (Oral)    Ht Readings from Last 3 Encounters:   03/23/18 1.88  "m (6' 2\")   03/22/18 1.843 m (6' 0.56\")   11/16/17 1.843 m (6' 0.56\")      Wt Readings from Last 3 Encounters:   03/23/18 62.8 kg (138 lb 7.2 oz)   03/22/18 62.3 kg (137 lb 5.6 oz)   03/07/18 58.7 kg (129 lb 6.4 oz)    Estimated body mass index is 17.78 kg/(m^2) as calculated from the following:    Height as of this encounter: 1.88 m (6' 2\").    Weight as of this encounter: 62.8 kg (138 lb 7.2 oz).            Physical Exam  Normal systems: dental    Airway   Mallampati: I  TM distance: >3 FB  Neck ROM: full    Dental     Cardiovascular   Rhythm and rate: regular and normal      Pulmonary    breath sounds clear to auscultation          Anesthesia Plan      History & Physical Review  History and physical reviewed and following examination; no interval change.    ASA Status:  2 .    NPO Status:  > 8 hours    Plan for MAC with Intravenous induction. Maintenance will be TIVA.    PONV prophylaxis:  Ondansetron (or other 5HT-3) and Dexamethasone or Solumedrol    - Relevant risks, benefits, alternatives and the anesthetic plan were discussed with patient/family or family representative.  All questions were answered and there was agreement to proceed.        Postoperative Care  Postoperative pain management:  Multi-modal analgesia.      Consents        Britney Calderon MD  Staff Pediatric Anesthesiologist  [phone]    9:16 AM  March 23, 2018    "

## 2018-03-23 NOTE — IP AVS SNAPSHOT
[place] [address] [facility] [address]    Phone:  [phone]                                       After Visit Summary   3/23/2018    Obed Viramontes    MRN: [id number]           After Visit Summary Signature Page     I have received my discharge instructions, and my questions have been answered. I have discussed any challenges I see with this plan with the nurse or doctor.    ..........................................................................................................................................  Patient/Patient Representative Signature      ..........................................................................................................................................  Patient Representative Print Name and Relationship to Patient    ..................................................               ................................................  Date                                            Time    ..........................................................................................................................................  Reviewed by Signature/Title    ...................................................              ..............................................  Date                                                            Time

## 2018-03-23 NOTE — DISCHARGE INSTRUCTIONS
"                               [facility]  Cardiac Catheterization & Electrophysiology Laboratory  Discharge Instructions    Obed Viramontes MRN# [id number]   YOB: 1995 Age: 22 year old     Date of Admission:  3/23/2018  Date of Discharge:  3/23/2018  Physician:   Aileen Sarabia MD    Primary Care Provider: Nnamdi Kaufman           Diagnoses:   Heart transplant          Procedures, Findings, Outcomes, Recommendations, Plans:     Heart catheterization, angiography, endomyocardial biopsy           Pending Results:   Endomyocardial biopsy           Discharge Weight and Vitals:   Blood pressure (!) 131/94, temperature 98.1  F (36.7  C), temperature source Oral, resp. rate 20, height 1.88 m (6' 2\"), weight 62.8 kg (138 lb 7.2 oz), SpO2 100 %.         Follow-Up Appointments:   Primary Care Provider: as needed  Dr. Pruitt:                            Transplant coordinator will contact patient/family regarding biopsy results and plan for follow-up          Wound Care, Monitoring, and Other Instructions:     Watch the right groin site closely for any bleeding, swelling, redness, discharge, or change in color/temperature/sensation of the R Leg and R neck    Call immediately if there is bleeding or fever    Keep the site clean and dry    You may leave the site uncovered; if you want to cover it with a band-aid be sure to change the band-aid any time it gets wet or dirty    Avoid vigorous activity for 48 hours to reduce the risk of bleeding from the site    Do not soak the site (bathe or swim) for 48 hours; okay to shower or sponge-bathe after 24 hours    If you have any questions about the site, either your primary care provider or your cardiologist can examine it    To reach [facility] cardiologist at any time please call your transplant coordinator or [phone] (M-F 7:30 AM- 4:00 PM) or [phone] and ask for the " on-call pediatric cardiologist (anytime)

## 2018-03-24 LAB
BASE EXCESS BLDA CALC-SCNC: 1.4 MMOL/L
CA-I BLD-MCNC: 4.7 MG/DL (ref 4.4–5.2)
CMV DNA SPEC NAA+PROBE-ACNC: NORMAL [IU]/ML
CMV DNA SPEC NAA+PROBE-LOG#: NORMAL {LOG_IU}/ML
GLUCOSE BLD-MCNC: 106 MG/DL (ref 70–99)
HCO3 BLD-SCNC: 26 MMOL/L (ref 21–28)
HGB BLD-MCNC: 12.5 G/DL (ref 13.3–17.7)
LACTATE BLD-SCNC: 0.8 MMOL/L (ref 0.7–2)
O2/TOTAL GAS SETTING VFR VENT: 21 %
OXYHGB MFR BLD: 97 % (ref 92–100)
PCO2 BLD: 42 MM HG (ref 35–45)
PH BLD: 7.41 PH (ref 7.35–7.45)
PO2 BLD: 111 MM HG (ref 80–105)
POTASSIUM BLD-SCNC: 4.1 MMOL/L (ref 3.4–5.3)
SODIUM BLD-SCNC: 140 MMOL/L (ref 133–144)
SPECIMEN SOURCE: NORMAL

## 2018-03-26 ENCOUNTER — TELEPHONE (OUTPATIENT)
Dept: PEDIATRIC CARDIOLOGY | Facility: CLINIC | Age: 23
End: 2018-03-26

## 2018-03-26 DIAGNOSIS — Z94.1 HEART REPLACED BY TRANSPLANT (H): Primary | ICD-10-CM

## 2018-03-26 LAB
COPATH REPORT: NORMAL
DEPRECATED CALCIDIOL+CALCIFEROL SERPL-MC: <39 UG/L (ref 20–75)
VITAMIN D2 SERPL-MCNC: <5 UG/L
VITAMIN D3 SERPL-MCNC: 34 UG/L

## 2018-03-26 RX ORDER — TACROLIMUS 0.5 MG/1
0.5 CAPSULE ORAL DAILY
Qty: 90 CAPSULE | Refills: 5 | Status: SHIPPED | OUTPATIENT
Start: 2018-03-26 | End: 2018-06-27

## 2018-03-26 NOTE — TELEPHONE ENCOUNTER
I called Obed to update him on his heart cath biopsy. There was no rejection (0R with no AMR). We will watch for the results of his PRA this week. Since his tacrolimus level was on the high side and we increased steroids last month, he can decrease his dose slightly to 3 mg in the morning and 3.5 in the evening. We will check labs in 2 months. He verbalized understanding.     I also called Jolly, Obed's mom, and left a brief message with the same information on her secure voicemail. I asked her to call me with questions and let her know that he still had PRA results pending.

## 2018-03-30 ENCOUNTER — TELEPHONE (OUTPATIENT)
Dept: PEDIATRIC CARDIOLOGY | Facility: CLINIC | Age: 23
End: 2018-03-30

## 2018-03-30 NOTE — TELEPHONE ENCOUNTER
I sent a [employer] message to Obed and Jolly about his PRA results:    HLA Donor Specific Antibody   Status:  Final result   Visible to patient:  No (Not Released) Next appt:  None Order: [id number]          8d ago       Donor Identification 03/23/2013     Organ Heart     DSA Present NO     DSA Comments  Flow Single Antigen Beads assays are intended for   detection/identification of IgG anti-HLA antibodies. Mfi values may not   accurately quantify donor-specific antibody levels in all instances.           DSA Test Method SA FCS     Resulting Agency [facility]            I left a message on Obed's voicemail as well.

## 2018-04-04 PROBLEM — T86.21 HEART TRANSPLANT REJECTION (H): Status: RESOLVED | Noted: 2017-04-22 | Resolved: 2017-10-11

## 2018-05-22 ENCOUNTER — COMMITTEE REVIEW (OUTPATIENT)
Dept: PEDIATRIC CARDIOLOGY | Facility: CLINIC | Age: 23
End: 2018-05-22

## 2018-05-23 DIAGNOSIS — Z94.1 HEART REPLACED BY TRANSPLANT (H): Primary | ICD-10-CM

## 2018-05-25 ENCOUNTER — TELEPHONE (OUTPATIENT)
Dept: PEDIATRIC CARDIOLOGY | Facility: CLINIC | Age: 23
End: 2018-05-25

## 2018-05-25 DIAGNOSIS — Z94.1 HEART REPLACED BY TRANSPLANT (H): Primary | ICD-10-CM

## 2018-05-25 NOTE — TELEPHONE ENCOUNTER
I called Obed and Jolly regarding his upcoming labs. He had not yet scheduled an appointment. Next Wednesday works for he and his mom. I went ahead and scheduled him for a 10 am lab appointment on 5/30/18. Obed verbalized understanding. I also asked if he had plans to schedule the endocrinology appointment. He said that he did not but would like us to schedule it. Generally, Wednesdays work will for him. I told him that I will work to schedule this and give this information to him next week. I will meet him in clinic when he gets his labs drawn to have him sign consent for release of information to his mom and dad. He verbalized understanding.

## 2018-05-30 ENCOUNTER — RESULTS ONLY (OUTPATIENT)
Dept: OTHER | Facility: CLINIC | Age: 23
End: 2018-05-30

## 2018-05-30 DIAGNOSIS — Z94.1 HEART REPLACED BY TRANSPLANT (H): ICD-10-CM

## 2018-05-30 LAB
ALBUMIN SERPL-MCNC: 3.9 G/DL (ref 3.4–5)
ALP SERPL-CCNC: 54 U/L (ref 40–150)
ALT SERPL W P-5'-P-CCNC: 13 U/L (ref 0–70)
ANION GAP SERPL CALCULATED.3IONS-SCNC: 6 MMOL/L (ref 3–14)
AST SERPL W P-5'-P-CCNC: 15 U/L (ref 0–45)
BASOPHILS # BLD AUTO: 0 10E9/L (ref 0–0.2)
BASOPHILS NFR BLD AUTO: 0.4 %
BILIRUB SERPL-MCNC: 0.6 MG/DL (ref 0.2–1.3)
BUN SERPL-MCNC: 24 MG/DL (ref 7–30)
CALCIUM SERPL-MCNC: 8.4 MG/DL (ref 8.5–10.1)
CHLORIDE SERPL-SCNC: 108 MMOL/L (ref 94–109)
CK SERPL-CCNC: 148 U/L (ref 30–300)
CO2 SERPL-SCNC: 27 MMOL/L (ref 20–32)
CREAT SERPL-MCNC: 0.79 MG/DL (ref 0.66–1.25)
DIFFERENTIAL METHOD BLD: NORMAL
EOSINOPHIL # BLD AUTO: 0.1 10E9/L (ref 0–0.7)
EOSINOPHIL NFR BLD AUTO: 2.5 %
ERYTHROCYTE [DISTWIDTH] IN BLOOD BY AUTOMATED COUNT: 12.7 % (ref 10–15)
GFR SERPL CREATININE-BSD FRML MDRD: >90 ML/MIN/1.7M2
GLUCOSE SERPL-MCNC: 125 MG/DL (ref 70–99)
HCT VFR BLD AUTO: 40.1 % (ref 40–53)
HGB BLD-MCNC: 13.7 G/DL (ref 13.3–17.7)
IMM GRANULOCYTES # BLD: 0 10E9/L (ref 0–0.4)
IMM GRANULOCYTES NFR BLD: 0.2 %
LYMPHOCYTES # BLD AUTO: 1.5 10E9/L (ref 0.8–5.3)
LYMPHOCYTES NFR BLD AUTO: 28.4 %
MAGNESIUM SERPL-MCNC: 1.6 MG/DL (ref 1.6–2.3)
MCH RBC QN AUTO: 29.7 PG (ref 26.5–33)
MCHC RBC AUTO-ENTMCNC: 34.2 G/DL (ref 31.5–36.5)
MCV RBC AUTO: 87 FL (ref 78–100)
MONOCYTES # BLD AUTO: 0.5 10E9/L (ref 0–1.3)
MONOCYTES NFR BLD AUTO: 10.1 %
NEUTROPHILS # BLD AUTO: 3.1 10E9/L (ref 1.6–8.3)
NEUTROPHILS NFR BLD AUTO: 58.4 %
NRBC # BLD AUTO: 0 10*3/UL
NRBC BLD AUTO-RTO: 0 /100
NT-PROBNP SERPL-MCNC: 22 PG/ML (ref 0–125)
PHOSPHATE SERPL-MCNC: 2.7 MG/DL (ref 2.5–4.5)
PLATELET # BLD AUTO: 181 10E9/L (ref 150–450)
POTASSIUM SERPL-SCNC: 3.5 MMOL/L (ref 3.4–5.3)
PROT SERPL-MCNC: 6.7 G/DL (ref 6.8–8.8)
RBC # BLD AUTO: 4.62 10E12/L (ref 4.4–5.9)
SODIUM SERPL-SCNC: 141 MMOL/L (ref 133–144)
TACROLIMUS BLD-MCNC: 8.7 UG/L (ref 5–15)
TME LAST DOSE: NORMAL H
TROPONIN I SERPL-MCNC: <0.015 UG/L (ref 0–0.04)
WBC # BLD AUTO: 5.3 10E9/L (ref 4–11)

## 2018-05-30 PROCEDURE — 36415 COLL VENOUS BLD VENIPUNCTURE: CPT | Performed by: PEDIATRICS

## 2018-05-30 PROCEDURE — 83735 ASSAY OF MAGNESIUM: CPT | Performed by: PEDIATRICS

## 2018-05-30 PROCEDURE — 80053 COMPREHEN METABOLIC PANEL: CPT | Performed by: PEDIATRICS

## 2018-05-30 PROCEDURE — 86664 EPSTEIN-BARR NUCLEAR ANTIGEN: CPT | Performed by: PEDIATRICS

## 2018-05-30 PROCEDURE — 82550 ASSAY OF CK (CPK): CPT | Performed by: PEDIATRICS

## 2018-05-30 PROCEDURE — 86644 CMV ANTIBODY: CPT | Performed by: PEDIATRICS

## 2018-05-30 PROCEDURE — 84100 ASSAY OF PHOSPHORUS: CPT | Performed by: PEDIATRICS

## 2018-05-30 PROCEDURE — 83880 ASSAY OF NATRIURETIC PEPTIDE: CPT | Performed by: PEDIATRICS

## 2018-05-30 PROCEDURE — 85025 COMPLETE CBC W/AUTO DIFF WBC: CPT | Performed by: PEDIATRICS

## 2018-05-30 PROCEDURE — 80197 ASSAY OF TACROLIMUS: CPT | Performed by: PEDIATRICS

## 2018-05-30 PROCEDURE — 86665 EPSTEIN-BARR CAPSID VCA: CPT | Performed by: PEDIATRICS

## 2018-05-30 PROCEDURE — 84484 ASSAY OF TROPONIN QUANT: CPT | Performed by: PEDIATRICS

## 2018-05-30 PROCEDURE — 87799 DETECT AGENT NOS DNA QUANT: CPT | Performed by: PEDIATRICS

## 2018-05-30 PROCEDURE — 86833 HLA CLASS II HIGH DEFIN QUAL: CPT | Performed by: PEDIATRICS

## 2018-05-30 PROCEDURE — 86832 HLA CLASS I HIGH DEFIN QUAL: CPT | Performed by: PEDIATRICS

## 2018-05-31 ENCOUNTER — TELEPHONE (OUTPATIENT)
Dept: PEDIATRIC CARDIOLOGY | Facility: CLINIC | Age: 23
End: 2018-05-31

## 2018-05-31 LAB
CMV DNA SPEC NAA+PROBE-ACNC: NORMAL [IU]/ML
CMV DNA SPEC NAA+PROBE-LOG#: NORMAL {LOG_IU}/ML
CMV IGG SERPL QL IA: <0.2 AI (ref 0–0.8)
EBV DNA # SPEC NAA+PROBE: NORMAL {COPIES}/ML
EBV DNA SPEC NAA+PROBE-LOG#: NORMAL {LOG_COPIES}/ML
EBV NA IGG SER QL IA: <0.2 AI (ref 0–0.8)
EBV VCA IGG SER QL IA: 5.4 AI (ref 0–0.8)
EBV VCA IGM SER QL IA: <0.2 AI (ref 0–0.8)
PRA DONOR SPECIFIC ABY: NORMAL
SPECIMEN SOURCE: NORMAL

## 2018-05-31 NOTE — TELEPHONE ENCOUNTER
I called Obed back with his lab results. His CMP, BNP, troponin, and CBC are stable. His tacrolimus is within goal range. He does not need to make any changes at this time. I will call him next week with PRA results. We will discuss a possible wean of his prednisone at that time. He verbalized understanding.

## 2018-06-01 ENCOUNTER — TELEPHONE (OUTPATIENT)
Dept: PEDIATRIC CARDIOLOGY | Facility: CLINIC | Age: 23
End: 2018-06-01

## 2018-06-01 NOTE — TELEPHONE ENCOUNTER
Obed called with PRA/DSA lab results. Patient has no high risk antibodies and no DSA present.     Attempted to reach patient twice. Message left on VM instructing patient to call the transplant office for lab results.

## 2018-06-15 ENCOUNTER — TELEPHONE (OUTPATIENT)
Dept: TRANSPLANT | Facility: CLINIC | Age: 23
End: 2018-06-15

## 2018-06-15 NOTE — TELEPHONE ENCOUNTER
"Farida 15, 2018: I left a message for the patient to call back and schedule his heart transplant appointments with Dr. Shine in September 2018.    Cheri Dejesus  Post-Heart Transplant   [phone]    Message  Received: 3 weeks ago       Josefina Palma, RN  Cheri Dejesus       Please schedule pt for his first \"adult\" visit for Sept 2018. Must be with Dr Shine and not NP.   Please copy NS, Peds coordinator on appts - both will attend appt.   Please also include adult SW visit            Orders Only  5/23/2018       Hao Shine MD - [facility] Solid Organ Transplant Encounter Summary       Diagnosis       Heart Replaced By Transplant (h) (Primary)              Orders Signed This Encounter (5)      MRI Cardiac w/contrast and flow        X-ray Chest 2 vws*        Card Cardiopulmonary stress test - adult        Follow-Up Appointment        EKG 12-lead complete with read (future)            "

## 2018-06-15 NOTE — LETTER
July 26, 2018    ANNUAL POST HEART TRANSPLANT APPOINTMENTS    Patient: Obed Viramontes  MR: [id number]  Coordinator: Josefina RODGERS  [phone]  : Cheri   [phone]  Date: August 8, 2018 and September 6, 2018    Day/Date:  Wednesday, August 8, 2018   Time Location Activity   3:00 pm [facility]  [address] [place] [address]; [facility] [id number]  Endocrinology Clinic  3rd floor Appointment with Dr. Prince, Endocrinology     NO FOOD OR DRINK AFTER MIDNIGHT FOR LABS AND TESTS    NO CAFFEINE FOR 12 HOURS BEFORE TESTING    Day/Date:  Thursday, September 6, 2018   Time Location Activity   7:45 am [address]; [facility] [address]  [facility] Waiting Room  2nd claudio Fasting Labs   8:15 am [facility] Waiting [facility]  2nd floor Cardiac MRI  (No caffeine for 12 hours before testing; do not eat or drink anything after midnight)   10:00 am [facility] Waiting Room  2nd floor Chest X-Ray   10:30 am [facility] Waiting [facility]  2nd floor Cardiopulmonary Stress Test  (No caffeine for 12 hours before testing; do not eat or drink anything after midnight)   12:30 pm Either walk or take the shuttle to the [facility]   2:00 pm [facility]  [address] [place] [address]; [facility] [address]  Cardiology Clinic  3rd floor Appointment with Emmy Lopez,    3:00 pm [facility]  Cardiology Clinic  3rd floor Appointment with Dr. Shine, cardiologist

## 2018-06-27 DIAGNOSIS — Z94.1 HEART REPLACED BY TRANSPLANT (H): ICD-10-CM

## 2018-06-27 RX ORDER — TACROLIMUS 1 MG/1
3 CAPSULE ORAL 2 TIMES DAILY
Qty: 540 CAPSULE | Refills: 3 | Status: SHIPPED | OUTPATIENT
Start: 2018-06-27 | End: 2019-06-10

## 2018-06-27 RX ORDER — TACROLIMUS 1 MG/1
3 CAPSULE ORAL 2 TIMES DAILY
Qty: 540 CAPSULE | Refills: 3 | Status: SHIPPED | OUTPATIENT
Start: 2018-06-27 | End: 2018-06-27

## 2018-06-27 RX ORDER — TACROLIMUS 0.5 MG/1
0.5 CAPSULE ORAL DAILY
Qty: 90 CAPSULE | Refills: 3 | Status: SHIPPED | OUTPATIENT
Start: 2018-06-27 | End: 2019-06-10

## 2018-07-16 ENCOUNTER — TELEPHONE (OUTPATIENT)
Dept: TRANSPLANT | Facility: CLINIC | Age: 23
End: 2018-07-16

## 2018-07-16 NOTE — TELEPHONE ENCOUNTER
LM for pt to call Cheri to schedule appts.    Spoke w/mom, she will see her son tomorrow and they will call to arrange.

## 2018-07-17 NOTE — TELEPHONE ENCOUNTER
July 17, 2018: I left a message for the patient's mother; I saved a Sept appt with Dr. Shine.    Cheri Ayala  Post-Heart Transplant   [phone]

## 2018-07-26 NOTE — TELEPHONE ENCOUNTER
July 26, 2018: I left a second message for the pt's mother (who left a message, in return, indicating that 9/6 will be okay for appts).    I called her back and left a message to let her know the tentative schedule. I assured her that I would be fine tuning his appointments and sending his schedule soon.    Cheri Ayala  [facility]Heart Transplant   [phone]

## 2018-08-02 ENCOUNTER — TELEPHONE (OUTPATIENT)
Dept: PEDIATRIC CARDIOLOGY | Facility: CLINIC | Age: 23
End: 2018-08-02

## 2018-08-02 DIAGNOSIS — Z94.1 HEART REPLACED BY TRANSPLANT (H): Primary | ICD-10-CM

## 2018-08-02 NOTE — TELEPHONE ENCOUNTER
I left a message for Obed to call me back. I also left a message on Jolly's secure VM that Obed needs labs with his visit next week (endocrinology). The visit is at 4 pm so he may have to get his tacrolimus level done at a different time during the week if it does not fit with the timing of this visit. I asked both Obed and Jolly to call back.

## 2018-08-07 NOTE — TELEPHONE ENCOUNTER
Jolly called about Obed's appointments tomorrow. She apologized for the delay as she and Obed had an extended vacation to [place]. She said that he did not move his medication timing with the trip and so he ended up at 1430/0230. He is slowly trying to move it back. We will get labs at the [facility] tomorrow at 1430 prior to his endocrinology visit.

## 2018-08-08 ENCOUNTER — OFFICE VISIT (OUTPATIENT)
Dept: ENDOCRINOLOGY | Facility: CLINIC | Age: 23
End: 2018-08-08
Payer: COMMERCIAL

## 2018-08-08 ENCOUNTER — RESULTS ONLY (OUTPATIENT)
Dept: OTHER | Facility: CLINIC | Age: 23
End: 2018-08-08

## 2018-08-08 VITALS
WEIGHT: 146.1 LBS | HEART RATE: 102 BPM | DIASTOLIC BLOOD PRESSURE: 81 MMHG | HEIGHT: 73 IN | BODY MASS INDEX: 19.36 KG/M2 | SYSTOLIC BLOOD PRESSURE: 127 MMHG

## 2018-08-08 DIAGNOSIS — M85.9 LOW BONE DENSITY FOR AGE: Primary | ICD-10-CM

## 2018-08-08 DIAGNOSIS — Z94.1 HEART REPLACED BY TRANSPLANT (H): ICD-10-CM

## 2018-08-08 LAB
ALBUMIN SERPL-MCNC: 4 G/DL (ref 3.4–5)
ALP SERPL-CCNC: 61 U/L (ref 40–150)
ALT SERPL W P-5'-P-CCNC: 15 U/L (ref 0–70)
ANION GAP SERPL CALCULATED.3IONS-SCNC: 8 MMOL/L (ref 3–14)
AST SERPL W P-5'-P-CCNC: 13 U/L (ref 0–45)
BASOPHILS # BLD AUTO: 0 10E9/L (ref 0–0.2)
BASOPHILS NFR BLD AUTO: 0.4 %
BILIRUB SERPL-MCNC: 0.5 MG/DL (ref 0.2–1.3)
BUN SERPL-MCNC: 24 MG/DL (ref 7–30)
CALCIUM SERPL-MCNC: 9 MG/DL (ref 8.5–10.1)
CHLORIDE SERPL-SCNC: 107 MMOL/L (ref 94–109)
CK SERPL-CCNC: 126 U/L (ref 30–300)
CO2 SERPL-SCNC: 27 MMOL/L (ref 20–32)
CREAT SERPL-MCNC: 0.69 MG/DL (ref 0.66–1.25)
DIFFERENTIAL METHOD BLD: NORMAL
EOSINOPHIL # BLD AUTO: 0.1 10E9/L (ref 0–0.7)
EOSINOPHIL NFR BLD AUTO: 2.1 %
ERYTHROCYTE [DISTWIDTH] IN BLOOD BY AUTOMATED COUNT: 12.8 % (ref 10–15)
GFR SERPL CREATININE-BSD FRML MDRD: >90 ML/MIN/1.7M2
GLUCOSE SERPL-MCNC: 119 MG/DL (ref 70–99)
HCT VFR BLD AUTO: 42.6 % (ref 40–53)
HGB BLD-MCNC: 14.5 G/DL (ref 13.3–17.7)
IMM GRANULOCYTES # BLD: 0 10E9/L (ref 0–0.4)
IMM GRANULOCYTES NFR BLD: 0.2 %
LYMPHOCYTES # BLD AUTO: 1.4 10E9/L (ref 0.8–5.3)
LYMPHOCYTES NFR BLD AUTO: 25.3 %
MAGNESIUM SERPL-MCNC: 1.6 MG/DL (ref 1.6–2.3)
MCH RBC QN AUTO: 29.2 PG (ref 26.5–33)
MCHC RBC AUTO-ENTMCNC: 34 G/DL (ref 31.5–36.5)
MCV RBC AUTO: 86 FL (ref 78–100)
MONOCYTES # BLD AUTO: 0.6 10E9/L (ref 0–1.3)
MONOCYTES NFR BLD AUTO: 10.9 %
NEUTROPHILS # BLD AUTO: 3.4 10E9/L (ref 1.6–8.3)
NEUTROPHILS NFR BLD AUTO: 61.1 %
NRBC # BLD AUTO: 0 10*3/UL
NRBC BLD AUTO-RTO: 0 /100
NT-PROBNP SERPL-MCNC: 53 PG/ML (ref 0–125)
PHOSPHATE SERPL-MCNC: 4.2 MG/DL (ref 2.5–4.5)
PLATELET # BLD AUTO: 176 10E9/L (ref 150–450)
POTASSIUM SERPL-SCNC: 3.9 MMOL/L (ref 3.4–5.3)
PROT SERPL-MCNC: 7.2 G/DL (ref 6.8–8.8)
RBC # BLD AUTO: 4.96 10E12/L (ref 4.4–5.9)
SODIUM SERPL-SCNC: 142 MMOL/L (ref 133–144)
TACROLIMUS BLD-MCNC: 8.8 UG/L (ref 5–15)
TME LAST DOSE: NORMAL H
TROPONIN I SERPL-MCNC: 0.02 UG/L (ref 0–0.04)
WBC # BLD AUTO: 5.6 10E9/L (ref 4–11)

## 2018-08-08 NOTE — NURSING NOTE
Chief Complaint   Patient presents with     Consult     Abnormal bone density      Karen Alvarez CMA

## 2018-08-08 NOTE — PROGRESS NOTES
Endocrine Clinic Consult:     Date: 08/17/2018    Referring Physician: Nnamdi Kaufman      HPI:     Obed is a pleasant 22 year old  who presents for evaluation of low bone density for age.Obed has a history of heart transplant due to cardiomyopathy.  At age 1, due to complication of cardiomyopathy he developed a stroke leading to left-sided weakness.  Following cardi transplant he takes prednisone for 7.5 mg daily along with tacrolimus.     During the transition from pediatrics to adult transplant team, bone density test was done as a part of adult screening and he was found to have low bone density for age.  He was therefore referred to our clinic for further evaluation and treatment.    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  The work requires him to be standing and walking throughout his 7-8 hour day.  During the wakeful hours, he eats small but very frequent meals and gets really hungry if he does not do so.        Risk Factors:      Symptoms Present? Details   Age // BMI // Race   22/ 19.2 / CM   Increased fall risk  no  gait is stable despite weakness on the left   Prior Fracture  no    Parental hip fracture  no    Secondary causes  yes    Steroid use  yes    Smoking  no    Hypogonadism  no  normal morning erections, good libido   Rheumatoid arthritis  no    Alcohol > 3 drinks a day  no    Femoral BMD      Sedentary life style  no    Ca supplement  no  drinks 1 glass of milk, frequent yogurt   Vit D supplement  no             Risk  Present ?    FH of hyper para / MEN / hyper calcemia.    No   Sarcoid ?    No   Prolonged immobilization  no   Thiazide  no   Vit A  no   Thyroid disease / meds  no       Clinically there are no symptoms suggestive of hyper or hypocalcemia. Specifically, there are:    no symptoms anxiety, depression or cognitive dysfucntion  No severe symptoms in past such as lethargy, confusion, stupor or coma associated with high calcium    Risk  Present ?     Anorexia  / GI s/s no   Peptic ulcer / pancreatitis no   Renal stone / polyuria / polydipsia no   Palpitations no   Weakness / bone pain no   Anxiety / Depression / cognitive dysfunction no   Lethargy / confusion / stupor / coma with high Ca.  no     Other ROS:   No eye symptoms  No headache, change in vision, loss of peripheral vision  No neck pain, no other lumps in the neck  No change in breathing   No change in swallowing.   No swelling in extremities  No change in mental status.   Complete ROS that were obtained were negative.     Past Medical History:   Diagnosis Date     Cardiomyopathy, hypertrophic obstructive (H)      H/O heart transplant (H)     Mar.23, 2012     Hemiplegia following CVA (cerebrovascular accident) (H)     left, improved with rehab     Lymphoproliferative disease (H)      Unspecified cerebral artery occlusion with cerebral infarction     age 1 1/2     Past Surgical History:   Procedure Laterality Date     ARTHRODESIS WRIST Left 10/20/2016    Procedure: ARTHRODESIS WRIST;  Surgeon: Amna Tinoco MD;  Location: [facility]     BIOPSY      gingival and tonsillar biopsy     [facility] SURGICAL PATHOLOGY       HEART CATH CHILD  11/13/2012    Procedure: HEART CATH CHILD;  Right Heart Cath Procedure and Biopsy *Latex Allergy*;  Surgeon: Bobbi Pruitt MD;  [facility]     HEART CATH CHILD  2/15/2013    Procedure: HEART CATH CHILD;  Right [place]earth Cath, Angiogram and Biopsy;  Surgeon: Bobbi Pruitt MD;  [facility]     HEART CATH CHILD N/A 4/10/2015    Procedure: HEART CATH CHILD;  Surgeon: Bobbi Pruitt MD;  [facility]     HEART CATH CHILD N/A 4/21/2017    Procedure: HEART CATH CHILD;  Right Heart Cath Procedure with Angio Biopsy    (latex allergy) ;  Surgeon: Bobbi Pruitt MD;  [facility]     HEART CATH, BIOPSY  3/21/2014    Procedure: HEART CATH, BIOPSY;  Right and Left Heart Cath, Angiogram, Biopsy   *Latex Allergy*;  Surgeon: Bobbi Pruitt MD;   [facility]     HEART CATH, BIOPSY Right 4/18/2016    Procedure: HEART CATH, BIOPSY;  Surgeon: Bobbi Pruitt MD;  [facility]     HEART CATH, BIOPSY Right 5/26/2017    Procedure: HEART CATH, BIOPSY;  Right Heart Cath and Biopsy  (Latex Allergy);  Surgeon: Bobbi Pruitt MD;  [facility]     HEART CATH, BIOPSY N/A 9/11/2017    Procedure: HEART CATH, BIOPSY;  Right Heart Cath, Angio and Biopsy ;  Surgeon: Bobbi Pruitt MD;  [facility]     HEART CATH, BIOPSY Right 3/23/2018    Procedure: HEART CATH, BIOPSY;  Right Heart Catheter, Angiogram and Biopsy  **Latex Allergy**;  Surgeon: Aileen Sarabia MD;  [facility]     INTERPOSITION TENDON HAND Left 10/20/2016    Procedure: INTERPOSITION TENDON HAND;  Surgeon: Amna Tinoco MD;  [facility]     ORTHOPEDIC SURGERY      at [place], left lower extremity     PICC INSERTION       PICC REMOVAL       REMOVE HARDWARE WRIST Left 10/12/2017    Procedure: REMOVE HARDWARE WRIST;  Left Wrist Fusion Plate Removal     ;  Surgeon: Amna Tinoco MD;  [facility]     TRANSPLANT HEART RECIPIENT       Family History   Problem Relation Age of Onset     Diabetes Sister      Social History     Social History     Marital status: Single     Spouse name: N/A     Number of children: N/A     Years of education: N/A     Occupational History     Not on file.     Social History Main Topics     Smoking status: Never Smoker     Smokeless tobacco: Never Used     Alcohol use No     Drug use: No     Sexual activity: No     Other Topics Concern     Not on file     Social History Narrative    Lives in [place], [place]. 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  Went to [place] .  Has 3 sisters and a brother.  Mom and dad  (2010).         Allergies   Allergen Reactions     Latex Rash     Other [Seasonal Allergies]      Corn-abdominal pain and diarrhea.     Current Outpatient Prescriptions  "  Medication     amLODIPine (NORVASC) 5 MG tablet     aspirin 81 MG EC tablet     Cholecalciferol (VITAMIN D3) 2000 UNITS TABS     mycophenolate (GENERIC EQUIVALENT) 500 MG tablet     pravastatin (PRAVACHOL) 10 MG tablet     predniSONE (DELTASONE) 2.5 MG tablet     predniSONE (DELTASONE) 5 MG tablet     tacrolimus (GENERIC EQUIVALENT) 0.5 MG capsule     tacrolimus (GENERIC EQUIVALENT) 1 MG capsule     No current facility-administered medications for this visit.            Exam:  /81 (BP Location: Right arm, Patient Position: Sitting, Cuff Size: Adult Regular)  Pulse 102  Ht 1.854 m (6' 1\")  Wt 66.3 kg (146 lb 1.6 oz)  BMI 19.28 kg/m2   Constitutional: Thin male, no distress  Head: Normocephalic. No masses, lesions, tenderness or abnormalities  Neck: Neck supple. No adenopathy. Thyroid symmetric, normal size, Carotids without bruits.  ENT: No throat congestion, no neck nodes or sinus tenderness  Cardiovascular: regular rhythm, mild  tachycardia  Respiratory: Lungs clear  Gastrointestinal: Abdomen soft, non-tender. BS normal. No striae  Musculoskeletal: gait normal and normal muscle tone  Neurologic: Left hemiplegia slightly slurred speech  Psychiatric: mentation appears normal   Spine nontender, no stepping    Last Basic Metabolic Panel:    Results for orders placed or performed in visit on 08/08/18   Comprehensive metabolic panel   Result Value Ref Range    Sodium 142 133 - 144 mmol/L    Potassium 3.9 3.4 - 5.3 mmol/L    Chloride 107 94 - 109 mmol/L    Carbon Dioxide 27 20 - 32 mmol/L    Anion Gap 8 3 - 14 mmol/L    Glucose 119 (H) 70 - 99 mg/dL    Urea Nitrogen 24 7 - 30 mg/dL    Creatinine 0.69 0.66 - 1.25 mg/dL    GFR Estimate >90 >60 mL/min/1.7m2    GFR Estimate If Black >90 >60 mL/min/1.7m2    Calcium 9.0 8.5 - 10.1 mg/dL    Bilirubin Total 0.5 0.2 - 1.3 mg/dL    Albumin 4.0 3.4 - 5.0 g/dL    Protein Total 7.2 6.8 - 8.8 g/dL    Alkaline Phosphatase 61 40 - 150 U/L    ALT 15 0 - 70 U/L    AST 13 0 - 45 " U/L   Magnesium   Result Value Ref Range    Magnesium 1.6 1.6 - 2.3 mg/dL   Phosphorus   Result Value Ref Range    Phosphorus 4.2 2.5 - 4.5 mg/dL   N terminal pro BNP outpatient   Result Value Ref Range    N-Terminal Pro Bnp 53 0 - 125 pg/mL   Troponin I   Result Value Ref Range    Troponin I ES 0.024 0.000 - 0.045 ug/L   CBC with platelets differential   Result Value Ref Range    WBC 5.6 4.0 - 11.0 10e9/L    RBC Count 4.96 4.4 - 5.9 10e12/L    Hemoglobin 14.5 13.3 - 17.7 g/dL    Hematocrit 42.6 40.0 - 53.0 %    MCV 86 78 - 100 fl    MCH 29.2 26.5 - 33.0 pg    MCHC 34.0 31.5 - 36.5 g/dL    RDW 12.8 10.0 - 15.0 %    Platelet Count 176 150 - 450 10e9/L    Diff Method Automated Method     % Neutrophils 61.1 %    % Lymphocytes 25.3 %    % Monocytes 10.9 %    % Eosinophils 2.1 %    % Basophils 0.4 %    % Immature Granulocytes 0.2 %    Nucleated RBCs 0 0 /100    Absolute Neutrophil 3.4 1.6 - 8.3 10e9/L    Absolute Lymphocytes 1.4 0.8 - 5.3 10e9/L    Absolute Monocytes 0.6 0.0 - 1.3 10e9/L    Absolute Eosinophils 0.1 0.0 - 0.7 10e9/L    Absolute Basophils 0.0 0.0 - 0.2 10e9/L    Abs Immature Granulocytes 0.0 0 - 0.4 10e9/L    Absolute Nucleated RBC 0.0    CK total   Result Value Ref Range    CK Total 126 30 - 300 U/L         Lab Results   Component Value Date    PHOS 4.2 08/08/2018         CBC RESULTS:   Recent Labs   Lab Test  08/08/18   1452   WBC  5.6   RBC  4.96   HGB  14.5   HCT  42.6   MCV  86   MCH  29.2   MCHC  34.0   RDW  12.8   PLT  176     Recent Labs   Lab Test  08/08/18   1452  05/30/18   1212   NA  142  141   POTASSIUM  3.9  3.5   CHLORIDE  107  108   CO2  27  27   ANIONGAP  8  6   GLC  119*  125*   BUN  24  24   CR  0.69  0.79   CARLOS A  9.0  8.4*       Assessment   Obed is a 22 year old male who is here for evaluation of low bone density for his age.  He has a history of heart transplant, chronic steroid use of prednisone at 7.5 mg daily for several years.  Recently he was transitioned to adult transplant team  and bone density was done as a part of his evaluation.     Even though he has low bone density for his age, treatment for osteoporosis has been evaluated in postmenopausal women and elderly man.  Therefore for someone who may not have reached a peak bone mass use of bisphosphonates or any other agents may not lead to significant benefit.    We will start our evaluation to find out secondary causes of bone loss and address these issues.  He has low fall risk and therefore other treatments may not have larger impact on his overall health.    Initial Evaluation    Complete blood cell count   Serum chemistry, including calcium, phosphorus, total protein, albumin, liver enzymes, alkaline phosphatase, creatinine, and electrolytes   Urinalysis (24-h collection) for calcium, sodium, and creatinine excretion (to identify calcium malabsorption or hypercalciuria)   Serum 25-hydroxyvitamin D  Serum thyrotropin   Serum parathyroid hormone concentration for possible primary or secondary hyperparathyroidism   Tissue transglutaminase antibodies for suspected celiac disease     Primary prevention   --  achievement and maintenance of a normal body mass index (BMI) of 20 to 25   --  A balanced diet including dairy products and appropriate nutrition   -- Participate in exercise that you enjoy     David Prince MD  [id number]  Endocrinology Service

## 2018-08-08 NOTE — LETTER
8/8/2018       RE: Obed Viramontes  [address]  [facility] [address]     Dear Colleague,    Thank you for referring your patient, Obed Viramontes, to the [facility] ENDOCRINOLOGY at [facility]. Please see a copy of my visit note below.    Endocrine Clinic Consult:     Date: 08/17/2018    Referring Physician: Nnamdi Kaufman      HPI:     Obed is a pleasant 22 year old  who presents for evaluation of low bone density for age.Obed has a history of heart transplant due to cardiomyopathy.  At age 1, due to complication of cardiomyopathy he developed a stroke leading to left-sided weakness.  Following cardi transplant he takes prednisone for 7.5 mg daily along with tacrolimus.     During the transition from pediatrics to adult transplant team, bone density test was done as a part of adult screening and he was found to have low bone density for age.  He was therefore referred to our clinic for further evaluation and treatment.    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  The work requires him to be standing and walking throughout his 7-8 hour day.  During the wakeful hours, he eats small but very frequent meals and gets really hungry if he does not do so.        Risk Factors:      Symptoms Present? Details   Age // BMI // Race   22/ 19.2 / CM   Increased fall risk  no  gait is stable despite weakness on the left   Prior Fracture  no    Parental hip fracture  no    Secondary causes  yes    Steroid use  yes    Smoking  no    Hypogonadism  no  normal morning erections, good libido   Rheumatoid arthritis  no    Alcohol > 3 drinks a day  no    Femoral BMD      Sedentary life style  no    Ca supplement  no  drinks 1 glass of milk, frequent yogurt   Vit D supplement  no             Risk  Present ?    FH of hyper para / MEN / hyper calcemia.    No   Sarcoid ?    No   Prolonged immobilization  no   Thiazide  no   Vit A  no   Thyroid disease / meds  no        Clinically there are no symptoms suggestive of hyper or hypocalcemia. Specifically, there are:    no symptoms anxiety, depression or cognitive dysfucntion  No severe symptoms in past such as lethargy, confusion, stupor or coma associated with high calcium    Risk  Present ?     Anorexia / GI s/s no   Peptic ulcer / pancreatitis no   Renal stone / polyuria / polydipsia no   Palpitations no   Weakness / bone pain no   Anxiety / Depression / cognitive dysfunction no   Lethargy / confusion / stupor / coma with high Ca.  no       Past Medical History:   Diagnosis Date     Cardiomyopathy, hypertrophic obstructive (H)      H/O heart transplant (H)     Mar.23, 2012     Hemiplegia following CVA (cerebrovascular accident) (H)     left, improved with rehab     Lymphoproliferative disease (H)      Unspecified cerebral artery occlusion with cerebral infarction     age 1 1/2     Past Surgical History:   Procedure Laterality Date     ARTHRODESIS WRIST Left 10/20/2016    Procedure: ARTHRODESIS WRIST;  Surgeon: Amna Tinoco MD;  [facility]     BIOPSY      gingival and tonsillar biopsy     CL AFF SURGICAL PATHOLOGY       HEART CATH CHILD  11/13/2012    Procedure: HEART CATH CHILD;  Right Heart Cath Procedure and Biopsy *Latex Allergy*;  Surgeon: Bobbi Pruitt MD;  [facility] OR     HEART CATH CHILD  2/15/2013    Procedure: HEART CATH CHILD;  Right [place] Cath, Angiogram and Biopsy;  Surgeon: Bobbi Pruitt MD;  [facility] OR     HEART CATH CHILD N/A 4/10/2015    Procedure: HEART CATH CHILD;  Surgeon: Bobbi Pruitt MD;  [facility]     HEART CATH CHILD N/A 4/21/2017    Procedure: HEART CATH CHILD;  Right Heart Cath Procedure with Angio Biopsy    (latex allergy) ;  Surgeon: Bobbi Pruitt MD;  [facility]     HEART CATH, BIOPSY  3/21/2014    Procedure: HEART CATH, BIOPSY;  Right and Left Heart Cath, Angiogram, Biopsy   *Latex Allergy*;  Surgeon: Bobbi Pruitt  MD Marilynn;  [facility]     HEART CATH, BIOPSY Right 4/18/2016    Procedure: HEART CATH, BIOPSY;  Surgeon: Bobbi Prutit MD;  [facility]     HEART CATH, BIOPSY Right 5/26/2017    Procedure: HEART CATH, BIOPSY;  Right Heart Cath and Biopsy  (Latex Allergy);  Surgeon: Bobbi Pruitt MD;  [facility]     HEART CATH, BIOPSY N/A 9/11/2017    Procedure: HEART CATH, BIOPSY;  Right Heart Cath, Angio and Biopsy ;  Surgeon: Bobbi Pruitt MD;  [facility]     HEART CATH, BIOPSY Right 3/23/2018    Procedure: HEART CATH, BIOPSY;  Right Heart Catheter, Angiogram and Biopsy  **Latex Allergy**;  Surgeon: Aileen Sarabia MD;  [facility]     INTERPOSITION TENDON HAND Left 10/20/2016    Procedure: INTERPOSITION TENDON HAND;  Surgeon: Amna Tinoco MD;  [facility]     ORTHOPEDIC SURGERY      at [place], left lower extremity     PICC INSERTION       PICC REMOVAL       REMOVE HARDWARE WRIST Left 10/12/2017    Procedure: REMOVE HARDWARE WRIST;  Left Wrist Fusion Plate Removal     ;  Surgeon: Amna Tinoco MD;  [facility]     TRANSPLANT HEART RECIPIENT       Family History   Problem Relation Age of Onset     Diabetes Sister      Social History     Social History     Marital status: Single     Spouse name: N/A     Number of children: N/A     Years of education: N/A     Occupational History     Not on file.     Social History Main Topics     Smoking status: Never Smoker     Smokeless tobacco: Never Used     Alcohol use No     Drug use: No     Sexual activity: No     Other Topics Concern     Not on file     Social History Narrative    Lives in [place], [place]. 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  Went to [employer].  Has 3 sisters and a brother.  Mom and dad  (2010).         Allergies   Allergen Reactions     Latex Rash     Other [Seasonal Allergies]      Corn-abdominal pain and diarrhea.     Current Outpatient  "Prescriptions   Medication     amLODIPine (NORVASC) 5 MG tablet     aspirin 81 MG EC tablet     Cholecalciferol (VITAMIN D3) 2000 UNITS TABS     mycophenolate (GENERIC EQUIVALENT) 500 MG tablet     pravastatin (PRAVACHOL) 10 MG tablet     predniSONE (DELTASONE) 2.5 MG tablet     predniSONE (DELTASONE) 5 MG tablet     tacrolimus (GENERIC EQUIVALENT) 0.5 MG capsule     tacrolimus (GENERIC EQUIVALENT) 1 MG capsule     No current facility-administered medications for this visit.      Exam:  /81 (BP Location: Right arm, Patient Position: Sitting, Cuff Size: Adult Regular)  Pulse 102  Ht 1.854 m (6' 1\")  Wt 66.3 kg (146 lb 1.6 oz)  BMI 19.28 kg/m2   Constitutional: Thin male, no distress  Head: Normocephalic. No masses, lesions, tenderness or abnormalities  Neck: Neck supple. No adenopathy. Thyroid symmetric, normal size, Carotids without bruits.  ENT: No throat congestion, no neck nodes or sinus tenderness  Cardiovascular: regular rhythm, mild  tachycardia  Respiratory: Lungs clear  Gastrointestinal: Abdomen soft, non-tender. BS normal. No striae  Musculoskeletal: gait normal and normal muscle tone  Neurologic: Left hemiplegia slightly slurred speech  Psychiatric: mentation appears normal   Spine nontender, no stepping    Last Basic Metabolic Panel:    Results for orders placed or performed in visit on 08/08/18   Comprehensive metabolic panel   Result Value Ref Range    Sodium 142 133 - 144 mmol/L    Potassium 3.9 3.4 - 5.3 mmol/L    Chloride 107 94 - 109 mmol/L    Carbon Dioxide 27 20 - 32 mmol/L    Anion Gap 8 3 - 14 mmol/L    Glucose 119 (H) 70 - 99 mg/dL    Urea Nitrogen 24 7 - 30 mg/dL    Creatinine 0.69 0.66 - 1.25 mg/dL    GFR Estimate >90 >60 mL/min/1.7m2    GFR Estimate If Black >90 >60 mL/min/1.7m2    Calcium 9.0 8.5 - 10.1 mg/dL    Bilirubin Total 0.5 0.2 - 1.3 mg/dL    Albumin 4.0 3.4 - 5.0 g/dL    Protein Total 7.2 6.8 - 8.8 g/dL    Alkaline Phosphatase 61 40 - 150 U/L    ALT 15 0 - 70 U/L    AST " 13 0 - 45 U/L   Magnesium   Result Value Ref Range    Magnesium 1.6 1.6 - 2.3 mg/dL   Phosphorus   Result Value Ref Range    Phosphorus 4.2 2.5 - 4.5 mg/dL   N terminal pro BNP outpatient   Result Value Ref Range    N-Terminal Pro Bnp 53 0 - 125 pg/mL   Troponin I   Result Value Ref Range    Troponin I ES 0.024 0.000 - 0.045 ug/L   CBC with platelets differential   Result Value Ref Range    WBC 5.6 4.0 - 11.0 10e9/L    RBC Count 4.96 4.4 - 5.9 10e12/L    Hemoglobin 14.5 13.3 - 17.7 g/dL    Hematocrit 42.6 40.0 - 53.0 %    MCV 86 78 - 100 fl    MCH 29.2 26.5 - 33.0 pg    MCHC 34.0 31.5 - 36.5 g/dL    RDW 12.8 10.0 - 15.0 %    Platelet Count 176 150 - 450 10e9/L    Diff Method Automated Method     % Neutrophils 61.1 %    % Lymphocytes 25.3 %    % Monocytes 10.9 %    % Eosinophils 2.1 %    % Basophils 0.4 %    % Immature Granulocytes 0.2 %    Nucleated RBCs 0 0 /100    Absolute Neutrophil 3.4 1.6 - 8.3 10e9/L    Absolute Lymphocytes 1.4 0.8 - 5.3 10e9/L    Absolute Monocytes 0.6 0.0 - 1.3 10e9/L    Absolute Eosinophils 0.1 0.0 - 0.7 10e9/L    Absolute Basophils 0.0 0.0 - 0.2 10e9/L    Abs Immature Granulocytes 0.0 0 - 0.4 10e9/L    Absolute Nucleated RBC 0.0    CK total   Result Value Ref Range    CK Total 126 30 - 300 U/L         Lab Results   Component Value Date    PHOS 4.2 08/08/2018         CBC RESULTS:   Recent Labs   Lab Test  08/08/18   1452   WBC  5.6   RBC  4.96   HGB  14.5   HCT  42.6   MCV  86   MCH  29.2   MCHC  34.0   RDW  12.8   PLT  176     Recent Labs   Lab Test  08/08/18   1452  05/30/18   1212   NA  142  141   POTASSIUM  3.9  3.5   CHLORIDE  107  108   CO2  27  27   ANIONGAP  8  6   GLC  119*  125*   BUN  24  24   CR  0.69  0.79   CARLOS A  9.0  8.4*       Assessment   Obed is a 22 year old male who is here for evaluation of low bone density for his age.  He has a history of heart transplant, chronic steroid use of prednisone at 7.5 mg daily for several years.  Recently he was transitioned to adult  transplant team and bone density was done as a part of his evaluation.     Even though he has low bone density for his age, treatment for osteoporosis has been evaluated in postmenopausal women and elderly man.  Therefore for someone who may not have reached a peak bone mass use of bisphosphonates or any other agents may not lead to significant benefit.    We will start our evaluation to find out secondary causes of bone loss and address these issues.  He has low fall risk and therefore other treatments may not have larger impact on his overall health.    Initial Evaluation    Complete blood cell count   Serum chemistry, including calcium, phosphorus, total protein, albumin, liver enzymes, alkaline phosphatase, creatinine, and electrolytes   Urinalysis (24-h collection) for calcium, sodium, and creatinine excretion (to identify calcium malabsorption or hypercalciuria)   Serum 25-hydroxyvitamin D  Serum thyrotropin   Serum parathyroid hormone concentration for possible primary or secondary hyperparathyroidism   Tissue transglutaminase antibodies for suspected celiac disease     Primary prevention   --  achievement and maintenance of a normal body mass index (BMI) of 20 to 25   --  A balanced diet including dairy products and appropriate nutrition   -- Participate in exercise that you enjoy     David Prince MD  [id number]  Endocrinology Service

## 2018-08-08 NOTE — PATIENT INSTRUCTIONS
24 Hour Urine Collection instruction    Decide a day you want to collect the urine for 24 hours.   On the day of collection, discard the first void urine in the morning.   Start collecting all the urine in the provided container for the entire day and over night.   The next morning when you wake up collect the first void urine in the container and then submit the container to the lab.  Store in the refrigerator      Labs when you wake up at 2 pm.

## 2018-08-08 NOTE — MR AVS SNAPSHOT
After Visit Summary   8/8/2018    Obed Viramontes    MRN: [id number]           Patient Information     Date Of Birth          1995        Visit Information        Provider Department      8/8/2018 4:00 PM David Prince MD [facility] Endocrinology        Today's Diagnoses     Low bone density for age    -  1      Care Instructions    24 Hour Urine Collection instruction    Decide a day you want to collect the urine for 24 hours.   On the day of collection, discard the first void urine in the morning.   Start collecting all the urine in the provided container for the entire day and over night.   The next morning when you wake up collect the first void urine in the container and then submit the container to the lab.  Store in the refrigerator      Labs when you wake up at 2 pm.           Follow-ups after your visit        Follow-up notes from your care team     Return in about 1 year (around 8/8/2019).      Your next 10 appointments already scheduled     Sep 06, 2018  7:45 AM CDT   LAB with UU LAB GOLD WAITING   [facility], Lab ([facility])    [address] [address]              Please do not eat 10-12 hours before your appointment if you are coming in fasting for labs on lipids, cholesterol, or glucose (sugar). This does not apply to pregnant women. Water, hot tea and black coffee (with nothing added) are okay. Do not drink other fluids, diet soda or chew gum.            Sep 06, 2018  8:15 AM CDT   MR CARDIAC W CONTRAST W FLOW QUANT with UUMR4, UU CV MR NURSE   [facility], MRI ([facility], [facility])    [address] [address] [phone]           Take your medicines as usual, unless your doctor tells you not to.   If you take Viagra, Levitra or Cialis, stop taking it 48 hours before your test.   If you take Aggrenox or dipyridamole  (Persantine, Permole), stop taking it 48 hours before your test.   If you take Theophylline or Aminophylline, stop taking it 12 hours before your test.   If you are diabetic and take oral hypoglycemics, do not take them on the day of your test.  The day before your exam, drink extra fluids at least six 8-ounce glasses (unless your doctor wants you to limit your fluids).  Stop all caffeine 12 hours before the test. This includes coffee, tea, soda, chocolate and certain medicines (such as Anacin, Excedrin and NoDoz). Also avoid decaf coffee and tea, as these contain small amounts of caffeine.  Do not eat or drink for 3 hours before your exam. You may drink water and take your morning medicines.  You may need a blood test (creatinine test) within 30 days of your exam. Follow your doctor s orders if this is arranged before your exam.  If you are very claustrophobic, please let you doctor know. You may get a sedative medicine from your doctor before you arrive. Bring the medicine to the exam. Do not take it at home. Arrive one hour early. Bring someone who can take you home after the test. Your medicine will make you sleepy. After the exam, you may not drive, take a bus or take a taxi by yourself.  The MRI machine uses a strong magnet. Please wear clothes without metal (snaps, zippers). A sweatsuit works well, or we may give you a hospital gown.  Please remove any body piercings and hair extensions before you arrive. You will remove watches, jewelry, hairpins, wallets, dentures, partial dental plates and hearing aids. You may wear contact lenses, and you may be able to wear your rings. We have a safe place to keep your personal items, but it is safer to leave them at home.  **IMPORTANT** THE INSTRUCTIONS BELOW ARE ONLY FOR THOSE PATIENTS WHO HAVE BEEN PRESCRIBED SEDATION OR GENERAL ANESTHESIA DURING THEIR MRI PROCEDURE:  IF YOUR DOCTOR PRESCRIBED ORAL SEDATION (take medicine to help you relax during your exam):   You  must get the medicine from your doctor (oral medication) before you arrive. Bring the medicine to the exam. Do not take it at home. You ll be told when to take it upon arriving for your exam.   Arrive one hour early. Bring someone who can take you home after the test. Your medicine will make you sleepy. After the exam, you may not drive, take a bus or take a taxi by yourself.  IF YOUR DOCTOR PRESCRIBED IV SEDATION:   Arrive one hour early. Bring someone who can take you home after the test. Your medicine will make you sleepy. After the exam, you may not drive, take a bus or take a taxi by yourself.   No eating 6 hours before your exam. You may have clear liquids up until 4 hours before your exam. (Clear liquids include water, clear tea, black coffee and fruit juice without pulp.)  IF YOUR DOCTOR PRESCRIBED ANESTHESIA (be asleep for your exam):   Arrive 1 1/2 hours early. Bring someone who can take you home after the test. You may not drive, take a bus or take a taxi by yourself.   No eating 8 hours before your exam. You may have clear liquids up until 4 hours before your exam. (Clear liquids include water, clear tea, black coffee and fruit juice without pulp.)   You will spend four to five hours in the recovery room.  If you have any questions, please contact your Imaging Department exam site.            Sep 06, 2018 10:00 AM CDT   XR CHEST 2 VIEWS with UUXR3   [facility] [place],  Radiology ([facility] [place] [facility])    [address] [address] [phone]           Please bring a list of your current medicines to your exam. (Include vitamins, minerals and over-thecounter medicines.) Leave your valuables at home.  Tell your doctor if there is a chance you may be pregnant.  You do not need to do anything special for this exam.            Sep 06, 2018 10:30 AM CDT   Card Cardpul Stress Tst Adult with UUEKGS   [facility] ([facility])    [address] [address]               Sep 06, 2018  2:00 PM CDT   (Arrive by 1:45 PM)   SOT SOCIAL WORK EVAL with LAURI Richmond   [facility] Solid Organ Transplant ([facility])    [address]  [address]  [place] [facility] [place] [facility] [place] [id number] [phone]            Sep 06, 2018  3:00 PM CDT   (Arrive by 2:45 PM)   HEART TRANSPLANT ANNUAL with Hao Shine MD   [facility] ([facility])    [address]  [address]  [facility] [place] [id number] [phone]              Future tests that were ordered for you today     Open Future Orders        Priority Expected Expires Ordered    25 Hydroxyvitamin D2 and D3 Routine 8/13/2018 8/8/2019 8/8/2018    Creatinine Routine 8/13/2018 8/8/2019 8/8/2018    Urea nitrogen Routine 8/13/2018 8/8/2019 8/8/2018    Calcium timed urine with Creat Ratio Routine 8/13/2018 8/8/2019 8/8/2018    Creatinine timed urine Routine 8/13/2018 8/8/2019 8/8/2018    Bone specific alk phosphatase Routine 8/13/2018 8/8/2019 8/8/2018    Lutropin Routine 8/13/2018 8/8/2019 8/8/2018    Testosterone Free and Total Routine 8/13/2018 8/8/2019 8/8/2018    Parathyroid Hormone Intact Routine 8/13/2018 8/8/2019 8/8/2018    Calcium Routine 8/13/2018 8/8/2019 8/8/2018    Phosphorus Routine 8/13/2018 8/8/2019 8/8/2018    Albumin level Routine 8/13/2018 8/8/2019 8/8/2018            Who to contact     Please call your clinic at [phone] to:    Ask questions about your health    Make or cancel appointments    Discuss your medicines    Learn about your test results    Speak to your doctor            Additional Information About Your Visit        MyChart Information     MyChart gives you secure access to your electronic health record. If you see a primary care provider, you can also send messages to your care team and make appointments. If you have questions, please call your primary care clinic.  If you  "do not have a primary care provider, please call [phone] and they will assist you.      [employer] is an electronic gateway that provides easy, online access to your medical records. With [employer], you can request a clinic appointment, read your test results, renew a prescription or communicate with your care team.     To access your existing account, please contact your [facility] or call [phone] for assistance.        Care EveryWhere ID     This is your Care EveryWhere ID. This could be used by other organizations to access your [place] medical records  [id number]        Your Vitals Were     Pulse Height BMI (Body Mass Index)             102 1.854 m (6' 1\") 19.28 kg/m2          Blood Pressure from Last 3 Encounters:   08/08/18 127/81   03/23/18 110/75   03/22/18 131/85    Weight from Last 3 Encounters:   08/08/18 66.3 kg (146 lb 1.6 oz)   03/23/18 62.8 kg (138 lb 7.2 oz)   03/22/18 62.3 kg (137 lb 5.6 oz)               Primary Care Provider Office Phone # Fax #    Nnamdi ROCHELLE Kaufman [phone]       [address] [address]        Equal Access to Services     CARLA GREENBERG : Hadii aad ku hadasho Soomaali, waaxda luqadaha, qaybta kaalmada adeegyada, waxay idiin haygeorginan carolyn luis lamary ann . So [facility] [phone].    ATENCIÓN: Si habla español, tiene a neves disposición servicios gratuitos de asistencia lingüística. Llame al [phone].    We comply with applicable federal civil rights laws and [place] laws. We do not discriminate on the basis of race, color, national origin, age, disability, sex, sexual orientation, or gender identity.            Thank you!     Thank you for choosing [facility]  for your care. Our goal is always to provide you with excellent care. Hearing back from our patients is one way we can continue to improve our services. Please take a few minutes to complete the written survey that you may receive in the mail " after your visit with us. Thank you!             Your Updated Medication List - Protect others around you: Learn how to safely use, store and throw away your medicines at www.disposemymeds.org.          This list is accurate as of 8/8/18  4:26 PM.  Always use your most recent med list.                   Brand Name Dispense Instructions for use Diagnosis    amLODIPine 5 MG tablet    NORVASC    90 tablet    Take 1 tablet (5 mg) by mouth daily    Heart replaced by transplant (H)       aspirin 81 MG EC tablet     60 tablet    Take 2 tablets (162 mg) by mouth daily    Status post transplant, heart (H)       mycophenolate 500 MG tablet    GENERIC EQUIVALENT    180 tablet    Take 1 tablet (500 mg) by mouth 2 times daily        pravastatin 10 MG tablet    PRAVACHOL    90 tablet    Take 1 tablet (10 mg) by mouth daily At bedtime    Heart replaced by transplant (H)       * predniSONE 2.5 MG tablet    DELTASONE    90 tablet    Take 1 tablet (2.5 mg) by mouth daily (Total dose 7.5mg every day)    Heart replaced by transplant (H)       * predniSONE 5 MG tablet    DELTASONE    90 tablet    Take 1 tablet (5 mg) by mouth daily (Total dose 7.5mg every day)    Heart replaced by transplant (H)       * tacrolimus 0.5 MG capsule    GENERIC EQUIVALENT    90 capsule    Take 1 capsule (0.5 mg) by mouth daily with 3 - 1 mg capsules for a total of 3.5 mg in the evening    Heart replaced by transplant (H)       * tacrolimus 1 MG capsule    GENERIC EQUIVALENT    540 capsule    Take 3 capsules (3 mg) by mouth 2 times daily With 1 - 0.5 mg capsule for a total of 3 mg in AM and 3.5 mg in PM    Heart replaced by transplant (H)       Vitamin D3 2000 units Tabs     100 tablet    Take 2,000 Units by mouth daily    Heart transplanted (H)       * Notice:  This list has 4 medication(s) that are the same as other medications prescribed for you. Read the directions carefully, and ask your doctor or other care provider to review them with you.

## 2018-08-09 ENCOUNTER — TELEPHONE (OUTPATIENT)
Dept: PEDIATRIC CARDIOLOGY | Facility: CLINIC | Age: 23
End: 2018-08-09

## 2018-08-09 LAB
CMV DNA SPEC NAA+PROBE-ACNC: NORMAL [IU]/ML
CMV DNA SPEC NAA+PROBE-LOG#: NORMAL {LOG_IU}/ML
CMV IGG SERPL QL IA: <0.2 AI (ref 0–0.8)
EBV DNA # SPEC NAA+PROBE: NORMAL {COPIES}/ML
EBV DNA SPEC NAA+PROBE-LOG#: NORMAL {LOG_COPIES}/ML
EBV NA IGG SER QL IA: <0.2 AI (ref 0–0.8)
EBV VCA IGG SER QL IA: 5.8 AI (ref 0–0.8)
EBV VCA IGM SER QL IA: <0.2 AI (ref 0–0.8)
PRA DONOR SPECIFIC ABY: NORMAL
SPECIMEN SOURCE: NORMAL

## 2018-08-15 NOTE — TELEPHONE ENCOUNTER
Obed called with his tacrolimus level    Tacrolimus level on 8/8/18: 8.8, which is stable from previous level of 8.7 on 5/30/18  Time of last Dose: 0230 on 8/8/18 (13 hr trough)   Presently taking Tacrolimus 3 mg in AM and 3.5 mg in PM    Goal tacrolimus level: 6-10    Instructed Obed to continue current dose of tacrolimus and repeat labs at next clinic visit in Sept with the adult team.     CMP, CBC and BNP stable. Troponin 0.024 which is also stable given patient has been higher in the past.     Message left with above instructions. Encouraged Obed to call the transplant office with questions/concnerns.

## 2018-08-21 DIAGNOSIS — M85.9 LOW BONE DENSITY FOR AGE: ICD-10-CM

## 2018-08-21 LAB
ALBUMIN SERPL-MCNC: 4 G/DL (ref 3.4–5)
BUN SERPL-MCNC: 20 MG/DL (ref 7–30)
CALCIUM 24H UR-MRATE: 0.26 G/24 H (ref 0.1–0.3)
CALCIUM SERPL-MCNC: 8.6 MG/DL (ref 8.5–10.1)
CALCIUM UR-MCNC: 20.4 MG/DL
CALCIUM/CREAT UR: 0.16 G/G CR
COLLECT DURATION TIME UR: 24 H
CREAT 24H UR-MRATE: 1.64 G/(24.H) (ref 1–2)
CREAT SERPL-MCNC: 0.76 MG/DL (ref 0.66–1.25)
CREAT UR-MCNC: 130 MG/DL
GFR SERPL CREATININE-BSD FRML MDRD: >90 ML/MIN/1.7M2
LH SERPL-ACNC: 6.1 IU/L (ref 1.5–9.3)
PHOSPHATE SERPL-MCNC: 2.8 MG/DL (ref 2.5–4.5)
PTH-INTACT SERPL-MCNC: 53 PG/ML (ref 18–80)
SPECIMEN VOL UR: 1260 ML
T4 FREE SERPL-MCNC: 1 NG/DL (ref 0.76–1.46)
TSH SERPL DL<=0.005 MIU/L-ACNC: 2.04 MU/L (ref 0.4–4)

## 2018-08-22 LAB
ALP BONE SERPL-MCNC: 11.5 UG/L (ref 10–28.8)
SHBG SERPL-SCNC: 13 NMOL/L (ref 11–80)
TESTOST FREE SERPL-MCNC: 11.31 NG/DL (ref 4.7–24.4)
TESTOST SERPL-MCNC: 371 NG/DL (ref 240–950)
TTG IGA SER-ACNC: <1 U/ML
TTG IGG SER-ACNC: <1 U/ML

## 2018-08-24 LAB
DEPRECATED CALCIDIOL+CALCIFEROL SERPL-MC: <43 UG/L (ref 20–75)
VITAMIN D2 SERPL-MCNC: <5 UG/L
VITAMIN D3 SERPL-MCNC: 38 UG/L

## 2018-08-31 ENCOUNTER — TELEPHONE (OUTPATIENT)
Dept: TRANSPLANT | Facility: CLINIC | Age: 23
End: 2018-08-31

## 2018-08-31 ENCOUNTER — PRE VISIT (OUTPATIENT)
Dept: TRANSPLANT | Facility: CLINIC | Age: 23
End: 2018-08-31

## 2018-08-31 DIAGNOSIS — Z79.899 ENCOUNTER FOR LONG-TERM (CURRENT) USE OF HIGH-RISK MEDICATION: ICD-10-CM

## 2018-08-31 DIAGNOSIS — Z13.220 ENCOUNTER FOR LIPID SCREENING FOR CARDIOVASCULAR DISEASE: ICD-10-CM

## 2018-08-31 DIAGNOSIS — Z94.1 HEART REPLACED BY TRANSPLANT (H): Primary | ICD-10-CM

## 2018-08-31 DIAGNOSIS — Z13.6 ENCOUNTER FOR LIPID SCREENING FOR CARDIOVASCULAR DISEASE: ICD-10-CM

## 2018-09-06 ENCOUNTER — HOSPITAL ENCOUNTER (OUTPATIENT)
Dept: MRI IMAGING | Facility: CLINIC | Age: 23
Discharge: HOME | End: 2018-09-06
Attending: INTERNAL MEDICINE | Admitting: INTERNAL MEDICINE
Payer: COMMERCIAL

## 2018-09-06 ENCOUNTER — OFFICE VISIT (OUTPATIENT)
Dept: CARDIOLOGY | Facility: CLINIC | Age: 23
End: 2018-09-06
Attending: INTERNAL MEDICINE
Payer: COMMERCIAL

## 2018-09-06 ENCOUNTER — HOSPITAL ENCOUNTER (OUTPATIENT)
Dept: CARDIOLOGY | Facility: CLINIC | Age: 23
Discharge: HOME | End: 2018-09-06
Attending: INTERNAL MEDICINE | Admitting: INTERNAL MEDICINE
Payer: COMMERCIAL

## 2018-09-06 ENCOUNTER — HOSPITAL ENCOUNTER (OUTPATIENT)
Dept: GENERAL RADIOLOGY | Facility: CLINIC | Age: 23
End: 2018-09-06
Attending: INTERNAL MEDICINE
Payer: COMMERCIAL

## 2018-09-06 ENCOUNTER — ALLIED HEALTH/NURSE VISIT (OUTPATIENT)
Dept: TRANSPLANT | Facility: CLINIC | Age: 23
End: 2018-09-06
Attending: INTERNAL MEDICINE
Payer: COMMERCIAL

## 2018-09-06 ENCOUNTER — RESULTS ONLY (OUTPATIENT)
Dept: OTHER | Facility: CLINIC | Age: 23
End: 2018-09-06

## 2018-09-06 VITALS
SYSTOLIC BLOOD PRESSURE: 124 MMHG | DIASTOLIC BLOOD PRESSURE: 82 MMHG | BODY MASS INDEX: 20.01 KG/M2 | HEART RATE: 98 BPM | HEIGHT: 73 IN | WEIGHT: 151 LBS | OXYGEN SATURATION: 97 %

## 2018-09-06 DIAGNOSIS — Z94.1 HEART REPLACED BY TRANSPLANT (H): Primary | ICD-10-CM

## 2018-09-06 DIAGNOSIS — Z94.1 HEART REPLACED BY TRANSPLANT (H): ICD-10-CM

## 2018-09-06 DIAGNOSIS — Z79.899 ENCOUNTER FOR LONG-TERM (CURRENT) USE OF HIGH-RISK MEDICATION: ICD-10-CM

## 2018-09-06 DIAGNOSIS — Z13.220 ENCOUNTER FOR LIPID SCREENING FOR CARDIOVASCULAR DISEASE: ICD-10-CM

## 2018-09-06 DIAGNOSIS — I10 BENIGN ESSENTIAL HYPERTENSION: Primary | ICD-10-CM

## 2018-09-06 DIAGNOSIS — E78.2 MIXED HYPERLIPIDEMIA: ICD-10-CM

## 2018-09-06 DIAGNOSIS — Z13.6 ENCOUNTER FOR LIPID SCREENING FOR CARDIOVASCULAR DISEASE: ICD-10-CM

## 2018-09-06 DIAGNOSIS — N18.30 CKD (CHRONIC KIDNEY DISEASE) STAGE 3, GFR 30-59 ML/MIN (H): ICD-10-CM

## 2018-09-06 LAB
ALBUMIN SERPL-MCNC: 4.1 G/DL (ref 3.4–5)
ALP SERPL-CCNC: 56 U/L (ref 40–150)
ALT SERPL W P-5'-P-CCNC: 15 U/L (ref 0–70)
ANION GAP SERPL CALCULATED.3IONS-SCNC: 8 MMOL/L (ref 3–14)
AST SERPL W P-5'-P-CCNC: 9 U/L (ref 0–45)
BILIRUB SERPL-MCNC: 0.4 MG/DL (ref 0.2–1.3)
BUN SERPL-MCNC: 22 MG/DL (ref 7–30)
CALCIUM SERPL-MCNC: 8.6 MG/DL (ref 8.5–10.1)
CHLORIDE SERPL-SCNC: 106 MMOL/L (ref 94–109)
CHOLEST SERPL-MCNC: 144 MG/DL
CK SERPL-CCNC: 91 U/L (ref 30–300)
CO2 SERPL-SCNC: 26 MMOL/L (ref 20–32)
CREAT SERPL-MCNC: 0.8 MG/DL (ref 0.66–1.25)
ERYTHROCYTE [DISTWIDTH] IN BLOOD BY AUTOMATED COUNT: 13 % (ref 10–15)
GFR SERPL CREATININE-BSD FRML MDRD: >90 ML/MIN/1.7M2
GLUCOSE SERPL-MCNC: 106 MG/DL (ref 70–99)
HCT VFR BLD AUTO: 42.2 % (ref 40–53)
HDLC SERPL-MCNC: 58 MG/DL
HGB BLD-MCNC: 14.5 G/DL (ref 13.3–17.7)
LDLC SERPL CALC-MCNC: 51 MG/DL
MAGNESIUM SERPL-MCNC: 1.6 MG/DL (ref 1.6–2.3)
MCH RBC QN AUTO: 30 PG (ref 26.5–33)
MCHC RBC AUTO-ENTMCNC: 34.4 G/DL (ref 31.5–36.5)
MCV RBC AUTO: 87 FL (ref 78–100)
NONHDLC SERPL-MCNC: 86 MG/DL
PHOSPHATE SERPL-MCNC: 2.7 MG/DL (ref 2.5–4.5)
PLATELET # BLD AUTO: 185 10E9/L (ref 150–450)
POTASSIUM SERPL-SCNC: 3.5 MMOL/L (ref 3.4–5.3)
PROT SERPL-MCNC: 7.2 G/DL (ref 6.8–8.8)
RBC # BLD AUTO: 4.83 10E12/L (ref 4.4–5.9)
SODIUM SERPL-SCNC: 140 MMOL/L (ref 133–144)
TACROLIMUS BLD-MCNC: 7.9 UG/L (ref 5–15)
TME LAST DOSE: NORMAL H
TRIGL SERPL-MCNC: 174 MG/DL
WBC # BLD AUTO: 5.3 10E9/L (ref 4–11)

## 2018-09-06 PROCEDURE — A9585 GADOBUTROL INJECTION: HCPCS | Performed by: INTERNAL MEDICINE

## 2018-09-06 PROCEDURE — 94618 PULMONARY STRESS TESTING: CPT | Mod: 26 | Performed by: INTERNAL MEDICINE

## 2018-09-06 PROCEDURE — 84100 ASSAY OF PHOSPHORUS: CPT | Performed by: INTERNAL MEDICINE

## 2018-09-06 PROCEDURE — 87799 DETECT AGENT NOS DNA QUANT: CPT | Performed by: INTERNAL MEDICINE

## 2018-09-06 PROCEDURE — G0463 HOSPITAL OUTPT CLINIC VISIT: HCPCS | Mod: 25,ZF

## 2018-09-06 PROCEDURE — 75561 CARDIAC MRI FOR MORPH W/DYE: CPT

## 2018-09-06 PROCEDURE — 75561 CARDIAC MRI FOR MORPH W/DYE: CPT | Mod: 26 | Performed by: INTERNAL MEDICINE

## 2018-09-06 PROCEDURE — 86352 CELL FUNCTION ASSAY W/STIM: CPT | Performed by: INTERNAL MEDICINE

## 2018-09-06 PROCEDURE — 94621 CARDIOPULM EXERCISE TESTING: CPT | Performed by: INTERNAL MEDICINE

## 2018-09-06 PROCEDURE — 71046 X-RAY EXAM CHEST 2 VIEWS: CPT

## 2018-09-06 PROCEDURE — 82550 ASSAY OF CK (CPK): CPT | Performed by: INTERNAL MEDICINE

## 2018-09-06 PROCEDURE — 85027 COMPLETE CBC AUTOMATED: CPT | Performed by: INTERNAL MEDICINE

## 2018-09-06 PROCEDURE — 80053 COMPREHEN METABOLIC PANEL: CPT | Performed by: INTERNAL MEDICINE

## 2018-09-06 PROCEDURE — 86833 HLA CLASS II HIGH DEFIN QUAL: CPT | Performed by: PEDIATRICS

## 2018-09-06 PROCEDURE — 83735 ASSAY OF MAGNESIUM: CPT | Performed by: INTERNAL MEDICINE

## 2018-09-06 PROCEDURE — 99204 OFFICE O/P NEW MOD 45 MIN: CPT | Mod: GC | Performed by: INTERNAL MEDICINE

## 2018-09-06 PROCEDURE — 25000128 H RX IP 250 OP 636: Performed by: INTERNAL MEDICINE

## 2018-09-06 PROCEDURE — 80061 LIPID PANEL: CPT | Performed by: INTERNAL MEDICINE

## 2018-09-06 PROCEDURE — 86832 HLA CLASS I HIGH DEFIN QUAL: CPT | Performed by: PEDIATRICS

## 2018-09-06 PROCEDURE — 80197 ASSAY OF TACROLIMUS: CPT | Performed by: INTERNAL MEDICINE

## 2018-09-06 RX ORDER — GADOBUTROL 604.72 MG/ML
7.5 INJECTION INTRAVENOUS ONCE
Status: COMPLETED | OUTPATIENT
Start: 2018-09-06 | End: 2018-09-06

## 2018-09-06 RX ADMIN — GADOBUTROL 7.5 ML: 604.72 INJECTION INTRAVENOUS at 10:10

## 2018-09-06 ASSESSMENT — PAIN SCALES - GENERAL: PAINLEVEL: NO PAIN (0)

## 2018-09-06 NOTE — MR AVS SNAPSHOT
After Visit Summary   9/6/2018    Obed Viramontes    MRN: [id number]           Patient Information     Date Of Birth          1995        Visit Information        Provider Department      9/6/2018 3:00 PM Hao Shine MD [facility] Heart Care        Today's Diagnoses     Heart replaced by transplant (H)          Care Instructions    Please call your coordinator at [phone] with any questions or concerns    Coordinator will call with Prograf level and outstanding labs    Labs in 6 months     Flu shot any time this fall    Please see dermatology once a year    Return to clinic in one year          Follow-ups after your visit        Additional Services     DERMATOLOGY REFERRAL       Your provider has referred you to: [facility]     Please be aware that coverage of these services is subject to the terms and limitations of your health insurance plan.  Call member services at your health plan with any benefit or coverage questions.      Please bring the following with you to your appointment:    (1) Any X-Rays, CTs or MRIs which have been performed.  Contact the facility where they were done to arrange for  prior to your scheduled appointment.    (2) List of current medications  (3) This referral request   (4) Any documents/labs given to you for this referral                  Your next 10 appointments already scheduled     Sep 17, 2018  2:30 PM CDT   (Arrive by 2:15 PM)   Transplant Skin Check with Juan Caba MD   [facility] Dermatology ([facility])    [address] [place] [address]  3rd [facility] [address] [phone]            Oct 03, 2018  2:10 PM CDT   (Arrive by 1:55 PM)   Return Visit with Nnamdi Kaufman MD   [facility] Primary [facility] Clinic ([facility])    [address] [place] [address]  4th [facility] [address] [phone]              Future tests that were ordered for you today     Open Future Orders        Priority  "Expected Expires Ordered    DERMATOLOGY REFERRAL Routine 10/6/2018 12/6/2018 9/6/2018    MR Myocardium w Contrast Routine 9/1/2018 5/23/2019 5/23/2018            Who to contact     If you have questions or need follow up information about today's clinic visit or your schedule please contact [facility] directly at [phone].  Normal or non-critical lab and imaging results will be communicated to you by MyChart, letter or phone within 4 business days after the clinic has received the results. If you do not hear from us within 7 days, please contact the clinic through Leiyoohart or phone. If you have a critical or abnormal lab result, we will notify you by phone as soon as possible.  Submit refill requests through VEEDIMS or call your pharmacy and they will forward the refill request to us. Please allow 3 business days for your refill to be completed.          Additional Information About Your Visit        LeiyooharNUOFFER Information     VEEDIMS gives you secure access to your electronic health record. If you see a primary care provider, you can also send messages to your care team and make appointments. If you have questions, please call your primary care clinic.  If you do not have a primary care provider, please call [phone] and they will assist you.        Care EveryWhere ID     This is your Care EveryWhere ID. This could be used by other organizations to access your [place] medical records  [id number]        Your Vitals Were     Pulse Height Pulse Oximetry BMI (Body Mass Index)          98 1.854 m (6' 1\") 97% 19.92 kg/m2         Blood Pressure from Last 3 Encounters:   09/06/18 124/82   08/08/18 127/81   03/23/18 110/75    Weight from Last 3 Encounters:   09/06/18 68.5 kg (151 lb)   08/08/18 66.3 kg (146 lb 1.6 oz)   03/23/18 62.8 kg (138 lb 7.2 oz)              We Performed the Following     EKG 12-lead complete with read (future)     Follow-Up Appointment        Primary Care Provider Office Phone # Fax " #    Nnamdi Kaufman [phone]       [address] [facility] [address]        Equal Access to Services     CARLA GREENBERG : Hadmaria luz aad ku hadnaomysalvatore Isabelagiovanna, wabenda maurilioroselineha, qaclaritzata kabeatriceda kaley, stephanie hunterin hayaan lincolngregoria luis freddy patiño. So [facility] [phone].    ATENCIÓN: Si habla español, tiene a neves disposición servicios gratuitos de asistencia lingüística. Llame al [phone].    We comply with applicable federal civil rights laws and [place] laws. We do not discriminate on the basis of race, color, national origin, age, disability, sex, sexual orientation, or gender identity.            Thank you!     Thank you for choosing [facility]  for your care. Our goal is always to provide you with excellent care. Hearing back from our patients is one way we can continue to improve our services. Please take a few minutes to complete the written survey that you may receive in the mail after your visit with us. Thank you!             Your Updated Medication List - Protect others around you: Learn how to safely use, store and throw away your medicines at www.disposemymeds.org.          This list is accurate as of 9/6/18  4:34 PM.  Always use your most recent med list.                   Brand Name Dispense Instructions for use Diagnosis    amLODIPine 5 MG tablet    NORVASC    90 tablet    Take 1 tablet (5 mg) by mouth daily    Heart replaced by transplant (H)       aspirin 81 MG EC tablet     60 tablet    Take 2 tablets (162 mg) by mouth daily    Status post transplant, heart (H)       mycophenolate 500 MG tablet    GENERIC EQUIVALENT    180 tablet    Take 1 tablet (500 mg) by mouth 2 times daily        pravastatin 10 MG tablet    PRAVACHOL    90 tablet    Take 1 tablet (10 mg) by mouth daily At bedtime    Heart replaced by transplant (H)       * predniSONE 2.5 MG tablet    DELTASONE    90 tablet    Take 1 tablet (2.5 mg) by mouth daily (Total dose 7.5mg every day)    Heart replaced by  transplant (H)       * predniSONE 5 MG tablet    DELTASONE    90 tablet    Take 1 tablet (5 mg) by mouth daily (Total dose 7.5mg every day)    Heart replaced by transplant (H)       * tacrolimus 0.5 MG capsule    GENERIC EQUIVALENT    90 capsule    Take 1 capsule (0.5 mg) by mouth daily with 3 - 1 mg capsules for a total of 3.5 mg in the evening    Heart replaced by transplant (H)       * tacrolimus 1 MG capsule    GENERIC EQUIVALENT    540 capsule    Take 3 capsules (3 mg) by mouth 2 times daily With 1 - 0.5 mg capsule for a total of 3 mg in AM and 3.5 mg in PM    Heart replaced by transplant (H)       Vitamin D3 2000 units Tabs     100 tablet    Take 2,000 Units by mouth daily    Heart transplanted (H)       * Notice:  This list has 4 medication(s) that are the same as other medications prescribed for you. Read the directions carefully, and ask your doctor or other care provider to review them with you.

## 2018-09-06 NOTE — NURSING NOTE
Chief Complaint   Patient presents with     New Patient     peds to adult transition     Vitals were taken and medications were reconciled.     Bel Roy RMA  2:51 PM

## 2018-09-06 NOTE — PATIENT INSTRUCTIONS
Please call your coordinator at [phone] with any questions or concerns    Coordinator will call with Prograf level and outstanding labs    Labs in 6 months     Flu shot any time this fall    Please see dermatology once a year    Return to clinic in one year

## 2018-09-06 NOTE — MR AVS SNAPSHOT
After Visit Summary   9/6/2018    Obed Viramontes    MRN: [id number]           Patient Information     Date Of Birth          1995        Visit Information        Provider Department      9/6/2018 2:00 PM Emmy Lopez MSW [facility] Solid Organ Transplant        Today's Diagnoses     Heart replaced by transplant (H)    -  1       Follow-ups after your visit        Your next 10 appointments already scheduled     Sep 17, 2018  2:30 PM CDT   (Arrive by 2:15 PM)   Transplant Skin Check with Juan Caba MD   [facility] Dermatology ([facility])    [address] [place] [address]  3rd [facility] [address] [phone]            Oct 03, 2018  2:10 PM CDT   (Arrive by 1:55 PM)   Return Visit with Nnamdi Kaufman MD   [facility] Primary Care Clinic ([facility])    [address] [place] [address]  4th [facility] [address] [phone]              Who to contact     If you have questions or need follow up information about today's clinic visit or your schedule please contact [facility] TRANSPL[facility] directly at [phone].  Normal or non-critical lab and imaging results will be communicated to you by J-Kanhart, letter or phone within 4 business days after the clinic has received the results. If you do not hear from us within 7 days, please contact the clinic through J-Kanhart or phone. If you have a critical or abnormal lab result, we will notify you by phone as soon as possible.  Submit refill requests through Biosport Athletechs or call your pharmacy and they will forward the refill request to us. Please allow 3 business days for your refill to be completed.          Additional Information About Your Visit        MyChart Information     Biosport Athletechs gives you secure access to your electronic health record. If you see a primary care provider, you can also send messages to your care team and make appointments. If you have questions, please call your primary  care clinic.  If you do not have a primary care provider, please call [phone] and they will assist you.        Care EveryWhere ID     This is your Care EveryWhere ID. This could be used by other organizations to access your [place] medical records  [id number]         Blood Pressure from Last 3 Encounters:   09/06/18 124/82   08/08/18 127/81   03/23/18 110/75    Weight from Last 3 Encounters:   09/06/18 68.5 kg (151 lb)   08/08/18 66.3 kg (146 lb 1.6 oz)   03/23/18 62.8 kg (138 lb 7.2 oz)              Today, you had the following     No orders found for display       Primary Care Provider Office Phone # Fax #    Nnamdi Kaufman [phone] [address] [facility] [address]        Equal Access to Services     [facility]: Hadii estuardo rojas hadasho Sogiovanna, waaxda luqadaha, qaybta kaalmada adeegyada, stephanie hayes . So [facility] [phone].    ATENCIÓN: Si habla español, tiene a neves disposición servicios gratuitos de asistencia lingüística. Llame al [phone].    We comply with applicable federal civil rights laws and [place] laws. We do not discriminate on the basis of race, color, national origin, age, disability, sex, sexual orientation, or gender identity.            Thank you!     Thank you for choosing [facility] TRANSPL[facility]  for your care. Our goal is always to provide you with excellent care. Hearing back from our patients is one way we can continue to improve our services. Please take a few minutes to complete the written survey that you may receive in the mail after your visit with us. Thank you!             Your Updated Medication List - Protect others around you: Learn how to safely use, store and throw away your medicines at www.disposemymeds.org.          This list is accurate as of 9/6/18 11:59 PM.  Always use your most recent med list.                   Brand Name Dispense Instructions for use Diagnosis    amLODIPine 5 MG tablet     NORVASC    90 tablet    Take 1 tablet (5 mg) by mouth daily    Heart replaced by transplant (H)       aspirin 81 MG EC tablet     60 tablet    Take 2 tablets (162 mg) by mouth daily    Status post transplant, heart (H)       mycophenolate 500 MG tablet    GENERIC EQUIVALENT    180 tablet    Take 1 tablet (500 mg) by mouth 2 times daily        pravastatin 10 MG tablet    PRAVACHOL    90 tablet    Take 1 tablet (10 mg) by mouth daily At bedtime    Heart replaced by transplant (H)       * predniSONE 2.5 MG tablet    DELTASONE    90 tablet    Take 1 tablet (2.5 mg) by mouth daily (Total dose 7.5mg every day)    Heart replaced by transplant (H)       * predniSONE 5 MG tablet    DELTASONE    90 tablet    Take 1 tablet (5 mg) by mouth daily (Total dose 7.5mg every day)    Heart replaced by transplant (H)       * tacrolimus 0.5 MG capsule    GENERIC EQUIVALENT    90 capsule    Take 1 capsule (0.5 mg) by mouth daily with 3 - 1 mg capsules for a total of 3.5 mg in the evening    Heart replaced by transplant (H)       * tacrolimus 1 MG capsule    GENERIC EQUIVALENT    540 capsule    Take 3 capsules (3 mg) by mouth 2 times daily With 1 - 0.5 mg capsule for a total of 3 mg in AM and 3.5 mg in PM    Heart replaced by transplant (H)       Vitamin D3 2000 units Tabs     100 tablet    Take 2,000 Units by mouth daily    Heart transplanted (H)       * Notice:  This list has 4 medication(s) that are the same as other medications prescribed for you. Read the directions carefully, and ask your doctor or other care provider to review them with you.

## 2018-09-06 NOTE — LETTER
9/6/2018      RE: Obed Viramontes  [address]  [facility] [address]       Dear Colleague,    Thank you for the opportunity to participate in the care of your patient, Obed Viramontes, at the [facility]. Please see a copy of my visit note below.    September 6, 2018    HPI:    Mr. Viramontes is a 22-year-old gentleman with history of viral mediated dilated cardiomyopathy at age 2-1/2 for which he underwent a heart transplant on 3/23/2012.  His post transplant course has been complicated by rejection the last 3 occurring in April May and June 2017 for which he receives steroids, post transplant lymphoproliferative disorder and stroke with resulting left sided hemiplegia presents today to establish care with cardiology as he transfers his care from the pediatric side.    He is currently doing well.  He is compliant with his medications.  He is tolerating his medications without side effects.  He denies any chest pain, shortness of breath, abdominal distention, lower extremity swelling, PND orthopnea, lightheadedness, dizziness, syncope.  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  He endorses no limitations to his daily activities.  From a heart standpoint, he has no concerns at today's visit and overall feels he is doing well.      PAST MEDICAL HISTORY:  Past Medical History:   Diagnosis Date     Cardiomyopathy, hypertrophic obstructive (H)      H/O heart transplant (H)     Mar.23, 2012     Hemiplegia following CVA (cerebrovascular accident) (H)     left, improved with rehab     Lymphoproliferative disease (H)      Unspecified cerebral artery occlusion with cerebral infarction     age 2 1/2       FAMILY HISTORY:  Family History   Problem Relation Age of Onset     Diabetes Sister        SOCIAL HISTORY:  Social History     Social History     Marital status: Single     Spouse name: N/A     Number of children: N/A     Years of education: N/A     Social History Main  "Topics     Smoking status: Never Smoker     Smokeless tobacco: Never Used     Alcohol use No     Drug use: No     Sexual activity: No     Other Topics Concern     None     Social History Narrative    Lives in [place], [place]. 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  Went to [facility].  Has 3 sisters and a brother.  Mom and dad  (2010).        CURRENT MEDICATIONS:  Current Outpatient Prescriptions   Medication Sig Dispense Refill     amLODIPine (NORVASC) 5 MG tablet Take 1 tablet (5 mg) by mouth daily 90 tablet 3     aspirin 81 MG EC tablet Take 2 tablets (162 mg) by mouth daily 60 tablet 99     Cholecalciferol (VITAMIN D3) 2000 UNITS TABS Take 2,000 Units by mouth daily 100 tablet 6     mycophenolate (GENERIC EQUIVALENT) 500 MG tablet Take 1 tablet (500 mg) by mouth 2 times daily 180 tablet 5     pravastatin (PRAVACHOL) 10 MG tablet Take 1 tablet (10 mg) by mouth daily At bedtime 90 tablet 3     predniSONE (DELTASONE) 2.5 MG tablet Take 1 tablet (2.5 mg) by mouth daily (Total dose 7.5mg every day) 90 tablet 11     predniSONE (DELTASONE) 5 MG tablet Take 1 tablet (5 mg) by mouth daily (Total dose 7.5mg every day) 90 tablet 11     tacrolimus (GENERIC EQUIVALENT) 0.5 MG capsule Take 1 capsule (0.5 mg) by mouth daily with 3 - 1 mg capsules for a total of 3.5 mg in the evening 90 capsule 3     tacrolimus (GENERIC EQUIVALENT) 1 MG capsule Take 3 capsules (3 mg) by mouth 2 times daily With 1 - 0.5 mg capsule for a total of 3 mg in AM and 3.5 mg in  capsule 3       ROS:   14 point review of systems was obtained from the patient with pertinent positives and negatives as described above in the HPI.    EXAM:  /82 (BP Location: Right arm, Cuff Size: Adult Small)  Pulse 98  Ht 1.854 m (6' 1\")  Wt 68.5 kg (151 lb)  SpO2 97%  BMI 19.92 kg/m2  General: appears comfortable, alert and articulate  Head: normocephalic, atraumatic  Eyes: anicteric sclera, EOMI  Neck: no " adenopathy  Orophyarynx: moist mucosa, no lesions, dentition intact  Heart: regular, S1/S2, no murmur, gallop, rub, JVP at clavicle  Lungs: clear, no rales or wheezing  Abdomen: soft, non-tender, bowel sounds present, no hepatosplenomegaly  Extremities: no clubbing, cyanosis or edema  Neurological: normal speech and affect, no gross motor deficits    Labs:  CBC RESULTS:  Lab Results   Component Value Date    WBC 5.3 09/06/2018    RBC 4.83 09/06/2018    HGB 14.5 09/06/2018    HCT 42.2 09/06/2018    MCV 87 09/06/2018    MCH 30.0 09/06/2018    MCHC 34.4 09/06/2018    RDW 13.0 09/06/2018     09/06/2018       CMP RESULTS:  Lab Results   Component Value Date     09/06/2018    POTASSIUM 3.5 09/06/2018    CHLORIDE 106 09/06/2018    CO2 26 09/06/2018    ANIONGAP 8 09/06/2018     (H) 09/06/2018    BUN 22 09/06/2018    CR 0.80 09/06/2018    GFRESTIMATED >90 09/06/2018    GFRESTBLACK >90 09/06/2018    CARLOS A 8.6 09/06/2018    BILITOTAL 0.4 09/06/2018    ALBUMIN 4.1 09/06/2018    ALKPHOS 56 09/06/2018    ALT 15 09/06/2018    AST 9 09/06/2018        CMRI:  Clinical history: 22M, s/p heart transplantation (03/23/12) complicated by PTLD (09/2012), hx of rejection  with mild troponin elevation. CMR to assess function.     Comparison CMR: No prior CMR.     1. The left ventricle is normal in cavity size and wall thickness. The global systolic function is normal.  The LVEF is 55%. There are no regional wall motion abnormalities.     2. The right ventricle is normal in cavity size. The global systolic function is normal. The RVEF is 53%.      3. Left atrium is mildly enlarged due to transplantation. Right atrium is normal.     4. There are no significant valvular disease.      5. There is no late gadolinium enhancement to indicate myocardial fibrosis or infiltrative disease.      6. There is no intracardiac thrombus.      7. There is no pericardial effusion.       CONCLUSIONS: Normal biventricular function, LVEF 55% and  RVEF 53%, with no evidence of focal myocardial  Fibrosis    Pediatric Heart Catheterization 3/23/2018:        Assessment and Plan:     Mrs. Viramontes is a pleasant 22-year-old gentleman with history of and is a pleasant 22-year-old gentleman with history of presumed viral myocarditis at age 2-1/2 with dilated cardiomyopathy and CVA with resulting with resulting left-sided hemiplegia.  He presents today as he transfers his care from pediatric cardiology to  adult cardiology.  Overall he is doing well, he has no functional limitations that he has noted, his cardiac MRI and his cardiopulmonary stress test today were reassuring and his laboratory studies are also reassuring today.  We will touch base with him regarding his tacrolimus level and if any adjustments are needed for his goal of 6-8.  From a steroid standpoint, he is currently on 7.5 mg daily.  We will keep this at 7.5 mg daily for now and consider in the future down titration.  He will continue to follow with endocrine for his bone health given his chronic steroid use.  Follow up in 6 months time.    Patient seen and discussed with Dr. Hao Shine who is in agreement with the above assessment and plan.    Juancarlos Mandujano  Cardiology Fellow    I have seen and evaluated the patient with Dr. Mandujano and agree with the assessment and plan as above.  Hao Shine MD  Cardiology

## 2018-09-07 LAB
EBV DNA # SPEC NAA+PROBE: NORMAL {COPIES}/ML
EBV DNA SPEC NAA+PROBE-LOG#: NORMAL {LOG_COPIES}/ML
PRA DONOR SPECIFIC ABY: NORMAL

## 2018-09-07 NOTE — NURSING NOTE
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 Pt stated he has been trying to gain weight  - up to 151#      Immunosuppressants:  mg BID, FK 3/3.5 mg - level pending (goal 6-8). Pred 7.5 mg daily - will hold on trying to decrease pred per Dr Shine.    Routine screenings:    Derm: Scheduled for Sept   Flu/Pneumonia: Scheduled for Oct    Labs: Reviewed in clinic; copy provided    Additional Notes: Met mom in waiting room after visit. She orders his meds (work with [facility] in Pharmacy); pt sets them up. Mom states she has been working with pt to have respond to VM, and call back team. Mom enc pt to put writer's phone number in his cell phone. Pt lives with his dad. Does not drive.     MRI, CXR and CPX reviewed with patient - copy of CPX and MRI provided to pt   Medication record reviewed and reconciled  Questions and concerns addressed  Pt verbalized an understanding of plan of care.     Patient Instructions  ~Please call your coordinator at [phone] with any questions or concerns  ~Coordinator will call with Prograf level and outstanding labs  ~Labs in 6 months   ~Flu shot any time this fall  ~Please see dermatology once a year  ~Return to clinic in one year

## 2018-09-08 LAB
CMV DNA SPEC NAA+PROBE-ACNC: NORMAL [IU]/ML
CMV DNA SPEC NAA+PROBE-LOG#: NORMAL {LOG_IU}/ML
SPECIMEN SOURCE: NORMAL

## 2018-09-10 LAB
DONOR IDENTIFICATION: NORMAL
DSA COMMENTS: NORMAL
DSA PRESENT: NO
DSA TEST METHOD: NORMAL
LAB SCANNED RESULT: NORMAL
ORGAN: NORMAL
SA1 CELL: NORMAL
SA1 COMMENTS: NORMAL
SA1 HI RISK ABY: NORMAL
SA1 MOD RISK ABY: NORMAL
SA1 TEST METHOD: NORMAL
SA2 CELL: NORMAL
SA2 COMMENTS: NORMAL
SA2 HI RISK ABY UA: NORMAL
SA2 MOD RISK ABY: NORMAL
SA2 TEST METHOD: NORMAL

## 2018-09-10 NOTE — PROGRESS NOTES
Dear Obed,     The Vit D and remaining calcium labs are normal. The amount of calcium in your diet seem adequate.     No celiac antibody and normal testosterone levels are noted.     Therefore, my recommendation is to continue taking Ca rich food, Vit D and do weight bearing exercises.     Repeat DEXA in March of 2019 and see if there is bone gain.   Plan to see me after the DXA scan (March April 2019. Test is ordered.     Best regards,   David Prince MD  [id number]  Endocrinology Service

## 2018-09-12 NOTE — PROGRESS NOTES
Social Work: Assessment    Patient Name: Obed Viramontes  : 1995  Age: 22 year old  MRN: [id number]  Completed assessment with: Patient    Presenting Information   Reason for Referral: Patient received a heart transplant in 2012 as a pediatric patient and is now transitioning to the adult program  Date of intake: 2018  Referral Source: Adult Transplant team  Decision Maker: Patient  Alternate Decision Maker: Parents  Health Care Directive: Patient considering completing  Living Situation: Lives with Dad in an apartment in [place]  Previous Functional Status: Independent with ADLs. Works full-time at a [employer]. Had stroke when he was the age of 1, so has deficits on his left side. Cannot use his left arm and wears a brace on his left leg and walks with a limp.  Cultural/Language/Spiritual Considerations: None  Adjustment to Illness: Seems to have coped well with his illnesses    Physical Health  Reason for clinic visit: Peds to Adult Transition  1. Heart replaced by transplant (H)      Services Needed/Recommended: None at this time    Mental Health/Chemical Dependency:   Diagnosis: None  Support/Services in Place: Very supportive familly  Services Needed/Recommended: None    Support System  Significant Relationship at Present time:  Not in a romantic relationship with someone, but has very good family support  Family of Origin is available for support: Yes-Mother lives near by. Patient lives with Dad. Has 3 Sisters and 1 Brother  Other support available:  Extended family and friends  Current in home services: None  Gaps in Support System: None    Provider Information   Primary Care Physician:Nnamdi Kaufman       Financial   Income Source: SSDI  Financial Concerns:  None  Insurance: Medical Choice through Mom's employer    Additional comments   Patient was accompanied to appointments by Mother. Obed was friendly and forthcoming with information. He graduated from High School  one year behind his classmates due to missing school in 8th grade and again his Cedric year during his heart transplant. He has a very supportive family and seems to have no psychosocial needs at this time. We discussed the differences between the pediatric program and adult program. Inquired into question/concerns and explained role of adult SW in the adult transplant program.

## 2018-09-17 ENCOUNTER — OFFICE VISIT (OUTPATIENT)
Dept: DERMATOLOGY | Facility: CLINIC | Age: 23
End: 2018-09-17
Payer: COMMERCIAL

## 2018-09-17 VITALS — SYSTOLIC BLOOD PRESSURE: 120 MMHG | DIASTOLIC BLOOD PRESSURE: 79 MMHG | HEART RATE: 98 BPM

## 2018-09-17 DIAGNOSIS — Z94.1 HEART REPLACED BY TRANSPLANT (H): ICD-10-CM

## 2018-09-17 DIAGNOSIS — L30.9 DERMATITIS: Primary | ICD-10-CM

## 2018-09-17 RX ORDER — TRIAMCINOLONE ACETONIDE 1 MG/G
OINTMENT TOPICAL 2 TIMES DAILY
Qty: 30 G | Refills: 1 | Status: ON HOLD | OUTPATIENT
Start: 2018-09-17 | End: 2020-10-19

## 2018-09-17 ASSESSMENT — PAIN SCALES - GENERAL: PAINLEVEL: NO PAIN (0)

## 2018-09-17 NOTE — PROGRESS NOTES
[facility] Dermatology Note    Dermatology Problem List:  1. No previous dermatologic history.    Encounter Date: Sep 17, 2018    CC:  Chief Complaint   Patient presents with     Skin Check     TSC, no lesions of concern noted.      History of Present Illness:  Mr. Obed Viramontes is a 22 year old male with history of transplant and immunosuppression who presents for skin check. Obed states there is a spot on his left knee that he noticed two weeks ago without any symptoms that he is concerned about. He denies any other new or changing lesions he is concerned about. He takes 500 mg mycophenolate and 7.5 mg prednisone daily. The patient denies significant history of skin exposure and personal history of skin cancer.     Past Medical History:   Patient Active Problem List   Diagnosis     Aftercare following surgery of the musculoskeletal system     Stiffness of finger joint of left hand     Flexion contracture of left elbow     Mechanical problems with limbs     History of stroke     Heart replaced by transplant (H)     Immunosuppression (H)     Mild protein-calorie malnutrition (H)     Hypertension, unspecified type     Past Medical History:   Diagnosis Date     Cardiomyopathy, hypertrophic obstructive (H)      H/O heart transplant (H)     Mar.23, 2012     Hemiplegia following CVA (cerebrovascular accident) (H)     left, improved with rehab     Lymphoproliferative disease (H)      Unspecified cerebral artery occlusion with cerebral infarction     age 2 1/2     Past Surgical History:   Procedure Laterality Date     ARTHRODESIS WRIST Left 10/20/2016    Procedure: ARTHRODESIS WRIST;  Surgeon: Amna Tinoco MD;  [facility]     BIOPSY      gingival and tonsillar biopsy     CL AFF SURGICAL PATHOLOGY       HEART CATH CHILD  11/13/2012    Procedure: HEART CATH CHILD;  Right Heart Cath Procedure and Biopsy *Latex Allergy*;  Surgeon: Bobbi Pruitt MD;  [facility]     HEART  CATH CHILD  2/15/2013    Procedure: HEART CATH CHILD;  Right Hearth Cath, Angiogram and Biopsy;  Surgeon: Bobbi Pruitt MD;  [facility]     HEART CATH CHILD N/A 4/10/2015    Procedure: HEART CATH CHILD;  Surgeon: Bobbi Pruitt MD;  [facility]     HEART CATH CHILD N/A 4/21/2017    Procedure: HEART CATH CHILD;  Right Heart Cath Procedure with Angio Biopsy    (latex allergy) ;  Surgeon: Bobbi Pruitt MD;  [facility]     HEART CATH, BIOPSY  3/21/2014    Procedure: HEART CATH, BIOPSY;  Right and Left Heart Cath, Angiogram, Biopsy   *Latex Allergy*;  Surgeon: Bobbi Pruitt MD;  [facility]     HEART CATH, BIOPSY Right 4/18/2016    Procedure: HEART CATH, BIOPSY;  Surgeon: Bobbi Pruitt MD;  [facility]     HEART CATH, BIOPSY Right 5/26/2017    Procedure: HEART CATH, BIOPSY;  Right Heart Cath and Biopsy  (Latex Allergy);  Surgeon: Bobbi Pruitt MD;  [facility]     HEART CATH, BIOPSY N/A 9/11/2017    Procedure: HEART CATH, BIOPSY;  Right Heart Cath, Angio and Biopsy ;  Surgeon: Bobbi Pruitt MD;  [facility]     HEART CATH, BIOPSY Right 3/23/2018    Procedure: HEART CATH, BIOPSY;  Right Heart Catheter, Angiogram and Biopsy  **Latex Allergy**;  Surgeon: Aileen Sarabia MD;  [facility]     INTERPOSITION TENDON HAND Left 10/20/2016    Procedure: INTERPOSITION TENDON HAND;  Surgeon: Amna Tinoco MD;  [facility]     ORTHOPEDIC SURGERY      at [place], left lower extremity     PICC INSERTION       PICC REMOVAL       REMOVE HARDWARE WRIST Left 10/12/2017    Procedure: REMOVE HARDWARE WRIST;  Left Wrist Fusion Plate Removal     ;  Surgeon: Amna Tinoco MD;  [facility]     TRANSPLANT HEART RECIPIENT       Social History:   reports that he has never smoked. He has never used smokeless tobacco. He reports that he does not drink alcohol or use illicit drugs.    Family History:  Family History   Problem  Relation Age of Onset     Diabetes Sister      Melanoma No family hx of      Skin Cancer No family hx of      Medications:  Current Outpatient Prescriptions   Medication Sig Dispense Refill     amLODIPine (NORVASC) 5 MG tablet Take 1 tablet (5 mg) by mouth daily 90 tablet 3     aspirin 81 MG EC tablet Take 2 tablets (162 mg) by mouth daily 60 tablet 99     Cholecalciferol (VITAMIN D3) 2000 UNITS TABS Take 2,000 Units by mouth daily 100 tablet 6     mycophenolate (GENERIC EQUIVALENT) 500 MG tablet Take 1 tablet (500 mg) by mouth 2 times daily 180 tablet 5     pravastatin (PRAVACHOL) 10 MG tablet Take 1 tablet (10 mg) by mouth daily At bedtime 90 tablet 3     predniSONE (DELTASONE) 2.5 MG tablet Take 1 tablet (2.5 mg) by mouth daily (Total dose 7.5mg every day) 90 tablet 11     predniSONE (DELTASONE) 5 MG tablet Take 1 tablet (5 mg) by mouth daily (Total dose 7.5mg every day) 90 tablet 11     tacrolimus (GENERIC EQUIVALENT) 0.5 MG capsule Take 1 capsule (0.5 mg) by mouth daily with 3 - 1 mg capsules for a total of 3.5 mg in the evening 90 capsule 3     tacrolimus (GENERIC EQUIVALENT) 1 MG capsule Take 3 capsules (3 mg) by mouth 2 times daily With 1 - 0.5 mg capsule for a total of 3 mg in AM and 3.5 mg in  capsule 3     Allergies   Allergen Reactions     Latex Rash     Other [Seasonal Allergies]      Corn-abdominal pain and diarrhea.     Review of Systems:  -Skin/Heme New Pt: The patient denies frequent sun exposure. The patient denies excessive scarring or problems healing except as per HPI. The patient denies excessive bleeding.  -Constitutional: The patient denies fatigue, fevers, chills, unintended weight loss, and night sweats.  -HEENT: Patient denies nonhealing oral sores.  -Skin: As above in HPI. No additional skin concerns.    Please see attending note for physical exam and assessment and plan.     Staff Involved:  Olvin WALSH MS3, saw and examined the patient in the presence of Dr. Caba.    Staff  Physician:  I was present with the medical student who participated in the service and in the documentation of the note. I have verified the history and personally performed the physical exam and medical decision making. Please see my note for the physical exam and assessment and plan.    Juan Caba MD   of Dermatology  Department of Dermatology  [facility]

## 2018-09-17 NOTE — NURSING NOTE
Dermatology Rooming Note    Obed Viramontes's goals for this visit include:   Chief Complaint   Patient presents with     Skin Check     TSC, no lesions of concern noted.      Andreea Agrawal LPN

## 2018-09-17 NOTE — MR AVS SNAPSHOT
After Visit Summary   9/17/2018    Obed Viramontes    MRN: [id number]           Patient Information     Date Of Birth          1995        Visit Information        Provider Department      9/17/2018 2:30 PM Juan Caba MD [facility] Dermatology        Today's Diagnoses     Dermatitis    -  1    Heart replaced by transplant (H)           Follow-ups after your visit        Follow-up notes from your care team     Return in about 1 year (around 9/17/2019).      Your next 10 appointments already scheduled     Oct 03, 2018  2:10 PM CDT   (Arrive by 1:55 PM)   Return Visit with Nnamdi Kaufman MD   [facility] ([facility])    [address] [address] [phone]              Who to contact     Please call your clinic at [phone] to:    Ask questions about your health    Make or cancel appointments    Discuss your medicines    Learn about your test results    Speak to your doctor            Additional Information About Your Visit        ApptiveharGudog Information     Markit gives you secure access to your electronic health record. If you see a primary care provider, you can also send messages to your care team and make appointments. If you have questions, please call your primary care clinic.  If you do not have a primary care provider, please call [phone] and they will assist you.      Markit is an electronic gateway that provides easy, online access to your medical records. With Markit, you can request a clinic appointment, read your test results, renew a prescription or communicate with your care team.     To access your existing account, please contact your [facility] or call [phone] for assistance.        Care EveryWhere ID     This is your Care EveryWhere ID. This could be used by other organizations to access your [place] medical records  [id number]        Your Vitals  Were     Pulse                   98            Blood Pressure from Last 3 Encounters:   09/17/18 120/79   09/06/18 124/82   08/08/18 127/81    Weight from Last 3 Encounters:   09/06/18 68.5 kg (151 lb)   08/08/18 66.3 kg (146 lb 1.6 oz)   03/23/18 62.8 kg (138 lb 7.2 oz)              We Performed the Following     DERMATOLOGY REFERRAL          Today's Medication Changes          These changes are accurate as of 9/17/18 11:59 PM.  If you have any questions, ask your nurse or doctor.               Start taking these medicines.        Dose/Directions    triamcinolone 0.1 % ointment   Commonly known as:  KENALOG   Used for:  Dermatitis   Started by:  Juan Caba MD        Apply topically 2 times daily   Quantity:  30 g   Refills:  1            Where to get your medicines      These medications were sent to [facility] - [address]  [address] [place] [id number]    Hours:  Mon-Fri 8:30am-5:00pm Toll Free [phone] Phone:  [phone]     triamcinolone 0.1 % ointment                Primary Care Provider Office Phone # Fax #    Nnamdi ROCHELLE Kaufman [phone]       [address] [facility] [address]        Equal Access to Services     CARLA GREENBERG AH: Hadii estuardo rojas hadasho Soomaali, waaxda luqadaha, qaybta kaalmada adeegyada, stephanie patiño. So [facility] [phone].    ATENCIÓN: Si habla español, tiene a neves disposición servicios gratuitos de asistencia lingüística. Llame al [phone].    We comply with applicable federal civil rights laws and [place] laws. We do not discriminate on the basis of race, color, national origin, age, disability, sex, sexual orientation, or gender identity.            Thank you!     Thank you for choosing [facility] DERMATOLOGY  for your care. Our goal is always to provide you with excellent care. Hearing back from our patients is one way we can continue to improve our  services. Please take a few minutes to complete the written survey that you may receive in the mail after your visit with us. Thank you!             Your Updated Medication List - Protect others around you: Learn how to safely use, store and throw away your medicines at www.disposemymeds.org.          This list is accurate as of 9/17/18 11:59 PM.  Always use your most recent med list.                   Brand Name Dispense Instructions for use Diagnosis    amLODIPine 5 MG tablet    NORVASC    90 tablet    Take 1 tablet (5 mg) by mouth daily    Heart replaced by transplant (H)       aspirin 81 MG EC tablet     60 tablet    Take 2 tablets (162 mg) by mouth daily    Status post transplant, heart (H)       mycophenolate 500 MG tablet    GENERIC EQUIVALENT    180 tablet    Take 1 tablet (500 mg) by mouth 2 times daily        pravastatin 10 MG tablet    PRAVACHOL    90 tablet    Take 1 tablet (10 mg) by mouth daily At bedtime    Heart replaced by transplant (H)       * predniSONE 2.5 MG tablet    DELTASONE    90 tablet    Take 1 tablet (2.5 mg) by mouth daily (Total dose 7.5mg every day)    Heart replaced by transplant (H)       * predniSONE 5 MG tablet    DELTASONE    90 tablet    Take 1 tablet (5 mg) by mouth daily (Total dose 7.5mg every day)    Heart replaced by transplant (H)       * tacrolimus 0.5 MG capsule    GENERIC EQUIVALENT    90 capsule    Take 1 capsule (0.5 mg) by mouth daily with 3 - 1 mg capsules for a total of 3.5 mg in the evening    Heart replaced by transplant (H)       * tacrolimus 1 MG capsule    GENERIC EQUIVALENT    540 capsule    Take 3 capsules (3 mg) by mouth 2 times daily With 1 - 0.5 mg capsule for a total of 3 mg in AM and 3.5 mg in PM    Heart replaced by transplant (H)       triamcinolone 0.1 % ointment    KENALOG    30 g    Apply topically 2 times daily    Dermatitis       Vitamin D3 2000 units Tabs     100 tablet    Take 2,000 Units by mouth daily    Heart transplanted (H)       * Notice:   This list has 4 medication(s) that are the same as other medications prescribed for you. Read the directions carefully, and ask your doctor or other care provider to review them with you.

## 2018-09-17 NOTE — PROGRESS NOTES
[facility] Dermatology Note      Dermatology Problem List:  1. History of heart transplant (2012) on chronic immunosuppressive therapy with tacrolimus, prednisone, and mycophenolate mofetil    CC:   Chief Complaint   Patient presents with     Skin Check     TSC, no lesions of concern noted.          Encounter Date: Sep 17, 2018    History of Present Illness:  Mr. Obed Viramontes is a 22 year old male who presents for evaluation of a skin check in the context of heart transplant (2012).     Left knee red spot - appeared a few weeks ago   - minimal tenderness to palpation   - can't remember inciting event   - no pruritus or overt pain   - no bleeding, scaling      Past Medical History:   Patient Active Problem List   Diagnosis     Aftercare following surgery of the musculoskeletal system     Stiffness of finger joint of left hand     Flexion contracture of left elbow     Mechanical problems with limbs     History of stroke     Heart replaced by transplant (H)     Immunosuppression (H)     Mild protein-calorie malnutrition (H)     Hypertension, unspecified type     Past Medical History:   Diagnosis Date     Cardiomyopathy, hypertrophic obstructive (H)      H/O heart transplant (H)     Mar.23, 2012     Hemiplegia following CVA (cerebrovascular accident) (H)     left, improved with rehab     Lymphoproliferative disease (H)      Unspecified cerebral artery occlusion with cerebral infarction     age 2 1/2     Past Surgical History:   Procedure Laterality Date     ARTHRODESIS WRIST Left 10/20/2016    Procedure: ARTHRODESIS WRIST;  Surgeon: Amna Tinoco MD;  [facility]     BIOPSY      gingival and tonsillar biopsy     CL AFF SURGICAL PATHOLOGY       HEART CATH CHILD  11/13/2012    Procedure: HEART CATH CHILD;  Right Heart Cath Procedure and Biopsy *Latex Allergy*;  Surgeon: Bobbi Pruitt MD;  [facility]     HEART CATH CHILD  2/15/2013    Procedure: HEART CATH CHILD;  Right  Hearth Cath, Angiogram and Biopsy;  Surgeon: Bobbi Pruitt MD;  [facility]     HEART CATH CHILD N/A 4/10/2015    Procedure: HEART CATH CHILD;  Surgeon: Bobbi Pruitt MD;  [facility]     HEART CATH CHILD N/A 4/21/2017    Procedure: HEART CATH CHILD;  Right Heart Cath Procedure with Angio Biopsy    (latex allergy) ;  Surgeon: Bobbi Pruitt MD;  [facility]     HEART CATH, BIOPSY  3/21/2014    Procedure: HEART CATH, BIOPSY;  Right and Left Heart Cath, Angiogram, Biopsy   *Latex Allergy*;  Surgeon: Bobbi Pruitt MD;  [facility]     HEART CATH, BIOPSY Right 4/18/2016    Procedure: HEART CATH, BIOPSY;  Surgeon: Bobbi Pruitt MD;  [facility]     HEART CATH, BIOPSY Right 5/26/2017    Procedure: HEART CATH, BIOPSY;  Right Heart Cath and Biopsy  (Latex Allergy);  Surgeon: Bobbi Pruitt MD;  [facility]     HEART CATH, BIOPSY N/A 9/11/2017    Procedure: HEART CATH, BIOPSY;  Right Heart Cath, Angio and Biopsy ;  Surgeon: Bobbi Pruitt MD;  [facility]     HEART CATH, BIOPSY Right 3/23/2018    Procedure: HEART CATH, BIOPSY;  Right Heart Catheter, Angiogram and Biopsy  **Latex Allergy**;  Surgeon: Aileen Sarabia MD;  [facility]     INTERPOSITION TENDON HAND Left 10/20/2016    Procedure: INTERPOSITION TENDON HAND;  Surgeon: Amna Tinoco MD;  [facility]     ORTHOPEDIC SURGERY      at [place], left lower extremity     PICC INSERTION       PICC REMOVAL       REMOVE HARDWARE WRIST Left 10/12/2017    Procedure: REMOVE HARDWARE WRIST;  Left Wrist Fusion Plate Removal     ;  Surgeon: Amna Tinoco MD;  [facility]     TRANSPLANT HEART RECIPIENT         Social History:   reports that he has never smoked. He has never used smokeless tobacco. He reports that he does not drink alcohol or use illicit drugs.    Family History:  Family History   Problem Relation Age of Onset     Diabetes Sister      Melanoma No  family hx of      Skin Cancer No family hx of        Medications:  Current Outpatient Prescriptions   Medication Sig Dispense Refill     amLODIPine (NORVASC) 5 MG tablet Take 1 tablet (5 mg) by mouth daily 90 tablet 3     aspirin 81 MG EC tablet Take 2 tablets (162 mg) by mouth daily 60 tablet 99     Cholecalciferol (VITAMIN D3) 2000 UNITS TABS Take 2,000 Units by mouth daily 100 tablet 6     mycophenolate (GENERIC EQUIVALENT) 500 MG tablet Take 1 tablet (500 mg) by mouth 2 times daily 180 tablet 5     pravastatin (PRAVACHOL) 10 MG tablet Take 1 tablet (10 mg) by mouth daily At bedtime 90 tablet 3     predniSONE (DELTASONE) 2.5 MG tablet Take 1 tablet (2.5 mg) by mouth daily (Total dose 7.5mg every day) 90 tablet 11     predniSONE (DELTASONE) 5 MG tablet Take 1 tablet (5 mg) by mouth daily (Total dose 7.5mg every day) 90 tablet 11     tacrolimus (GENERIC EQUIVALENT) 0.5 MG capsule Take 1 capsule (0.5 mg) by mouth daily with 3 - 1 mg capsules for a total of 3.5 mg in the evening 90 capsule 3     tacrolimus (GENERIC EQUIVALENT) 1 MG capsule Take 3 capsules (3 mg) by mouth 2 times daily With 1 - 0.5 mg capsule for a total of 3 mg in AM and 3.5 mg in  capsule 3     Allergies   Allergen Reactions     Latex Rash     Other [Seasonal Allergies]      Corn-abdominal pain and diarrhea.         Review of Systems:  -Skin/Heme New Pt: The patient denies frequent sun exposure. The patient denies excessive scarring or problems healing except as per HPI. The patient denies excessive bleeding.  -Constitutional: The patient denies fatigue, fevers, chills, unintended weight loss, and night sweats.  -HEENT: Patient denies nonhealing oral sores.  -Skin: As above in HPI. No additional skin concerns.    Physical exam:  Vitals: /79  Pulse 98  GEN: This is a well developed, thin male in no acute distress, in a pleasant mood.    SKIN: Total skin excluding the undergarment areas was performed. The exam included the head/face,  neck, both arms, chest, back, abdomen, both legs, digits and/or nails.   -Left medial knee with well circumscribed pink 1.2 cm plaque with fine overlying scale.  -Scattered sharply demarcated brown 2-5 mm macules and papules on the face, trunk, and extremities  -No other lesions of concern on areas examined.     Impression/Plan:  1. Eczematous dermatitis    On left knee. Start triamcinolone 0.1% ointment. Discussed liberal use of bland topical moisturizers.    2. Banal-appearing melanocytic nevi on the face, trunk, and extremities    No further intervention required. Patient to report changes.     3. History of heart transplant on chronic immunosuppression    No concerning lesions identified today. Discussed sun protective behaviors including sun avoidance and sunscreen use.      CC Dr. Hao Shine and Nnamdi Kaufman on close of this encounter.  Follow-up in 1 year, earlier for new or changing lesions.       Staff Involved:  Staff Only    Juan Caba MD   of Dermatology  Department of Dermatology  [facility]

## 2018-09-17 NOTE — LETTER
9/17/2018       RE: Obed Viramontes  [address]  [facility] [address]     Dear Colleague,    Thank you for referring your patient, Obed Viramontes, to the [facility] DERMATOLOGY at [facility]. Please see a copy of my visit note below.    [facility] Dermatology Note    Dermatology Problem List:  1. No previous dermatologic history.    Encounter Date: Sep 17, 2018    CC:  Chief Complaint   Patient presents with     Skin Check     TSC, no lesions of concern noted.      History of Present Illness:  Mr. Obed Viramontes is a 22 year old male with history of transplant and immunosuppression who presents for skin check. Obed states there is a spot on his left knee that he noticed two weeks ago without any symptoms that he is concerned about. He denies any other new or changing lesions he is concerned about. He takes 500 mg mycophenolate and 7.5 mg prednisone daily. The patient denies significant history of skin exposure and personal history of skin cancer.     Past Medical History:   Patient Active Problem List   Diagnosis     Aftercare following surgery of the musculoskeletal system     Stiffness of finger joint of left hand     Flexion contracture of left elbow     Mechanical problems with limbs     History of stroke     Heart replaced by transplant (H)     Immunosuppression (H)     Mild protein-calorie malnutrition (H)     Hypertension, unspecified type     Past Medical History:   Diagnosis Date     Cardiomyopathy, hypertrophic obstructive (H)      H/O heart transplant (H)     Mar.23, 2012     Hemiplegia following CVA (cerebrovascular accident) (H)     left, improved with rehab     Lymphoproliferative disease (H)      Unspecified cerebral artery occlusion with cerebral infarction     age 1 1/2     Past Surgical History:   Procedure Laterality Date     ARTHRODESIS WRIST Left 10/20/2016    Procedure: ARTHRODESIS WRIST;  Surgeon: Amna Tinoco MD;   [facility]     BIOPSY      gingival and tonsillar biopsy     CL AFF SURGICAL PATHOLOGY       HEART CATH CHILD  11/13/2012    Procedure: HEART CATH CHILD;  Right Heart Cath Procedure and Biopsy *Latex Allergy*;  Surgeon: Bobbi Pruitt MD;  [facility]     HEART CATH CHILD  2/15/2013    Procedure: HEART CATH CHILD;  Right Hearth Cath, Angiogram and Biopsy;  Surgeon: Bobbi Pruitt MD;  [facility]     HEART CATH CHILD N/A 4/10/2015    Procedure: HEART CATH CHILD;  Surgeon: Bobbi Pruitt MD;  [facility]     HEART CATH CHILD N/A 4/21/2017    Procedure: HEART CATH CHILD;  Right Heart Cath Procedure with Angio Biopsy    (latex allergy) ;  Surgeon: Bobbi Pruitt MD;  [facility]     HEART CATH, BIOPSY  3/21/2014    Procedure: HEART CATH, BIOPSY;  Right and Left Heart Cath, Angiogram, Biopsy   *Latex Allergy*;  Surgeon: Bobbi Pruitt MD;  [facility]     HEART CATH, BIOPSY Right 4/18/2016    Procedure: HEART CATH, BIOPSY;  Surgeon: Bobbi Pruitt MD;  [facility]     HEART CATH, BIOPSY Right 5/26/2017    Procedure: HEART CATH, BIOPSY;  Right Heart Cath and Biopsy  (Latex Allergy);  Surgeon: Bobbi Pruitt MD;  [facility]     HEART CATH, BIOPSY N/A 9/11/2017    Procedure: HEART CATH, BIOPSY;  Right Heart Cath, Angio and Biopsy ;  Surgeon: Bobbi Pruitt MD;  [facility]     HEART CATH, BIOPSY Right 3/23/2018    Procedure: HEART CATH, BIOPSY;  Right Heart Catheter, Angiogram and Biopsy  **Latex Allergy**;  Surgeon: Aileen Sarabia MD;  [facility]     INTERPOSITION TENDON HAND Left 10/20/2016    Procedure: INTERPOSITION TENDON HAND;  Surgeon: Amna Tinoco MD;  [facility]     ORTHOPEDIC SURGERY      at [place], left lower extremity     PICC INSERTION       PICC REMOVAL       REMOVE HARDWARE WRIST Left 10/12/2017    Procedure: REMOVE HARDWARE WRIST;  Left Wrist Fusion Plate Removal     ;  Surgeon: Booker  Amna Lancaster MD;  [facility]     TRANSPLANT HEART RECIPIENT       Social History:   reports that he has never smoked. He has never used smokeless tobacco. He reports that he does not drink alcohol or use illicit drugs.    Family History:  Family History   Problem Relation Age of Onset     Diabetes Sister      Melanoma No family hx of      Skin Cancer No family hx of      Medications:  Current Outpatient Prescriptions   Medication Sig Dispense Refill     amLODIPine (NORVASC) 5 MG tablet Take 1 tablet (5 mg) by mouth daily 90 tablet 3     aspirin 81 MG EC tablet Take 2 tablets (162 mg) by mouth daily 60 tablet 99     Cholecalciferol (VITAMIN D3) 2000 UNITS TABS Take 2,000 Units by mouth daily 100 tablet 6     mycophenolate (GENERIC EQUIVALENT) 500 MG tablet Take 1 tablet (500 mg) by mouth 2 times daily 180 tablet 5     pravastatin (PRAVACHOL) 10 MG tablet Take 1 tablet (10 mg) by mouth daily At bedtime 90 tablet 3     predniSONE (DELTASONE) 2.5 MG tablet Take 1 tablet (2.5 mg) by mouth daily (Total dose 7.5mg every day) 90 tablet 11     predniSONE (DELTASONE) 5 MG tablet Take 1 tablet (5 mg) by mouth daily (Total dose 7.5mg every day) 90 tablet 11     tacrolimus (GENERIC EQUIVALENT) 0.5 MG capsule Take 1 capsule (0.5 mg) by mouth daily with 3 - 1 mg capsules for a total of 3.5 mg in the evening 90 capsule 3     tacrolimus (GENERIC EQUIVALENT) 1 MG capsule Take 3 capsules (3 mg) by mouth 2 times daily With 1 - 0.5 mg capsule for a total of 3 mg in AM and 3.5 mg in  capsule 3     Allergies   Allergen Reactions     Latex Rash     Other [Seasonal Allergies]      Corn-abdominal pain and diarrhea.     Review of Systems:  -Skin/Heme New Pt: The patient denies frequent sun exposure. The patient denies excessive scarring or problems healing except as per HPI. The patient denies excessive bleeding.  -Constitutional: The patient denies fatigue, fevers, chills, unintended weight loss, and night  sweats.  -HEENT: Patient denies nonhealing oral sores.  -Skin: As above in HPI. No additional skin concerns.    Please see attending note for physical exam and assessment and plan.     Staff Involved:  I, Olvin Medrano MS3, saw and examined the patient in the presence of Dr. Caba.    Staff Physician:  I was present with the medical student who participated in the service and in the documentation of the note. I have verified the history and personally performed the physical exam and medical decision making. Please see my note for the physical exam and assessment and plan.    Juan Caba MD   of Dermatology  Department of Dermatology  [facility] [employer]        [facility] Dermatology Note      Dermatology Problem List:  1. History of heart transplant (2012) on chronic immunosuppressive therapy with tacrolimus, prednisone, and mycophenolate mofetil    CC:   Chief Complaint   Patient presents with     Skin Check     TSC, no lesions of concern noted.          Encounter Date: Sep 17, 2018    History of Present Illness:  Mr. Obed Viramontes is a 22 year old male who presents for evaluation of a skin check in the context of heart transplant (2012).     Left knee red spot - appeared a few weeks ago   - minimal tenderness to palpation   - can't remember inciting event   - no pruritus or overt pain   - no bleeding, scaling      Past Medical History:   Patient Active Problem List   Diagnosis     Aftercare following surgery of the musculoskeletal system     Stiffness of finger joint of left hand     Flexion contracture of left elbow     Mechanical problems with limbs     History of stroke     Heart replaced by transplant (H)     Immunosuppression (H)     Mild protein-calorie malnutrition (H)     Hypertension, unspecified type     Past Medical History:   Diagnosis Date     Cardiomyopathy, hypertrophic obstructive (H)      H/O heart transplant (H)     Mar.23,  2012     Hemiplegia following CVA (cerebrovascular accident) (H)     left, improved with rehab     Lymphoproliferative disease (H)      Unspecified cerebral artery occlusion with cerebral infarction     age 2 1/2     Past Surgical History:   Procedure Laterality Date     ARTHRODESIS WRIST Left 10/20/2016    Procedure: ARTHRODESIS WRIST;  Surgeon: Amna Tinoco MD;  [facility]     BIOPSY      gingival and tonsillar biopsy     CL AFF SURGICAL PATHOLOGY       HEART CATH CHILD  11/13/2012    Procedure: HEART CATH CHILD;  Right Heart Cath Procedure and Biopsy *Latex Allergy*;  Surgeon: Bobbi Pruitt MD;  [facility]     HEART CATH CHILD  2/15/2013    Procedure: HEART CATH CHILD;  Right Hearth Cath, Angiogram and Biopsy;  Surgeon: Bobbi Pruitt MD;  [facility]     HEART CATH CHILD N/A 4/10/2015    Procedure: HEART CATH CHILD;  Surgeon: Bobbi Pruitt MD;  [facility]     HEART CATH CHILD N/A 4/21/2017    Procedure: HEART CATH CHILD;  Right Heart Cath Procedure with Angio Biopsy    (latex allergy) ;  Surgeon: Bobbi Pruitt MD;  [facility]     HEART CATH, BIOPSY  3/21/2014    Procedure: HEART CATH, BIOPSY;  Right and Left Heart Cath, Angiogram, Biopsy   *Latex Allergy*;  Surgeon: Bobbi Pruitt MD;  [facility]     HEART CATH, BIOPSY Right 4/18/2016    Procedure: HEART CATH, BIOPSY;  Surgeon: Bobbi Pruitt MD;  [facility]     HEART CATH, BIOPSY Right 5/26/2017    Procedure: HEART CATH, BIOPSY;  Right Heart Cath and Biopsy  (Latex Allergy);  Surgeon: Bobbi Pruitt MD;  [facility]     HEART CATH, BIOPSY N/A 9/11/2017    Procedure: HEART CATH, BIOPSY;  Right Heart Cath, Angio and Biopsy ;  Surgeon: Bobbi Pruitt MD;  [facility]     HEART CATH, BIOPSY Right 3/23/2018    Procedure: HEART CATH, BIOPSY;  Right Heart Catheter, Angiogram and Biopsy  **Latex Allergy**;  Surgeon: Aileen Sarabia MD;  [facility]     INTERPOSITION TENDON HAND Left 10/20/2016    Procedure: INTERPOSITION TENDON HAND;  Surgeon: Amna Tinoco MD;  [facility]     ORTHOPEDIC SURGERY      at [place], left lower extremity     PICC INSERTION       PICC REMOVAL       REMOVE HARDWARE WRIST Left 10/12/2017    Procedure: REMOVE HARDWARE WRIST;  Left Wrist Fusion Plate Removal     ;  Surgeon: Amna Tinoco MD;  [facility]     TRANSPLANT HEART RECIPIENT         Social History:   reports that he has never smoked. He has never used smokeless tobacco. He reports that he does not drink alcohol or use illicit drugs.    Family History:  Family History   Problem Relation Age of Onset     Diabetes Sister      Melanoma No family hx of      Skin Cancer No family hx of        Medications:  Current Outpatient Prescriptions   Medication Sig Dispense Refill     amLODIPine (NORVASC) 5 MG tablet Take 1 tablet (5 mg) by mouth daily 90 tablet 3     aspirin 81 MG EC tablet Take 2 tablets (162 mg) by mouth daily 60 tablet 99     Cholecalciferol (VITAMIN D3) 2000 UNITS TABS Take 2,000 Units by mouth daily 100 tablet 6     mycophenolate (GENERIC EQUIVALENT) 500 MG tablet Take 1 tablet (500 mg) by mouth 2 times daily 180 tablet 5     pravastatin (PRAVACHOL) 10 MG tablet Take 1 tablet (10 mg) by mouth daily At bedtime 90 tablet 3     predniSONE (DELTASONE) 2.5 MG tablet Take 1 tablet (2.5 mg) by mouth daily (Total dose 7.5mg every day) 90 tablet 11     predniSONE (DELTASONE) 5 MG tablet Take 1 tablet (5 mg) by mouth daily (Total dose 7.5mg every day) 90 tablet 11     tacrolimus (GENERIC EQUIVALENT) 0.5 MG capsule Take 1 capsule (0.5 mg) by mouth daily with 3 - 1 mg capsules for a total of 3.5 mg in the evening 90 capsule 3     tacrolimus (GENERIC EQUIVALENT) 1 MG capsule Take 3 capsules (3 mg) by mouth 2 times daily With 1 - 0.5 mg capsule for a total of 3 mg in AM and 3.5 mg in  capsule 3     Allergies   Allergen Reactions     Latex  Rash     Other [Seasonal Allergies]      Corn-abdominal pain and diarrhea.         Review of Systems:  -Skin/Heme New Pt: The patient denies frequent sun exposure. The patient denies excessive scarring or problems healing except as per HPI. The patient denies excessive bleeding.  -Constitutional: The patient denies fatigue, fevers, chills, unintended weight loss, and night sweats.  -HEENT: Patient denies nonhealing oral sores.  -Skin: As above in HPI. No additional skin concerns.    Physical exam:  Vitals: /79  Pulse 98  GEN: This is a well developed, thin male in no acute distress, in a pleasant mood.    SKIN: Total skin excluding the undergarment areas was performed. The exam included the head/face, neck, both arms, chest, back, abdomen, both legs, digits and/or nails.   -Left medial knee with well circumscribed pink 1.2 cm plaque with fine overlying scale.  -Scattered sharply demarcated brown 2-5 mm macules and papules on the face, trunk, and extremities  -No other lesions of concern on areas examined.     Impression/Plan:  1. Eczematous dermatitis    On left knee. Start triamcinolone 0.1% ointment. Discussed liberal use of bland topical moisturizers.    2. Banal-appearing melanocytic nevi on the face, trunk, and extremities    No further intervention required. Patient to report changes.     3. History of heart transplant on chronic immunosuppression    No concerning lesions identified today. Discussed sun protective behaviors including sun avoidance and sunscreen use.      CC Dr. Hao Shine and Nnamdi Kaufman on close of this encounter.  Follow-up in 1 year, earlier for new or changing lesions.       Staff Involved:  Staff Only    Juan Caba MD   of Dermatology  Department of Dermatology  [facility] [employer]        Again, thank you for allowing me to participate in the care of your patient.      Sincerely,    Juan Caba MD

## 2018-10-02 NOTE — PROGRESS NOTES
"[facility]  Primary Care Center   Nnamdi Kaufman MD  10/03/2018      Chief Complaint:   Physical     History of Present Illness:   Obed Viramontes is a 22 year old male with a history of cardiomyopathy, hypertrophic obstructive s/p heart transplant (2012), cerebral infarction age 2 1/2 with hemiplegia following the CVA, lymphoproliferative disease and immunosuppression who presents for follow up. He is seen once a year by the transplant team, and once a year by the dermatologist.     Cold  The patient states that he is feeling alright, but has a cold right now. He endorses rhinorrhea, sore throat, and cough, but denies fever. He states that this usually lasts him 5-7 days. Of note, his mother is sick now too. He does not get \"too many colds\" per year. He does not use neti pots ever.     Headache   He has been getting more headaches, and so takes about 1500mg of Tylenol per day for this. He gets these once every 3-4 days. They do not cause him nausea, and the Tylenol does help. He does sit more for his job now, but does not correlate this to his job. 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 This has made a difference in his headaches.     Healthcare Maintenance  Glucose: every 5 years, yearly if risk factors? - Up to Date 2018  Lipid panel: every 5 years, yearly if risk factors - Up to Date 09/2018  Immunizations (Tetanus every 10 years, Influenza yearly, Pneumonia PPV-23 and PCV-13 age ? 65 or sooner if risk factors) - Recommended   Immunization History   Administered Date(s) Administered     DTAP (<7y) 02/24/1995, 04/13/1995, 06/19/1995, 06/03/1996, 01/12/2000     HepB 1994, 02/24/1995, 09/25/1995     Hib (PRP-T) 02/24/1995, 06/03/1996     Influenza (IIV3) PF 10/31/1997, 12/02/1997, 11/16/2000, 12/20/2012, 10/18/2013     Influenza Vaccine IM 3yrs+ 4 Valent IIV4 10/19/2015     MMR 06/03/1996, 01/12/2000     " Poliovirus, inactivated (IPV) 02/24/1995, 04/13/1995, 06/19/1995, 06/03/1996     TD (ADULT, 7+) 08/29/2008     TDAP Vaccine (Boostrix) 03/07/2018     Varicella 06/03/1996      The following health maintenance issues were also reviewed and addressed as needed:  Blood pressure (>18 years annually)  Lifestyle habits (including exercise, diet, weight)  Health risk behaviors (sexual history, alcohol, tobacco, drug use, partner violence)  Routine eye, dental, hearing, and skin exams  Advanced directives     Review of Systems:   Pertinent items are noted in HPI, remainder of complete ROS is negative.      Active Medications:      amLODIPine (NORVASC) 5 MG tablet, Take 1 tablet (5 mg) by mouth daily, Disp: 90 tablet, Rfl: 3     aspirin 81 MG EC tablet, Take 2 tablets (162 mg) by mouth daily, Disp: 60 tablet, Rfl: 99     Cholecalciferol (VITAMIN D3) 2000 UNITS TABS, Take 2,000 Units by mouth daily, Disp: 100 tablet, Rfl: 6     mycophenolate (GENERIC EQUIVALENT) 500 MG tablet, Take 1 tablet (500 mg) by mouth 2 times daily, Disp: 180 tablet, Rfl: 5     pravastatin (PRAVACHOL) 10 MG tablet, Take 1 tablet (10 mg) by mouth daily At bedtime, Disp: 90 tablet, Rfl: 3     predniSONE (DELTASONE) 2.5 MG tablet, Take 1 tablet (2.5 mg) by mouth daily (Total dose 7.5mg every day), Disp: 90 tablet, Rfl: 11     predniSONE (DELTASONE) 5 MG tablet, Take 1 tablet (5 mg) by mouth daily (Total dose 7.5mg every day), Disp: 90 tablet, Rfl: 11     tacrolimus (GENERIC EQUIVALENT) 0.5 MG capsule, Take 1 capsule (0.5 mg) by mouth daily with 3 - 1 mg capsules for a total of 3.5 mg in the evening, Disp: 90 capsule, Rfl: 3     tacrolimus (GENERIC EQUIVALENT) 1 MG capsule, Take 3 capsules (3 mg) by mouth 2 times daily With 1 - 0.5 mg capsule for a total of 3 mg in AM and 3.5 mg in PM, Disp: 540 capsule, Rfl: 3     triamcinolone (KENALOG) 0.1 % ointment, Apply topically 2 times daily, Disp: 30 g, Rfl: 1      Allergies:   Latex and Other [seasonal  allergies]      Past Medical History:  Cardiomyopathy, hypertrophic obstructive  History of heart transplant  Hemiplegia following CVA  Lymphoproliferative disease  Unspecified cerebral artery occlusion with cerebral infarction, age 2 1/2  Flexion contraction of left elbow  Immunosuppression  Mild protein calorie malnutrition  Mechanical problems with limbs      Past Surgical History:  Arthrodesis wrist  Biopsy  AFF surgical pathology  Heart Cath Child x4  Heart Cath Biopsy x5  Left lower extremity orthopedic surgery  PICC insertion  PICC removal  Remove hardware wrist  Transplant heart recipient     Family History:   Diabetes - Sister      Social History:   The patient was alone.  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 He works late at night and opens in the morning. He does work a lot on the weekends.   Home: He is living with his dad right now, but will be moving out of his dad's home soon as his dad is [place] with his girlfriend. The patient does not know where he will live yet, but will be staying with his sister for a short time.      Physical Exam:   /83  Pulse 101  Temp 98  F (36.7  C) (Oral)  Resp 18  Wt 67.9 kg (149 lb 11.2 oz)  SpO2 97%  BMI 19.75 kg/m2     GENERAL APPEARANCE: Healthy, alert and no distress  EYES: EOMI, PERRL; conjunctiva injected bilaterally; right eye watery  HENT: Ear canals and TM's normal. Nose and mouth without ulcers or lesions  NECK: No adenopathy, no asymmetry, masses, or scars. Thyroid normal to palpation  RESP: Lungs clear to auscultation - no rales, rhonchi or wheezes  CV: Regular rates and rhythm, normal S1 and S2, no S3 or S4 and no murmur, click or rub  ABDOMEN:  Soft, nontender, no HSM or masses and bowel sounds normal  MS: left hemiplegia with left arm/ leg  weakness and left elbow  contracture  NEURO: Normal strength and tone, mentation intact and speech normal  PSYCH: Mentation appears normal and affect normal/bright      Assessment and Plan:  Heart replaced by transplant (H)  We reviewed his history today; he is seeing his transplant team once a year. He just saw cardiology in September, and will be seeing them again next September. The patient would like a flu shot today, and so this was given to him.   - HC FLU VAC PRESRV FREE QUAD SPLIT VIR 3+YRS IM    Headaches  For his headaches, I recommended he cut back on his caffeine intake and let me know via MyChart if his headaches get worse. I also counseled him on the safe dose of Tylenol to take per day--1500mg is safe for him and he understands this.     Follow-up:  6 months       Scribe Disclosure:   I, Maria L Hewitt, am serving as a scribe to document services personally performed by Nnamdi Kaufman MD at this visit, based upon the provider's statements to me. All documentation has been reviewed by the aforementioned provider prior to being entered into the official medical record.     Portions of this medical record were completed by a scribe. UPON MY REVIEW AND AUTHENTICATION BY ELECTRONIC SIGNATURE, this confirms (a) I performed the applicable clinical services, and (b) the record is accurate.     Nnamdi Kaufman MD, A

## 2018-10-03 ENCOUNTER — OFFICE VISIT (OUTPATIENT)
Dept: INTERNAL MEDICINE | Facility: CLINIC | Age: 23
End: 2018-10-03
Payer: COMMERCIAL

## 2018-10-03 VITALS
OXYGEN SATURATION: 97 % | DIASTOLIC BLOOD PRESSURE: 83 MMHG | WEIGHT: 149.7 LBS | SYSTOLIC BLOOD PRESSURE: 122 MMHG | TEMPERATURE: 98 F | BODY MASS INDEX: 19.75 KG/M2 | RESPIRATION RATE: 18 BRPM | HEART RATE: 101 BPM

## 2018-10-03 DIAGNOSIS — Z86.73 HISTORY OF STROKE: ICD-10-CM

## 2018-10-03 DIAGNOSIS — Z94.1 HEART REPLACED BY TRANSPLANT (H): Primary | ICD-10-CM

## 2018-10-03 ASSESSMENT — PAIN SCALES - GENERAL: PAINLEVEL: NO PAIN (0)

## 2018-10-03 NOTE — MR AVS SNAPSHOT
After Visit Summary   10/3/2018    Obed Viramontes    MRN: [id number]           Patient Information     Date Of Birth          1995        Visit Information        Provider Department      10/3/2018 2:10 PM Nnamdi Kaufman MD [facility]        Today's Diagnoses     Heart replaced by transplant (H)    -  1    History of stroke           Follow-ups after your visit        Who to contact     Please call your clinic at [phone] to:    Ask questions about your health    Make or cancel appointments    Discuss your medicines    Learn about your test results    Speak to your doctor            Additional Information About Your Visit        MyChart Information     [employer] gives you secure access to your electronic health record. If you see a primary care provider, you can also send messages to your care team and make appointments. If you have questions, please call your primary care clinic.  If you do not have a primary care provider, please call [phone] and they will assist you.      [employer] is an electronic gateway that provides easy, online access to your medical records. With [employer], you can request a clinic appointment, read your test results, renew a prescription or communicate with your care team.     To access your existing account, please contact your [facility] or call [phone] for assistance.        Care EveryWhere ID     This is your Care EveryWhere ID. This could be used by other organizations to access your [facility] medical records  [id number]        Your Vitals Were     Pulse Temperature Respirations Pulse Oximetry BMI (Body Mass Index)       101 98  F (36.7  C) (Oral) 18 97% 19.75 kg/m2        Blood Pressure from Last 3 Encounters:   10/03/18 122/83   09/17/18 120/79   09/06/18 124/82    Weight from Last 3 Encounters:   10/03/18 67.9 kg (149 lb 11.2 oz)   09/06/18 68.5 kg (151 lb)   08/08/18 66.3 kg (146 lb 1.6 oz)               We Performed the Following     HC FLU VAC PRESRV FREE QUAD SPLIT VIR 3+YRS IM        Primary Care Provider Office Phone # Fax #    Nnamdi Kaufman [phone] [address] [facility] [address]        Equal Access to Services     TIMONAOMI DIONICIO : Hadii aad ku hadnaomyo Soomaali, waaxda luqadaha, qaybta kaalmada adeegyada, waxay idiin haygeorginan adegregoria luis lafidelthee . So [facility] [phone].    ATENCIÓN: Si habla español, tiene a neves disposición servicios gratuitos de asistencia lingüística. Llame al [phone].    We comply with applicable federal civil rights laws and [place] laws. We do not discriminate on the basis of race, color, national origin, age, disability, sex, sexual orientation, or gender identity.            Thank you!     Thank you for choosing [facility]  for your care. Our goal is always to provide you with excellent care. Hearing back from our patients is one way we can continue to improve our services. Please take a few minutes to complete the written survey that you may receive in the mail after your visit with us. Thank you!             Your Updated Medication List - Protect others around you: Learn how to safely use, store and throw away your medicines at www.disposemymeds.org.          This list is accurate as of 10/3/18  2:32 PM.  Always use your most recent med list.                   Brand Name Dispense Instructions for use Diagnosis    amLODIPine 5 MG tablet    NORVASC    90 tablet    Take 1 tablet (5 mg) by mouth daily    Heart replaced by transplant (H)       aspirin 81 MG EC tablet     60 tablet    Take 2 tablets (162 mg) by mouth daily    Status post transplant, heart (H)       mycophenolate 500 MG tablet    GENERIC EQUIVALENT    180 tablet    Take 1 tablet (500 mg) by mouth 2 times daily        pravastatin 10 MG tablet    PRAVACHOL    90 tablet    Take 1 tablet (10 mg) by mouth daily At bedtime    Heart replaced by transplant (H)       *  predniSONE 2.5 MG tablet    DELTASONE    90 tablet    Take 1 tablet (2.5 mg) by mouth daily (Total dose 7.5mg every day)    Heart replaced by transplant (H)       * predniSONE 5 MG tablet    DELTASONE    90 tablet    Take 1 tablet (5 mg) by mouth daily (Total dose 7.5mg every day)    Heart replaced by transplant (H)       * tacrolimus 0.5 MG capsule    GENERIC EQUIVALENT    90 capsule    Take 1 capsule (0.5 mg) by mouth daily with 3 - 1 mg capsules for a total of 3.5 mg in the evening    Heart replaced by transplant (H)       * tacrolimus 1 MG capsule    GENERIC EQUIVALENT    540 capsule    Take 3 capsules (3 mg) by mouth 2 times daily With 1 - 0.5 mg capsule for a total of 3 mg in AM and 3.5 mg in PM    Heart replaced by transplant (H)       triamcinolone 0.1 % ointment    KENALOG    30 g    Apply topically 2 times daily    Dermatitis       Vitamin D3 2000 units Tabs     100 tablet    Take 2,000 Units by mouth daily    Heart transplanted (H)       * Notice:  This list has 4 medication(s) that are the same as other medications prescribed for you. Read the directions carefully, and ask your doctor or other care provider to review them with you.

## 2018-10-03 NOTE — NURSING NOTE
Chief Complaint   Patient presents with     Physical     Patient is here for physical exam       Srinivas Marr CMA (AAMA) at 2:08 PM on 10/3/2018

## 2018-10-03 NOTE — NURSING NOTE
Obed Viramontes      1.  Has the patient received the information for the influenza vaccine? YES    2.  Does the patient have any of the following contraindications?     Allergy to eggs? No     Allergic reaction to previous influenza vaccines? No     Any other problems to previous influenza vaccines? No     Paralyzed by Guillain-[place] syndrome? No     Currently pregnant? NO     Current moderate or severe illness? No     Allergy to contact lens solution? No    3.  The vaccine has been administered in the usual fashion and the patient was instructed to wait 20 minutes before leaving the building in the event of an allergic reaction: YES    Vaccination given by Lazaro Bullock, EMT 2:35 PM on 10/3/2018.  Recorded by Lazaro Bullock

## 2018-10-15 NOTE — TELEPHONE ENCOUNTER
Called Obed and his mother, phones both go to voicemail boxes that are full. Texted Obed to let him know his Tacro level came back 13.7 (goal 10-15). Instructed him to stay on his current Tacro dose of 3mg twice daily, again encouraged him to take his doses at 8am and 8pm. I also reminded him to [facility] trop[facility] level on Friday.   
Have Your Spot(S) Been Treated In The Past?: has not been treated
Hpi Title: Evaluation of Skin Lesions

## 2018-10-31 ENCOUNTER — APPOINTMENT (OUTPATIENT)
Dept: GENERAL RADIOLOGY | Facility: CLINIC | Age: 23
End: 2018-10-31
Attending: FAMILY MEDICINE
Payer: COMMERCIAL

## 2018-10-31 ENCOUNTER — TELEPHONE (OUTPATIENT)
Dept: TRANSPLANT | Facility: CLINIC | Age: 23
End: 2018-10-31

## 2018-10-31 ENCOUNTER — APPOINTMENT (OUTPATIENT)
Dept: CARDIOLOGY | Facility: CLINIC | Age: 23
End: 2018-10-31
Attending: FAMILY MEDICINE
Payer: COMMERCIAL

## 2018-10-31 ENCOUNTER — HOSPITAL ENCOUNTER (EMERGENCY)
Facility: CLINIC | Age: 23
Discharge: HOME | End: 2018-10-31
Attending: FAMILY MEDICINE | Admitting: FAMILY MEDICINE
Payer: COMMERCIAL

## 2018-10-31 VITALS
HEART RATE: 99 BPM | BODY MASS INDEX: 19.75 KG/M2 | DIASTOLIC BLOOD PRESSURE: 67 MMHG | RESPIRATION RATE: 16 BRPM | TEMPERATURE: 98.3 F | OXYGEN SATURATION: 99 % | HEIGHT: 73 IN | WEIGHT: 149 LBS | SYSTOLIC BLOOD PRESSURE: 142 MMHG

## 2018-10-31 DIAGNOSIS — Z94.1 HEART REPLACED BY TRANSPLANT (H): ICD-10-CM

## 2018-10-31 DIAGNOSIS — R07.89 OTHER CHEST PAIN: ICD-10-CM

## 2018-10-31 DIAGNOSIS — M25.512 ACUTE PAIN OF LEFT SHOULDER: ICD-10-CM

## 2018-10-31 LAB
ALBUMIN SERPL-MCNC: 4.2 G/DL (ref 3.4–5)
ALP SERPL-CCNC: 66 U/L (ref 40–150)
ALT SERPL W P-5'-P-CCNC: 25 U/L (ref 0–70)
ANION GAP SERPL CALCULATED.3IONS-SCNC: 6 MMOL/L (ref 3–14)
AST SERPL W P-5'-P-CCNC: 12 U/L (ref 0–45)
BASOPHILS # BLD AUTO: 0 10E9/L (ref 0–0.2)
BASOPHILS NFR BLD AUTO: 0.1 %
BILIRUB SERPL-MCNC: 0.5 MG/DL (ref 0.2–1.3)
BUN SERPL-MCNC: 18 MG/DL (ref 7–30)
CALCIUM SERPL-MCNC: 9.1 MG/DL (ref 8.5–10.1)
CHLORIDE SERPL-SCNC: 103 MMOL/L (ref 94–109)
CO2 SERPL-SCNC: 31 MMOL/L (ref 20–32)
CREAT SERPL-MCNC: 0.75 MG/DL (ref 0.66–1.25)
D DIMER PPP FEU-MCNC: <0.3 UG/ML FEU (ref 0–0.5)
DIFFERENTIAL METHOD BLD: ABNORMAL
EOSINOPHIL # BLD AUTO: 0.1 10E9/L (ref 0–0.7)
EOSINOPHIL NFR BLD AUTO: 0.5 %
ERYTHROCYTE [DISTWIDTH] IN BLOOD BY AUTOMATED COUNT: 12.6 % (ref 10–15)
GFR SERPL CREATININE-BSD FRML MDRD: >90 ML/MIN/1.7M2
GLUCOSE SERPL-MCNC: 113 MG/DL (ref 70–99)
HCT VFR BLD AUTO: 44.6 % (ref 40–53)
HGB BLD-MCNC: 15.1 G/DL (ref 13.3–17.7)
IMM GRANULOCYTES # BLD: 0 10E9/L (ref 0–0.4)
IMM GRANULOCYTES NFR BLD: 0.2 %
INTERPRETATION ECG - MUSE: NORMAL
LYMPHOCYTES # BLD AUTO: 1 10E9/L (ref 0.8–5.3)
LYMPHOCYTES NFR BLD AUTO: 7.9 %
MCH RBC QN AUTO: 29.3 PG (ref 26.5–33)
MCHC RBC AUTO-ENTMCNC: 33.9 G/DL (ref 31.5–36.5)
MCV RBC AUTO: 86 FL (ref 78–100)
MONOCYTES # BLD AUTO: 1.5 10E9/L (ref 0–1.3)
MONOCYTES NFR BLD AUTO: 11.8 %
NEUTROPHILS # BLD AUTO: 10 10E9/L (ref 1.6–8.3)
NEUTROPHILS NFR BLD AUTO: 79.5 %
NRBC # BLD AUTO: 0 10*3/UL
NRBC BLD AUTO-RTO: 0 /100
PLATELET # BLD AUTO: 186 10E9/L (ref 150–450)
POTASSIUM SERPL-SCNC: 3.8 MMOL/L (ref 3.4–5.3)
PROT SERPL-MCNC: 7.6 G/DL (ref 6.8–8.8)
RBC # BLD AUTO: 5.16 10E12/L (ref 4.4–5.9)
SODIUM SERPL-SCNC: 140 MMOL/L (ref 133–144)
TROPONIN I BLD-MCNC: 0.01 UG/L (ref 0–0.08)
TROPONIN I SERPL-MCNC: 0.01 UG/L (ref 0–0.04)
WBC # BLD AUTO: 12.6 10E9/L (ref 4–11)

## 2018-10-31 PROCEDURE — 85379 FIBRIN DEGRADATION QUANT: CPT | Performed by: FAMILY MEDICINE

## 2018-10-31 PROCEDURE — 96375 TX/PRO/DX INJ NEW DRUG ADDON: CPT | Performed by: FAMILY MEDICINE

## 2018-10-31 PROCEDURE — 84484 ASSAY OF TROPONIN QUANT: CPT

## 2018-10-31 PROCEDURE — 80053 COMPREHEN METABOLIC PANEL: CPT | Performed by: FAMILY MEDICINE

## 2018-10-31 PROCEDURE — 85025 COMPLETE CBC W/AUTO DIFF WBC: CPT | Performed by: FAMILY MEDICINE

## 2018-10-31 PROCEDURE — 93005 ELECTROCARDIOGRAM TRACING: CPT | Performed by: FAMILY MEDICINE

## 2018-10-31 PROCEDURE — 71045 X-RAY EXAM CHEST 1 VIEW: CPT

## 2018-10-31 PROCEDURE — 73030 X-RAY EXAM OF SHOULDER: CPT | Mod: LT

## 2018-10-31 PROCEDURE — 96374 THER/PROPH/DIAG INJ IV PUSH: CPT | Performed by: FAMILY MEDICINE

## 2018-10-31 PROCEDURE — 93325 DOPPLER ECHO COLOR FLOW MAPG: CPT

## 2018-10-31 PROCEDURE — 25000132 ZZH RX MED GY IP 250 OP 250 PS 637: Performed by: FAMILY MEDICINE

## 2018-10-31 PROCEDURE — 99285 EMERGENCY DEPT VISIT HI MDM: CPT | Mod: 25 | Performed by: FAMILY MEDICINE

## 2018-10-31 PROCEDURE — 25000128 H RX IP 250 OP 636: Performed by: FAMILY MEDICINE

## 2018-10-31 PROCEDURE — 84484 ASSAY OF TROPONIN QUANT: CPT | Performed by: FAMILY MEDICINE

## 2018-10-31 PROCEDURE — 93010 ELECTROCARDIOGRAM REPORT: CPT | Mod: Z6 | Performed by: FAMILY MEDICINE

## 2018-10-31 PROCEDURE — 96376 TX/PRO/DX INJ SAME DRUG ADON: CPT | Performed by: FAMILY MEDICINE

## 2018-10-31 RX ORDER — ASPIRIN 81 MG/1
324 TABLET, CHEWABLE ORAL ONCE
Status: COMPLETED | OUTPATIENT
Start: 2018-10-31 | End: 2018-10-31

## 2018-10-31 RX ORDER — HYDROMORPHONE HCL/0.9% NACL/PF 0.2MG/0.2
0.2 SYRINGE (ML) INTRAVENOUS
Status: COMPLETED | OUTPATIENT
Start: 2018-10-31 | End: 2018-10-31

## 2018-10-31 RX ORDER — CYCLOBENZAPRINE HCL 10 MG
5-10 TABLET ORAL 3 TIMES DAILY PRN
Qty: 20 TABLET | Refills: 0 | Status: SHIPPED | OUTPATIENT
Start: 2018-10-31 | End: 2019-08-21

## 2018-10-31 RX ORDER — NAPROXEN 500 MG/1
500 TABLET ORAL 2 TIMES DAILY WITH MEALS
Qty: 6 TABLET | Refills: 0 | Status: SHIPPED | OUTPATIENT
Start: 2018-10-31 | End: 2018-11-03

## 2018-10-31 RX ORDER — HYDROMORPHONE HCL/0.9% NACL/PF 0.2MG/0.2
0.2 SYRINGE (ML) INTRAVENOUS ONCE
Status: COMPLETED | OUTPATIENT
Start: 2018-10-31 | End: 2018-10-31

## 2018-10-31 RX ORDER — KETOROLAC TROMETHAMINE 15 MG/ML
15 INJECTION, SOLUTION INTRAMUSCULAR; INTRAVENOUS ONCE
Status: COMPLETED | OUTPATIENT
Start: 2018-10-31 | End: 2018-10-31

## 2018-10-31 RX ADMIN — KETOROLAC TROMETHAMINE 15 MG: 15 INJECTION, SOLUTION INTRAMUSCULAR; INTRAVENOUS at 11:53

## 2018-10-31 RX ADMIN — Medication 0.2 MG: at 10:21

## 2018-10-31 RX ADMIN — Medication 0.2 MG: at 11:16

## 2018-10-31 RX ADMIN — ASPIRIN 81 MG CHEWABLE TABLET 324 MG: 81 TABLET CHEWABLE at 10:20

## 2018-10-31 ASSESSMENT — ENCOUNTER SYMPTOMS
MYALGIAS: 0
ABDOMINAL PAIN: 0
CHILLS: 0
COUGH: 0

## 2018-10-31 NOTE — DISCHARGE INSTRUCTIONS
Thank you for choosing [facility].     Please closely monitor for further symptoms. Return to the Emergency Department if you develop any new or worsening signs or symptoms.    If you received any opiate pain medications or sedatives during your visit, please do not drive for at least 8 hours.     Labs, cultures or final xray interpretations may still need to be reviewed.  We will call you if your plan of care needs to be changed.    Please follow up with your orthopedic appointment as scheduled tomorrow.  Should you continue to have symptoms such as discomfort in your chest also follow-up with your transplant coordinator.

## 2018-10-31 NOTE — TELEPHONE ENCOUNTER
"Mom called, expressed frustration that she has not been able to get a hold of transplant coordinator since 8am this morning. Mom has been calling primary coordinator's direct line, but did get connected to  x2. Mom states that she also asked that the coordinator be paged but hadn't heard back.      Provided reassurance and explained that primary coordinator should have been at her desk at 8am but left for clinic after. Mom reports that she did not know where to bring pt (transplanted as peds, transitioned last month to adult team) so she brought him to [facility] ED. She reports that pt woke up this morning with shooting pain in left shoulder, took tylenol then developed chest pain and shortness of breath, \"worst he's had since transplant!\" She reports HR of 112, BP of 140/94, and 02 sats of 97%. Reviewed with mom pt's last cath in March, normal coronaries. Reviewed with mom CPX and cardiac MRI in September--WNL. Coordinator will touch base with Dr. Shine and get back to mom.     Presented information to Dr. Shine who feels pt should continue work up at [facility]. Provided [facility] ED nurse Maribel with Dr. Shine's pager if there are any questions about pt's care.    Before coordinator could call pt's mom back, she called primary coordinator again on coordinator's line. Reminded her that primary coordinator is at clinic still so mom needs to call main office number to speak with admin who can direct her to available coordinator; mom states she was only given coordinator's desk number. Again, stressed that primary coordinator will not always be at her desk in the future so to avoid frustration on their part, should always call the office. Mom did not request office number and quickly reported that ED provider requests Dr. Shine call him/her. Informed her that coordinator already provided bedside nurse Maribel with Dr. Shine's number, but did not get a chance to call pt/mom since Maribel commented that the " provider was in the room with patient. Mom verbalized understanding and will connect with bedside nurse. Primary coordinator updated.

## 2018-10-31 NOTE — ED AVS SNAPSHOT
[facility], Emergency Department    [address]    [facility] [address]    Phone:  [phone]    Fax:  [phone]                                       Obed Viramontes   MRN: [id number]    Department:  [facility], Emergency Department   Date of Visit:  10/31/2018           After Visit Summary Signature Page     I have received my discharge instructions, and my questions have been answered. I have discussed any challenges I see with this plan with the nurse or doctor.    ..........................................................................................................................................  Patient/Patient Representative Signature      ..........................................................................................................................................  Patient Representative Print Name and Relationship to Patient    ..................................................               ................................................  Date                                   Time    ..........................................................................................................................................  Reviewed by Signature/Title    ...................................................              ..............................................  Date                                               Time          22EPIC Rev 08/18

## 2018-10-31 NOTE — TELEPHONE ENCOUNTER
Reviewed 6 [phone] VMs - mom left message at 8:51 and said she would try back in an hour and press zero.  Coordinator left for clinic by 8AM.   Mom was able to reach colleague who was able to connect with Dr Shine.    Discussed that she should have the main office phone and if she reached my VM to press zero.     Mom stated pt was in the ER; assured her Dr Shine is aware and has been consulted.   Will follow up once pt discontinue to ensure they both have all the best numbers to reach team.

## 2018-10-31 NOTE — ED PROVIDER NOTES
[facility] EMERGENCY DEPARTMENT ([facility])    10/31/18      History     Chief Complaint   Patient presents with     Chest Pain     Chest pain w/SOB. Onset 0730 this AM. Concerned about taking too much tylenol last night ~1500 mg over a few hours for back and left shoulder pain. Denies injury. Heart transplant pt in 2012. Just transferred to the adult program     The history is provided by the patient, a parent and medical records.     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 The patient reports unique left shoulder pain that began early this morning. The patient indicates he took Tylenol between 02:00-03:00 for the shoulder pain, and again, accidentally, at approximately 07:15. The patient then reports the onset of chest pain shortly afterwards. The patient indicates, now, the left shoulder pain origintes posterially and radiates diffusely throughout the shoulder. The patient reports his chest pain is still present, constant but not radiating, and worsens with deep breathing. Patient reports a subjective fever and cold-like symptoms in the last two months. Patient denies abdominal pain, cough, chills, or myalgia. Patient's mother reports he has left sided weakness at baseline, and this is unchanged. The patient indicates no known drug allergies.    Per chart review, patient underwent a cardiac stress test, chest x-ray, and cardiac MRI on 09/06/2018.    ECG Impressions:  The stress ECG was negative for ischemia at a diagnostic level of myocardial O2 demand.    Chest X-Ray Impressions:  Stable postoperative changes of heart transplant.  No acute cardiopulmonary process.    Cardiac MRI Impressions:  Normal biventricular function, LVEF 55% and RVEF 53%, with no  evidence of focal myocardial fibrosis.    I have reviewed the Medications, Allergies, Past Medical and Surgical History, and Social History in the [facility] system.    Past Medical History:   Diagnosis Date     Cardiomyopathy, hypertrophic obstructive (H)      H/O heart transplant (H)     Mar.23, 2012     Hemiplegia following CVA (cerebrovascular accident) (H)     left, improved with rehab     Lymphoproliferative disease (H)      Unspecified cerebral artery occlusion with cerebral infarction     age 2 1/2       Past Surgical History:   Procedure Laterality Date     ARTHRODESIS WRIST Left 10/20/2016    Procedure: ARTHRODESIS WRIST;  Surgeon: Amna Tinoco MD;  Location: [facility] OR     BIOPSY      gingival and tonsillar biopsy     [facility] SURGICAL PATHOLOGY       HEART CATH CHILD  11/13/2012    Procedure: HEART CATH CHILD;  Right Heart Cath Procedure and Biopsy *Latex Allergy*;  Surgeon: Bobbi Pruitt MD;  Location: [facility] OR     HEART CATH CHILD  2/15/2013    Procedure: HEART CATH CHILD;  Right [place] Cath, Angiogram and Biopsy;  Surgeon: Bobbi Pruitt MD;  [facility] OR     HEART CATH CHILD N/A 4/10/2015    Procedure: HEART CATH CHILD;  Surgeon: Bobbi Pruitt MD;  [facility] OR     HEART CATH CHILD N/A 4/21/2017    Procedure: HEART CATH CHILD;  Right Heart Cath Procedure with Angio Biopsy    (latex allergy) ;  Surgeon: Bobbi Pruitt MD;  [facility] OR     HEART CATH, BIOPSY  3/21/2014    Procedure: HEART CATH, BIOPSY;  Right and Left Heart Cath, Angiogram, Biopsy   *Latex Allergy*;  Surgeon: Bobbi Pruitt MD;  [facility]     HEART CATH, BIOPSY Right 4/18/2016    Procedure: HEART CATH, BIOPSY;  Surgeon: Bobbi Pruitt MD;  [facility]     HEART CATH, BIOPSY Right 5/26/2017    Procedure: HEART CATH, BIOPSY;  Right Heart Cath and Biopsy  (Latex Allergy);  Surgeon: Bobbi Pruitt MD;  [facility]     HEART CATH, BIOPSY N/A 9/11/2017     Procedure: HEART CATH, BIOPSY;  Right Heart Cath, Angio and Biopsy ;  Surgeon: Bobbi Pruitt MD;  [facility]     HEART CATH, BIOPSY Right 3/23/2018    Procedure: HEART CATH, BIOPSY;  Right Heart Catheter, Angiogram and Biopsy  **Latex Allergy**;  Surgeon: Aileen Sarabia MD;  [facility] OR     INTERPOSITION TENDON HAND Left 10/20/2016    Procedure: INTERPOSITION TENDON HAND;  Surgeon: Amna Tinoco MD;  [facility]     ORTHOPEDIC SURGERY      at [place], left lower extremity     PICC INSERTION       PICC REMOVAL       REMOVE HARDWARE WRIST Left 10/12/2017    Procedure: REMOVE HARDWARE WRIST;  Left Wrist Fusion Plate Removal     ;  Surgeon: Amna Tinoco MD;  [facility]: [facility] OR     TRANSPLANT HEART RECIPIENT         Family History   Problem Relation Age of Onset     Diabetes Sister      Melanoma No family hx of      Skin Cancer No family hx of        Social History   Substance Use Topics     Smoking status: Never Smoker     Smokeless tobacco: Never Used     Alcohol use No       No current facility-administered medications for this encounter.      Current Outpatient Prescriptions   Medication     amLODIPine (NORVASC) 5 MG tablet     aspirin 81 MG EC tablet     Cholecalciferol (VITAMIN D3) 2000 UNITS TABS     cyclobenzaprine (FLEXERIL) 10 MG tablet     mycophenolate (GENERIC EQUIVALENT) 500 MG tablet     naproxen (NAPROSYN) 500 MG tablet     pravastatin (PRAVACHOL) 10 MG tablet     predniSONE (DELTASONE) 2.5 MG tablet     predniSONE (DELTASONE) 5 MG tablet     tacrolimus (GENERIC EQUIVALENT) 0.5 MG capsule     tacrolimus (GENERIC EQUIVALENT) 1 MG capsule     triamcinolone (KENALOG) 0.1 % ointment        Allergies   Allergen Reactions     Latex Rash     Other [Seasonal Allergies]      Corn-abdominal pain and diarrhea.       Review of Systems   Constitutional: Negative for chills.   Respiratory: Negative for cough.    Cardiovascular: Positive for chest pain.   Gastrointestinal:  "Negative for abdominal pain.   Musculoskeletal: Negative for myalgias.        Left shoulder pain   All other systems reviewed and are negative.      Physical Exam   BP: 140/66  Pulse: 72  Heart Rate: 102  Temp: 98.5  F (36.9  C)  Resp: 18  Height: 185.4 cm (6' 1\")  Weight: 67.6 kg (149 lb)  SpO2: 97 %      Physical Exam   Constitutional: He is oriented to person, place, and time. He appears well-developed and well-nourished.   HENT:   Head: Normocephalic and atraumatic.   Mouth/Throat: Oropharynx is clear and moist.   Eyes: EOM are normal. Pupils are equal, round, and reactive to light.   Neck: Normal range of motion. Neck supple. No tracheal deviation present. No thyromegaly present.   Cardiovascular: Normal rate, regular rhythm, normal heart sounds and intact distal pulses.  Exam reveals no gallop and no friction rub.    No murmur heard.  Pulmonary/Chest: Effort normal and breath sounds normal. He exhibits tenderness.   Abdominal: Soft. Bowel sounds are normal. He exhibits no distension and no mass. There is no tenderness.   Musculoskeletal: He exhibits no edema.        Left shoulder: He exhibits decreased range of motion, tenderness (Generalized.  No erythema, warmth or effusion.) and deformity (Chronic atrophy and spasticity consistent with his known history of left upper extremity hemiplegia). He exhibits no effusion, no crepitus and normal pulse.   Neurological: He is alert and oriented to person, place, and time. No cranial nerve deficit. Coordination normal.   Skin: Skin is warm and dry. No rash noted.   Psychiatric: He has a normal mood and affect. His behavior is normal.   Nursing note and vitals reviewed.      ED Course   10:06 AM  The patient was seen and examined by Dr. Núñez in Room ED06.     ED Course     Procedures             EKG Interpretation:      Interpreted by Juan Núñez  Time reviewed: 1010  Symptoms at time of EKG: Left shoulder pain and chest pain  Rhythm: sinus tachycardia  Rate: " 100-110  Axis: Normal  Ectopy: none  Conduction: right bundle branch block (incomplete)  ST Segments/ T Waves: No acute ischemic changes  Q Waves: nonspecific  Comparison to prior: Unchanged from 3/2018    Clinical Impression: Sinus tachycardia.  Otherwise negative EKG.                Critical Care time:  none             Labs Ordered and Resulted from Time of ED Arrival Up to the Time of Departure from the ED   CBC WITH PLATELETS DIFFERENTIAL - Abnormal; Notable for the following:        Result Value    WBC 12.6 (*)     Absolute Neutrophil 10.0 (*)     Absolute Monocytes 1.5 (*)     All other components within normal limits   COMPREHENSIVE METABOLIC PANEL - Abnormal; Notable for the following:     Glucose 113 (*)     All other components within normal limits   TROPONIN I   D DIMER QUANTITATIVE   PULSE OXIMETRY NURSING   CARDIAC CONTINUOUS MONITORING   PERIPHERAL IV CATHETER   PATIENT CARE ORDER   ISTAT TROPONIN NURSING POCT   TROPONIN POCT            Assessments & Plan (with Medical Decision Making)   Patient with a history of heart transplant in 2012 due to cardiomyopathy, also history of stroke early childhood with subsequent left upper extremity atrophy and deformity.  He presented with left shoulder pain which started after he was laying on his left shoulder but no other specific trauma.  Also was complaining of chest pain.  Differential diagnosis includes acute heart transplant rejection, ACS, pericarditis, pulmonary embolism, pneumonia, pneumothorax, infection, as well as numerous causes of musculoskeletal left shoulder pain.  On exam he is slightly tachycardic.  His other vitals are unremarkable.  Upon review of the chart his mild tachycardia today is consistent with previous visits.  Blood pressure slightly more elevated than normal.  Mental status is normal.  Cardiovascular exam is otherwise normal.  Lungs are clear.  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  There is no warmth, erythema, crepitance, or other sign of inflammatory or infectious effusion.  The arm is well perfused.  His neck is supple nontender with no signs of cervical radiculopathy.  Lungs are clear to auscultation, only mild tenderness of the chest wall without other appreciable deformity.  EKG is unchanged from previous.  Troponin and d-dimer are negative.  Chest x-ray and plain film of the shoulder are unremarkable.  Early in his emergency department course I discussed the case with the transplant cardiologist Dr. Shine.  We proceeded with an echocardiogram the results of which are unremarkable and were discussed with cardiology.  He was treated for musculoskeletal pain and did improve to some extent.  Reviewing his chart, he had a relatively recent heart cath which was negative and a very recent cardiac MRI which was negative.  Given all the above facts, I find no indication of any acute life-threatening etiology of his symptoms.  At this time we will proceed with symptomatic treatment and close outpatient follow-up.  Again this disposition plan was discussed with the cardiology service.  He has orthopedic follow-up tomorrow for further evaluation of his shoulder pain.  We discussed the indications for emergency department return and follow-up.  Stable for discharge.      I have reviewed the nursing notes.    I have reviewed the findings, diagnosis, plan and need for follow up with the patient.    Discharge Medication List as of 10/31/2018  2:23 PM      START taking these medications    Details   cyclobenzaprine (FLEXERIL) 10 MG tablet Take 0.5-1 tablets (5-10 mg) by mouth 3 times daily as needed for muscle spasms, Disp-20 tablet, R-0, Local Print      naproxen (NAPROSYN) 500 MG tablet Take 1 tablet (500 mg) by mouth 2 times daily (with meals) for 3 days, Disp-6 tablet, R-0, Local Print             Final diagnoses:   Acute pain of left shoulder   Other  chest pain   Heart replaced by transplant (H)   I, Veto Goff, am serving as a trained medical scribe to document services personally performed by Juan Núñez MD, based on the provider's statements to me.      I, Juan Núñez MD, was physically present and have reviewed and verified the accuracy of this note documented by Veto Goff.       10/31/2018   [facility], [place], EMERGENCY DEPARTMENT     Juan Núñez MD  10/31/18 [id number]

## 2018-11-01 ENCOUNTER — RADIANT APPOINTMENT (OUTPATIENT)
Dept: CT IMAGING | Facility: CLINIC | Age: 23
End: 2018-11-01
Attending: NURSE PRACTITIONER
Payer: COMMERCIAL

## 2018-11-01 ENCOUNTER — OFFICE VISIT (OUTPATIENT)
Dept: CARDIOLOGY | Facility: CLINIC | Age: 23
End: 2018-11-01
Attending: NURSE PRACTITIONER
Payer: COMMERCIAL

## 2018-11-01 ENCOUNTER — TELEPHONE (OUTPATIENT)
Dept: TRANSPLANT | Facility: CLINIC | Age: 23
End: 2018-11-01

## 2018-11-01 ENCOUNTER — OFFICE VISIT (OUTPATIENT)
Dept: ORTHOPEDICS | Facility: CLINIC | Age: 23
End: 2018-11-01
Payer: COMMERCIAL

## 2018-11-01 VITALS — WEIGHT: 149 LBS | HEIGHT: 73 IN | BODY MASS INDEX: 19.75 KG/M2

## 2018-11-01 VITALS
HEIGHT: 74 IN | DIASTOLIC BLOOD PRESSURE: 75 MMHG | WEIGHT: 156.2 LBS | SYSTOLIC BLOOD PRESSURE: 114 MMHG | HEART RATE: 120 BPM | BODY MASS INDEX: 20.05 KG/M2 | OXYGEN SATURATION: 95 %

## 2018-11-01 DIAGNOSIS — Z94.1 HEART REPLACED BY TRANSPLANT (H): Primary | ICD-10-CM

## 2018-11-01 DIAGNOSIS — D84.9 IMMUNOSUPPRESSION (H): ICD-10-CM

## 2018-11-01 DIAGNOSIS — I10 HYPERTENSION, UNSPECIFIED TYPE: ICD-10-CM

## 2018-11-01 DIAGNOSIS — R52 PAIN: ICD-10-CM

## 2018-11-01 DIAGNOSIS — Z94.1 HEART REPLACED BY TRANSPLANT (H): ICD-10-CM

## 2018-11-01 DIAGNOSIS — M25.512 ACUTE PAIN OF LEFT SHOULDER: Primary | ICD-10-CM

## 2018-11-01 LAB
ALBUMIN SERPL-MCNC: 3.1 G/DL (ref 3.4–5)
ALP SERPL-CCNC: 48 U/L (ref 40–150)
ALT SERPL W P-5'-P-CCNC: 12 U/L (ref 0–70)
ANION GAP SERPL CALCULATED.3IONS-SCNC: 8 MMOL/L (ref 3–14)
AST SERPL W P-5'-P-CCNC: 10 U/L (ref 0–45)
BILIRUB SERPL-MCNC: 0.4 MG/DL (ref 0.2–1.3)
BUN SERPL-MCNC: 15 MG/DL (ref 7–30)
CALCIUM SERPL-MCNC: 8 MG/DL (ref 8.5–10.1)
CHLORIDE SERPL-SCNC: 101 MMOL/L (ref 94–109)
CO2 SERPL-SCNC: 26 MMOL/L (ref 20–32)
CREAT SERPL-MCNC: 0.78 MG/DL (ref 0.66–1.25)
CRP SERPL-MCNC: 120 MG/L (ref 0–8)
ERYTHROCYTE [DISTWIDTH] IN BLOOD BY AUTOMATED COUNT: 12.7 % (ref 10–15)
GFR SERPL CREATININE-BSD FRML MDRD: >90 ML/MIN/1.7M2
GLUCOSE SERPL-MCNC: 121 MG/DL (ref 70–99)
HCT VFR BLD AUTO: 37.8 % (ref 40–53)
HGB BLD-MCNC: 12.7 G/DL (ref 13.3–17.7)
MCH RBC QN AUTO: 29.1 PG (ref 26.5–33)
MCHC RBC AUTO-ENTMCNC: 33.6 G/DL (ref 31.5–36.5)
MCV RBC AUTO: 87 FL (ref 78–100)
PLATELET # BLD AUTO: 161 10E9/L (ref 150–450)
POTASSIUM SERPL-SCNC: 3.8 MMOL/L (ref 3.4–5.3)
PROT SERPL-MCNC: 6.1 G/DL (ref 6.8–8.8)
RBC # BLD AUTO: 4.36 10E12/L (ref 4.4–5.9)
SODIUM SERPL-SCNC: 135 MMOL/L (ref 133–144)
WBC # BLD AUTO: 8.3 10E9/L (ref 4–11)

## 2018-11-01 PROCEDURE — 36415 COLL VENOUS BLD VENIPUNCTURE: CPT | Performed by: NURSE PRACTITIONER

## 2018-11-01 PROCEDURE — G0463 HOSPITAL OUTPT CLINIC VISIT: HCPCS | Mod: ZF

## 2018-11-01 PROCEDURE — 85027 COMPLETE CBC AUTOMATED: CPT | Performed by: NURSE PRACTITIONER

## 2018-11-01 PROCEDURE — 86140 C-REACTIVE PROTEIN: CPT | Performed by: NURSE PRACTITIONER

## 2018-11-01 PROCEDURE — 80053 COMPREHEN METABOLIC PANEL: CPT | Performed by: NURSE PRACTITIONER

## 2018-11-01 PROCEDURE — 99204 OFFICE O/P NEW MOD 45 MIN: CPT | Mod: ZP | Performed by: NURSE PRACTITIONER

## 2018-11-01 RX ORDER — IOPAMIDOL 755 MG/ML
96 INJECTION, SOLUTION INTRAVASCULAR ONCE
Status: COMPLETED | OUTPATIENT
Start: 2018-11-01 | End: 2018-11-01

## 2018-11-01 RX ORDER — TRAMADOL HYDROCHLORIDE 50 MG/1
TABLET ORAL
Qty: 30 TABLET | Refills: 0 | Status: SHIPPED | OUTPATIENT
Start: 2018-11-01 | End: 2019-08-21

## 2018-11-01 RX ADMIN — IOPAMIDOL 96 ML: 755 INJECTION, SOLUTION INTRAVASCULAR at 16:20

## 2018-11-01 ASSESSMENT — PAIN SCALES - GENERAL: PAINLEVEL: SEVERE PAIN (7)

## 2018-11-01 NOTE — NURSING NOTE
"Pt seen in clinic today for continued shoulder pain after being evaluated last evening at the ED and this morning with ortho. Pain still present in the posterior-lateral upper shoulder. Labs were insignificant; echo WNL. Reports feeling more \"out of breath\" with ambulation, some flushing/warmth last evening and some nausea. Pt denies any fevers, vomiting. Ate tacos the evening before coming to the ED. NP, Dr. Shine recommends pt CT chest/abdomen/pelvis to evaluate further. Repeat labs, including CRP.  Next step would be MRI of the shoulder. Instructions reviewed with pt/mom and both verbalized understanding. AVS printed.    CT of the chest/abdomen and pelvis at 5:20pm today at clinic imaging department on the first floor. No eating or drinking prior.     Santa will call with any pending results.    Take tramadol 50-100mg every 6 hours as needed for pain.     **Return as needed for any new symptoms or concern.     Call Josefina at [phone], option 2, with any questions or concerns.     "

## 2018-11-01 NOTE — PATIENT INSTRUCTIONS
CT of the chest/abdomen and pelvis at 5:20pm today at clinic imaging department on the first floor. No eating or drinking prior.     Santa will call with any pending results.    Take tramadol 50-100mg every 6 hours as needed for pain.     **Return as needed for any new symptoms or concern.     Call Josefina at [phone], option 2, with any questions or concerns.

## 2018-11-01 NOTE — TELEPHONE ENCOUNTER
"Received call from RN @ Ortho clinic where Obed is being seen this AM for L shoulder pain, and transitioned into call with his mother from same location.  Obed was seen yesterday in ED with L shoulder pain and chest pain.  After a full workup including imaging, labs, and echo- he was discharged with Aleve and flexeril to follow up with already scheduled ortho clinic today.  In clinic, RN and mother states that provider believes this pain is cardiac in nature and should be referred back to cardiology providers for further assessment.  Mother states that Aleve and flexeril have provided no relief so he will not be taking any further doses of either, and the only thing that relieves his pain is \"oxy\".  I offered her an appointment today at 3 pm in clinic with Yamilka- as is our only available at this time, reminding her at that at any time he can be seen in any ED for what they feel is chest pain.  I asked I she feels this can wait until 3 pm- she asked patient who said that was fine- but mother feels that maybe he is not able and verbalizes she feels she might just need to take him to [facility] ER to see Dr Olsen instead.  I, again, reinforced that if she feels his pain is intolerable or she is concerned that it is a life threatening situation, that they can go to any ED at any time for care and we will assist in any way possible from the coordinators' office.  Paged Dr Shine- as mom repeatedly asked to see him in office (does not have availability)- to inform him of developments.  Provided contact phone numbers to Yoselin Viramontes- patient's mom who states that she will contact/call with any changes prior to appointment at 3 pm.  "

## 2018-11-01 NOTE — LETTER
11/1/2018      RE: Obed Viramontes  [address]  [facility] [address]       Dear Colleague,    Thank you for the opportunity to participate in the care of your patient, Obed Viramontes, at the [facility]. Please see a copy of my visit note below.    ADULT HE[facility] TRANSPLANT CLINIC    HPI:   Mr. Viramontes is a 22 year old male who presents to clinic today for evaluation of left shoulder pain, and chest pain and associated shortness of breath. 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    He was seen in the emergency department yesterday on [facility] for the same complaint.  D-dimer and troponin were negative.  Laboratory work was unremarkable except for a white count of 12.  He had a normal chest x-ray, normal echocardiogram, and normal shoulder x-ray.  He was told that the pain was likely musculoskeletal and therefore he saw his orthopedic surgeon who he has a good relationship with this morning.  At that visit the pain was not reproducible on exam note he had just taken some oxycodone prior to visit.  He presents today for evaluation to rule out cardiac cause.  Patient states he awoke from sleep 2 nights ago with pain that started in his chest and radiated to his shoulder.  He states the pain moves around.  States it is intermittent but then clarifies it is always there but sometimes it is worse.  Pain is exacerbated by coughing, sneezing, breathing and, laughing, and laying back.  The pain is occasionally associated with shortness of breath.  He denies any rashes or numbness or tingling of the skin.  Notes a cold a few weeks ago but no recent fever or chills.  Notes  chronic nausea when he's hungry. No vomiting, diarrhea, abdominal pain, or other joint pain or swelling.  Stated he had tacos for dinner the night before he woke up with the pain.  The pain is not associated with meals.  Appetite has been fine.    PAST MEDICAL HISTORY:  Past Medical History:   Diagnosis Date     Cardiomyopathy, hypertrophic obstructive (H)      H/O heart transplant (H)     Mar.23, 2012     Hemiplegia following CVA (cerebrovascular accident) (H)     left, improved with rehab     Lymphoproliferative disease (H)      Unspecified cerebral artery occlusion with cerebral infarction     age 2 1/2       FAMILY HISTORY:  Family History   Problem Relation Age of Onset     Diabetes Sister      Melanoma No family hx of      Skin Cancer No family hx of        SOCIAL HISTORY:  Social History     Social History     Marital status: Single     Spouse name: N/A     Number of children: N/A     Years of education: N/A     Social History Main Topics     Smoking status: Never Smoker     Smokeless tobacco: Never Used     Alcohol use No     Drug use: No     Sexual activity: No     Other Topics Concern     Not on file     Social History Narrative    Lives in [place] with father, [place]. Works at [employer] as a manager, watches TV shows and plays video games.  Went to [facility].  Has 3 sisters and a brother.  Mom and dad  (2010).        CURRENT MEDICATIONS:    Current Outpatient Prescriptions on File Prior to Visit:  amLODIPine (NORVASC) 5 MG tablet Take 1 tablet (5 mg) by mouth daily   aspirin 81 MG EC tablet Take 2 tablets (162 mg) by mouth daily   Cholecalciferol (VITAMIN D3) 2000 UNITS TABS Take 2,000 Units by mouth daily   cyclobenzaprine (FLEXERIL) 10 MG tablet Take 0.5-1 tablets (5-10 mg) by mouth 3 times daily as needed for muscle spasms   mycophenolate (GENERIC EQUIVALENT) 500 MG tablet Take 1 tablet (500 mg) by mouth 2 times daily   naproxen (NAPROSYN) 500 MG tablet Take 1 tablet (500 mg) by mouth 2 times  "daily (with meals) for 3 days   pravastatin (PRAVACHOL) 10 MG tablet Take 1 tablet (10 mg) by mouth daily At bedtime   predniSONE (DELTASONE) 2.5 MG tablet Take 1 tablet (2.5 mg) by mouth daily (Total dose 7.5mg every day)   predniSONE (DELTASONE) 5 MG tablet Take 1 tablet (5 mg) by mouth daily (Total dose 7.5mg every day)   tacrolimus (GENERIC EQUIVALENT) 0.5 MG capsule Take 1 capsule (0.5 mg) by mouth daily with 3 - 1 mg capsules for a total of 3.5 mg in the evening   tacrolimus (GENERIC EQUIVALENT) 1 MG capsule Take 3 capsules (3 mg) by mouth 2 times daily With 1 - 0.5 mg capsule for a total of 3 mg in AM and 3.5 mg in PM   triamcinolone (KENALOG) 0.1 % ointment Apply topically 2 times daily     No current facility-administered medications on file prior to visit.     ROS:  See HPI    EXAM:  /75 (BP Location: Right arm, Patient Position: Chair, Cuff Size: Adult Regular)  Pulse 120  Ht 1.867 m (6' 1.5\")  Wt 70.9 kg (156 lb 3.2 oz)  SpO2 95%  BMI 20.33 kg/m2    GENERAL: Appears comfortable, in no acute distress.   HEENT: Eye symmetrical, no discharge or icterus bilaterally. Mucous membranes moist and without lesions. No thrush.   CV: Tachycardic and regular, +S1S2, no murmur, rub, or gallop. JVP not visible.   RESPIRATORY: Respirations regular, even, and unlabored. Lungs CTA throughout.   GI: Soft and non distended with normoactive bowel sounds present in all quadrants. No tenderness, rebound, guarding. No hepatomegaly.   EXTREMITIES: No peripheral edema. 2+ bilateral pedal pulses.   NEUROLOGIC: Alert and oriented x 3. No focal deficits.   MUSCULOSKELETAL: left sided hemiplegia. Tenderness to palpation over AC joint. Skin is normal without redness, warmth, or lesions.  SKIN: No jaundice. No rashes or lesions.     Labs - reviewed with patient in clinic today:  CBC RESULTS:  Lab Results   Component Value Date    WBC 12.6 (H) 10/31/2018    RBC 5.16 10/31/2018    HGB 15.1 10/31/2018    HCT 44.6 10/31/2018    " MCV 86 10/31/2018    MCH 29.3 10/31/2018    MCHC 33.9 10/31/2018    RDW 12.6 10/31/2018     10/31/2018       CMP RESULTS:  Lab Results   Component Value Date     10/31/2018    POTASSIUM 3.8 10/31/2018    CHLORIDE 103 10/31/2018    CO2 31 10/31/2018    ANIONGAP 6 10/31/2018     (H) 10/31/2018    BUN 18 10/31/2018    CR 0.75 10/31/2018    GFRESTIMATED >90 10/31/2018    GFRESTBLACK >90 10/31/2018    CARLOS A 9.1 10/31/2018    BILITOTAL 0.5 10/31/2018    ALBUMIN 4.2 10/31/2018    ALKPHOS 66 10/31/2018    ALT 25 10/31/2018    AST 12 10/31/2018        INR RESULTS:  Lab Results   Component Value Date    INR 1.00 01/12/2017       LIPID RESULTS:  Lab Results   Component Value Date    CHOL 144 09/06/2018    HDL 58 09/06/2018    LDL 51 09/06/2018    TRIG 174 (H) 09/06/2018    CHOLHDLRATIO 3.0 06/17/2013       IMMUNOSUPPRESSANT LEVELS:  Lab Results   Component Value Date    TACROL 7.9 09/06/2018    DOSTAC Not Provided 09/06/2018    RAPAMY <2.0 (L) 01/31/2018       No components found for: CK  Lab Results   Component Value Date    MAG 1.6 09/06/2018     No results found for: A1C  Lab Results   Component Value Date    PHOS 2.7 09/06/2018     Lab Results   Component Value Date    NTBNP 53 08/08/2018     Lab Results   Component Value Date    SAITESTMET [facility] 09/06/2018    SAICELL Class I 09/06/2018    [id number] None 09/06/2018    [id number] None 09/06/2018    SAIREPCOM  09/06/2018      Test performed by modified procedure. Serum heat inactivated and tested   by a modified ([place]) protocol including fetal calf serum addition.   High-risk, mfi >3,000. Mod-risk, mfi 500-3,000.       Lab Results   Component Value Date    SAIITESTME [facility] 09/06/2018    SAIICELL Class II 09/06/2018    [id number] None 09/06/2018    [id number] None 09/06/2018    SAIIREPCOM  09/06/2018      Test performed by modified procedure. Serum heat inactivated and tested   by a modified ([place]) protocol including fetal calf serum addition.    High-risk, mfi >3,000. Mod-risk, mfi 500-3,000.       Lab Results   Component Value Date    CSPEC EDTA PLASMA 09/06/2018    CMQNT <100 03/17/2014    CMLOG  03/17/2014     <2.0  The Cytomegalovirus DNA Quantitation assay is a real-time polymerase chain   reaction (PCR) utilizing analyte specific reagents manufactured by [employer]   [employer]. Analyte Specific Reagents (ASRs) are used in many laboratory   tests necessary for standard medical care and generally do not require FDA   approval.   This test was developed and its performance characteristics determined by   [facility].  It has not been   cleared or approved by the US Food and Drug Administration.       Diagnostic Studies:  TTE 10/31/18  Patient after orthotopic heart transplant. There is trivial tricuspid  insufficiency. Insufficient jet to estimate right ventricular systolic  pressure. No pericardial effusion. ECG tracing shows sinus tachycardia at 111  bpm. The left and right ventricles have normal chamber size, wall thickness,  and systolic function. The calculated biplane left ventricular ejection  fraction is 66 %. The LVRI is 1.3. There is mild flow acceleration in the  proximal right atrium (possible suture line) with a mean gradient of 3 mmHg.    Cardiac MRI 9/6/18  1. The left ventricle is normal in cavity size and wall thickness. The global systolic function is normal.  The LVEF is 55%. There are no regional wall motion abnormalities.  2. The right ventricle is normal in cavity size. The global systolic function is normal. The RVEF is 53%.   3. Left atrium is mildly enlarged due to transplantation. Right atrium is normal.  4. There are no significant valvular disease.   5. There is no late gadolinium enhancement to indicate myocardial fibrosis or infiltrative disease.   6. There is no intracardiac thrombus.   7. There is no pericardial effusion.       CONCLUSIONS: Normal biventricular function, LVEF  55% and RVEF 53%, with no evidence of focal myocardial  fibrosis.     RHC 3/23/18      Shoulder XR yesterday  The shoulder appears grossly unremarkable. Lateral  scapular views appear obliqued. If there is clinical suspicion for  glenohumeral joint instability, an axillary view is recommended.    Assessment/Plan:  Mr. Viramontes is a 22 year old male who is s/p orthotopic heart transplant in 2012 who presents to clinic for evaluation of shoulder pain and chest pain.  He was seen in the emergency department where he had a negative troponin, negative D dimer, slightly elevated white count otherwise normal labs, normal shoulder x-ray and normal echocardiogram.  On physical examination pain appears consistent with musculoskeletal pain as it is reproducible over a joint.  He has had a recent thorough cardiac evaluation as part of his first visit with the adult transplant team reassuring for normal graft function (i.e not likely to be cardiac related).  To be sure that he is not having referred pain from an internal organ or any vascular concerns we will do CT scan from chest through pelvis with contrast.  If this is normal will consider MRI of the shoulder after discussing with his orthopedist.  Note: his CRP is 120 today, baseline ~80.  He is afebrile, white count is normal and no signs of systemic infection.  Low concern for infected joint.  Recommend patient monitor the skin for eruption of vesicles which would be concerning for zoster.  He was given a prescription for tramadol and recommended not to take naproxen.    # Status post OHT in 2012, history of NICM 2/2 viral myocardititis  # Chronic immunosuppression  * Rejection history: three episodes in 2017 treated with steroids  * Last biopsy: 3/2018 negative for ACR or AMR    Immunosuppression:  - Prednisone 7.5 mg daily  - Tacrolimus, trough level goal 6-8.   -  mg bid            25 minutes spent face-to-face with patient, >50% in counseling and/or  coordination of care as described above      Yamilka Carmichael DNP, NP-C  11/1/2018      CC  KIYA PARKER

## 2018-11-01 NOTE — DISCHARGE INSTRUCTIONS

## 2018-11-01 NOTE — NURSING NOTE
"Reason For Visit:   Chief Complaint   Patient presents with     RECHECK     The patient is here today with left arm and shoulder pain. No known injury.        Primary MD: Nnamdi Kaufman  Ref. MD:self    Age: 22 year old    ?  No    Went to the ED yesterday for Chest pain and left shoulder and arm pain. He has a little bit of chest pain still today.   Ht 1.854 m (6' 1\")  Wt 67.6 kg (149 lb)  BMI 19.66 kg/m2      Pain Assessment  Patient Currently in Pain: Yes  0-10 Pain Scale: 6  Primary Pain Location: Arm  Pain Orientation: Left  Pain Descriptors: Aching  Alleviating Factors: Rest  Aggravating Factors: Movement               QuickDASH Assessment  No flowsheet data found.       Current Outpatient Prescriptions   Medication Sig Dispense Refill     amLODIPine (NORVASC) 5 MG tablet Take 1 tablet (5 mg) by mouth daily 90 tablet 3     aspirin 81 MG EC tablet Take 2 tablets (162 mg) by mouth daily 60 tablet 99     Cholecalciferol (VITAMIN D3) 2000 UNITS TABS Take 2,000 Units by mouth daily 100 tablet 6     cyclobenzaprine (FLEXERIL) 10 MG tablet Take 0.5-1 tablets (5-10 mg) by mouth 3 times daily as needed for muscle spasms 20 tablet 0     mycophenolate (GENERIC EQUIVALENT) 500 MG tablet Take 1 tablet (500 mg) by mouth 2 times daily 180 tablet 5     naproxen (NAPROSYN) 500 MG tablet Take 1 tablet (500 mg) by mouth 2 times daily (with meals) for 3 days 6 tablet 0     pravastatin (PRAVACHOL) 10 MG tablet Take 1 tablet (10 mg) by mouth daily At bedtime 90 tablet 3     predniSONE (DELTASONE) 2.5 MG tablet Take 1 tablet (2.5 mg) by mouth daily (Total dose 7.5mg every day) 90 tablet 11     predniSONE (DELTASONE) 5 MG tablet Take 1 tablet (5 mg) by mouth daily (Total dose 7.5mg every day) 90 tablet 11     tacrolimus (GENERIC EQUIVALENT) 0.5 MG capsule Take 1 capsule (0.5 mg) by mouth daily with 3 - 1 mg capsules for a total of 3.5 mg in the evening 90 capsule 3     tacrolimus (GENERIC EQUIVALENT) 1 MG capsule Take " 3 capsules (3 mg) by mouth 2 times daily With 1 - 0.5 mg capsule for a total of 3 mg in AM and 3.5 mg in  capsule 3     triamcinolone (KENALOG) 0.1 % ointment Apply topically 2 times daily 30 g 1       Allergies   Allergen Reactions     Latex Rash     Other [Seasonal Allergies]      Corn-abdominal pain and diarrhea.       Radha Connolly, ATC

## 2018-11-01 NOTE — MR AVS SNAPSHOT
After Visit Summary   11/1/2018    Obed Viramontes    MRN: [id number]           Patient Information     Date Of Birth          1995        Visit Information        Provider Department      11/1/2018 10:40 AM Amna Tinoco MD [facility]        Today's Diagnoses     Acute pain of left shoulder    -  1       Follow-ups after your visit        Your next 10 appointments already scheduled     Nov 01, 2018  5:20 PM CDT   CT CHEST/ABDOMEN/PELVIS W CONTRAST with UCCT1   [facility] [place] [address] ([facility])    [address]  [facility] [id number] [phone]           How do I prepare for my exam? (Food and drink instructions) To prepare: Do not eat or drink for 2 hours before your exam. If you need to take medicine, you may take it with small sips of water. (We may ask you to take liquid medicine as well.)  How do I prepare for my exam? (Other instructions) Please arrive 30 minutes early for your CT.  Once in the department you might be asked to drink water 15-20 minutes prior to your exam.  If indicated you may be asked to drink an oral contrast in advance of your CT.  If this is the case, the imaging team will let you know or be in contact with you prior to your appointment  Patients over 70 or patients with diabetes or kidney problems: If you haven t had a blood test (creatinine test) within the last 30 days, the Cardiologist/Radiologist may require you to get this test prior to your exam.  If you have diabetes:  Continue to take your metformin medication on the day of your exam  What should I wear: Please wear loose clothing, such as a sweat suit or jogging clothes. Avoid snaps, zippers and other metal. We may ask you to undress and put on a hospital gown.  How long does the exam take: Most scans take less than 20 minutes.  What should I bring: Please bring any scans or X-rays taken at other hospitals, if similar tests  were done. Also bring a list of your medicines, including vitamins, minerals and over-the-counter drugs. It is safest to leave personal items at home.  Do I need a : No  is needed.  What do I need to tell my doctor? Be sure to tell your doctor: * If you have any allergies. * If there s any chance you are pregnant. * If you are breastfeeding.  What should I do after the exam: No restrictions, You may resume normal activities.  What is this test: A CT (computed tomography) scan is a series of pictures that allows us to look inside your body. The scanner creates images of the body in cross sections, much like slices of bread. This helps us see any problems more clearly. You may receive contrast (X-ray dye) before or during your scan. You will be asked to drink the contrast.  Who should I call with questions: If you have any questions, please call the Imaging Department where you will have your exam. Directions, parking instructions, and other information is available on our website, [email or web address].              Future tests that were ordered for you today     Open Future Orders        Priority Expected Expires Ordered    CT Chest/Abdomen/Pelvis w Contrast Routine  11/1/2019 11/1/2018            Who to contact     Please call your clinic at [phone] to:    Ask questions about your health    Make or cancel appointments    Discuss your medicines    Learn about your test results    Speak to your doctor            Additional Information About Your Visit        [employer] Information     [employer] gives you secure access to your electronic health record. If you see a primary care provider, you can also send messages to your care team and make appointments. If you have questions, please call your primary care clinic.  If you do not have a primary care provider, please call [phone] and they will assist you.      [employer] is an electronic gateway that provides easy, online access to your medical records.  "With MyChart, you can request a clinic appointment, read your test results, renew a prescription or communicate with your care team.     To access your existing account, please contact your [facility] or call [phone] for assistance.        Care EveryWhere ID     This is your Care EveryWhere ID. This could be used by other organizations to access your [facility] medical records  [id number]        Your Vitals Were     Height BMI (Body Mass Index)                1.854 m (6' 1\") 19.66 kg/m2           Blood Pressure from Last 3 Encounters:   11/01/18 114/75   10/31/18 142/67   10/03/18 122/83    Weight from Last 3 Encounters:   11/01/18 70.9 kg (156 lb 3.2 oz)   11/01/18 67.6 kg (149 lb)   10/31/18 67.6 kg (149 lb)              Today, you had the following     No orders found for display       Primary Care Provider Office Phone # Fax #    Nnamdi Kaufman [phone]       [address] [facility] [address]        Equal Access to Services     [facility]: Hadii estuardo rojas hadasho Sogiovanna, waaxda luqadaha, qaybta kaalmada kaley, stephanie hayes . So [facility] [phone].    ATENCIÓN: Si habla español, tiene a neves disposición servicios gratuitos de asistencia lingüística. Ashly al [phone].    We comply with applicable federal civil rights laws and [place] laws. We do not discriminate on the basis of race, color, national origin, age, disability, sex, sexual orientation, or gender identity.            Thank you!     Thank you for choosing [facility]  for your care. Our goal is always to provide you with excellent care. Hearing back from our patients is one way we can continue to improve our services. Please take a few minutes to complete the written survey that you may receive in the mail after your visit with us. Thank you!             Your Updated Medication List - Protect others around you: Learn how to safely use, " store and throw away your medicines at www.disposemymeds.org.          This list is accurate as of 11/1/18  3:21 PM.  Always use your most recent med list.                   Brand Name Dispense Instructions for use Diagnosis    amLODIPine 5 MG tablet    NORVASC    90 tablet    Take 1 tablet (5 mg) by mouth daily    Heart replaced by transplant (H)       aspirin 81 MG EC tablet     60 tablet    Take 2 tablets (162 mg) by mouth daily    Status post transplant, heart (H)       cyclobenzaprine 10 MG tablet    FLEXERIL    20 tablet    Take 0.5-1 tablets (5-10 mg) by mouth 3 times daily as needed for muscle spasms        mycophenolate 500 MG tablet    GENERIC EQUIVALENT    180 tablet    Take 1 tablet (500 mg) by mouth 2 times daily        naproxen 500 MG tablet    NAPROSYN    6 tablet    Take 1 tablet (500 mg) by mouth 2 times daily (with meals) for 3 days        pravastatin 10 MG tablet    PRAVACHOL    90 tablet    Take 1 tablet (10 mg) by mouth daily At bedtime    Heart replaced by transplant (H)       * predniSONE 2.5 MG tablet    DELTASONE    90 tablet    Take 1 tablet (2.5 mg) by mouth daily (Total dose 7.5mg every day)    Heart replaced by transplant (H)       * predniSONE 5 MG tablet    DELTASONE    90 tablet    Take 1 tablet (5 mg) by mouth daily (Total dose 7.5mg every day)    Heart replaced by transplant (H)       * tacrolimus 0.5 MG capsule    GENERIC EQUIVALENT    90 capsule    Take 1 capsule (0.5 mg) by mouth daily with 3 - 1 mg capsules for a total of 3.5 mg in the evening    Heart replaced by transplant (H)       * tacrolimus 1 MG capsule    GENERIC EQUIVALENT    540 capsule    Take 3 capsules (3 mg) by mouth 2 times daily With 1 - 0.5 mg capsule for a total of 3 mg in AM and 3.5 mg in PM    Heart replaced by transplant (H)       triamcinolone 0.1 % ointment    KENALOG    30 g    Apply topically 2 times daily    Dermatitis       Vitamin D3 2000 units Tabs     100 tablet    Take 2,000 Units by mouth daily     Heart transplanted (H)       * Notice:  This list has 4 medication(s) that are the same as other medications prescribed for you. Read the directions carefully, and ask your doctor or other care provider to review them with you.

## 2018-11-01 NOTE — LETTER
2018       RE: Obed Viramontes  [address]  [facility] [address]     Dear Colleague,    Thank you for referring your patient, Obed Viramontes, to the [facility]. Please see a copy of my visit note below.    [facility]  Orthopedics  Amna Tinoco MD  2018     Name: Obed Viramontes  MRN: [id number]  Age: 22 year old  : 1995  Referring provider: Referred Self     Chief Complaint:   Left shoulder pain     History of Present Illness:   Obed Viramontes is a 22 year old male with a history of heart transplant in  due to cardiomyopathy and stroke in early childhood with subsequent left upper extremity atrophy and deformity who presents today for evaluation of left posterior shoulder pain. I last evaluated the patient on 2017 following a left wrist plate removal performed on 10/1/2017. He was doing postoperatively well at this time.     On Tuesday evening (3 days ago), he developed mild back pain. He went to bed and awoke with severe posterior shoulder pain. He notes that he usually sleeps on his right side, but may have been sleeping on his left when he awoke. The pain was a 5/10 in severity at rest and up to a 9/10 with movement. The pain migrates to different areas of his shoulder and is sometimes diffuse across the entire area. Yesterday, the pain became even more severe and he developed chest pain and increased respirations, prompting them to the emergency department for evaluation. He had imaging obtained as noted below, EKG was unchanged from prior, and troponin and d-dimer were negative. Echocardiogram was unremarkable.  He had some improvement with musculoskeletal treatment of his pain. He was discharged home with recommendation to see me.     Today, the mother reports that he was in so much pain last night that she gave the patient an old Oxycodone. This did well to alleviate his pain. 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 His pain has not inhibited his ability to work more hours. There are no other concerns.     Review of Systems:   A 10-point review of systems was obtained and is negative except for as noted in the HPI.     Physical Examination:  Height 1.854 m (6' 1\"), weight 67.6 kg (149 lb).    General: Healthy appearing male. Affect appropriate. Normal gait. Alert and oriented to surroundings.   Left Upper Extremity: Nontender to palpation of his AC joint, subacromial joint, and glenohumeral joint anteriorly and posteriorly. I can rotate his glenohumeral joint internally and externally without pain. He is limited in shoulder abduction and flexion secondary to chiqui plegia      Imaging:  XR Shoulder Lt portable - 2 views on 10/31/18:  IMPRESSION: The shoulder appears grossly unremarkable. Lateral  scapular views appear obliqued. No fracture or dislocation.     XR Chest portable - 1 view on 10/31/18:  IMPRESSION: The right costophrenic angle is partly excluded. With this  limitation, the lungs appear clear. No apparent pleural effusion or  pneumothorax. Sternotomy wires are noted.  Per radiology report     Assessment:   22 year old male with right shoulder pain that I cannot reproduce with range of motion or palpation of the shoulder joint.     Plan:   Refer to cardiology or primary care physician for further chest pain work up.     Scribe Disclosure:   I, Tom Richter, am serving as a scribe to document services personally performed by Amna Tinoco MD at this visit, based upon the provider's statements to me. All documentation has been reviewed by the aforementioned provider prior to being entered into the official medical record.      Amna Tinoco MD   Hand and Upper Extremity Specialist  [facility]      "

## 2018-11-01 NOTE — PROGRESS NOTES
[facility]    HPI:   Mr. Viramontes is a 22 year old male who presents to clinic today for evaluation of left shoulder pain, and chest pain and associated shortness of breath. 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    He was seen in the emergency department yesterday on [facility] for the same complaint.  D-dimer and troponin were negative.  Laboratory work was unremarkable except for a white count of 12.  He had a normal chest x-ray, normal echocardiogram, and normal shoulder x-ray.  He was told that the pain was likely musculoskeletal and therefore he saw his orthopedic surgeon who he has a good relationship with this morning.  At that visit the pain was not reproducible on exam note he had just taken some oxycodone prior to visit.  He presents today for evaluation to rule out cardiac cause.  Patient states he awoke from sleep 2 nights ago with pain that started in his chest and radiated to his shoulder.  He states the pain moves around.  States it is intermittent but then clarifies it is always there but sometimes it is worse.  Pain is exacerbated by coughing, sneezing, breathing and, laughing, and laying back.  The pain is occasionally associated with shortness of breath.  He denies any rashes or numbness or tingling of the skin.  Notes a cold a few weeks ago but no recent fever or chills.  Notes chronic nausea when he's hungry. No vomiting, diarrhea, abdominal pain, or other joint pain or swelling.  Stated he had tacos for dinner the night before he woke up with the pain.  The pain is not associated with meals.  Appetite has been fine.    PAST MEDICAL HISTORY:  Past Medical History:   Diagnosis Date      Cardiomyopathy, hypertrophic obstructive (H)      H/O heart transplant (H)     Mar.23, 2012     Hemiplegia following CVA (cerebrovascular accident) (H)     left, improved with rehab     Lymphoproliferative disease (H)      Unspecified cerebral artery occlusion with cerebral infarction     age 2 1/2       FAMILY HISTORY:  Family History   Problem Relation Age of Onset     Diabetes Sister      Melanoma No family hx of      Skin Cancer No family hx of        SOCIAL HISTORY:  Social History     Social History     Marital status: Single     Spouse name: N/A     Number of children: N/A     Years of education: N/A     Social History Main Topics     Smoking status: Never Smoker     Smokeless tobacco: Never Used     Alcohol use No     Drug use: No     Sexual activity: No     Other Topics Concern     Not on file     Social History Narrative    Lives in [place] with father, [place]. 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  Went to [employer] .  Has 3 sisters and a brother.  Mom and dad  (2010).        CURRENT MEDICATIONS:    Current Outpatient Prescriptions on File Prior to Visit:  amLODIPine (NORVASC) 5 MG tablet Take 1 tablet (5 mg) by mouth daily   aspirin 81 MG EC tablet Take 2 tablets (162 mg) by mouth daily   Cholecalciferol (VITAMIN D3) 2000 UNITS TABS Take 2,000 Units by mouth daily   cyclobenzaprine (FLEXERIL) 10 MG tablet Take 0.5-1 tablets (5-10 mg) by mouth 3 times daily as needed for muscle spasms   mycophenolate (GENERIC EQUIVALENT) 500 MG tablet Take 1 tablet (500 mg) by mouth 2 times daily   naproxen (NAPROSYN) 500 MG tablet Take 1 tablet (500 mg) by mouth 2 times daily (with meals) for 3 days   pravastatin (PRAVACHOL) 10 MG tablet Take 1 tablet (10 mg) by mouth daily At bedtime   predniSONE (DELTASONE) 2.5 MG tablet Take 1 tablet (2.5 mg) by mouth daily (Total dose 7.5mg every day)   predniSONE (DELTASONE) 5 MG tablet Take 1 tablet (5 mg) by mouth daily (Total dose 7.5mg  "every day)   tacrolimus (GENERIC EQUIVALENT) 0.5 MG capsule Take 1 capsule (0.5 mg) by mouth daily with 3 - 1 mg capsules for a total of 3.5 mg in the evening   tacrolimus (GENERIC EQUIVALENT) 1 MG capsule Take 3 capsules (3 mg) by mouth 2 times daily With 1 - 0.5 mg capsule for a total of 3 mg in AM and 3.5 mg in PM   triamcinolone (KENALOG) 0.1 % ointment Apply topically 2 times daily     No current facility-administered medications on file prior to visit.     ROS:  See HPI    EXAM:  /75 (BP Location: Right arm, Patient Position: Chair, Cuff Size: Adult Regular)  Pulse 120  Ht 1.867 m (6' 1.5\")  Wt 70.9 kg (156 lb 3.2 oz)  SpO2 95%  BMI 20.33 kg/m2    GENERAL: Appears comfortable, in no acute distress.   HEENT: Eye symmetrical, no discharge or icterus bilaterally. Mucous membranes moist and without lesions. No thrush.   CV: Tachycardic and regular, +S1S2, no murmur, rub, or gallop. JVP not visible.   RESPIRATORY: Respirations regular, even, and unlabored. Lungs CTA throughout.   GI: Soft and non distended with normoactive bowel sounds present in all quadrants. No tenderness, rebound, guarding. No hepatomegaly.   EXTREMITIES: No peripheral edema. 2+ bilateral pedal pulses.   NEUROLOGIC: Alert and oriented x 3. No focal deficits.   MUSCULOSKELETAL: left sided hemiplegia. Tenderness to palpation over AC joint. Skin is normal without redness, warmth, or lesions.  SKIN: No jaundice. No rashes or lesions.     Labs - reviewed with patient in clinic today:  CBC RESULTS:  Lab Results   Component Value Date    WBC 12.6 (H) 10/31/2018    RBC 5.16 10/31/2018    HGB 15.1 10/31/2018    HCT 44.6 10/31/2018    MCV 86 10/31/2018    MCH 29.3 10/31/2018    MCHC 33.9 10/31/2018    RDW 12.6 10/31/2018     10/31/2018       CMP RESULTS:  Lab Results   Component Value Date     10/31/2018    POTASSIUM 3.8 10/31/2018    CHLORIDE 103 10/31/2018    CO2 31 10/31/2018    ANIONGAP 6 10/31/2018     (H) 10/31/2018 "    BUN 18 10/31/2018    CR 0.75 10/31/2018    GFRESTIMATED >90 10/31/2018    GFRESTBLACK >90 10/31/2018    CARLOS A 9.1 10/31/2018    BILITOTAL 0.5 10/31/2018    ALBUMIN 4.2 10/31/2018    ALKPHOS 66 10/31/2018    ALT 25 10/31/2018    AST 12 10/31/2018        INR RESULTS:  Lab Results   Component Value Date    INR 1.00 01/12/2017       LIPID RESULTS:  Lab Results   Component Value Date    CHOL 144 09/06/2018    HDL 58 09/06/2018    LDL 51 09/06/2018    TRIG 174 (H) 09/06/2018    CHOLHDLRATIO 3.0 06/17/2013       IMMUNOSUPPRESSANT LEVELS:  Lab Results   Component Value Date    TACROL 7.9 09/06/2018    DOSTAC Not Provided 09/06/2018    RAPAMY <2.0 (L) 01/31/2018       No components found for: CK  Lab Results   Component Value Date    MAG 1.6 09/06/2018     No results found for: A1C  Lab Results   Component Value Date    PHOS 2.7 09/06/2018     Lab Results   Component Value Date    NTBNP 53 08/08/2018     Lab Results   Component Value Date    SAITESTMET [facility] 09/06/2018    SAICELL Class I 09/06/2018    [id number] None 09/06/2018    HI1URHZOSG None 09/06/2018    SAIREPCOM  09/06/2018      Test performed by modified procedure. Serum heat inactivated and tested   by a modified (Dickey) protocol including fetal calf serum addition.   High-risk, mfi >3,000. Mod-risk, mfi 500-3,000.       Lab Results   Component Value Date    SAIITESTME [facility] 09/06/2018    SAIICELL Class II 09/06/2018    [id number] None 09/06/2018    ND6TXAUCPY None 09/06/2018    SAIIREPCOM  09/06/2018      Test performed by modified procedure. Serum heat inactivated and tested   by a modified (Dickey) protocol including fetal calf serum addition.   High-risk, mfi >3,000. Mod-risk, mfi 500-3,000.       Lab Results   Component Value Date    CSPEC EDTA PLASMA 09/06/2018    CMQNT <100 03/17/2014    CMLOG  03/17/2014     <2.0  The Cytomegalovirus DNA Quantitation assay is a real-time polymerase chain   reaction (PCR) utilizing analyte specific reagents manufactured by  [facility]. Analyte Specific Reagents (ASRs) are used in many laboratory   tests necessary for standard medical care and generally do not require FDA   approval.   This test was developed and its performance characteristics determined by   [facility].  It has not been   cleared or approved by the US Food and Drug Administration.       Diagnostic Studies:  TTE 10/31/18  Patient after orthotopic heart transplant. There is trivial tricuspid  insufficiency. Insufficient jet to estimate right ventricular systolic  pressure. No pericardial effusion. ECG tracing shows sinus tachycardia at 111  bpm. The left and right ventricles have normal chamber size, wall thickness,  and systolic function. The calculated biplane left ventricular ejection  fraction is 66 %. The LVRI is 1.3. There is mild flow acceleration in the  proximal right atrium (possible suture line) with a mean gradient of 3 mmHg.    Cardiac MRI 9/6/18  1. The left ventricle is normal in cavity size and wall thickness. The global systolic function is normal.  The LVEF is 55%. There are no regional wall motion abnormalities.  2. The right ventricle is normal in cavity size. The global systolic function is normal. The RVEF is 53%.   3. Left atrium is mildly enlarged due to transplantation. Right atrium is normal.  4. There are no significant valvular disease.   5. There is no late gadolinium enhancement to indicate myocardial fibrosis or infiltrative disease.   6. There is no intracardiac thrombus.   7. There is no pericardial effusion.       CONCLUSIONS: Normal biventricular function, LVEF 55% and RVEF 53%, with no evidence of focal myocardial  fibrosis.     [facility] 3/23/18      Shoulder XR yesterday  The shoulder appears grossly unremarkable. Lateral  scapular views appear obliqued. If there is clinical suspicion for  glenohumeral joint instability, an axillary view is recommended.    Assessment/Plan:    Wander is a 22 year old male who is s/p orthotopic heart transplant in 2012 who presents to clinic for evaluation of shoulder pain and chest pain.  He was seen in the emergency department where he had a negative troponin, negative D dimer, slightly elevated white count otherwise normal labs, normal shoulder x-ray and normal echocardiogram.  On physical examination pain appears consistent with musculoskeletal pain as it is reproducible over a joint.  He has had a recent thorough cardiac evaluation as part of his first visit with the adult transplant team reassuring for normal graft function (i.e not likely to be cardiac related).  To be sure that he is not having referred pain from an internal organ or any vascular concerns we will do CT scan from chest through pelvis with contrast.  If this is normal will consider MRI of the shoulder after discussing with his orthopedist.  Note: his CRP is 120 today, baseline ~80.  He is afebrile, white count is normal and no signs of systemic infection.  Low concern for infected joint.  Recommend patient monitor the skin for eruption of vesicles which would be concerning for zoster.  He was given a prescription for tramadol and recommended not to take naproxen.    # Status post OHT in 2012, history of NICM 2/2 viral myocardititis  # Chronic immunosuppression  * Rejection history: three episodes in 2017 treated with steroids  * Last biopsy: 3/2018 negative for ACR or AMR    Immunosuppression:  - Prednisone 7.5 mg daily  - Tacrolimus, trough level goal 6-8.   -  mg bid            25 minutes spent face-to-face with patient, >50% in counseling and/or coordination of care as described above      Yamilka Carmichael DNP, NP-C  11/1/2018          KIYA BESS

## 2018-11-01 NOTE — NURSING NOTE
Vitals completed successfully and medication reconciled. Dione Lyon MA    Chief Complaint   Patient presents with     Follow Up For     Return Heart transplant

## 2018-11-01 NOTE — PROGRESS NOTES
[facility]  Orthopedics  Amna Tinoco MD  2018     Name: Obed Viramontes  MRN: [id number]  Age: 22 year old  : 1995  Referring provider: Referred Self     Chief Complaint:   Left shoulder pain     History of Present Illness:   Obed Viramontes is a 22 year old male with a history of heart transplant in  due to cardiomyopathy and stroke in early childhood with subsequent left upper extremity atrophy and deformity who presents today for evaluation of left posterior shoulder pain. I last evaluated the patient on 2017 following a left wrist plate removal performed on 10/1/2017. He was doing postoperatively well at this time.     On Tuesday evening (3 days ago), he developed mild back pain. He went to bed and awoke with severe posterior shoulder pain. He notes that he usually sleeps on his right side, but may have been sleeping on his left when he awoke. The pain was a 5/10 in severity at rest and up to a 9/10 with movement. The pain migrates to different areas of his shoulder and is sometimes diffuse across the entire area. Yesterday, the pain became even more severe and he developed chest pain and increased respirations, prompting them to the emergency department for evaluation. He had imaging obtained as noted below, EKG was unchanged from prior, and troponin and d-dimer were negative. Echocardiogram was unremarkable.  He had some improvement with musculoskeletal treatment of his pain. He was discharged home with recommendation to see me.     Today, the mother reports that he was in so much pain last night that she gave the patient an old Oxycodone. This did well to alleviate his pain. 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 His pain has not inhibited his ability to work more hours. There are no other concerns.     Review of Systems:   A 10-point review of systems was obtained and is negative except for as noted in the HPI.  "    Physical Examination:  Height 1.854 m (6' 1\"), weight 67.6 kg (149 lb).    General: Healthy appearing male. Affect appropriate. Normal gait. Alert and oriented to surroundings.   Left Upper Extremity: Nontender to palpation of his AC joint, subacromial joint, and glenohumeral joint anteriorly and posteriorly. I can rotate his glenohumeral joint internally and externally without pain. He is limited in shoulder abduction and flexion secondary to chiqui plegia      Imaging:  XR Shoulder Lt portable - 2 views on 10/31/18:  IMPRESSION: The shoulder appears grossly unremarkable. Lateral  scapular views appear obliqued. No fracture or dislocation.     XR Chest portable - 1 view on 10/31/18:  IMPRESSION: The right costophrenic angle is partly excluded. With this  limitation, the lungs appear clear. No apparent pleural effusion or  pneumothorax. Sternotomy wires are noted.  Per radiology report         Assessment:   22 year old male with right shoulder pain that I cannot reproduce with range of motion or palpation of the shoulder joint.     Plan:   Refer to cardiology or primary care physician for further chest pain work up.     Scribe Disclosure:   ITom, am serving as a scribe to document services personally performed by Amna Tinoco MD at this visit, based upon the provider's statements to me. All documentation has been reviewed by the aforementioned provider prior to being entered into the official medical record.      Amna Tinoco MD   Hand and Upper Extremity Specialist  [facility]    "

## 2018-11-01 NOTE — MR AVS SNAPSHOT
After Visit Summary   11/1/2018    Obed Viramontes    MRN: [id number]           Patient Information     Date Of Birth          1995        Visit Information        Provider Department      11/1/2018 3:00 PM Janice Carmichael APRN CNP [facility]        Today's Diagnoses     Heart replaced by transplant (H)    -  1    Pain        Hypertension, unspecified type        Immunosuppression (H)          Care Instructions    CT of the chest/abdomen and pelvis at 5:20pm today at clinic imaging department on the first floor. No eating or drinking prior.     Santa will call with any pending results.    Take tramadol 50-100mg every 6 hours as needed for pain.     **Return as needed for any new symptoms or concern.     Call Josefina at [phone], option 2, with any questions or concerns.           Follow-ups after your visit        Who to contact     If you have questions or need follow up information about today's clinic visit or your schedule please contact [facility] directly at [phone].  Normal or non-critical lab and imaging results will be communicated to you by Xactiumt, letter or phone within 4 business days after the clinic has received the results. If you do not hear from us within 7 days, please contact the clinic through EventWith or phone. If you have a critical or abnormal lab result, we will notify you by phone as soon as possible.  Submit refill requests through EventWith or call your pharmacy and they will forward the refill request to us. Please allow 3 business days for your refill to be completed.          Additional Information About Your Visit        [facility] Information     EventWith gives you secure access to your electronic health record. If you see a primary care provider, you can also send messages to your care team and make appointments. If you have questions, please call your primary care clinic.  If you do not have a primary care provider, please call [phone]  "and they will assist you.        Care EveryWhere ID     This is your Care EveryWhere ID. This could be used by other organizations to access your [facility] medical records  [id number]        Your Vitals Were     Pulse Height Pulse Oximetry BMI (Body Mass Index)          120 1.867 m (6' 1.5\") 95% 20.33 kg/m2         Blood Pressure from Last 3 Encounters:   11/01/18 114/75   10/31/18 142/67   10/03/18 122/83    Weight from Last 3 Encounters:   11/01/18 70.9 kg (156 lb 3.2 oz)   11/01/18 67.6 kg (149 lb)   10/31/18 67.6 kg (149 lb)                 Today's Medication Changes          These changes are accurate as of 11/1/18  6:49 PM.  If you have any questions, ask your nurse or doctor.               Start taking these medicines.        Dose/Directions    traMADol 50 MG tablet   Commonly known as:  ULTRAM   Used for:  Pain   Started by:  Janice Carmichael APRN CNP        Take 50mg-100mg every 6 hours as needed for pain.   Quantity:  30 tablet   Refills:  0            Where to get your medicines      Some of these will need a paper prescription and others can be bought over the counter.  Ask your nurse if you have questions.     Bring a paper prescription for each of these medications     traMADol 50 MG tablet               Information about OPIOIDS     PRESCRIPTION OPIOIDS: WHAT YOU NEED TO KNOW   We gave you an opioid (narcotic) pain medicine. It is important to manage your pain, but opioids are not always the best choice. You should first try all the other options your care team gave you. Take this medicine for as short a time (and as few doses) as possible.    Some activities can increase your pain, such as bandage changes or therapy sessions. It may help to take your pain medicine 30 to 60 minutes before these activities. Reduce your stress by getting enough sleep, working on hobbies you enjoy and practicing relaxation or meditation. Talk to your care team about ways to manage your pain beyond prescription " opioids.    These medicines have risks:    DO NOT drive when on new or higher doses of pain medicine. These medicines can affect your alertness and reaction times, and you could be arrested for driving under the influence (DUI). If you need to use opioids long-term, talk to your care team about driving.    DO NOT operate heavy machinery    DO NOT do any other dangerous activities while taking these medicines.    DO NOT drink any alcohol while taking these medicines.     If the opioid prescribed includes acetaminophen, DO NOT take with any other medicines that contain acetaminophen. Read all labels carefully. Look for the word  acetaminophen  or  Tylenol.  Ask your pharmacist if you have questions or are unsure.    You can get addicted to pain medicines, especially if you have a history of addiction (chemical, alcohol or substance dependence). Talk to your care team about ways to reduce this risk.    All opioids tend to cause constipation. Drink plenty of water and eat foods that have a lot of fiber, such as fruits, vegetables, prune juice, apple juice and high-fiber cereal. Take a laxative (Miralax, milk of magnesia, Colace, Senna) if you don t move your bowels at least every other day. Other side effects include upset stomach, sleepiness, dizziness, throwing up, tolerance (needing more of the medicine to have the same effect), physical dependence and slowed breathing.    Store your pills in a secure place, locked if possible. We will not replace any lost or stolen medicine. If you don t finish your medicine, please throw away (dispose) as directed by your pharmacist. The [place] Pollution Control Agency has more information about safe disposal: https://www.pca.[facility].[place].us/living-green/managing-unwanted-medications         Primary Care Provider Office Phone # Fax #    Nnamdi Kaufman [phone]       [address] [facility] [address]        Equal Access to Services     CARLA SANTOS: Jabier  estuardo Maddox, wabenda maurilioadaha, qaybta kaalmada kaley, waxlion adrian yingsinaichristo hayes haroon. So [facility] [phone].    ATENCIÓN: Si ramola ro, tiene a neves disposición servicios gratuitos de asistencia lingüística. Ashly al [phone].    We comply with applicable federal civil rights laws and [place] laws. We do not discriminate on the basis of race, color, national origin, age, disability, sex, sexual orientation, or gender identity.            Thank you!     Thank you for choosing [facility]  for your care. Our goal is always to provide you with excellent care. Hearing back from our patients is one way we can continue to improve our services. Please take a few minutes to complete the written survey that you may receive in the mail after your visit with us. Thank you!             Your Updated Medication List - Protect others around you: Learn how to safely use, store and throw away your medicines at www.disposemymeds.org.          This list is accurate as of 11/1/18  6:49 PM.  Always use your most recent med list.                   Brand Name Dispense Instructions for use Diagnosis    amLODIPine 5 MG tablet    NORVASC    90 tablet    Take 1 tablet (5 mg) by mouth daily    Heart replaced by transplant (H)       aspirin 81 MG EC tablet     60 tablet    Take 2 tablets (162 mg) by mouth daily    Status post transplant, heart (H)       cyclobenzaprine 10 MG tablet    FLEXERIL    20 tablet    Take 0.5-1 tablets (5-10 mg) by mouth 3 times daily as needed for muscle spasms        mycophenolate 500 MG tablet    GENERIC EQUIVALENT    180 tablet    Take 1 tablet (500 mg) by mouth 2 times daily        naproxen 500 MG tablet    NAPROSYN    6 tablet    Take 1 tablet (500 mg) by mouth 2 times daily (with meals) for 3 days        pravastatin 10 MG tablet    PRAVACHOL    90 tablet    Take 1 tablet (10 mg) by mouth daily At bedtime    Heart replaced by transplant (H)       * predniSONE 2.5 MG tablet     DELTASONE    90 tablet    Take 1 tablet (2.5 mg) by mouth daily (Total dose 7.5mg every day)    Heart replaced by transplant (H)       * predniSONE 5 MG tablet    DELTASONE    90 tablet    Take 1 tablet (5 mg) by mouth daily (Total dose 7.5mg every day)    Heart replaced by transplant (H)       * tacrolimus 0.5 MG capsule    GENERIC EQUIVALENT    90 capsule    Take 1 capsule (0.5 mg) by mouth daily with 3 - 1 mg capsules for a total of 3.5 mg in the evening    Heart replaced by transplant (H)       * tacrolimus 1 MG capsule    GENERIC EQUIVALENT    540 capsule    Take 3 capsules (3 mg) by mouth 2 times daily With 1 - 0.5 mg capsule for a total of 3 mg in AM and 3.5 mg in PM    Heart replaced by transplant (H)       traMADol 50 MG tablet    ULTRAM    30 tablet    Take 50mg-100mg every 6 hours as needed for pain.    Pain       triamcinolone 0.1 % ointment    KENALOG    30 g    Apply topically 2 times daily    Dermatitis       Vitamin D3 2000 units Tabs     100 tablet    Take 2,000 Units by mouth daily    Heart transplanted (H)       * Notice:  This list has 4 medication(s) that are the same as other medications prescribed for you. Read the directions carefully, and ask your doctor or other care provider to review them with you.

## 2018-11-02 NOTE — TELEPHONE ENCOUNTER
Received call from [facility] NP, preliminary CT results reviewed, and felt the findings could not explain his shoulder back and chest pain. Recommended Obed use Tramadol 50 mg as needed for pain control.     CT preliminary  findings   1. Indeterminate lucent lesions in the left proximal humerus and  femur. Potentially, this may be demineralization from left hemiplegia.  Recommend further evaluation with radiographs of the left shoulder and  left femur.  2. No additional CT finding to explain the patient's symptoms.    Contacted mother Jolly and relayed about message to her. She was satisfied with this information and would let Obed know. She agreed with the plan to treat pain with Tramadol 50 mg, stating Obed had taken one earlier and was getting some relief.     She denies any questions or concerns at this time.

## 2018-11-06 ENCOUNTER — TELEPHONE (OUTPATIENT)
Dept: ORTHOPEDICS | Facility: CLINIC | Age: 23
End: 2018-11-06

## 2018-11-06 NOTE — TELEPHONE ENCOUNTER
Message left on his voicemail to call the clinic to schedule an appointment with Dr Lazar for left shoulder pain.

## 2018-11-07 ENCOUNTER — TELEPHONE (OUTPATIENT)
Dept: TRANSPLANT | Facility: CLINIC | Age: 23
End: 2018-11-07

## 2018-11-07 NOTE — TELEPHONE ENCOUNTER
Contacted mom that a message was left with pt re scheduling follow up for his shoulder. Per mom, he doesn't check VM. She will contact him .  Appreciated follow up.

## 2018-11-12 ENCOUNTER — TELEPHONE (OUTPATIENT)
Dept: ORTHOPEDICS | Facility: CLINIC | Age: 23
End: 2018-11-12

## 2018-11-12 NOTE — TELEPHONE ENCOUNTER
Message left on patient's  voicemail to call the clinic concerning scheduling an appointment with Dr Lazar.

## 2018-12-07 ENCOUNTER — TELEPHONE (OUTPATIENT)
Dept: TRANSPLANT | Facility: CLINIC | Age: 23
End: 2018-12-07

## 2018-12-07 NOTE — TELEPHONE ENCOUNTER
Patient Call: General    Reason for call: Mother just checking per dentist if pt needs antibiotic before dental procedure Appointment on Mon     Call back needed? Yes    Return Call Needed  Same as documented in contacts section  When to return call?: Same day: Route High Priority

## 2018-12-07 NOTE — TELEPHONE ENCOUNTER
LM with mom that based on AHA guidelines, pt does not require antibiotics for dental appts.   Enc to call back with further questions.

## 2019-02-26 DIAGNOSIS — Z13.220 ENCOUNTER FOR LIPID SCREENING FOR CARDIOVASCULAR DISEASE: ICD-10-CM

## 2019-02-26 DIAGNOSIS — Z13.6 ENCOUNTER FOR LIPID SCREENING FOR CARDIOVASCULAR DISEASE: ICD-10-CM

## 2019-02-26 DIAGNOSIS — Z94.1 HEART REPLACED BY TRANSPLANT (H): Primary | ICD-10-CM

## 2019-02-26 DIAGNOSIS — Z79.899 ENCOUNTER FOR LONG-TERM (CURRENT) USE OF MEDICATIONS: ICD-10-CM

## 2019-03-14 DIAGNOSIS — Z94.1 HEART REPLACED BY TRANSPLANT (H): ICD-10-CM

## 2019-03-14 RX ORDER — PREDNISONE 5 MG/1
5 TABLET ORAL DAILY
Qty: 90 TABLET | Refills: 3 | Status: SHIPPED | OUTPATIENT
Start: 2019-03-14 | End: 2020-03-11

## 2019-03-14 RX ORDER — PREDNISONE 2.5 MG/1
2.5 TABLET ORAL DAILY
Qty: 90 TABLET | Refills: 3 | Status: SHIPPED | OUTPATIENT
Start: 2019-03-14 | End: 2020-03-11

## 2019-03-14 RX ORDER — PRAVASTATIN SODIUM 10 MG
10 TABLET ORAL DAILY
Qty: 90 TABLET | Refills: 3 | Status: SHIPPED | OUTPATIENT
Start: 2019-03-14 | End: 2020-03-11

## 2019-03-14 RX ORDER — MYCOPHENOLATE MOFETIL 500 MG/1
500 TABLET ORAL 2 TIMES DAILY
Qty: 180 TABLET | Refills: 3 | Status: SHIPPED | OUTPATIENT
Start: 2019-03-14 | End: 2020-03-11

## 2019-03-14 RX ORDER — AMLODIPINE BESYLATE 5 MG/1
5 TABLET ORAL DAILY
Qty: 90 TABLET | Refills: 3 | Status: SHIPPED | OUTPATIENT
Start: 2019-03-14 | End: 2020-03-11

## 2019-03-25 ENCOUNTER — TELEPHONE (OUTPATIENT)
Dept: TRANSPLANT | Facility: CLINIC | Age: 24
End: 2019-03-25

## 2019-03-30 ENCOUNTER — HOSPITAL ENCOUNTER (OUTPATIENT)
Dept: LAB | Facility: CLINIC | Age: 24
Discharge: HOME | End: 2019-03-30
Attending: INTERNAL MEDICINE | Admitting: INTERNAL MEDICINE
Payer: COMMERCIAL

## 2019-03-30 DIAGNOSIS — Z13.6 ENCOUNTER FOR LIPID SCREENING FOR CARDIOVASCULAR DISEASE: ICD-10-CM

## 2019-03-30 DIAGNOSIS — Z79.899 ENCOUNTER FOR LONG-TERM (CURRENT) USE OF MEDICATIONS: ICD-10-CM

## 2019-03-30 DIAGNOSIS — Z94.1 HEART REPLACED BY TRANSPLANT (H): ICD-10-CM

## 2019-03-30 DIAGNOSIS — Z13.220 ENCOUNTER FOR LIPID SCREENING FOR CARDIOVASCULAR DISEASE: ICD-10-CM

## 2019-03-30 LAB
ALBUMIN SERPL-MCNC: 3.9 G/DL (ref 3.4–5)
ALP SERPL-CCNC: 67 U/L (ref 40–150)
ALT SERPL W P-5'-P-CCNC: 18 U/L (ref 0–70)
ANION GAP SERPL CALCULATED.3IONS-SCNC: 4 MMOL/L (ref 3–14)
AST SERPL W P-5'-P-CCNC: 12 U/L (ref 0–45)
BILIRUB SERPL-MCNC: 0.7 MG/DL (ref 0.2–1.3)
BUN SERPL-MCNC: 16 MG/DL (ref 7–30)
CALCIUM SERPL-MCNC: 8.8 MG/DL (ref 8.5–10.1)
CHLORIDE SERPL-SCNC: 110 MMOL/L (ref 94–109)
CHOLEST SERPL-MCNC: 152 MG/DL
CK SERPL-CCNC: 129 U/L (ref 30–300)
CO2 SERPL-SCNC: 28 MMOL/L (ref 20–32)
CREAT SERPL-MCNC: 0.84 MG/DL (ref 0.66–1.25)
ERYTHROCYTE [DISTWIDTH] IN BLOOD BY AUTOMATED COUNT: 12.8 % (ref 10–15)
GFR SERPL CREATININE-BSD FRML MDRD: >90 ML/MIN/{1.73_M2}
GLUCOSE SERPL-MCNC: 93 MG/DL (ref 70–99)
HCT VFR BLD AUTO: 46.5 % (ref 40–53)
HDLC SERPL-MCNC: 67 MG/DL
HGB BLD-MCNC: 15.6 G/DL (ref 13.3–17.7)
LDLC SERPL CALC-MCNC: 74 MG/DL
MAGNESIUM SERPL-MCNC: 1.8 MG/DL (ref 1.6–2.3)
MCH RBC QN AUTO: 29.7 PG (ref 26.5–33)
MCHC RBC AUTO-ENTMCNC: 33.5 G/DL (ref 31.5–36.5)
MCV RBC AUTO: 88 FL (ref 78–100)
NONHDLC SERPL-MCNC: 85 MG/DL
PHOSPHATE SERPL-MCNC: 2.9 MG/DL (ref 2.5–4.5)
PLATELET # BLD AUTO: 191 10E9/L (ref 150–450)
POTASSIUM SERPL-SCNC: 4.1 MMOL/L (ref 3.4–5.3)
PROT SERPL-MCNC: 7.1 G/DL (ref 6.8–8.8)
RBC # BLD AUTO: 5.26 10E12/L (ref 4.4–5.9)
SODIUM SERPL-SCNC: 142 MMOL/L (ref 133–144)
TRIGL SERPL-MCNC: 56 MG/DL
WBC # BLD AUTO: 5.3 10E9/L (ref 4–11)

## 2019-03-30 PROCEDURE — 83735 ASSAY OF MAGNESIUM: CPT | Performed by: INTERNAL MEDICINE

## 2019-03-30 PROCEDURE — 82550 ASSAY OF CK (CPK): CPT | Performed by: INTERNAL MEDICINE

## 2019-03-30 PROCEDURE — 80053 COMPREHEN METABOLIC PANEL: CPT | Performed by: INTERNAL MEDICINE

## 2019-03-30 PROCEDURE — 84100 ASSAY OF PHOSPHORUS: CPT | Performed by: INTERNAL MEDICINE

## 2019-03-30 PROCEDURE — 80197 ASSAY OF TACROLIMUS: CPT | Performed by: INTERNAL MEDICINE

## 2019-03-30 PROCEDURE — 36415 COLL VENOUS BLD VENIPUNCTURE: CPT | Performed by: INTERNAL MEDICINE

## 2019-03-30 PROCEDURE — 85027 COMPLETE CBC AUTOMATED: CPT | Performed by: INTERNAL MEDICINE

## 2019-03-30 PROCEDURE — 80061 LIPID PANEL: CPT | Performed by: INTERNAL MEDICINE

## 2019-03-31 LAB
TACROLIMUS BLD-MCNC: 9 UG/L (ref 5–15)
TME LAST DOSE: NORMAL H

## 2019-04-01 ENCOUNTER — TELEPHONE (OUTPATIENT)
Dept: TRANSPLANT | Facility: CLINIC | Age: 24
End: 2019-04-01

## 2019-04-01 NOTE — RESULT ENCOUNTER NOTE
Reviewed labs including FK level 9 w/pt. Confirmed 11 hr trough. Goal 6-8. No dose change.   RTC in fall. Enc to call with questions

## 2019-06-07 DIAGNOSIS — Z94.1 HEART REPLACED BY TRANSPLANT (H): ICD-10-CM

## 2019-06-07 RX ORDER — TACROLIMUS 1 MG/1
3 CAPSULE ORAL 2 TIMES DAILY
Qty: 540 CAPSULE | Refills: 3 | Status: CANCELLED | OUTPATIENT
Start: 2019-06-07

## 2019-06-07 RX ORDER — TACROLIMUS 0.5 MG/1
0.5 CAPSULE ORAL DAILY
Qty: 90 CAPSULE | Refills: 3 | Status: CANCELLED | OUTPATIENT
Start: 2019-06-07

## 2019-06-10 DIAGNOSIS — Z94.1 HEART REPLACED BY TRANSPLANT (H): ICD-10-CM

## 2019-06-10 RX ORDER — TACROLIMUS 1 MG/1
CAPSULE ORAL
Qty: 540 CAPSULE | Refills: 3 | Status: SHIPPED | OUTPATIENT
Start: 2019-06-10 | End: 2019-10-09

## 2019-06-10 RX ORDER — TACROLIMUS 0.5 MG/1
0.5 CAPSULE ORAL DAILY
Qty: 90 CAPSULE | Refills: 3 | Status: SHIPPED | OUTPATIENT
Start: 2019-06-10 | End: 2019-10-09

## 2019-07-29 ENCOUNTER — TELEPHONE (OUTPATIENT)
Dept: INTERNAL MEDICINE | Facility: CLINIC | Age: 24
End: 2019-07-29

## 2019-07-29 NOTE — TELEPHONE ENCOUNTER
[facility] Call Center    Phone Message    May a detailed message be left on voicemail: yes    Reason for Call: Pt's mother Jolly called to schedule physical with Dr. Kaufman. First available is in 1/2020. Pt is a transplant Pt and transfered care to Dr. Coto because they wanted a provider that they wouldn't have trouble scheduling with. Pt needs to be seen sooner than January 2020 due to Transplant and they don't want to see another provider. Please call Jolly back at [phone].     Action Taken: Message routed to:  [facility]      Appt set up for 2:40pm on 08/21/2019  Message left on pt's phone.  Heather Leos RN 2:16 PM on 7/30/2019.

## 2019-07-31 DIAGNOSIS — Z94.1 HEART REPLACED BY TRANSPLANT (H): Primary | ICD-10-CM

## 2019-08-06 ENCOUNTER — TELEPHONE (OUTPATIENT)
Dept: TRANSPLANT | Facility: CLINIC | Age: 24
End: 2019-08-06

## 2019-08-07 ENCOUNTER — TELEPHONE (OUTPATIENT)
Dept: TRANSPLANT | Facility: CLINIC | Age: 24
End: 2019-08-07

## 2019-08-08 ENCOUNTER — PRE VISIT (OUTPATIENT)
Dept: INTERNAL MEDICINE | Facility: CLINIC | Age: 24
End: 2019-08-08

## 2019-08-08 NOTE — TELEPHONE ENCOUNTER
[facility]  Dept: [phone]     Patient: Obed Viramontes   : 1995  MRN: [id number]  Encounter: 19       Pre Visit Assessment    Health Maintenance Due   Topic Date Due     HPV IMMUNIZATION (1 - Risk male 3-dose series) 2005     HIV SCREENING  2009     PHQ-2  2019     PREVENTIVE CARE VISIT  2019     Chart Reviewed for Labs needed/Health Maintenance: Yes   If labs needed, orders placed:  No labs needed ,   Lab appointment scheduled:  N/A    Notes:  Patient confirmed they will attend appointment:  N/A  Appointment rescheduled?  N/A      Clair Sosa at 4:22 PM on 2019

## 2019-08-21 ENCOUNTER — OFFICE VISIT (OUTPATIENT)
Dept: INTERNAL MEDICINE | Facility: CLINIC | Age: 24
End: 2019-08-21
Payer: COMMERCIAL

## 2019-08-21 VITALS
WEIGHT: 163.2 LBS | BODY MASS INDEX: 21.24 KG/M2 | SYSTOLIC BLOOD PRESSURE: 121 MMHG | DIASTOLIC BLOOD PRESSURE: 80 MMHG | HEART RATE: 102 BPM | OXYGEN SATURATION: 97 %

## 2019-08-21 DIAGNOSIS — Z00.00 ENCOUNTER FOR ROUTINE HISTORY AND PHYSICAL EXAM FOR MALE: Primary | ICD-10-CM

## 2019-08-21 DIAGNOSIS — G44.039 EPISODIC PAROXYSMAL HEMICRANIA, NOT INTRACTABLE: ICD-10-CM

## 2019-08-21 ASSESSMENT — PAIN SCALES - GENERAL: PAINLEVEL: MILD PAIN (3)

## 2019-08-21 NOTE — PROGRESS NOTES
[facility]  Primary Care Center   Nnamdi Kaufman MD  08/21/2019      Chief Complaint:   Physical       History of Present Illness:   Obed Viramontes is a 23 year old male with a history viral cardiomyopathy, s/p heart transplant (2012), hypertension, complications of cerebral infarction age 2 1/2 with hemiplegia following the CVA, post transplant  lymphoproliferative disease and current  immunosuppression who presents for a physical. 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 He does report that he has been having frequent headaches, 2-3 days a week for the past 1.5 years. He reports they are not terrible and he is still able to complete daily activities, but they are bothersome with pain at a 3/10. They usually start in the morning and persist intermittently throughout the day, but other days he does not get a headache at all. They are localized to his right parietal region, and are worsened by caffeine intake (Obed usually drinks mellow yellow). His headaches do not worsen with lack of sleep and are not accompanied by nausea, vomiting, weakness, waking from sleep, blurry vision, or shoulder or neck pain. Tylenol does alleviate these headaches, and he takes 500 mg 1-2x per day when he has one. He has not had frequent headaches like these prior to onset, he can not think of any changes that would have caused onset, and he has not seen a neurologist or other physician for them.  He denies any sensations or feelings that he is about to get a headache.     Medication Recheck:  He is still taking Amlodipine 5 mg daily, Aspirin 81 mg 2x daily, Prednisone 7.5 mg daily, Pravastatin 10 mg daily, Mycophenolate 500 mg 2x daily, and Tacrolimus 6.5 mg daily. He is no longer taking Flexeril.    General Health:  He denies blurry vision, allergies, cough, sore throat, shortness of breath, chest pain, palpitations, rashes, unusual moles, nausea, vomiting, abdominal pain,  heart burn, difficulty urinating, hematuria, constipation, diarrhea, hematochezia, depression, anxiety, or panic attacks. He does not have any side effects from the medications he takes, except for occasional difficultly falling asleep due to Prednisone. He reports it is much better than it used to be and he does not need to take sleeping pills. He reports his joints have been good, although he does not have much function in his left hand. He also fell while walking up the stairs a few weeks ago and hit his knee. It was very swollen and bruised but it is healing well. He is due for a dental check-up in a few weeks, and reports that he does have a number of cavities    Other concerns discussed:  1. He has an annual with cardiology in October, he reports no new changes in cardiac health.     Review of Systems:   Pertinent items are noted in HPI or as in patient entered ROS below, remainder of complete ROS is negative.     Answers for HPI/ROS submitted by the patient on 8/21/2019   General Symptoms: No  Skin Symptoms: No  HENT Symptoms: No  EYE SYMPTOMS: No  HEART SYMPTOMS: No  LUNG SYMPTOMS: No  INTESTINAL SYMPTOMS: No  URINARY SYMPTOMS: No  REPRODUCTIVE SYMPTOMS: No  SKELETAL SYMPTOMS: No  BLOOD SYMPTOMS: No  NERVOUS SYSTEM SYMPTOMS: No  MENTAL HEALTH SYMPTOMS: No      Active Medications:      amLODIPine (NORVASC) 5 MG tablet, Take 1 tablet (5 mg) by mouth daily, Disp: 90 tablet, Rfl: 3     aspirin 81 MG EC tablet, Take 2 tablets (162 mg) by mouth daily, Disp: 60 tablet, Rfl: 99     Cholecalciferol (VITAMIN D3) 2000 UNITS TABS, Take 2,000 Units by mouth daily, Disp: 100 tablet, Rfl: 6     mycophenolate (GENERIC EQUIVALENT) 500 MG tablet, Take 1 tablet (500 mg) by mouth 2 times daily, Disp: 180 tablet, Rfl: 3     pravastatin (PRAVACHOL) 10 MG tablet, Take 1 tablet (10 mg) by mouth daily At bedtime, Disp: 90 tablet, Rfl: 3     predniSONE (DELTASONE) 2.5 MG tablet, Take 2.5 mg by mouth daily (Total dose 7.5mg every  day)., Disp: 90 tablet, Rfl: 3     predniSONE (DELTASONE) 5 MG tablet, Take 5 mg by mouth daily (Total dose 7.5mg every day)., Disp: 90 tablet, Rfl: 3     tacrolimus (GENERIC EQUIVALENT) 0.5 MG capsule, Take 1 capsule (0.5 mg) by mouth daily Take ONE capsule with THREE 1 mg capsules (3.5 mg) every AM, Disp: 90 capsule, Rfl: 3     tacrolimus (GENERIC EQUIVALENT) 1 MG capsule, Take THREE capsules with ONE 0.5 mg capsule every AM (3.5 mg) and THREE capsules (3 mg) every PM., Disp: 540 capsule, Rfl: 3     triamcinolone (KENALOG) 0.1 % ointment, Apply topically 2 times daily (Patient not taking: Reported on 11/1/2018), Disp: 30 g, Rfl: 1      Allergies:   Latex and Other [seasonal allergies]      Past Medical History:  Cardiomyopathy, hypertrophic obstructive  Hypertension   History of heart transplant  Hemiplegia following CVA  Lymphoproliferative disease  Unspecified cerebral artery occlusion with cerebral infarction, age 2 1/2  Flexion contracture of left elbow  Immunosuppression  Mild protein calorie malnutrition  Mechanical problems with limbs      Past Surgical History:  Arthrodesis wrist, left - 10/20/16  Biopsy  AFF surgical pathology  Heart Cath Child x4  Heart Cath Biopsy x5  Interposition tendon hand, left - 10/20/16  Left lower extremity orthopedic surgery  PICC insertion  PICC removal  Remove hardware wrist - 10/12/17  Transplant heart recipient - 3/23/12    Family History:   Diabetes mellitus - sister      Social History:   The patient is single, a nonsmoker, and does not consume alcohol.      Physical Exam:   /80 (BP Location: Right arm, Patient Position: Sitting, Cuff Size: Adult Regular)   Pulse 102   Wt 74 kg (163 lb 3.2 oz)   SpO2 97%   BMI 21.24 kg/m     GENERAL APPEARANCE: Healthy, alert and no distress  EYES: EOMI, PERRL  HENT: Ear canals and TM's normal. Nose and mouth without ulcers or lesions  NECK: No adenopathy, no asymmetry, masses, or scars. Thyroid normal to palpation  RESP: Lungs  clear to auscultation - no rales, rhonchi or wheezes  CV: tachycardic and rhythm, normal S1 and S2, no S3 or S4 and no murmur, click or rub  ABDOMEN:  Soft, nontender, no HSM or masses and bowel sounds normal  MS: Weakness of left arm and leg with malformation of left hand  NEURO: Normal strength and tone, mentation intact and speech normal; weakness of left leg, arm  PSYCH: Mentation appears normal and affect normal/bright  SKIN: Scattered nevi on neck, trunk, and back, none suspicious-looking     Assessment and Plan:  Encounter for routine history and physical exam for male  Obed reports that he is overall doing very well, but has been experiencing frequent bothersome headaches.   We discussed his weight and avoiding slow weight gain over the next 10 -15 years, especially being on chronic prednisone.  We will administer Pneumococcal vaccine today given his immunosuppressed status.  She should get the 23 valent pneumococcal after 8 weeks, perhaps at his physical next year.     Episodic paroxysmal hemicrania, not intractable  They do not stop him from working or completing his daily activities, but they are mildly painful rated at 3/10. He is unsure what is causing them, but they are worsened by caffeine and alleviated by Tylenol.  This is atypical for migraine.  We discussed muscle tension headaches and trying a course of PT to see if this helps.  If this does not help after 3-6 weeks, a referral to the headache clinic is provided.   - [facility] HAND, AND CHIROPRACTIC REFERRAL  - NEUROLOGY ADULT REFERRAL     Follow-up: Return in about 1 year (around 8/21/2020) for Physical Exam.         Scribe Disclosure:  I, Carmen Roberto, am serving as a scribe to document services personally performed by Nnamdi Kaufman MD at this visit, based upon the provider's statements to me. All documentation has been reviewed by the aforementioned provider prior to being entered into the official medical record.    Portions of this  medical record were completed by a scribe. UPON MY REVIEW AND AUTHENTICATION BY ELECTRONIC SIGNATURE, this confirms (a) I performed the applicable clinical services, and (b) the record is accurate.     Nnamdi Kaufman MD, A

## 2019-08-21 NOTE — NURSING NOTE
Administered Pneumococcal Prevnar 13 vaccine (see Immunizations in Chart Review). Patient tolerated well. Instructed patient to stay in building for at least 15 minutes after injection since this is his first pneumonia vaccine.  Patient gave verbal understanding.        Srinivas Marr CMA at 4:17 PM on 8/21/2019

## 2019-08-21 NOTE — NURSING NOTE
Chief Complaint   Patient presents with     Physical     Pt here for annual physical      LALITA Sabillon at 2:26 PM sign on 8/21/2019

## 2019-08-22 ENCOUNTER — PRE VISIT (OUTPATIENT)
Dept: NEUROLOGY | Facility: CLINIC | Age: 24
End: 2019-08-22

## 2019-08-22 NOTE — TELEPHONE ENCOUNTER
FUTURE VISIT INFORMATION      FUTURE VISIT INFORMATION:    Date: 10/8/29359    Time: 9AM    Location: [facility]  REFERRAL INFORMATION:    Referring provider:  Dr. Kaufman    Referring providers clinic:  [facility]     Reason for visit/diagnosis  Episodic Parozysmal hemicrania    RECORDS REQUESTED FROM:       Clinic name Comments Records Status Imaging Status

## 2019-10-04 ENCOUNTER — HEALTH MAINTENANCE LETTER (OUTPATIENT)
Age: 24
End: 2019-10-04

## 2019-10-07 ENCOUNTER — RESULTS ONLY (OUTPATIENT)
Dept: OTHER | Facility: CLINIC | Age: 24
End: 2019-10-07

## 2019-10-07 ENCOUNTER — PRE VISIT (OUTPATIENT)
Dept: TRANSPLANT | Facility: CLINIC | Age: 24
End: 2019-10-07

## 2019-10-07 ENCOUNTER — HOSPITAL ENCOUNTER (OUTPATIENT)
Dept: MRI IMAGING | Facility: CLINIC | Age: 24
Discharge: HOME | End: 2019-10-07
Attending: INTERNAL MEDICINE | Admitting: INTERNAL MEDICINE
Payer: COMMERCIAL

## 2019-10-07 VITALS — DIASTOLIC BLOOD PRESSURE: 84 MMHG | HEART RATE: 97 BPM | SYSTOLIC BLOOD PRESSURE: 133 MMHG

## 2019-10-07 DIAGNOSIS — Z94.1 HEART REPLACED BY TRANSPLANT (H): ICD-10-CM

## 2019-10-07 DIAGNOSIS — Z79.899 ENCOUNTER FOR LONG-TERM (CURRENT) USE OF OTHER MEDICATIONS: ICD-10-CM

## 2019-10-07 DIAGNOSIS — Z13.220 ENCOUNTER FOR LIPID SCREENING FOR CARDIOVASCULAR DISEASE: ICD-10-CM

## 2019-10-07 DIAGNOSIS — Z94.1 HEART REPLACED BY TRANSPLANT (H): Primary | ICD-10-CM

## 2019-10-07 DIAGNOSIS — Z13.6 ENCOUNTER FOR LIPID SCREENING FOR CARDIOVASCULAR DISEASE: ICD-10-CM

## 2019-10-07 LAB
ALBUMIN SERPL-MCNC: 4 G/DL (ref 3.4–5)
ALP SERPL-CCNC: 64 U/L (ref 40–150)
ALT SERPL W P-5'-P-CCNC: 19 U/L (ref 0–70)
ANION GAP SERPL CALCULATED.3IONS-SCNC: 6 MMOL/L (ref 3–14)
AST SERPL W P-5'-P-CCNC: 13 U/L (ref 0–45)
BILIRUB SERPL-MCNC: 0.6 MG/DL (ref 0.2–1.3)
BUN SERPL-MCNC: 16 MG/DL (ref 7–30)
CALCIUM SERPL-MCNC: 9 MG/DL (ref 8.5–10.1)
CHLORIDE SERPL-SCNC: 110 MMOL/L (ref 94–109)
CHOLEST SERPL-MCNC: 147 MG/DL
CK SERPL-CCNC: 99 U/L (ref 30–300)
CO2 SERPL-SCNC: 26 MMOL/L (ref 20–32)
CREAT SERPL-MCNC: 0.82 MG/DL (ref 0.66–1.25)
ERYTHROCYTE [DISTWIDTH] IN BLOOD BY AUTOMATED COUNT: 12.8 % (ref 10–15)
GFR SERPL CREATININE-BSD FRML MDRD: >90 ML/MIN/{1.73_M2}
GLUCOSE SERPL-MCNC: 85 MG/DL (ref 70–99)
HCT VFR BLD AUTO: 46.7 % (ref 40–53)
HDLC SERPL-MCNC: 66 MG/DL
HGB BLD-MCNC: 15.6 G/DL (ref 13.3–17.7)
LDLC SERPL CALC-MCNC: 64 MG/DL
MAGNESIUM SERPL-MCNC: 1.4 MG/DL (ref 1.6–2.3)
MCH RBC QN AUTO: 29.2 PG (ref 26.5–33)
MCHC RBC AUTO-ENTMCNC: 33.4 G/DL (ref 31.5–36.5)
MCV RBC AUTO: 88 FL (ref 78–100)
NONHDLC SERPL-MCNC: 81 MG/DL
PHOSPHATE SERPL-MCNC: 2.4 MG/DL (ref 2.5–4.5)
PLATELET # BLD AUTO: 175 10E9/L (ref 150–450)
POTASSIUM SERPL-SCNC: 3.8 MMOL/L (ref 3.4–5.3)
PROT SERPL-MCNC: 7.2 G/DL (ref 6.8–8.8)
RBC # BLD AUTO: 5.34 10E12/L (ref 4.4–5.9)
SODIUM SERPL-SCNC: 141 MMOL/L (ref 133–144)
TACROLIMUS BLD-MCNC: 12.2 UG/L (ref 5–15)
TME LAST DOSE: NORMAL H
TRIGL SERPL-MCNC: 86 MG/DL
WBC # BLD AUTO: 5.1 10E9/L (ref 4–11)

## 2019-10-07 PROCEDURE — 80061 LIPID PANEL: CPT | Performed by: INTERNAL MEDICINE

## 2019-10-07 PROCEDURE — 93010 ELECTROCARDIOGRAM REPORT: CPT | Mod: 76 | Performed by: INTERNAL MEDICINE

## 2019-10-07 PROCEDURE — 80197 ASSAY OF TACROLIMUS: CPT | Performed by: INTERNAL MEDICINE

## 2019-10-07 PROCEDURE — 86352 CELL FUNCTION ASSAY W/STIM: CPT | Performed by: INTERNAL MEDICINE

## 2019-10-07 PROCEDURE — 93016 CV STRESS TEST SUPVJ ONLY: CPT | Performed by: INTERNAL MEDICINE

## 2019-10-07 PROCEDURE — 36415 COLL VENOUS BLD VENIPUNCTURE: CPT | Performed by: INTERNAL MEDICINE

## 2019-10-07 PROCEDURE — 86832 HLA CLASS I HIGH DEFIN QUAL: CPT | Performed by: INTERNAL MEDICINE

## 2019-10-07 PROCEDURE — 80053 COMPREHEN METABOLIC PANEL: CPT | Performed by: INTERNAL MEDICINE

## 2019-10-07 PROCEDURE — 86833 HLA CLASS II HIGH DEFIN QUAL: CPT | Performed by: INTERNAL MEDICINE

## 2019-10-07 PROCEDURE — 84100 ASSAY OF PHOSPHORUS: CPT | Performed by: INTERNAL MEDICINE

## 2019-10-07 PROCEDURE — 85027 COMPLETE CBC AUTOMATED: CPT | Performed by: INTERNAL MEDICINE

## 2019-10-07 PROCEDURE — 93018 CV STRESS TEST I&R ONLY: CPT | Performed by: INTERNAL MEDICINE

## 2019-10-07 PROCEDURE — 82550 ASSAY OF CK (CPK): CPT | Performed by: INTERNAL MEDICINE

## 2019-10-07 PROCEDURE — 40000065 ZZH STATISTIC EKG NON-CHARGEABLE

## 2019-10-07 PROCEDURE — 25000128 H RX IP 250 OP 636: Performed by: RADIOLOGY

## 2019-10-07 PROCEDURE — 25500064 ZZH RX 255 OP 636: Performed by: INTERNAL MEDICINE

## 2019-10-07 PROCEDURE — 93017 CV STRESS TEST TRACING ONLY: CPT

## 2019-10-07 PROCEDURE — 75563 CARD MRI W/STRESS IMG & DYE: CPT

## 2019-10-07 PROCEDURE — 87799 DETECT AGENT NOS DNA QUANT: CPT | Performed by: INTERNAL MEDICINE

## 2019-10-07 PROCEDURE — A9585 GADOBUTROL INJECTION: HCPCS | Performed by: INTERNAL MEDICINE

## 2019-10-07 PROCEDURE — 83735 ASSAY OF MAGNESIUM: CPT | Performed by: INTERNAL MEDICINE

## 2019-10-07 RX ORDER — AMINOPHYLLINE 25 MG/ML
100 INJECTION, SOLUTION INTRAVENOUS ONCE
Status: DISCONTINUED | OUTPATIENT
Start: 2019-10-07 | End: 2019-10-08 | Stop reason: HOSPADM

## 2019-10-07 RX ORDER — GADOBUTROL 604.72 MG/ML
10 INJECTION INTRAVENOUS ONCE
Status: COMPLETED | OUTPATIENT
Start: 2019-10-07 | End: 2019-10-07

## 2019-10-07 RX ORDER — ACYCLOVIR 200 MG/1
0-1 CAPSULE ORAL
Status: DISCONTINUED | OUTPATIENT
Start: 2019-10-07 | End: 2019-10-08 | Stop reason: HOSPADM

## 2019-10-07 RX ORDER — REGADENOSON 0.08 MG/ML
0.4 INJECTION, SOLUTION INTRAVENOUS ONCE
Status: COMPLETED | OUTPATIENT
Start: 2019-10-07 | End: 2019-10-07

## 2019-10-07 RX ORDER — ALBUTEROL SULFATE 90 UG/1
2 AEROSOL, METERED RESPIRATORY (INHALATION) EVERY 5 MIN PRN
Status: DISCONTINUED | OUTPATIENT
Start: 2019-10-07 | End: 2019-10-08 | Stop reason: HOSPADM

## 2019-10-07 RX ORDER — DIAZEPAM 5 MG
5 TABLET ORAL EVERY 30 MIN PRN
Status: DISCONTINUED | OUTPATIENT
Start: 2019-10-07 | End: 2019-10-08 | Stop reason: HOSPADM

## 2019-10-07 RX ADMIN — GADOBUTROL 10 ML: 604.72 INJECTION INTRAVENOUS at 10:15

## 2019-10-07 RX ADMIN — REGADENOSON 0.4 MG: 0.08 INJECTION, SOLUTION INTRAVENOUS at 10:25

## 2019-10-07 NOTE — PROGRESS NOTES
.Pt arrived for cardiac MRI with stress. Allergies, medications, and safety checklist reviewed with patient. Test explained and all questions were answered. Denies caffeine intake. Lungs are clear. Lexiscan 0.4 mg given over 10 seconds followed by 5 mL of saline. Pt tolerated the scan, medications, and contrast well. Pre and post EKG completed. Pt monitored after MRI and escorted back to [facility] waiting room.

## 2019-10-08 LAB
CMV DNA SPEC NAA+PROBE-ACNC: NORMAL [IU]/ML
CMV DNA SPEC NAA+PROBE-LOG#: NORMAL {LOG_IU}/ML
INTERPRETATION ECG - MUSE: NORMAL
INTERPRETATION ECG - MUSE: NORMAL
SPECIMEN SOURCE: NORMAL

## 2019-10-09 ENCOUNTER — OFFICE VISIT (OUTPATIENT)
Dept: CARDIOLOGY | Facility: CLINIC | Age: 24
End: 2019-10-09
Attending: INTERNAL MEDICINE
Payer: COMMERCIAL

## 2019-10-09 ENCOUNTER — ANCILLARY PROCEDURE (OUTPATIENT)
Dept: GENERAL RADIOLOGY | Facility: CLINIC | Age: 24
End: 2019-10-09
Attending: INTERNAL MEDICINE
Payer: COMMERCIAL

## 2019-10-09 VITALS
WEIGHT: 172.3 LBS | HEART RATE: 100 BPM | DIASTOLIC BLOOD PRESSURE: 82 MMHG | OXYGEN SATURATION: 96 % | SYSTOLIC BLOOD PRESSURE: 128 MMHG | BODY MASS INDEX: 22.83 KG/M2 | HEIGHT: 73 IN

## 2019-10-09 DIAGNOSIS — E78.2 MIXED HYPERLIPIDEMIA: ICD-10-CM

## 2019-10-09 DIAGNOSIS — Z94.1 HEART REPLACED BY TRANSPLANT (H): ICD-10-CM

## 2019-10-09 DIAGNOSIS — I10 BENIGN ESSENTIAL HYPERTENSION: Primary | ICD-10-CM

## 2019-10-09 LAB
CW7: 553
DONOR IDENTIFICATION: NORMAL
DR51: 614
DSA COMMENTS: NORMAL
DSA PRESENT: YES
DSA TEST METHOD: NORMAL
EBV DNA # SPEC NAA+PROBE: NORMAL {COPIES}/ML
EBV DNA SPEC NAA+PROBE-LOG#: NORMAL {LOG_COPIES}/ML
IMMUKNOW IMMUNE CELL FUNCTION: 262 NG/ML
ORGAN: NORMAL
SA1 CELL: NORMAL
SA1 COMMENTS: NORMAL
SA1 HI RISK ABY: NORMAL
SA1 MOD RISK ABY: NORMAL
SA1 TEST METHOD: NORMAL
SA2 CELL: NORMAL
SA2 COMMENTS: NORMAL
SA2 HI RISK ABY UA: NORMAL
SA2 MOD RISK ABY: NORMAL
SA2 TEST METHOD: NORMAL
UNACCEPTABLE ANTIGEN: NORMAL
UNOS CPRA: 0

## 2019-10-09 PROCEDURE — 99214 OFFICE O/P EST MOD 30 MIN: CPT | Mod: GC | Performed by: INTERNAL MEDICINE

## 2019-10-09 PROCEDURE — G0463 HOSPITAL OUTPT CLINIC VISIT: HCPCS | Mod: ZF

## 2019-10-09 RX ORDER — TACROLIMUS 1 MG/1
CAPSULE ORAL
Qty: 540 CAPSULE | Refills: 3 | Status: SHIPPED | OUTPATIENT
Start: 2019-10-09 | End: 2020-10-20

## 2019-10-09 RX ORDER — TACROLIMUS 0.5 MG/1
0.5 CAPSULE ORAL DAILY
Qty: 90 CAPSULE | Refills: 3 | Status: SHIPPED | OUTPATIENT
Start: 2019-10-09 | End: 2022-09-15

## 2019-10-09 ASSESSMENT — PAIN SCALES - GENERAL: PAINLEVEL: NO PAIN (0)

## 2019-10-09 ASSESSMENT — MIFFLIN-ST. JEOR: SCORE: 1825.43

## 2019-10-09 NOTE — LETTER
RE: Obed Viramontes  [address]  [facility] [address]     Dear Colleague,    Thank you for the opportunity to participate in the care of your patient, Obed Viramontes, at the [facility]. Please see a copy of my visit note below.    October 9, 2019     HPI:  Mr. Viramontes is a 23-year-old gentleman with history of viral mediated dilated cardiomyopathy at age 2-1/2 for which he underwent a heart transplant on 3/23/2012.  His post transplant course has been complicated by rejection the last 3 occurring in April May and June 2017 for which he receives steroids, post transplant lymphoproliferative disorder and stroke with resulting left sided hemiplegia presents today for follow-up.     He is doing well and is compliant with his medications. Denied any chest pain, SOB, LE swelling, syncopal episodes, PND, orthopnea. He continues to endorse headache and is taking tylenol for pain. He is scheduled to see neurology for further evaluation. No reported limitation in ADLs. He currently works as a manager of a movie theater.     PAST MEDICAL HISTORY:  Past Medical History:   Diagnosis Date     Cardiomyopathy, hypertrophic obstructive (H)      H/O heart transplant (H)     Mar.23, 2012     Hemiplegia following CVA (cerebrovascular accident) (H)     left, improved with rehab     Lymphoproliferative disease (H)      Unspecified cerebral artery occlusion with cerebral infarction     age 2 1/2       FAMILY HISTORY:  Family History   Problem Relation Age of Onset     Diabetes Sister      Melanoma No family hx of      Skin Cancer No family hx of        SOCIAL HISTORY:  Social History     Social History     Marital status: Single     Spouse name: N/A     Number of children: N/A     Years of education: N/A     Social History Main Topics     Smoking status: Never Smoker     Smokeless tobacco: Never Used     Alcohol use No     Drug use: No     Sexual activity: No     Other  "Topics Concern     None     Social History Narrative    Lives in [place], [place]. 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  Went to [facility].  Has 3 sisters and a brother.  Mom and dad  (2010).        CURRENT MEDICATIONS:  Current Outpatient Medications   Medication Sig Dispense Refill     amLODIPine (NORVASC) 5 MG tablet Take 1 tablet (5 mg) by mouth daily 90 tablet 3     aspirin 81 MG EC tablet Take 2 tablets (162 mg) by mouth daily 60 tablet 99     Cholecalciferol (VITAMIN D3) 2000 UNITS TABS Take 2,000 Units by mouth daily 100 tablet 6     mycophenolate (GENERIC EQUIVALENT) 500 MG tablet Take 1 tablet (500 mg) by mouth 2 times daily 180 tablet 3     pravastatin (PRAVACHOL) 10 MG tablet Take 1 tablet (10 mg) by mouth daily At bedtime 90 tablet 3     predniSONE (DELTASONE) 2.5 MG tablet Take 2.5 mg by mouth daily (Total dose 7.5mg every day). 90 tablet 3     predniSONE (DELTASONE) 5 MG tablet Take 5 mg by mouth daily (Total dose 7.5mg every day). 90 tablet 3     tacrolimus (GENERIC EQUIVALENT) 0.5 MG capsule Take 1 capsule (0.5 mg) by mouth daily Take ONE capsule with THREE 1 mg capsules (3.5 mg) every AM 90 capsule 3     tacrolimus (GENERIC EQUIVALENT) 1 MG capsule Take THREE capsules with ONE 0.5 mg capsule every AM (3.5 mg) and THREE capsules (3 mg) every PM. 540 capsule 3     triamcinolone (KENALOG) 0.1 % ointment Apply topically 2 times daily (Patient not taking: Reported on 11/1/2018) 30 g 1       ROS:   14 point review of systems was obtained from the patient with pertinent positives and negatives as described above in the HPI.    EXAM:  /82 (BP Location: Right arm, Patient Position: Chair, Cuff Size: Adult Regular)   Pulse 100   Ht 1.854 m (6' 1\")   Wt 78.2 kg (172 lb 4.8 oz)   SpO2 96%   BMI 22.73 kg/m     General: appears comfortable, alert and articulate  Head: normocephalic, atraumatic  Eyes: anicteric sclera, EOMI  Neck: no adenopathy  Orophyarynx: " moist mucosa, no lesions, dentition intact  Heart: regular, S1/S2, no murmur, gallop, rub, JVP at clavicle  Lungs: clear, no rales or wheezing  Abdomen: soft, non-tender, bowel sounds present, no hepatosplenomegaly  Extremities: no clubbing, cyanosis or edema  Neurological: normal speech and affect, no gross motor deficits    Labs:  CBC RESULTS:  Lab Results   Component Value Date    WBC 5.1 10/07/2019    RBC 5.34 10/07/2019    HGB 15.6 10/07/2019    HCT 46.7 10/07/2019    MCV 88 10/07/2019    MCH 29.2 10/07/2019    MCHC 33.4 10/07/2019    RDW 12.8 10/07/2019     10/07/2019       CMP RESULTS:  Lab Results   Component Value Date     10/07/2019    POTASSIUM 3.8 10/07/2019    CHLORIDE 110 (H) 10/07/2019    CO2 26 10/07/2019    ANIONGAP 6 10/07/2019    GLC 85 10/07/2019    BUN 16 10/07/2019    CR 0.82 10/07/2019    GFRESTIMATED >90 10/07/2019    GFRESTBLACK >90 10/07/2019    CARLOS A 9.0 10/07/2019    BILITOTAL 0.6 10/07/2019    ALBUMIN 4.0 10/07/2019    ALKPHOS 64 10/07/2019    ALT 19 10/07/2019    AST 13 10/07/2019      CMRI:  Clinical history: 22M, s/p heart transplantation (03/23/12) complicated by PTLD (09/2012), hx of rejection  with mild troponin elevation. CMR to assess function.     Comparison CMR: No prior CMR.     1. The left ventricle is normal in cavity size and wall thickness. The global systolic function is normal.  The LVEF is 55%. There are no regional wall motion abnormalities.     2. The right ventricle is normal in cavity size. The global systolic function is normal. The RVEF is 53%.      3. Left atrium is mildly enlarged due to transplantation. Right atrium is normal.     4. There are no significant valvular disease.      5. There is no late gadolinium enhancement to indicate myocardial fibrosis or infiltrative disease.      6. There is no intracardiac thrombus.      7. There is no pericardial effusion.       CONCLUSIONS: Normal biventricular function, LVEF 55% and RVEF 53%, with no evidence  of focal myocardial  Fibrosis    Pediatric Heart Catheterization 3/23/2018:        Assessment and Plan:   Mr. Viramontes is a pleasant 23-year-old gentleman with history of presumed viral myocarditis at age 2-1/2 with dilated cardiomyopathy and CVA with resulting with resulting left-sided hemiplegia.    Overall he is doing well, he has no functional limitations that he has noted. He endorses headache and has an upcoming appointment with neurology. His headache is unlikely to be from tacrolimus given chronic use of medication and only recent onset of symptoms with associated neck tension.    Official read of cardiac MRI is still pending. Will touch base with patient once report is available. Labs from 10/7 significant for tacrolimus level of 12.2 (timed appropriately to measure trough). Will decrease tacrolimus to 3mg BID and recheck level in 2 weeks. Also noted to have mildly elevated DSA. Will recheck level in3 months. He will continue to follow with endocrine for his bone health given his chronic steroid use.  Continue mycophenolate 500mg BID, prednisone 7.5mg daily. Advised patient regarding preventive health care maintenance: dental visit, dermatology visit.     Follow-up in 1 year. Will plan for repeat coronary angiogram at that time.     Discussed with Dr.Alexy Meg Qiu   Cardiology fellow     I have seen and evaluated the patient and agree with the assessment and plan as above.  Hao Shine MD

## 2019-10-09 NOTE — PATIENT INSTRUCTIONS
Please call your coordinator at [phone] with any questions or concerns.      Decrease tacrolimus from 3.5/ 3 mg to 3 mg twice daily. Recheck level in ~2 weeks. Call/My Chart Josefina when you can come in and she will schedule your appt.   [phone] (OK to leave message)     Recheck donor specific antibodies in 3 months.     Follow up with dermatology before end of year.     Full labs in 6 months.    Coordinator will call with MRI results.

## 2019-10-09 NOTE — PROGRESS NOTES
October 9, 2019     HPI:    Mr. Viramontes is a 23-year-old gentleman with history of viral mediated dilated cardiomyopathy at age 2-1/2 for which he underwent a heart transplant on 3/23/2012.  His post transplant course has been complicated by rejection the last 3 occurring in April May and June 2017 for which he receives steroids, post transplant lymphoproliferative disorder and stroke with resulting left sided hemiplegia presents today for follow-up.     He is doing well and is compliant with his medications. Denied any chest pain, SOB, LE swelling, syncopal episodes, PND, orthopnea. He continues to endorse headache and is taking tylenol for pain. He is scheduled to see neurology for further evaluation. No reported limitation in ADLs. He currently works as a manager of a movie theater.     PAST MEDICAL HISTORY:  Past Medical History:   Diagnosis Date     Cardiomyopathy, hypertrophic obstructive (H)      H/O heart transplant (H)     Mar.23, 2012     Hemiplegia following CVA (cerebrovascular accident) (H)     left, improved with rehab     Lymphoproliferative disease (H)      Unspecified cerebral artery occlusion with cerebral infarction     age 1 1/2       FAMILY HISTORY:  Family History   Problem Relation Age of Onset     Diabetes Sister      Melanoma No family hx of      Skin Cancer No family hx of        SOCIAL HISTORY:  Social History     Social History     Marital status: Single     Spouse name: N/A     Number of children: N/A     Years of education: N/A     Social History Main Topics     Smoking status: Never Smoker     Smokeless tobacco: Never Used     Alcohol use No     Drug use: No     Sexual activity: No     Other Topics Concern     None     Social History Narrative    Lives in [place], [place]. Works at [employer] at night, watches TV shows and plays video games.  Went to [facility].  Has 3 sisters and a brother.  Mom and dad  (2010).        CURRENT MEDICATIONS:  Current Outpatient Medications  "  Medication Sig Dispense Refill     amLODIPine (NORVASC) 5 MG tablet Take 1 tablet (5 mg) by mouth daily 90 tablet 3     aspirin 81 MG EC tablet Take 2 tablets (162 mg) by mouth daily 60 tablet 99     Cholecalciferol (VITAMIN D3) 2000 UNITS TABS Take 2,000 Units by mouth daily 100 tablet 6     mycophenolate (GENERIC EQUIVALENT) 500 MG tablet Take 1 tablet (500 mg) by mouth 2 times daily 180 tablet 3     pravastatin (PRAVACHOL) 10 MG tablet Take 1 tablet (10 mg) by mouth daily At bedtime 90 tablet 3     predniSONE (DELTASONE) 2.5 MG tablet Take 2.5 mg by mouth daily (Total dose 7.5mg every day). 90 tablet 3     predniSONE (DELTASONE) 5 MG tablet Take 5 mg by mouth daily (Total dose 7.5mg every day). 90 tablet 3     tacrolimus (GENERIC EQUIVALENT) 0.5 MG capsule Take 1 capsule (0.5 mg) by mouth daily Take ONE capsule with THREE 1 mg capsules (3.5 mg) every AM 90 capsule 3     tacrolimus (GENERIC EQUIVALENT) 1 MG capsule Take THREE capsules with ONE 0.5 mg capsule every AM (3.5 mg) and THREE capsules (3 mg) every PM. 540 capsule 3     triamcinolone (KENALOG) 0.1 % ointment Apply topically 2 times daily (Patient not taking: Reported on 11/1/2018) 30 g 1       ROS:   14 point review of systems was obtained from the patient with pertinent positives and negatives as described above in the HPI.    EXAM:  /82 (BP Location: Right arm, Patient Position: Chair, Cuff Size: Adult Regular)   Pulse 100   Ht 1.854 m (6' 1\")   Wt 78.2 kg (172 lb 4.8 oz)   SpO2 96%   BMI 22.73 kg/m    General: appears comfortable, alert and articulate  Head: normocephalic, atraumatic  Eyes: anicteric sclera, EOMI  Neck: no adenopathy  Orophyarynx: moist mucosa, no lesions, dentition intact  Heart: regular, S1/S2, no murmur, gallop, rub, JVP at clavicle  Lungs: clear, no rales or wheezing  Abdomen: soft, non-tender, bowel sounds present, no hepatosplenomegaly  Extremities: no clubbing, cyanosis or edema  Neurological: normal speech and " affect, no gross motor deficits    Labs:  CBC RESULTS:  Lab Results   Component Value Date    WBC 5.1 10/07/2019    RBC 5.34 10/07/2019    HGB 15.6 10/07/2019    HCT 46.7 10/07/2019    MCV 88 10/07/2019    MCH 29.2 10/07/2019    MCHC 33.4 10/07/2019    RDW 12.8 10/07/2019     10/07/2019       CMP RESULTS:  Lab Results   Component Value Date     10/07/2019    POTASSIUM 3.8 10/07/2019    CHLORIDE 110 (H) 10/07/2019    CO2 26 10/07/2019    ANIONGAP 6 10/07/2019    GLC 85 10/07/2019    BUN 16 10/07/2019    CR 0.82 10/07/2019    GFRESTIMATED >90 10/07/2019    GFRESTBLACK >90 10/07/2019    CARLOS A 9.0 10/07/2019    BILITOTAL 0.6 10/07/2019    ALBUMIN 4.0 10/07/2019    ALKPHOS 64 10/07/2019    ALT 19 10/07/2019    AST 13 10/07/2019        CMRI:  Clinical history: 22M, s/p heart transplantation (03/23/12) complicated by PTLD (09/2012), hx of rejection  with mild troponin elevation. CMR to assess function.     Comparison CMR: No prior CMR.     1. The left ventricle is normal in cavity size and wall thickness. The global systolic function is normal.  The LVEF is 55%. There are no regional wall motion abnormalities.     2. The right ventricle is normal in cavity size. The global systolic function is normal. The RVEF is 53%.      3. Left atrium is mildly enlarged due to transplantation. Right atrium is normal.     4. There are no significant valvular disease.      5. There is no late gadolinium enhancement to indicate myocardial fibrosis or infiltrative disease.      6. There is no intracardiac thrombus.      7. There is no pericardial effusion.       CONCLUSIONS: Normal biventricular function, LVEF 55% and RVEF 53%, with no evidence of focal myocardial  Fibrosis    Pediatric Heart Catheterization 3/23/2018:        Assessment and Plan:   Mr. Viramontes is a pleasant 23-year-old gentleman with history of presumed viral myocarditis at age 2-1/2 with dilated cardiomyopathy and CVA with resulting with resulting left-sided  hemiplegia.      Overall he is doing well, he has no functional limitations that he has noted. He endorses headache and has an upcoming appointment with neurology. His headache is unlikely to be from tacrolimus given chronic use of medication and only recent onset of symptoms with associated neck tension.    Official read of cardiac MRI is still pending. Will touch base with patient once report is available. Labs from 10/7 significant for tacrolimus level of 12.2 (timed appropriately to measure trough). Will decrease tacrolimus to 3mg BID and recheck level in 2 weeks. Also noted to have mildly elevated DSA. Will recheck level in3 months. He will continue to follow with endocrine for his bone health given his chronic steroid use.  Continue mycophenolate 500mg BID, prednisone 7.5mg daily. Advised patient regarding preventive health care maintenance: dental visit, dermatology visit.     Follow-up in 1 year. Will plan for repeat coronary angiogram at that time.     Discussed with Dr.Alexy Meg Qiu   Cardiology fellow     I have seen and evaluated the patient and agree with the assessment and plan as above.  Hao Shine MD

## 2019-10-09 NOTE — NURSING NOTE
Chief Complaint   Patient presents with     Follow Up     h tx annual     Vitals were taken and medications were reconciled.  Criselda Murillo  11:25 AM

## 2019-10-10 NOTE — NURSING NOTE
Transplant Coordinator Note  Reason for visit: Annual  Caregiver: Yusuf Azevedo     Health concerns addressed today:  Pt seen in clinic with Dr Shine. MD reviewed available labs. MRI and CXR pending. Tac dose adjusted. New DSAs discussed, recheck in Jan 2020. Wght/BP stable. Pt discussed onoing headaches; pt seeing neuro on 10/11. Decrease FK dose may also help. Preventive care reviewed; pt active at work up to 35K steps per shift. Mom states pt is becoming more independent and taking more responsibility for medical and life in general!     Immunosuppressants:  ~ mg BID  ~FK 3/3.5 mg - level 12.2 (goal 6-8). Confirmed 12-hour trough, dose decreased to 3 mg BID  ~Pred 7.5 mg daily - discussed; cont    Routine screenings:    Derm: DUE  Dental: Spring 2019  Eye: Wear glasses; on going  Flu/Pneumonia: DONE     Labs: Reviewed; copy provided    Medication record reviewed and reconciled  Questions and concerns addressed. Copy of AVS provided. Pt verbalized an understanding of plan of care.     Patient Instructions  ~Please call your coordinator at [phone] with any questions or concerns.    ~Decrease tacrolimus from 3.5/ 3 mg to 3 mg twice daily. Recheck level in ~2 weeks. Call/My Chart Josefina when you can come in and she will schedule your appt. [phone] (OK to leave message)   ~Recheck donor specific antibodies in 3 months.   ~Follow up with dermatology before end of year.   ~Full labs in 6 months.  ~Coordinator will call with MRI results.

## 2019-11-04 ENCOUNTER — TELEPHONE (OUTPATIENT)
Dept: TRANSPLANT | Facility: CLINIC | Age: 24
End: 2019-11-04

## 2019-11-04 NOTE — TELEPHONE ENCOUNTER
LM with mom requesting pt have Fk level recheck. Also inquired about headaches since pt was no show for neurology appt.

## 2019-11-14 DIAGNOSIS — Z94.1 HEART REPLACED BY TRANSPLANT (H): ICD-10-CM

## 2019-11-14 LAB
TACROLIMUS BLD-MCNC: 8.7 UG/L (ref 5–15)
TME LAST DOSE: NORMAL H

## 2019-11-14 PROCEDURE — 80197 ASSAY OF TACROLIMUS: CPT | Performed by: INTERNAL MEDICINE

## 2019-11-14 PROCEDURE — 36415 COLL VENOUS BLD VENIPUNCTURE: CPT | Performed by: INTERNAL MEDICINE

## 2019-11-14 PROCEDURE — 80048 BASIC METABOLIC PNL TOTAL CA: CPT | Performed by: INTERNAL MEDICINE

## 2019-11-15 ENCOUNTER — TELEPHONE (OUTPATIENT)
Dept: TRANSPLANT | Facility: CLINIC | Age: 24
End: 2019-11-15

## 2019-11-15 DIAGNOSIS — Z94.1 HEART REPLACED BY TRANSPLANT (H): Primary | ICD-10-CM

## 2019-11-15 LAB
ANION GAP SERPL CALCULATED.3IONS-SCNC: 8 MMOL/L (ref 3–14)
BUN SERPL-MCNC: 17 MG/DL (ref 7–30)
CALCIUM SERPL-MCNC: 9.1 MG/DL (ref 8.5–10.1)
CHLORIDE SERPL-SCNC: 107 MMOL/L (ref 94–109)
CO2 SERPL-SCNC: 27 MMOL/L (ref 20–32)
CREAT SERPL-MCNC: 0.84 MG/DL (ref 0.66–1.25)
GFR SERPL CREATININE-BSD FRML MDRD: >90 ML/MIN/{1.73_M2}
GLUCOSE SERPL-MCNC: 79 MG/DL (ref 70–99)
POTASSIUM SERPL-SCNC: 3.8 MMOL/L (ref 3.4–5.3)
SODIUM SERPL-SCNC: 142 MMOL/L (ref 133–144)

## 2019-11-15 NOTE — TELEPHONE ENCOUNTER
Lab results called to mom. Goal taco 6-8; level 8.7.   No dose change. Cont 3 mg BID after sig decrease after 0.5 mg change . Level on 3.5/3 12.3.     Mom reports pt told her his headaches have improved.     Recheck in Jan with DSAs

## 2019-12-06 ENCOUNTER — TELEPHONE (OUTPATIENT)
Dept: TRANSPLANT | Facility: CLINIC | Age: 24
End: 2019-12-06

## 2019-12-06 ENCOUNTER — OFFICE VISIT (OUTPATIENT)
Dept: URGENT CARE | Facility: URGENT CARE | Age: 24
End: 2019-12-06
Payer: COMMERCIAL

## 2019-12-06 ENCOUNTER — MYC MEDICAL ADVICE (OUTPATIENT)
Dept: INTERNAL MEDICINE | Facility: CLINIC | Age: 24
End: 2019-12-06

## 2019-12-06 VITALS
BODY MASS INDEX: 22.33 KG/M2 | OXYGEN SATURATION: 97 % | HEART RATE: 95 BPM | DIASTOLIC BLOOD PRESSURE: 68 MMHG | SYSTOLIC BLOOD PRESSURE: 106 MMHG | WEIGHT: 174 LBS | HEIGHT: 74 IN | TEMPERATURE: 98.8 F

## 2019-12-06 DIAGNOSIS — R51.9 HEADACHE: Primary | ICD-10-CM

## 2019-12-06 DIAGNOSIS — Z20.828 EXPOSURE TO INFLUENZA: Primary | ICD-10-CM

## 2019-12-06 DIAGNOSIS — Z20.828 EXPOSURE TO THE FLU: Primary | ICD-10-CM

## 2019-12-06 LAB
FLUAV+FLUBV AG SPEC QL: NEGATIVE
FLUAV+FLUBV AG SPEC QL: NEGATIVE
SPECIMEN SOURCE: NORMAL

## 2019-12-06 PROCEDURE — 99207 ZZC NO BILLABLE SERVICE THIS VISIT: CPT

## 2019-12-06 PROCEDURE — 87804 INFLUENZA ASSAY W/OPTIC: CPT | Performed by: FAMILY MEDICINE

## 2019-12-06 RX ORDER — OSELTAMIVIR PHOSPHATE 75 MG/1
75 CAPSULE ORAL DAILY
Qty: 10 CAPSULE | Refills: 0 | Status: SHIPPED | OUTPATIENT
Start: 2019-12-06 | End: 2022-02-11

## 2019-12-06 RX ORDER — OSELTAMIVIR PHOSPHATE 75 MG/1
75 CAPSULE ORAL DAILY
Qty: 10 CAPSULE | Refills: 0 | Status: SHIPPED | OUTPATIENT
Start: 2019-12-06 | End: 2019-12-16

## 2019-12-06 ASSESSMENT — MIFFLIN-ST. JEOR: SCORE: 1841.07

## 2019-12-06 NOTE — TELEPHONE ENCOUNTER
See My Chart messages - ok per Dr Shine for pt to receive Tamilfu.     Discussed with pharmacist re dosing:   [12/6/2019 1:48 PM] Jayden Ocampo:    I would order Tamilfu 75mg daily x 10 days, and have patient assessed at urgent care for influenza  if positive start taking Tamiflu 75mg BID  once daily would be prophylactic dose, BID is treatment dose    Plan communicated with mom.

## 2019-12-10 ENCOUNTER — APPOINTMENT (OUTPATIENT)
Dept: GENERAL RADIOLOGY | Facility: CLINIC | Age: 24
End: 2019-12-10
Attending: EMERGENCY MEDICINE
Payer: COMMERCIAL

## 2019-12-10 ENCOUNTER — HOSPITAL ENCOUNTER (EMERGENCY)
Facility: CLINIC | Age: 24
Discharge: HOME | End: 2019-12-10
Attending: EMERGENCY MEDICINE | Admitting: EMERGENCY MEDICINE
Payer: COMMERCIAL

## 2019-12-10 ENCOUNTER — MYC MEDICAL ADVICE (OUTPATIENT)
Dept: TRANSPLANT | Facility: CLINIC | Age: 24
End: 2019-12-10

## 2019-12-10 ENCOUNTER — TELEPHONE (OUTPATIENT)
Dept: TRANSPLANT | Facility: CLINIC | Age: 24
End: 2019-12-10

## 2019-12-10 VITALS
HEART RATE: 102 BPM | SYSTOLIC BLOOD PRESSURE: 128 MMHG | WEIGHT: 178 LBS | BODY MASS INDEX: 22.84 KG/M2 | TEMPERATURE: 97.2 F | RESPIRATION RATE: 18 BRPM | DIASTOLIC BLOOD PRESSURE: 103 MMHG | OXYGEN SATURATION: 95 % | HEIGHT: 74 IN

## 2019-12-10 DIAGNOSIS — M25.512 LEFT SHOULDER PAIN, UNSPECIFIED CHRONICITY: ICD-10-CM

## 2019-12-10 DIAGNOSIS — R07.9 CHEST PAIN, UNSPECIFIED TYPE: ICD-10-CM

## 2019-12-10 LAB
ANION GAP SERPL CALCULATED.3IONS-SCNC: 5 MMOL/L (ref 3–14)
BASOPHILS # BLD AUTO: 0 10E9/L (ref 0–0.2)
BASOPHILS NFR BLD AUTO: 0.4 %
BUN SERPL-MCNC: 19 MG/DL (ref 7–30)
CALCIUM SERPL-MCNC: 9.5 MG/DL (ref 8.5–10.1)
CHLORIDE SERPL-SCNC: 108 MMOL/L (ref 94–109)
CO2 SERPL-SCNC: 27 MMOL/L (ref 20–32)
CREAT SERPL-MCNC: 0.77 MG/DL (ref 0.66–1.25)
D DIMER PPP FEU-MCNC: <0.3 UG/ML FEU (ref 0–0.5)
DIFFERENTIAL METHOD BLD: ABNORMAL
EOSINOPHIL # BLD AUTO: 0 10E9/L (ref 0–0.7)
EOSINOPHIL NFR BLD AUTO: 0.1 %
ERYTHROCYTE [DISTWIDTH] IN BLOOD BY AUTOMATED COUNT: 12.3 % (ref 10–15)
GFR SERPL CREATININE-BSD FRML MDRD: >90 ML/MIN/{1.73_M2}
GLUCOSE SERPL-MCNC: 106 MG/DL (ref 70–99)
HCT VFR BLD AUTO: 47.7 % (ref 40–53)
HGB BLD-MCNC: 15.7 G/DL (ref 13.3–17.7)
IMM GRANULOCYTES # BLD: 0 10E9/L (ref 0–0.4)
IMM GRANULOCYTES NFR BLD: 0.2 %
LYMPHOCYTES # BLD AUTO: 1.2 10E9/L (ref 0.8–5.3)
LYMPHOCYTES NFR BLD AUTO: 10.9 %
MCH RBC QN AUTO: 28.9 PG (ref 26.5–33)
MCHC RBC AUTO-ENTMCNC: 32.9 G/DL (ref 31.5–36.5)
MCV RBC AUTO: 88 FL (ref 78–100)
MONOCYTES # BLD AUTO: 0.8 10E9/L (ref 0–1.3)
MONOCYTES NFR BLD AUTO: 7.2 %
NEUTROPHILS # BLD AUTO: 8.9 10E9/L (ref 1.6–8.3)
NEUTROPHILS NFR BLD AUTO: 81.2 %
NRBC # BLD AUTO: 0 10*3/UL
NRBC BLD AUTO-RTO: 0 /100
PLATELET # BLD AUTO: 204 10E9/L (ref 150–450)
POTASSIUM SERPL-SCNC: 4.3 MMOL/L (ref 3.4–5.3)
RBC # BLD AUTO: 5.43 10E12/L (ref 4.4–5.9)
SODIUM SERPL-SCNC: 140 MMOL/L (ref 133–144)
TROPONIN I BLD-MCNC: 0 UG/L (ref 0–0.08)
TROPONIN I SERPL-MCNC: <0.015 UG/L (ref 0–0.04)
WBC # BLD AUTO: 11 10E9/L (ref 4–11)

## 2019-12-10 PROCEDURE — 85025 COMPLETE CBC W/AUTO DIFF WBC: CPT | Performed by: EMERGENCY MEDICINE

## 2019-12-10 PROCEDURE — 99285 EMERGENCY DEPT VISIT HI MDM: CPT | Mod: 25

## 2019-12-10 PROCEDURE — 93005 ELECTROCARDIOGRAM TRACING: CPT

## 2019-12-10 PROCEDURE — 84484 ASSAY OF TROPONIN QUANT: CPT

## 2019-12-10 PROCEDURE — 84484 ASSAY OF TROPONIN QUANT: CPT | Mod: 91 | Performed by: EMERGENCY MEDICINE

## 2019-12-10 PROCEDURE — 85379 FIBRIN DEGRADATION QUANT: CPT | Performed by: EMERGENCY MEDICINE

## 2019-12-10 PROCEDURE — 71046 X-RAY EXAM CHEST 2 VIEWS: CPT

## 2019-12-10 PROCEDURE — 80048 BASIC METABOLIC PNL TOTAL CA: CPT | Performed by: EMERGENCY MEDICINE

## 2019-12-10 RX ORDER — NAPROXEN 500 MG/1
500 TABLET ORAL 2 TIMES DAILY PRN
Qty: 24 TABLET | Refills: 0 | Status: SHIPPED | OUTPATIENT
Start: 2019-12-10 | End: 2019-12-22

## 2019-12-10 RX ORDER — CYCLOBENZAPRINE HCL 10 MG
5-10 TABLET ORAL 3 TIMES DAILY PRN
Qty: 10 TABLET | Refills: 0 | Status: ON HOLD | OUTPATIENT
Start: 2019-12-10 | End: 2020-10-19

## 2019-12-10 ASSESSMENT — ENCOUNTER SYMPTOMS
MYALGIAS: 0
COUGH: 0
NAUSEA: 0
BACK PAIN: 0
NECK PAIN: 0
NUMBNESS: 0
WEAKNESS: 0
SHORTNESS OF BREATH: 1
DIAPHORESIS: 0
FEVER: 0

## 2019-12-10 ASSESSMENT — MIFFLIN-ST. JEOR: SCORE: 1859.21

## 2019-12-10 NOTE — ED AVS SNAPSHOT
[facility] Emergency Department  [address]  [facility] [address]  Phone:  [phone]  Fax:  [phone]                                    Obed Viramontes   MRN: [id number]    Department:  [facility] Emergency Department   Date of Visit:  12/10/2019           After Visit Summary Signature Page    I have received my discharge instructions, and my questions have been answered. I have discussed any challenges I see with this plan with the nurse or doctor.    ..........................................................................................................................................  Patient/Patient Representative Signature      ..........................................................................................................................................  Patient Representative Print Name and Relationship to Patient    ..................................................               ................................................  Date                                   Time    ..........................................................................................................................................  Reviewed by Signature/Title    ...................................................              ..............................................  Date                                               Time          22EPIC Rev 08/18

## 2019-12-10 NOTE — TELEPHONE ENCOUNTER
Called pt to discuss My Chart message about arm and chest pain. States he took Tylenol without relief. Conts on Tamiflu. No cough, no fever, no congestion, or chills. Some MEJIA. Pain is not getter better but not worse. Thinks it is similar to when he had arm and CP last year.     Did not take any Tums (doesn't have any), went to bed on fairly empty stomach.     He is at work until 8 PM. If pain persists, agreed to have his sister take him to [facility] to be evaluated. Mom is out of town. If pain worsens, enc him to seek treatment ASAP.    Pt verbalized understanding.

## 2019-12-11 LAB — INTERPRETATION ECG - MUSE: NORMAL

## 2019-12-11 NOTE — ED PROVIDER NOTES
"  History     Chief Complaint:  Chest Pain    The history is provided by the patient.      Obed Viramontes is a 23 year old, immunosuppressed male with a history of cerebral infarction with subsequent left sided hemiplegia (1997), hypertrophic obstructive cardiomyopathy 7 years s/p heart transplant, and hypertension who presents for evaluation of chest pain. This morning, around 1000, the patient woke up with mid-sternal chest pain that has been constant ever since. He reports it is exacerbated when he stands and walks, however it improves a little if he walks quickly for a long time. It also improves when he sits down - however it never goes away. It is associated with shortness of breath on exertion as well. He also notes left shoulder/upper arm pain, however he feels this is intermittent and is not necessarily associated with exertion or position. However, he reports both these pains are \"exactly the same\" as the episode for which he presented to the [facility] ED in October 2018; at this time, he underwent unremarkable lab work and was discharged home with analgesia. Given this similarity and extensive cardiac history, he decided to present to the ED.     Today, he reports there is more chest pain and less arm pain than the episode one year ago; at this moment, he denies any arm symptoms. He describes the chest pain as a heavy, burning sensation that feels like someone is \"holding his heart and not letting it work properly\". On top of exertion, the pain is exacerbated when he laughs or breathes deeply. He denies any radiation of the pain from his chest into his jaw, neck, or back and denies any associated nausea or diaphoresis. For the last week, the patient reports mild cold symptoms; on 12/6, he was tested for influenza given recent exposure. That was negative, but he was started on prophylactic Tamiflu. He denies any recent fevers, cough or leg swelling, nor has he noticed a change in his " "baseline left upper and lower extremity sensation or right sided symptoms. Notably, he follows with Dr. Shine of the cardiology service at the [facility]; he last saw him in October of this year for his annual physical and he had a cardiac MRI at that time.     Allergies:  Latex     Medications:    Amlodipine   Baby aspirin   Mycophenolate  Tacrolimus   Pravastatin  Prednisone  Tamiflu - started 12/7    Past Medical History:    Hypertrophic obstructive cardiomyopathy  History of heart transplant  Hemiplegia following CVA  Cerebrovascular accident (1997)  Immunosuppression  Hypertension   Lymphoproliferative disease    Past Surgical History:    Left wrist arthrodesis  Heart cath - multiple, last one 2018  Heart transplant  Left lower extremity surgery   Interposition tendon hand - left     Family History:    Sister: Diabetes     Social History:  Marital Status:  Single [1]  Presents to the ED with sister  Negative for tobacco use.  Negative for alcohol use.  Negative for drug use.      Review of Systems   Constitutional: Negative for diaphoresis and fever.   HENT: Positive for congestion.    Respiratory: Positive for shortness of breath. Negative for cough.    Cardiovascular: Positive for chest pain. Negative for leg swelling.   Gastrointestinal: Negative for nausea.   Musculoskeletal: Negative for back pain, myalgias and neck pain.        Left arm pain   Neurological: Negative for weakness and numbness.   All other systems reviewed and are negative.      Physical Exam     Patient Vitals for the past 24 hrs:   BP Temp Temp src Pulse Heart Rate Resp SpO2 Height Weight   12/10/19 2100 (!) 128/103 -- -- 102 -- -- 95 % -- --   12/10/19 1930 112/83 -- -- 90 89 18 96 % -- --   12/10/19 1915 (!) 130/100 -- -- 93 93 -- 95 % -- --   12/10/19 1900 (!) 126/90 -- -- 93 94 18 98 % -- --   12/10/19 1845 128/86 -- -- -- -- -- -- -- --   12/10/19 1830 (!) 124/93 97.2  F (36.2  C) Temporal -- 107 20 98 % 1.867 m (6' 1.5\") 80.7 kg (178 " lb)      Physical Exam  General: Alert, no acute distress; nontoxic appearing  HEENT:  Moist mucous membranes.  Conjunctiva normal.   CV:  RRR, no m/r/g, skin warm and well perfused  Pulm:  CTAB, no wheezes/ronchi/rales.  No acute distress, breathing comfortably; reproducible left anterior chest wall tenderness  GI:  Soft, nontender, nondistended.  No rebound or guarding.  Normal bowel sounds  MSK:  Moving all extremities.  No focal areas of edema, erythema, or tenderness; left upper extremity atrophy and contraction deformity  Skin:  WWP, no rashes, no lower extremity edema, skin color normal, no diaphoresis  Psych:  Well-appearing, normal affect, regular speech    Emergency Department Course   ECG:  Indication: Chest Pain  Time: 1835  Vent. Rate 101 bpm. ME interval 148. QRS duration 98. QT/QTc 346/448. P-R-T axis 29 30 49.    Sinus tachycardia with premature atrial complexes. Otherwise normal ECG. No significant change compared to EKG dated 10/7/19. Read time: 1835.    Imaging:  Radiographic findings were communicated with the patient who voiced understanding of the findings.  XR Chest 2 views:   No acute change given poorer ventilatory effort, as per radiology.      Laboratory:  CBC: WBC: 11.0, HGB: 15.7, PLT: 204  BMP: Glucose 106 (H), o/w WNL (Creatinine: 0.77)  1903 Troponin: <0.015   1903 Troponin POCT: 0.00  D dimer: <0.3    Procedures:  None    Emergency Department Course:  Nursing notes and vitals reviewed.   1904: I performed an exam of the patient as documented above.     EKG obtained in the ED, see results above.   IV was inserted and blood was drawn for laboratory testing, results above.   The patient was sent for a chest x-ray while in the emergency department, results above.      2023: I consulted with Dr. Broderick of the cardiology service at the [facility], regarding the patient's history and presentation here in the emergency department.    2140: I rechecked the patient and discussed the results of his  workup thus far.     Findings and plan explained to the Patient. Patient discharged home with instructions regarding supportive care, medications, and reasons to return. The importance of close follow-up was reviewed. The patient was prescribed naproxen and Flexeril.      I personally reviewed the laboratory and imaging results with the Patient and answered all related questions prior to discharge.    Impression & Plan      Medical Decision Making:  Obed Viramontes is a 23 year old who presents to the ER for evaluation of chest pain.  He has a complex past medical history including cardiac transplant for obstructive cardiomyopathy, CVA with left upper extremity atrophy/left-sided weakness.  He has had recent stress cardiac MRI 10/7 which was normal in 3/23 heart cath which was unremarkable for significant CAD.  There have been no fevers and he is afebrile and hemodynamically stable here.  I considered a broad differential including heart transplant rejection, ACS, pericarditis, PE, pneumonia/pneumothorax, musculoskeletal etiology.  He does have reproducible anterior left-sided chest wall tenderness on exam but no crepitus.  His EKG shows no acute ischemic findings and  greater than 6-hour troponin is within normal limits.  His d-dimer is negative and chest x-ray was unremarkable for signs of infection or pneumothorax.  Discussed case with cardiology at the [facility], Dr. Broderick, as patient follows with Dr. Shine there about the patient's work-up and she agreed that no further work-up is indicated given recent normal stress MRI and heart cath, normal EKG, normal troponin here.  This is unlikely to represent ACS or transplant rejection.  I do suspect that symptoms are more musculoskeletal in nature and feel that he is safe for discharge to home with muscle relaxant and naproxen.  I will have him follow-up with his cardiologist closely and return for new or worsening symptoms are discussed at bedside.   Patient comfortable with the plan and all questions were answered prior to discharge.    Diagnosis:    ICD-10-CM    1. Chest pain, unspecified type R07.9    2. Left shoulder pain, unspecified chronicity M25.512        Disposition:  discharged to home    Discharge Medications:  Discharge Medication List as of 12/10/2019  9:30 PM        Scribe Disclosure:  I, Cecilia Meza, am serving as a scribe on 12/10/2019 at 7:04 PM to personally document services performed by Andrés Bernstein MD based on my observations and the provider's statements to me.      Cecilia Meza  12/10/2019   [facility] EMERGENCY DEPARTMENT       Andrés Bernstein MD  12/11/19 0007

## 2019-12-11 NOTE — DISCHARGE INSTRUCTIONS
Please follow up with your cardiologist in the next 3-5 days.    Return to the ER for any new or worsening symptoms.

## 2019-12-11 NOTE — TELEPHONE ENCOUNTER
LM for pt to check how he was doing after visiting ER last evening. Assured him that all testing looked fine. Enc to call with further concerns.     My Chart message sent.

## 2019-12-11 NOTE — ED TRIAGE NOTES
Pt presents for evaluation midsternal chest pain starting around 1000 today upon waking. Pt also c/o left arm pain, but thinks he may have slept on it wrong. Pt also has shortness of breath with ambulation and deep breathing. Denies any travel or surgeries, fever or cough.

## 2019-12-19 ENCOUNTER — TELEPHONE (OUTPATIENT)
Dept: TRANSPLANT | Facility: CLINIC | Age: 24
End: 2019-12-19

## 2019-12-19 NOTE — TELEPHONE ENCOUNTER
Pt had not picked up [facility] message after ER visit - LM to call with questions or further concerns,

## 2020-01-15 ENCOUNTER — TELEPHONE (OUTPATIENT)
Dept: TRANSPLANT | Facility: CLINIC | Age: 25
End: 2020-01-15

## 2020-01-28 ENCOUNTER — RESULTS ONLY (OUTPATIENT)
Dept: OTHER | Facility: CLINIC | Age: 25
End: 2020-01-28

## 2020-01-28 DIAGNOSIS — Z94.1 HEART REPLACED BY TRANSPLANT (H): ICD-10-CM

## 2020-01-28 LAB
TACROLIMUS BLD-MCNC: 8.7 UG/L (ref 5–15)
TME LAST DOSE: NORMAL H

## 2020-01-28 PROCEDURE — 36415 COLL VENOUS BLD VENIPUNCTURE: CPT | Performed by: INTERNAL MEDICINE

## 2020-01-28 PROCEDURE — 80048 BASIC METABOLIC PNL TOTAL CA: CPT | Performed by: INTERNAL MEDICINE

## 2020-01-28 PROCEDURE — 86832 HLA CLASS I HIGH DEFIN QUAL: CPT | Performed by: INTERNAL MEDICINE

## 2020-01-28 PROCEDURE — 86833 HLA CLASS II HIGH DEFIN QUAL: CPT | Performed by: INTERNAL MEDICINE

## 2020-01-28 PROCEDURE — 80197 ASSAY OF TACROLIMUS: CPT | Performed by: INTERNAL MEDICINE

## 2020-01-29 LAB
ANION GAP SERPL CALCULATED.3IONS-SCNC: 2 MMOL/L (ref 3–14)
BUN SERPL-MCNC: 15 MG/DL (ref 7–30)
CALCIUM SERPL-MCNC: 9.1 MG/DL (ref 8.5–10.1)
CHLORIDE SERPL-SCNC: 107 MMOL/L (ref 94–109)
CO2 SERPL-SCNC: 32 MMOL/L (ref 20–32)
CREAT SERPL-MCNC: 0.86 MG/DL (ref 0.66–1.25)
GFR SERPL CREATININE-BSD FRML MDRD: >90 ML/MIN/{1.73_M2}
GLUCOSE SERPL-MCNC: 75 MG/DL (ref 70–99)
POTASSIUM SERPL-SCNC: 3.5 MMOL/L (ref 3.4–5.3)
SODIUM SERPL-SCNC: 141 MMOL/L (ref 133–144)

## 2020-01-31 LAB
DONOR IDENTIFICATION: NORMAL
DSA COMMENTS: NORMAL
DSA PRESENT: NO
DSA TEST METHOD: NORMAL
ORGAN: NORMAL
SA1 CELL: NORMAL
SA1 COMMENTS: NORMAL
SA1 HI RISK ABY: NORMAL
SA1 MOD RISK ABY: NORMAL
SA1 TEST METHOD: NORMAL
SA2 CELL: NORMAL
SA2 COMMENTS: NORMAL
SA2 HI RISK ABY UA: NORMAL
SA2 MOD RISK ABY: NORMAL
SA2 TEST METHOD: NORMAL
UNACCEPTABLE ANTIGEN: NORMAL
UNOS CPRA: 0

## 2020-03-09 DIAGNOSIS — Z94.1 HEART REPLACED BY TRANSPLANT (H): ICD-10-CM

## 2020-03-11 RX ORDER — MYCOPHENOLATE MOFETIL 500 MG/1
500 TABLET ORAL 2 TIMES DAILY
Qty: 180 TABLET | Refills: 3 | Status: SHIPPED | OUTPATIENT
Start: 2020-03-11 | End: 2021-03-11

## 2020-03-11 RX ORDER — PRAVASTATIN SODIUM 10 MG
10 TABLET ORAL DAILY
Qty: 90 TABLET | Refills: 3 | Status: SHIPPED | OUTPATIENT
Start: 2020-03-11 | End: 2021-03-11

## 2020-03-11 RX ORDER — AMLODIPINE BESYLATE 5 MG/1
5 TABLET ORAL DAILY
Qty: 90 TABLET | Refills: 3 | Status: SHIPPED | OUTPATIENT
Start: 2020-03-11 | End: 2020-10-20

## 2020-03-11 RX ORDER — PREDNISONE 5 MG/1
5 TABLET ORAL DAILY
Qty: 90 TABLET | Refills: 3 | Status: SHIPPED | OUTPATIENT
Start: 2020-03-11 | End: 2021-03-11

## 2020-03-11 RX ORDER — PREDNISONE 2.5 MG/1
2.5 TABLET ORAL DAILY
Qty: 90 TABLET | Refills: 3 | Status: SHIPPED | OUTPATIENT
Start: 2020-03-11 | End: 2021-03-11

## 2020-06-12 DIAGNOSIS — Z94.1 HEART REPLACED BY TRANSPLANT (H): Primary | ICD-10-CM

## 2020-06-12 DIAGNOSIS — Z79.899 ENCOUNTER FOR LONG-TERM (CURRENT) USE OF OTHER MEDICATIONS: ICD-10-CM

## 2020-06-12 DIAGNOSIS — Z13.6 ENCOUNTER FOR LIPID SCREENING FOR CARDIOVASCULAR DISEASE: ICD-10-CM

## 2020-06-12 DIAGNOSIS — Z13.220 ENCOUNTER FOR LIPID SCREENING FOR CARDIOVASCULAR DISEASE: ICD-10-CM

## 2020-08-05 ENCOUNTER — TELEPHONE (OUTPATIENT)
Dept: TRANSPLANT | Facility: CLINIC | Age: 25
End: 2020-08-05

## 2020-08-05 NOTE — TELEPHONE ENCOUNTER
I called Obed and Jolly to notify him of upcoming labs. I left a message for Obed, to confirm he received the call. Jolly's voice mailbox was full.

## 2020-08-13 DIAGNOSIS — Z94.1 HEART REPLACED BY TRANSPLANT (H): ICD-10-CM

## 2020-08-13 DIAGNOSIS — Z13.220 ENCOUNTER FOR LIPID SCREENING FOR CARDIOVASCULAR DISEASE: ICD-10-CM

## 2020-08-13 DIAGNOSIS — Z79.899 ENCOUNTER FOR LONG-TERM (CURRENT) USE OF OTHER MEDICATIONS: ICD-10-CM

## 2020-08-13 DIAGNOSIS — Z13.6 ENCOUNTER FOR LIPID SCREENING FOR CARDIOVASCULAR DISEASE: ICD-10-CM

## 2020-08-13 LAB
ERYTHROCYTE [DISTWIDTH] IN BLOOD BY AUTOMATED COUNT: 13.2 % (ref 10–15)
HCT VFR BLD AUTO: 48.4 % (ref 40–53)
HGB BLD-MCNC: 16.6 G/DL (ref 13.3–17.7)
MCH RBC QN AUTO: 29.5 PG (ref 26.5–33)
MCHC RBC AUTO-ENTMCNC: 34.3 G/DL (ref 31.5–36.5)
MCV RBC AUTO: 86 FL (ref 78–100)
PLATELET # BLD AUTO: 191 10E9/L (ref 150–450)
RBC # BLD AUTO: 5.62 10E12/L (ref 4.4–5.9)
TACROLIMUS BLD-MCNC: 9.3 UG/L (ref 5–15)
TME LAST DOSE: NORMAL H
WBC # BLD AUTO: 5.4 10E9/L (ref 4–11)

## 2020-08-13 PROCEDURE — 85027 COMPLETE CBC AUTOMATED: CPT | Performed by: INTERNAL MEDICINE

## 2020-08-13 PROCEDURE — 80197 ASSAY OF TACROLIMUS: CPT | Performed by: INTERNAL MEDICINE

## 2020-08-13 PROCEDURE — 80061 LIPID PANEL: CPT | Performed by: INTERNAL MEDICINE

## 2020-08-13 PROCEDURE — 84100 ASSAY OF PHOSPHORUS: CPT | Performed by: INTERNAL MEDICINE

## 2020-08-13 PROCEDURE — 80053 COMPREHEN METABOLIC PANEL: CPT | Performed by: INTERNAL MEDICINE

## 2020-08-13 PROCEDURE — 83735 ASSAY OF MAGNESIUM: CPT | Performed by: INTERNAL MEDICINE

## 2020-08-13 PROCEDURE — 82550 ASSAY OF CK (CPK): CPT | Performed by: INTERNAL MEDICINE

## 2020-08-13 PROCEDURE — 36415 COLL VENOUS BLD VENIPUNCTURE: CPT | Performed by: INTERNAL MEDICINE

## 2020-08-14 LAB
ALBUMIN SERPL-MCNC: 4.1 G/DL (ref 3.4–5)
ALP SERPL-CCNC: 72 U/L (ref 40–150)
ALT SERPL W P-5'-P-CCNC: 23 U/L (ref 0–70)
ANION GAP SERPL CALCULATED.3IONS-SCNC: 7 MMOL/L (ref 3–14)
AST SERPL W P-5'-P-CCNC: 15 U/L (ref 0–45)
BILIRUB SERPL-MCNC: 0.8 MG/DL (ref 0.2–1.3)
BUN SERPL-MCNC: 16 MG/DL (ref 7–30)
CALCIUM SERPL-MCNC: 9.5 MG/DL (ref 8.5–10.1)
CHLORIDE SERPL-SCNC: 107 MMOL/L (ref 94–109)
CHOLEST SERPL-MCNC: 158 MG/DL
CK SERPL-CCNC: 83 U/L (ref 30–300)
CO2 SERPL-SCNC: 27 MMOL/L (ref 20–32)
CREAT SERPL-MCNC: 0.87 MG/DL (ref 0.66–1.25)
GFR SERPL CREATININE-BSD FRML MDRD: >90 ML/MIN/{1.73_M2}
GLUCOSE SERPL-MCNC: 93 MG/DL (ref 70–99)
HDLC SERPL-MCNC: 58 MG/DL
LDLC SERPL CALC-MCNC: 83 MG/DL
MAGNESIUM SERPL-MCNC: 1.9 MG/DL (ref 1.6–2.3)
NONHDLC SERPL-MCNC: 100 MG/DL
PHOSPHATE SERPL-MCNC: 3.4 MG/DL (ref 2.5–4.5)
POTASSIUM SERPL-SCNC: 4.2 MMOL/L (ref 3.4–5.3)
PROT SERPL-MCNC: 7.5 G/DL (ref 6.8–8.8)
SODIUM SERPL-SCNC: 141 MMOL/L (ref 133–144)
TRIGL SERPL-MCNC: 85 MG/DL

## 2020-08-14 NOTE — RESULT ENCOUNTER NOTE
Pt called with labs, no change in tac level at 9.3 (last two checks within goal). Will plan to recheck at annual later this fall.   Pt in agreement with plan.  LM for mom.

## 2020-09-14 DIAGNOSIS — Z94.1 HEART REPLACED BY TRANSPLANT (H): Primary | ICD-10-CM

## 2020-09-16 ENCOUNTER — TELEPHONE (OUTPATIENT)
Dept: CARDIOLOGY | Facility: CLINIC | Age: 25
End: 2020-09-16

## 2020-09-16 DIAGNOSIS — Z11.59 ENCOUNTER FOR SCREENING FOR OTHER VIRAL DISEASES: Primary | ICD-10-CM

## 2020-09-16 NOTE — TELEPHONE ENCOUNTER
Spoke with mom re potential appt dates.   Sent My Chart message and LM for pt re dates and to review on My Chart.

## 2020-10-16 ENCOUNTER — TELEPHONE (OUTPATIENT)
Dept: CARDIOLOGY | Facility: CLINIC | Age: 25
End: 2020-10-16

## 2020-10-16 ENCOUNTER — PRE VISIT (OUTPATIENT)
Dept: TRANSPLANT | Facility: CLINIC | Age: 25
End: 2020-10-16

## 2020-10-16 DIAGNOSIS — Z79.899 ENCOUNTER FOR LONG-TERM (CURRENT) USE OF MEDICATIONS: ICD-10-CM

## 2020-10-16 DIAGNOSIS — Z13.6 ENCOUNTER FOR LIPID SCREENING FOR CARDIOVASCULAR DISEASE: ICD-10-CM

## 2020-10-16 DIAGNOSIS — Z94.1 HEART REPLACED BY TRANSPLANT (H): Primary | ICD-10-CM

## 2020-10-16 DIAGNOSIS — Z13.220 ENCOUNTER FOR LIPID SCREENING FOR CARDIOVASCULAR DISEASE: ICD-10-CM

## 2020-10-16 RX ORDER — POTASSIUM CHLORIDE 1500 MG/1
40 TABLET, EXTENDED RELEASE ORAL
Status: CANCELLED | OUTPATIENT
Start: 2020-10-16

## 2020-10-16 RX ORDER — POTASSIUM CHLORIDE 1500 MG/1
20 TABLET, EXTENDED RELEASE ORAL
Status: CANCELLED | OUTPATIENT
Start: 2020-10-16

## 2020-10-16 RX ORDER — SODIUM CHLORIDE 9 MG/ML
INJECTION, SOLUTION INTRAVENOUS CONTINUOUS
Status: CANCELLED | OUTPATIENT
Start: 2020-10-16

## 2020-10-16 NOTE — TELEPHONE ENCOUNTER
Call complete for pre procedure reminder, travel screen and updated visitor policy.  COVID test scheduled tomorrow

## 2020-10-17 DIAGNOSIS — Z11.59 ENCOUNTER FOR SCREENING FOR OTHER VIRAL DISEASES: ICD-10-CM

## 2020-10-17 PROCEDURE — U0003 INFECTIOUS AGENT DETECTION BY NUCLEIC ACID (DNA OR RNA); SEVERE ACUTE RESPIRATORY SYNDROME CORONAVIRUS 2 (SARS-COV-2) (CORONAVIRUS DISEASE [COVID-19]), AMPLIFIED PROBE TECHNIQUE, MAKING USE OF HIGH THROUGHPUT TECHNOLOGIES AS DESCRIBED BY CMS-2020-01-R: HCPCS | Performed by: TRANSPLANT SURGERY

## 2020-10-18 LAB
LABORATORY COMMENT REPORT: NORMAL
SARS-COV-2 RNA SPEC QL NAA+PROBE: NEGATIVE
SARS-COV-2 RNA SPEC QL NAA+PROBE: NORMAL
SPECIMEN SOURCE: NORMAL
SPECIMEN SOURCE: NORMAL

## 2020-10-19 ENCOUNTER — HOSPITAL ENCOUNTER (OUTPATIENT)
Dept: CARDIOLOGY | Facility: CLINIC | Age: 25
End: 2020-10-19
Attending: INTERNAL MEDICINE | Admitting: INTERNAL MEDICINE
Payer: COMMERCIAL

## 2020-10-19 ENCOUNTER — RESULTS ONLY (OUTPATIENT)
Dept: OTHER | Facility: CLINIC | Age: 25
End: 2020-10-19

## 2020-10-19 ENCOUNTER — APPOINTMENT (OUTPATIENT)
Dept: MEDSURG UNIT | Facility: CLINIC | Age: 25
End: 2020-10-19
Attending: INTERNAL MEDICINE
Payer: COMMERCIAL

## 2020-10-19 ENCOUNTER — APPOINTMENT (OUTPATIENT)
Dept: LAB | Facility: CLINIC | Age: 25
End: 2020-10-19
Attending: INTERNAL MEDICINE
Payer: COMMERCIAL

## 2020-10-19 ENCOUNTER — HOSPITAL ENCOUNTER (OUTPATIENT)
Dept: GENERAL RADIOLOGY | Facility: CLINIC | Age: 25
End: 2020-10-19
Attending: INTERNAL MEDICINE | Admitting: INTERNAL MEDICINE
Payer: COMMERCIAL

## 2020-10-19 ENCOUNTER — HOSPITAL ENCOUNTER (OUTPATIENT)
Facility: CLINIC | Age: 25
Discharge: HOME | End: 2020-10-19
Attending: INTERNAL MEDICINE | Admitting: INTERNAL MEDICINE
Payer: COMMERCIAL

## 2020-10-19 VITALS
RESPIRATION RATE: 16 BRPM | WEIGHT: 184 LBS | OXYGEN SATURATION: 98 % | DIASTOLIC BLOOD PRESSURE: 92 MMHG | TEMPERATURE: 98.3 F | SYSTOLIC BLOOD PRESSURE: 125 MMHG | BODY MASS INDEX: 23.95 KG/M2 | HEART RATE: 99 BPM

## 2020-10-19 DIAGNOSIS — Z94.1 HEART REPLACED BY TRANSPLANT (H): ICD-10-CM

## 2020-10-19 DIAGNOSIS — Z13.220 ENCOUNTER FOR LIPID SCREENING FOR CARDIOVASCULAR DISEASE: ICD-10-CM

## 2020-10-19 DIAGNOSIS — Z79.899 ENCOUNTER FOR LONG-TERM (CURRENT) USE OF MEDICATIONS: ICD-10-CM

## 2020-10-19 DIAGNOSIS — Z94.1 HEART REPLACED BY TRANSPLANT (H): Primary | ICD-10-CM

## 2020-10-19 DIAGNOSIS — Z13.6 ENCOUNTER FOR LIPID SCREENING FOR CARDIOVASCULAR DISEASE: ICD-10-CM

## 2020-10-19 LAB
ALBUMIN SERPL-MCNC: 3.9 G/DL (ref 3.4–5)
ALP SERPL-CCNC: 80 U/L (ref 40–150)
ALT SERPL W P-5'-P-CCNC: 27 U/L (ref 0–70)
ANION GAP SERPL CALCULATED.3IONS-SCNC: 5 MMOL/L (ref 3–14)
AST SERPL W P-5'-P-CCNC: 15 U/L (ref 0–45)
BILIRUB SERPL-MCNC: 0.4 MG/DL (ref 0.2–1.3)
BUN SERPL-MCNC: 21 MG/DL (ref 7–30)
CALCIUM SERPL-MCNC: 8.4 MG/DL (ref 8.5–10.1)
CHLORIDE SERPL-SCNC: 109 MMOL/L (ref 94–109)
CHOLEST SERPL-MCNC: 164 MG/DL
CK SERPL-CCNC: 94 U/L (ref 30–300)
CO2 SERPL-SCNC: 27 MMOL/L (ref 20–32)
CREAT SERPL-MCNC: 0.84 MG/DL (ref 0.66–1.25)
ERYTHROCYTE [DISTWIDTH] IN BLOOD BY AUTOMATED COUNT: 12.7 % (ref 10–15)
GFR SERPL CREATININE-BSD FRML MDRD: >90 ML/MIN/{1.73_M2}
GLUCOSE SERPL-MCNC: 99 MG/DL (ref 70–99)
HCT VFR BLD AUTO: 47.4 % (ref 40–53)
HDLC SERPL-MCNC: 55 MG/DL
HGB BLD-MCNC: 16.2 G/DL (ref 13.3–17.7)
LDLC SERPL CALC-MCNC: 87 MG/DL
MAGNESIUM SERPL-MCNC: 1.7 MG/DL (ref 1.6–2.3)
MCH RBC QN AUTO: 29.5 PG (ref 26.5–33)
MCHC RBC AUTO-ENTMCNC: 34.2 G/DL (ref 31.5–36.5)
MCV RBC AUTO: 86 FL (ref 78–100)
NONHDLC SERPL-MCNC: 108 MG/DL
PHOSPHATE SERPL-MCNC: 3.2 MG/DL (ref 2.5–4.5)
PLATELET # BLD AUTO: 207 10E9/L (ref 150–450)
POTASSIUM SERPL-SCNC: 4 MMOL/L (ref 3.4–5.3)
PROT SERPL-MCNC: 7 G/DL (ref 6.8–8.8)
RBC # BLD AUTO: 5.5 10E12/L (ref 4.4–5.9)
SODIUM SERPL-SCNC: 141 MMOL/L (ref 133–144)
TACROLIMUS BLD-MCNC: 9.7 UG/L (ref 5–15)
TME LAST DOSE: NORMAL H
TRIGL SERPL-MCNC: 105 MG/DL
WBC # BLD AUTO: 6.7 10E9/L (ref 4–11)

## 2020-10-19 PROCEDURE — 82550 ASSAY OF CK (CPK): CPT | Performed by: INTERNAL MEDICINE

## 2020-10-19 PROCEDURE — 85027 COMPLETE CBC AUTOMATED: CPT | Performed by: INTERNAL MEDICINE

## 2020-10-19 PROCEDURE — 93306 TTE W/DOPPLER COMPLETE: CPT | Mod: 26 | Performed by: INTERNAL MEDICINE

## 2020-10-19 PROCEDURE — 93456 R HRT CORONARY ARTERY ANGIO: CPT | Mod: 26 | Performed by: INTERNAL MEDICINE

## 2020-10-19 PROCEDURE — C1894 INTRO/SHEATH, NON-LASER: HCPCS | Performed by: INTERNAL MEDICINE

## 2020-10-19 PROCEDURE — 80053 COMPREHEN METABOLIC PANEL: CPT | Performed by: INTERNAL MEDICINE

## 2020-10-19 PROCEDURE — 80061 LIPID PANEL: CPT | Performed by: INTERNAL MEDICINE

## 2020-10-19 PROCEDURE — 86833 HLA CLASS II HIGH DEFIN QUAL: CPT | Performed by: INTERNAL MEDICINE

## 2020-10-19 PROCEDURE — 93456 R HRT CORONARY ARTERY ANGIO: CPT | Performed by: INTERNAL MEDICINE

## 2020-10-19 PROCEDURE — 99152 MOD SED SAME PHYS/QHP 5/>YRS: CPT | Performed by: INTERNAL MEDICINE

## 2020-10-19 PROCEDURE — 93306 TTE W/DOPPLER COMPLETE: CPT

## 2020-10-19 PROCEDURE — 71046 X-RAY EXAM CHEST 2 VIEWS: CPT | Mod: 26 | Performed by: RADIOLOGY

## 2020-10-19 PROCEDURE — 999N000054 HC STATISTIC EKG NON-CHARGEABLE

## 2020-10-19 PROCEDURE — 93005 ELECTROCARDIOGRAM TRACING: CPT

## 2020-10-19 PROCEDURE — 272N000001 HC OR GENERAL SUPPLY STERILE: Performed by: INTERNAL MEDICINE

## 2020-10-19 PROCEDURE — 99152 MOD SED SAME PHYS/QHP 5/>YRS: CPT | Mod: 59 | Performed by: INTERNAL MEDICINE

## 2020-10-19 PROCEDURE — 999N000142 HC STATISTIC PROCEDURE PREP ONLY

## 2020-10-19 PROCEDURE — 80197 ASSAY OF TACROLIMUS: CPT | Performed by: INTERNAL MEDICINE

## 2020-10-19 PROCEDURE — 93010 ELECTROCARDIOGRAM REPORT: CPT | Performed by: INTERNAL MEDICINE

## 2020-10-19 PROCEDURE — 36415 COLL VENOUS BLD VENIPUNCTURE: CPT | Performed by: INTERNAL MEDICINE

## 2020-10-19 PROCEDURE — 84100 ASSAY OF PHOSPHORUS: CPT | Performed by: INTERNAL MEDICINE

## 2020-10-19 PROCEDURE — 99153 MOD SED SAME PHYS/QHP EA: CPT | Performed by: INTERNAL MEDICINE

## 2020-10-19 PROCEDURE — C1887 CATHETER, GUIDING: HCPCS | Performed by: INTERNAL MEDICINE

## 2020-10-19 PROCEDURE — 83735 ASSAY OF MAGNESIUM: CPT | Performed by: INTERNAL MEDICINE

## 2020-10-19 PROCEDURE — 250N000011 HC RX IP 250 OP 636: Performed by: INTERNAL MEDICINE

## 2020-10-19 PROCEDURE — 71046 X-RAY EXAM CHEST 2 VIEWS: CPT

## 2020-10-19 PROCEDURE — 250N000013 HC RX MED GY IP 250 OP 250 PS 637: Performed by: INTERNAL MEDICINE

## 2020-10-19 PROCEDURE — 250N000009 HC RX 250: Performed by: INTERNAL MEDICINE

## 2020-10-19 PROCEDURE — 86352 CELL FUNCTION ASSAY W/STIM: CPT | Performed by: INTERNAL MEDICINE

## 2020-10-19 PROCEDURE — 86832 HLA CLASS I HIGH DEFIN QUAL: CPT | Performed by: INTERNAL MEDICINE

## 2020-10-19 PROCEDURE — 87799 DETECT AGENT NOS DNA QUANT: CPT | Performed by: INTERNAL MEDICINE

## 2020-10-19 RX ORDER — LIDOCAINE 40 MG/G
CREAM TOPICAL
Status: DISCONTINUED | OUTPATIENT
Start: 2020-10-19 | End: 2020-10-19 | Stop reason: HOSPADM

## 2020-10-19 RX ORDER — NITROGLYCERIN 20 MG/100ML
10-200 INJECTION INTRAVENOUS CONTINUOUS PRN
Status: DISCONTINUED | OUTPATIENT
Start: 2020-10-19 | End: 2020-10-19 | Stop reason: HOSPADM

## 2020-10-19 RX ORDER — DOPAMINE HYDROCHLORIDE 160 MG/100ML
2-20 INJECTION, SOLUTION INTRAVENOUS CONTINUOUS PRN
Status: DISCONTINUED | OUTPATIENT
Start: 2020-10-19 | End: 2020-10-19 | Stop reason: HOSPADM

## 2020-10-19 RX ORDER — ATROPINE SULFATE 0.1 MG/ML
0.5 INJECTION INTRAVENOUS
Status: DISCONTINUED | OUTPATIENT
Start: 2020-10-19 | End: 2020-10-19 | Stop reason: HOSPADM

## 2020-10-19 RX ORDER — FENTANYL CITRATE 50 UG/ML
INJECTION, SOLUTION INTRAMUSCULAR; INTRAVENOUS
Status: DISCONTINUED | OUTPATIENT
Start: 2020-10-19 | End: 2020-10-19 | Stop reason: HOSPADM

## 2020-10-19 RX ORDER — NITROGLYCERIN 5 MG/ML
VIAL (ML) INTRAVENOUS
Status: DISCONTINUED | OUTPATIENT
Start: 2020-10-19 | End: 2020-10-19 | Stop reason: HOSPADM

## 2020-10-19 RX ORDER — POTASSIUM CHLORIDE 750 MG/1
20 TABLET, EXTENDED RELEASE ORAL
Status: DISCONTINUED | OUTPATIENT
Start: 2020-10-19 | End: 2020-10-19 | Stop reason: HOSPADM

## 2020-10-19 RX ORDER — FLUMAZENIL 0.1 MG/ML
0.2 INJECTION, SOLUTION INTRAVENOUS
Status: DISCONTINUED | OUTPATIENT
Start: 2020-10-19 | End: 2020-10-19 | Stop reason: HOSPADM

## 2020-10-19 RX ORDER — NALOXONE HYDROCHLORIDE 0.4 MG/ML
.2-.4 INJECTION, SOLUTION INTRAMUSCULAR; INTRAVENOUS; SUBCUTANEOUS
Status: DISCONTINUED | OUTPATIENT
Start: 2020-10-19 | End: 2020-10-19 | Stop reason: HOSPADM

## 2020-10-19 RX ORDER — SODIUM CHLORIDE 9 MG/ML
INJECTION, SOLUTION INTRAVENOUS CONTINUOUS
Status: DISCONTINUED | OUTPATIENT
Start: 2020-10-19 | End: 2020-10-19 | Stop reason: HOSPADM

## 2020-10-19 RX ORDER — POTASSIUM CHLORIDE 750 MG/1
40 TABLET, EXTENDED RELEASE ORAL
Status: DISCONTINUED | OUTPATIENT
Start: 2020-10-19 | End: 2020-10-19 | Stop reason: HOSPADM

## 2020-10-19 RX ORDER — DOBUTAMINE HYDROCHLORIDE 200 MG/100ML
2-20 INJECTION INTRAVENOUS CONTINUOUS PRN
Status: DISCONTINUED | OUTPATIENT
Start: 2020-10-19 | End: 2020-10-19 | Stop reason: HOSPADM

## 2020-10-19 RX ORDER — FENTANYL CITRATE 50 UG/ML
25-50 INJECTION, SOLUTION INTRAMUSCULAR; INTRAVENOUS
Status: ACTIVE | OUTPATIENT
Start: 2020-10-19 | End: 2020-10-19

## 2020-10-19 RX ORDER — NALOXONE HYDROCHLORIDE 0.4 MG/ML
.1-.4 INJECTION, SOLUTION INTRAMUSCULAR; INTRAVENOUS; SUBCUTANEOUS
Status: DISCONTINUED | OUTPATIENT
Start: 2020-10-19 | End: 2020-10-19

## 2020-10-19 RX ORDER — HEPARIN SODIUM 1000 [USP'U]/ML
INJECTION, SOLUTION INTRAVENOUS; SUBCUTANEOUS
Status: DISCONTINUED | OUTPATIENT
Start: 2020-10-19 | End: 2020-10-19 | Stop reason: HOSPADM

## 2020-10-19 RX ORDER — EPTIFIBATIDE 2 MG/ML
2 INJECTION, SOLUTION INTRAVENOUS CONTINUOUS PRN
Status: DISCONTINUED | OUTPATIENT
Start: 2020-10-19 | End: 2020-10-19 | Stop reason: HOSPADM

## 2020-10-19 RX ORDER — EPTIFIBATIDE 2 MG/ML
180 INJECTION, SOLUTION INTRAVENOUS EVERY 10 MIN PRN
Status: DISCONTINUED | OUTPATIENT
Start: 2020-10-19 | End: 2020-10-19 | Stop reason: HOSPADM

## 2020-10-19 RX ORDER — HEPARIN SODIUM 10000 [USP'U]/100ML
100-1000 INJECTION, SOLUTION INTRAVENOUS CONTINUOUS PRN
Status: DISCONTINUED | OUTPATIENT
Start: 2020-10-19 | End: 2020-10-19 | Stop reason: HOSPADM

## 2020-10-19 RX ORDER — IOPAMIDOL 755 MG/ML
INJECTION, SOLUTION INTRAVASCULAR
Status: DISCONTINUED | OUTPATIENT
Start: 2020-10-19 | End: 2020-10-19 | Stop reason: HOSPADM

## 2020-10-19 RX ORDER — NICARDIPINE HYDROCHLORIDE 2.5 MG/ML
INJECTION INTRAVENOUS
Status: DISCONTINUED | OUTPATIENT
Start: 2020-10-19 | End: 2020-10-19 | Stop reason: HOSPADM

## 2020-10-19 RX ADMIN — ASPIRIN 325 MG: 325 TABLET, COATED ORAL at 14:17

## 2020-10-19 NOTE — PROGRESS NOTES
Pt to unit 2a for coronary angiogram and right heart cath with mom at bedside. PIV in place, IVF infusing, groin prep completed, pedal pulses marked, awaiting consent for procedure.

## 2020-10-19 NOTE — DISCHARGE INSTRUCTIONS
Going Home after an Angioplasty or Stent Placement (Cardiac)  ______________________________________________      After you go home:    Have an adult stay with you for 24 hours.    Drink plenty of fluids.    You may eat your normal diet, unless your doctor tells you otherwise.    For 24 hours:    Relax and take it easy.    Do NOT smoke.    Do NOT make any important or legal decisions.    Do NOT drive or operate machines at home or at work.    Do NOT drink alcohol.    Remove the Band-Aid after 24 hours. If there is minor oozing, apply another Band-aid and remove it after 12 hours.    For 2 days, do NOT have sex or do any heavy exercise.    Do NOT take a bath, or use a hot tub or pool for at least 3 days. You may shower.    Care of wrist or arm site  It is normal to have soreness at the puncture site and mild tingling in your hand for up to 3 days.    For 2 days, do not use your hand or arm to support your weight (such as rising from a chair) or bend your wrist (such as lifting a garage door).    For 2 days, do not lift more than 5 pounds or exercise your arm (tennis, golf or bowling).    If you start bleeding from the site in your arm:    Sit down and press firmly on the site with your fingers for 10 minutes. Call your doctor as soon as you can.    If the bleeding stops, sit still and keep your wrist straight for 2 hours.    Medicines    If you have started taking Plavix or Effient, do not stop taking it until you talk to your heart doctor (cardiologist).    If you are on metformin (Glucophage), do not restart it until you have blood tests (within 2 to 3 days after discharge). When your doctor tells you it is safe, you may restart the metformin.    If you have stopped any other medicines, check with your nurse or provider about when to restart them.    Call 911 right away if you have bleeding that is heavy or does not stop.    Call your doctor if:    You have a large or growing hard lump around the site.    The  site is red, swollen, hot or tender.    Blood or fluid is draining from the site.    You have chills or a fever greater than 101 F (38 C).    Your leg or arm feels numb or cool.    You have hives, a rash or unusual itching.      [facility] [place]:  [phone] (7 days a week)

## 2020-10-19 NOTE — PROGRESS NOTES
D/I/A: Pt roomed on 3C in bay 35.  Arrived via litter and accompanied by Real RN On/Off: Off monitor.  VSSA.  Rhythm upon arrival SR on monitor.  Denies pain or sob.  Reviewed activity restrictions and when to notify RN, ie-changes to breathing or increased chest pressure or chest pain.  CCL access:  R radial TR band in place with 12 ml's of air.  P: Continue to monitor status.  Discharge to home once meeting criteria.

## 2020-10-20 ENCOUNTER — OFFICE VISIT (OUTPATIENT)
Dept: CARDIOLOGY | Facility: CLINIC | Age: 25
End: 2020-10-20
Attending: INTERNAL MEDICINE
Payer: COMMERCIAL

## 2020-10-20 VITALS
WEIGHT: 195 LBS | BODY MASS INDEX: 25.84 KG/M2 | SYSTOLIC BLOOD PRESSURE: 146 MMHG | HEART RATE: 103 BPM | DIASTOLIC BLOOD PRESSURE: 91 MMHG | OXYGEN SATURATION: 99 % | HEIGHT: 73 IN

## 2020-10-20 DIAGNOSIS — E78.2 MIXED HYPERLIPIDEMIA: ICD-10-CM

## 2020-10-20 DIAGNOSIS — Z94.1 HEART REPLACED BY TRANSPLANT (H): ICD-10-CM

## 2020-10-20 DIAGNOSIS — I10 BENIGN ESSENTIAL HYPERTENSION: ICD-10-CM

## 2020-10-20 DIAGNOSIS — Z23 NEED FOR PROPHYLACTIC VACCINATION AND INOCULATION AGAINST INFLUENZA: Primary | ICD-10-CM

## 2020-10-20 LAB
CMV DNA SPEC NAA+PROBE-ACNC: NORMAL [IU]/ML
CMV DNA SPEC NAA+PROBE-LOG#: NORMAL {LOG_IU}/ML
DONOR IDENTIFICATION: NORMAL
DSA COMMENTS: NORMAL
DSA PRESENT: NO
DSA TEST METHOD: NORMAL
EBV DNA # SPEC NAA+PROBE: NORMAL {COPIES}/ML
EBV DNA SPEC NAA+PROBE-LOG#: NORMAL {LOG_COPIES}/ML
INTERPRETATION ECG - MUSE: NORMAL
ORGAN: NORMAL
SA1 CELL: NORMAL
SA1 COMMENTS: NORMAL
SA1 HI RISK ABY: NORMAL
SA1 MOD RISK ABY: NORMAL
SA1 TEST METHOD: NORMAL
SA2 CELL: NORMAL
SA2 COMMENTS: NORMAL
SA2 HI RISK ABY UA: NORMAL
SA2 MOD RISK ABY: NORMAL
SA2 TEST METHOD: NORMAL
SPECIMEN SOURCE: NORMAL
UNACCEPTABLE ANTIGEN: NORMAL
UNOS CPRA: 0

## 2020-10-20 PROCEDURE — 90686 IIV4 VACC NO PRSV 0.5 ML IM: CPT | Performed by: INTERNAL MEDICINE

## 2020-10-20 PROCEDURE — G0463 HOSPITAL OUTPT CLINIC VISIT: HCPCS

## 2020-10-20 PROCEDURE — G0008 ADMIN INFLUENZA VIRUS VAC: HCPCS | Performed by: INTERNAL MEDICINE

## 2020-10-20 PROCEDURE — 99214 OFFICE O/P EST MOD 30 MIN: CPT | Performed by: INTERNAL MEDICINE

## 2020-10-20 PROCEDURE — 250N000011 HC RX IP 250 OP 636: Performed by: INTERNAL MEDICINE

## 2020-10-20 RX ORDER — TACROLIMUS 1 MG/1
CAPSULE ORAL
Qty: 450 CAPSULE | Refills: 3 | Status: SHIPPED | OUTPATIENT
Start: 2020-10-20 | End: 2021-08-23

## 2020-10-20 RX ORDER — AMLODIPINE BESYLATE 5 MG/1
10 TABLET ORAL DAILY
Qty: 180 TABLET | Refills: 3 | Status: SHIPPED | OUTPATIENT
Start: 2020-10-20 | End: 2021-11-22

## 2020-10-20 RX ADMIN — INFLUENZA A VIRUS A/GUANGDONG-MAONAN/SWL1536/2019 CNIC-1909 (H1N1) ANTIGEN (FORMALDEHYDE INACTIVATED), INFLUENZA A VIRUS A/HONG KONG/2671/2019 (H3N2) ANTIGEN (FORMALDEHYDE INACTIVATED), INFLUENZA B VIRUS B/PHUKET/3073/2013 ANTIGEN (FORMALDEHYDE INACTIVATED), AND INFLUENZA B VIRUS B/WASHINGTON/02/2019 ANTIGEN (FORMALDEHYDE INACTIVATED) 0.5 ML: 15; 15; 15; 15 INJECTION, SUSPENSION INTRAMUSCULAR at 11:54

## 2020-10-20 ASSESSMENT — PAIN SCALES - GENERAL: PAINLEVEL: NO PAIN (0)

## 2020-10-20 ASSESSMENT — MIFFLIN-ST. JEOR: SCORE: 1923.39

## 2020-10-20 NOTE — LETTER
10/20/2020      RE: Obed Viramontes  [address]  [facility] [address]       Dear Colleague,    Thank you for the opportunity to participate in the care of your patient, Obed Viramontes, at the [facility] [place] at [facility]. Please see a copy of my visit note below.    Cardiology clinic note    October 9, 2019      HPI:     Mr. Viramontes is a 24-year-old gentleman with history of viral mediated dilated cardiomyopathy at age 2-1/2 for which he underwent a heart transplant on 3/23/2012.  His post transplant course has been complicated by rejection the last 3 occurring in April May and June 2017 for which he receives steroids, post transplant lymphoproliferative disorder and stroke with resulting left sided hemiplegia presents today for follow-up.      He is doing well and is compliant with his medications. Denied any chest pain, SOB, LE swelling, syncopal episodes, PND, orthopnea. He continues to endorse headache and is taking tylenol for pain. He is scheduled to see neurology for further evaluation. No reported limitation in ADLs. 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 He rarely leaves the house due to COVID. Other than the weight gain denies any complaints, no SOB, no LE edema, no dizziness or lightheadedness. Feels well overall.     PAST MEDICAL HISTORY:  Past Medical History        Past Medical History:   Diagnosis Date     Cardiomyopathy, hypertrophic obstructive (H)       H/O heart transplant (H)       Mar.23, 2012     Hemiplegia following CVA (cerebrovascular accident) (H)       left, improved with rehab     Lymphoproliferative disease (H)       Unspecified cerebral artery occlusion with cerebral infarction       age 2 1/2            FAMILY HISTORY:  Family History         Family History   Problem Relation Age of Onset     Diabetes Sister       Melanoma No family hx of        "Skin Cancer No family hx of              SOCIAL HISTORY:  Social History            Social History     Marital status: Single       Spouse name: N/A     Number of children: N/A     Years of education: N/A           Social History Main Topics     Smoking status: Never Smoker     Smokeless tobacco: Never Used     Alcohol use No     Drug use: No     Sexual activity: No           Other Topics Concern     None          Social History Narrative     Lives in [place], [place]. 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  Went to [facility].  Has 3 sisters and a brother.  Mom and dad  (2010).       ROS:   14 point review of systems was obtained from the patient with pertinent positives and negatives as described above in the HPI.     EXAM:  BP (!) 146/91 (BP Location: Right arm, Patient Position: Chair, Cuff Size: Adult Regular)   Pulse 103   Ht 1.854 m (6' 1\")   Wt 88.5 kg (195 lb)   SpO2 99%   BMI 25.73 kg/m    General: appears comfortable, alert and articulate  Head: normocephalic, atraumatic  Eyes: anicteric sclera, EOMI  Neck: no adenopathy  Orophyarynx: moist mucosa, no lesions, dentition intact  Heart: regular, S1/S2, no murmur, gallop, rub, JVP at clavicle  Lungs: clear, no rales or wheezing  Abdomen: soft, non-tender, bowel sounds present, no hepatosplenomegaly  Extremities: no clubbing, cyanosis or edema  Neurological: normal speech and affect, no gross motor deficits     Labs:  reviewed     CMRI:  Clinical history: 22M, s/p heart transplantation (03/23/12) complicated by PTLD (09/2012), hx of rejection  with mild troponin elevation. CMR to assess function.  1. The left ventricle is normal in cavity size and wall thickness. The global systolic function is normal.  The LVEF is 55%. There are no regional wall motion abnormalities.  2. The right ventricle is normal in cavity size. The global systolic function is normal. The RVEF is 53%.   3. Left atrium is mildly enlarged due to " transplantation. Right atrium is normal.  4. There are no significant valvular disease.   5. There is no late gadolinium enhancement to indicate myocardial fibrosis or infiltrative disease.   6. There is no intracardiac thrombus.   7. There is no pericardial effusion.    CONCLUSIONS: Normal biventricular function, LVEF 55% and RVEF 53%, with no evidence of focal myocardial  Fibrosis     Pediatric Heart Catheterization 3/23/2018:       Coronary angiogram and RHC 10/18/2020   Ao 97/72/80  RA 7/8/6  RV 25/6  PA 22/11/15  PCWP 14/15/10  Cardiac output by Francisca: 7.4 L/min (indexed to 3.6 L/min/m2)  Cardiac output by thermodilution: 5.3 L/min (indexed to 2.6 L/min/m2)  SVR by TD CO: 1117  PVR by TD CO: 0.9 Right sided filling pressures are normal. Left sided filling pressures are normal. Normal PA pressures. Normal cardiac output level.     Normal coronary arteries    TTE 10/19/2020  Interpretation Summary  Global and regional left ventricular function is normal with an EF of 55-60%.  Right ventricular function, chamber size, wall motion, and thickness are  normal.  Pulmonary artery systolic pressure cannot be assessed.  The inferior vena cava is normal.  No pericardial effusion is present.    Assessment and Plan:   Mr. Viramontes is a pleasant 24-year-old gentleman with history of presumed viral myocarditis at age 2-1/2 with dilated cardiomyopathy and CVA with resulting with resulting left-sided hemiplegia.    Overall he continues to do well and offers no new complaints.  Takes medications as prescribed.  He did gain some weight which is more likely than not related to reduced exercise just is not working at this time and isolating at home and quarantine due to the Covid.  He did have left lateral catheterization which showed essentially normal coronary arteries and normal filling pressures and cardiac index.  Given above evaluation we will continue medications.  He is hypertensive today which has also been seen yesterday  in the Cath Lab.  As such we will increase his amlodipine to 10 mg once a day dosing.  Side effect discussed that if he would develop dizziness lightheadedness or hypotension we will decrease the dose back to 7.5 mg daily.  We also discussed that he might experience some lower extremity edema related to the amlodipine.  We will recheck a tacrolimus level and continue other medications at this time.  We will repeat DSA as per protocol.  We will get stress cardiac MRI next year.  Influenza vaccination today.  Routine follow-up.  We will continue preventive health maintenance including dental visit dermatology visits.  No need for colonoscopy at this time.  We will see him back in 1 year or sooner as needed.      Please do not hesitate to contact me if you have any questions/concerns.     Sincerely,     Hao Shine MD

## 2020-10-20 NOTE — PROGRESS NOTES
Cardiology clinic note    October 9, 2019      HPI:     Mr. Viramontes is a 24-year-old gentleman with history of viral mediated dilated cardiomyopathy at age 2-1/2 for which he underwent a heart transplant on 3/23/2012.  His post transplant course has been complicated by rejection the last 3 occurring in April May and June 2017 for which he receives steroids, post transplant lymphoproliferative disorder and stroke with resulting left sided hemiplegia presents today for follow-up.      He is doing well and is compliant with his medications. Denied any chest pain, SOB, LE swelling, syncopal episodes, PND, orthopnea. He continues to endorse headache and is taking tylenol for pain. He is scheduled to see neurology for further evaluation. No reported limitation in ADLs. 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 He rarely leaves the house due to COVID. Other than the weight gain denies any complaints, no SOB, no LE edema, no dizziness or lightheadedness. Feels well overall.     PAST MEDICAL HISTORY:  Past Medical History        Past Medical History:   Diagnosis Date     Cardiomyopathy, hypertrophic obstructive (H)       H/O heart transplant (H)       Mar.23, 2012     Hemiplegia following CVA (cerebrovascular accident) (H)       left, improved with rehab     Lymphoproliferative disease (H)       Unspecified cerebral artery occlusion with cerebral infarction       age 2 1/2            FAMILY HISTORY:  Family History         Family History   Problem Relation Age of Onset     Diabetes Sister       Melanoma No family hx of       Skin Cancer No family hx of              SOCIAL HISTORY:  Social History            Social History     Marital status: Single       Spouse name: N/A     Number of children: N/A     Years of education: N/A           Social History Main Topics     Smoking status: Never Smoker     Smokeless tobacco: Never Used     Alcohol use No      "Drug use: No     Sexual activity: No           Other Topics Concern     None          Social History Narrative     Lives in [place], [place]. 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  Went to [employer] .  Has 3 sisters and a brother.  Mom and dad  (2010).       ROS:   14 point review of systems was obtained from the patient with pertinent positives and negatives as described above in the HPI.     EXAM:  BP (!) 146/91 (BP Location: Right arm, Patient Position: Chair, Cuff Size: Adult Regular)   Pulse 103   Ht 1.854 m (6' 1\")   Wt 88.5 kg (195 lb)   SpO2 99%   BMI 25.73 kg/m    General: appears comfortable, alert and articulate  Head: normocephalic, atraumatic  Eyes: anicteric sclera, EOMI  Neck: no adenopathy  Orophyarynx: moist mucosa, no lesions, dentition intact  Heart: regular, S1/S2, no murmur, gallop, rub, JVP at clavicle  Lungs: clear, no rales or wheezing  Abdomen: soft, non-tender, bowel sounds present, no hepatosplenomegaly  Extremities: no clubbing, cyanosis or edema  Neurological: normal speech and affect, no gross motor deficits     Labs:  reviewed     CMRI:  Clinical history: 22M, s/p heart transplantation (03/23/12) complicated by PTLD (09/2012), hx of rejection  with mild troponin elevation. CMR to assess function.  1. The left ventricle is normal in cavity size and wall thickness. The global systolic function is normal.  The LVEF is 55%. There are no regional wall motion abnormalities.  2. The right ventricle is normal in cavity size. The global systolic function is normal. The RVEF is 53%.   3. Left atrium is mildly enlarged due to transplantation. Right atrium is normal.  4. There are no significant valvular disease.   5. There is no late gadolinium enhancement to indicate myocardial fibrosis or infiltrative disease.   6. There is no intracardiac thrombus.   7. There is no pericardial effusion.    CONCLUSIONS: Normal biventricular function, LVEF 55% and RVEF " 53%, with no evidence of focal myocardial  Fibrosis     Pediatric Heart Catheterization 3/23/2018:       Coronary angiogram and RHC 10/18/2020   Ao 97/72/80  RA 7/8/6  RV 25/6  PA 22/11/15  PCWP 14/15/10  Cardiac output by Francisca: 7.4 L/min (indexed to 3.6 L/min/m2)  Cardiac output by thermodilution: 5.3 L/min (indexed to 2.6 L/min/m2)  SVR by TD CO: 1117  PVR by TD CO: 0.9 Right sided filling pressures are normal. Left sided filling pressures are normal. Normal PA pressures. Normal cardiac output level.     Normal coronary arteries    TTE 10/19/2020  Interpretation Summary  Global and regional left ventricular function is normal with an EF of 55-60%.  Right ventricular function, chamber size, wall motion, and thickness are  normal.  Pulmonary artery systolic pressure cannot be assessed.  The inferior vena cava is normal.  No pericardial effusion is present.    Assessment and Plan:   Mr. Viramontes is a pleasant 24-year-old gentleman with history of presumed viral myocarditis at age 2-1/2 with dilated cardiomyopathy and CVA with resulting with resulting left-sided hemiplegia.    Overall he continues to do well and offers no new complaints.  Takes medications as prescribed.  He did gain some weight which is more likely than not related to reduced exercise just is not working at this time and isolating at home and quarantine due to the Covid.  He did have left lateral catheterization which showed essentially normal coronary arteries and normal filling pressures and cardiac index.  Given above evaluation we will continue medications.  He is hypertensive today which has also been seen yesterday in the Cath Lab.  As such we will increase his amlodipine to 10 mg once a day dosing.  Side effect discussed that if he would develop dizziness lightheadedness or hypotension we will decrease the dose back to 7.5 mg daily.  We also discussed that he might experience some lower extremity edema related to the amlodipine.  We will  recheck a tacrolimus level and continue other medications at this time.  We will repeat DSA as per protocol.  We will get stress cardiac MRI next year.  Influenza vaccination today.  Routine follow-up.  We will continue preventive health maintenance including dental visit dermatology visits.  No need for colonoscopy at this time.  We will see him back in 1 year or sooner as needed.      Hao Shine MD

## 2020-10-20 NOTE — PATIENT INSTRUCTIONS
Please call your coordinator at [phone] with any questions or concerns.     Full labs in 6 months     Increase activity; bring weight down. Eat protein at every meal.     Increase Amlodipine 10 mg daily; goal 110//90. Check BP daily for  with dose change. If you have increased leg swelling with dose change, please call.     Decrease tac from 3 mg twice daily to 3 mg in the AM and 2 mg in the PM. Recheck level in 10-14 days. Goal 6-8.    Routine screenings:    Derm: DUE  Dental: DUE  Eye: DUE

## 2020-10-20 NOTE — NURSING NOTE
Chief Complaint   Patient presents with     Follow Up     [facility] heart transplant annual      Vitals were taken and medications were reconciled.     Criselda Murillo  10:58 AM

## 2020-10-20 NOTE — DISCHARGE SUMMARY
DISCHARGE   Discharged to: Home  Via: Automobile  Accompanied by: Mom  Discharge Instructions: diet, activity, medications, follow up appointments, when to call the MD, and what to watchout for (i.e. s/s of infection, increasing SOB, palpitations, chest pain,)  Prescriptions: No new medications  Follow Up Appointments: none scheduled  Belongings: All sent with pt  IV: out  Telemetry: off   Pt exhibits understanding of above discharge instructions; all questions answered.  Discharge Paperwork: faxed

## 2020-10-21 LAB — IMMUKNOW IMMUNE CELL FUNCTION: 336 NG/ML

## 2020-10-21 NOTE — NURSING NOTE
Transplant Coordinator Note  Reason for visit: Annual  Caregiver: Yusuf Azevedo     Health concerns addressed today:  Pt seen in clinic with Dr Shine. MD reviewed available labs, meds and test results. Tac dose adjusted. Wght up ~15-20#.  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 Iphone steps average ~2000 steps daily; enc to set goals to increase steps. Mom indicates fair amount of snacking; enc to decrease/eliminate - may be the answer.  Blood pressure >140/90; amlodipine increased. Pt not checking at home, enc to do so why meds adjusted. Weight loss may help improve numbers.      Immunosuppressants:  ~ mg BID  ~FK 3/3 mg - level 9.7 (goal 6-8). Confirmed 12-hour trough, dose decreased to 3/2 mg   ~Pred 7.5 mg daily      Routine screenings:    Derm: DUE  Dental: DUE  Eye: DUE   Flu: Vaccine given after visit     Medication record reviewed and reconciled  Questions and concerns addressed. Copy of AVS provided. Pt verbalized an understanding of plan of care.      Patient Instructions  ~Please call your coordinator at [phone] with any questions or concerns.   ~Full labs in 6 months   ~Increase activity; bring weight down. Eat protein at every meal.   ~Increase Amlodipine 10 mg daily; goal 110//90. Check BP daily for  with dose change. If you have increased leg swelling with dose change, please call.   ~Decrease tac from 3 mg twice daily to 3 mg in the AM and 2 mg in the PM. Recheck level in 10-14 days. Goal 6-8.

## 2020-11-08 ENCOUNTER — HEALTH MAINTENANCE LETTER (OUTPATIENT)
Age: 25
End: 2020-11-08

## 2020-11-09 DIAGNOSIS — Z94.1 HEART REPLACED BY TRANSPLANT (H): ICD-10-CM

## 2020-11-09 LAB
ANION GAP SERPL CALCULATED.3IONS-SCNC: 2 MMOL/L (ref 3–14)
BUN SERPL-MCNC: 18 MG/DL (ref 7–30)
CALCIUM SERPL-MCNC: 8.8 MG/DL (ref 8.5–10.1)
CHLORIDE SERPL-SCNC: 107 MMOL/L (ref 94–109)
CO2 SERPL-SCNC: 32 MMOL/L (ref 20–32)
CREAT SERPL-MCNC: 0.82 MG/DL (ref 0.66–1.25)
GFR SERPL CREATININE-BSD FRML MDRD: >90 ML/MIN/{1.73_M2}
GLUCOSE SERPL-MCNC: 95 MG/DL (ref 70–99)
POTASSIUM SERPL-SCNC: 4.1 MMOL/L (ref 3.4–5.3)
SODIUM SERPL-SCNC: 141 MMOL/L (ref 133–144)

## 2020-11-09 PROCEDURE — 80197 ASSAY OF TACROLIMUS: CPT | Performed by: INTERNAL MEDICINE

## 2020-11-09 PROCEDURE — 80048 BASIC METABOLIC PNL TOTAL CA: CPT | Performed by: INTERNAL MEDICINE

## 2020-11-09 PROCEDURE — 36415 COLL VENOUS BLD VENIPUNCTURE: CPT | Performed by: INTERNAL MEDICINE

## 2020-11-10 ENCOUNTER — TELEPHONE (OUTPATIENT)
Dept: TRANSPLANT | Facility: CLINIC | Age: 25
End: 2020-11-10

## 2020-11-10 LAB
TACROLIMUS BLD-MCNC: 7.6 UG/L (ref 5–15)
TME LAST DOSE: NORMAL H

## 2020-11-10 NOTE — TELEPHONE ENCOUNTER
Pt and mom called with tac level 7.6. Goal 6-8. No further dose change.  BP mostly 120s/80 since increasing Amlodipine - no side effects noted.    Pt using the treadmill and now down a couple # . Will watch BP as [facility] comes down.

## 2021-03-10 DIAGNOSIS — Z94.1 HEART REPLACED BY TRANSPLANT (H): ICD-10-CM

## 2021-03-11 ENCOUNTER — IMMUNIZATION (OUTPATIENT)
Dept: NURSING | Facility: CLINIC | Age: 26
End: 2021-03-11
Payer: MEDICAID

## 2021-03-11 PROCEDURE — 91300 PR COVID VAC PFIZER DIL RECON 30 MCG/0.3 ML IM: CPT

## 2021-03-11 PROCEDURE — 0001A PR COVID VAC PFIZER DIL RECON 30 MCG/0.3 ML IM: CPT

## 2021-03-11 RX ORDER — PREDNISONE 5 MG/1
5 TABLET ORAL DAILY
Qty: 90 TABLET | Refills: 3 | Status: SHIPPED | OUTPATIENT
Start: 2021-03-11 | End: 2021-06-11

## 2021-03-11 RX ORDER — PRAVASTATIN SODIUM 10 MG
10 TABLET ORAL DAILY
Qty: 90 TABLET | Refills: 3 | Status: SHIPPED | OUTPATIENT
Start: 2021-03-11 | End: 2022-02-11

## 2021-03-11 RX ORDER — PREDNISONE 2.5 MG/1
2.5 TABLET ORAL DAILY
Qty: 90 TABLET | Refills: 3 | Status: SHIPPED | OUTPATIENT
Start: 2021-03-11 | End: 2021-06-11 | Stop reason: ALTCHOICE

## 2021-03-11 RX ORDER — MYCOPHENOLATE MOFETIL 500 MG/1
500 TABLET ORAL 2 TIMES DAILY
Qty: 180 TABLET | Refills: 3 | Status: SHIPPED | OUTPATIENT
Start: 2021-03-11 | End: 2022-05-11

## 2021-04-01 ENCOUNTER — IMMUNIZATION (OUTPATIENT)
Dept: NURSING | Facility: CLINIC | Age: 26
End: 2021-04-01
Attending: INTERNAL MEDICINE
Payer: MEDICAID

## 2021-04-01 PROCEDURE — 0002A PR COVID VAC PFIZER DIL RECON 30 MCG/0.3 ML IM: CPT

## 2021-04-01 PROCEDURE — 91300 PR COVID VAC PFIZER DIL RECON 30 MCG/0.3 ML IM: CPT

## 2021-05-13 DIAGNOSIS — Z94.1 HEART REPLACED BY TRANSPLANT (H): Primary | ICD-10-CM

## 2021-05-13 DIAGNOSIS — Z13.220 ENCOUNTER FOR LIPID SCREENING FOR CARDIOVASCULAR DISEASE: ICD-10-CM

## 2021-05-13 DIAGNOSIS — Z13.6 ENCOUNTER FOR LIPID SCREENING FOR CARDIOVASCULAR DISEASE: ICD-10-CM

## 2021-05-20 ENCOUNTER — TELEPHONE (OUTPATIENT)
Dept: TRANSPLANT | Facility: CLINIC | Age: 26
End: 2021-05-20

## 2021-05-20 DIAGNOSIS — Z94.1 HEART REPLACED BY TRANSPLANT (H): Primary | ICD-10-CM

## 2021-05-20 NOTE — TELEPHONE ENCOUNTER
I left a message for Obed on both numbers concerning his 6 month labs. I left my number to have him call and let me know hw received the message, and to call with questions.

## 2021-06-10 NOTE — TELEPHONE ENCOUNTER
I spoke with Jolly, and she will have Obed go in in the next couple of weeks to have his 6 month labs done. She would also like future lab requests placed in [facility], and would also like a call to discuss Obed getting the COVID vaccine.

## 2021-06-10 NOTE — TELEPHONE ENCOUNTER
Called mom to discuss labs and COVID vaccine. She missed the my chart message re lab check due and COVID Q and A  - has been out to the traveling for work.      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 Enc to wear and cont good hand washing. Mom plans to talk to PCP about checking antibodies; did relay that tx team is not recommending a check.    In review of medications mom states that pt is taking 5 mg Pred vs 7.5 mg pred for last year. No documentation in chart of decreasing dose; medication list stated 7.5 mg daily. Mom states pt feels better and less headaches, reluctant to go back up on dose.     Will add DSAs to upcoming labs.   Dr Shine notified. Cont 5 mg at this time; check labs   Mom and med sheet updated.

## 2021-06-11 RX ORDER — PREDNISONE 5 MG/1
5 TABLET ORAL DAILY
Qty: 90 TABLET | Refills: 3 | Status: SHIPPED | OUTPATIENT
Start: 2021-06-11 | End: 2022-08-04

## 2021-06-18 ENCOUNTER — TELEPHONE (OUTPATIENT)
Dept: LAB | Facility: CLINIC | Age: 26
End: 2021-06-18

## 2021-06-18 DIAGNOSIS — Z13.220 ENCOUNTER FOR LIPID SCREENING FOR CARDIOVASCULAR DISEASE: ICD-10-CM

## 2021-06-18 DIAGNOSIS — Z94.1 HEART REPLACED BY TRANSPLANT (H): ICD-10-CM

## 2021-06-18 DIAGNOSIS — Z13.6 ENCOUNTER FOR LIPID SCREENING FOR CARDIOVASCULAR DISEASE: ICD-10-CM

## 2021-06-18 LAB
ERYTHROCYTE [DISTWIDTH] IN BLOOD BY AUTOMATED COUNT: 13.1 % (ref 10–15)
HCT VFR BLD AUTO: 49.2 % (ref 40–53)
HGB BLD-MCNC: 16.8 G/DL (ref 13.3–17.7)
MCH RBC QN AUTO: 29.5 PG (ref 26.5–33)
MCHC RBC AUTO-ENTMCNC: 34.1 G/DL (ref 31.5–36.5)
MCV RBC AUTO: 86 FL (ref 78–100)
PLATELET # BLD AUTO: 204 10E9/L (ref 150–450)
RBC # BLD AUTO: 5.7 10E12/L (ref 4.4–5.9)
WBC # BLD AUTO: 5.5 10E9/L (ref 4–11)

## 2021-06-18 PROCEDURE — 36415 COLL VENOUS BLD VENIPUNCTURE: CPT | Performed by: INTERNAL MEDICINE

## 2021-06-18 PROCEDURE — 80053 COMPREHEN METABOLIC PANEL: CPT | Performed by: INTERNAL MEDICINE

## 2021-06-18 PROCEDURE — 85027 COMPLETE CBC AUTOMATED: CPT | Performed by: INTERNAL MEDICINE

## 2021-06-18 PROCEDURE — 84100 ASSAY OF PHOSPHORUS: CPT | Performed by: INTERNAL MEDICINE

## 2021-06-18 PROCEDURE — 80061 LIPID PANEL: CPT | Performed by: INTERNAL MEDICINE

## 2021-06-18 PROCEDURE — 82550 ASSAY OF CK (CPK): CPT | Performed by: INTERNAL MEDICINE

## 2021-06-18 PROCEDURE — 80197 ASSAY OF TACROLIMUS: CPT | Performed by: INTERNAL MEDICINE

## 2021-06-18 PROCEDURE — 83735 ASSAY OF MAGNESIUM: CPT | Performed by: INTERNAL MEDICINE

## 2021-06-19 LAB
ALBUMIN SERPL-MCNC: 4.2 G/DL (ref 3.4–5)
ALP SERPL-CCNC: 75 U/L (ref 40–150)
ALT SERPL W P-5'-P-CCNC: 33 U/L (ref 0–70)
ANION GAP SERPL CALCULATED.3IONS-SCNC: 5 MMOL/L (ref 3–14)
AST SERPL W P-5'-P-CCNC: 16 U/L (ref 0–45)
BILIRUB SERPL-MCNC: 0.7 MG/DL (ref 0.2–1.3)
BUN SERPL-MCNC: 17 MG/DL (ref 7–30)
CALCIUM SERPL-MCNC: 8.9 MG/DL (ref 8.5–10.1)
CHLORIDE SERPL-SCNC: 105 MMOL/L (ref 94–109)
CHOLEST SERPL-MCNC: 172 MG/DL
CK SERPL-CCNC: 76 U/L (ref 30–300)
CO2 SERPL-SCNC: 29 MMOL/L (ref 20–32)
CREAT SERPL-MCNC: 0.77 MG/DL (ref 0.66–1.25)
GFR SERPL CREATININE-BSD FRML MDRD: >90 ML/MIN/{1.73_M2}
GLUCOSE SERPL-MCNC: 91 MG/DL (ref 70–99)
HDLC SERPL-MCNC: 58 MG/DL
LDLC SERPL CALC-MCNC: 96 MG/DL
MAGNESIUM SERPL-MCNC: 1.5 MG/DL (ref 1.6–2.3)
NONHDLC SERPL-MCNC: 114 MG/DL
PHOSPHATE SERPL-MCNC: 3.4 MG/DL (ref 2.5–4.5)
POTASSIUM SERPL-SCNC: 3.8 MMOL/L (ref 3.4–5.3)
PROT SERPL-MCNC: 7.7 G/DL (ref 6.8–8.8)
SODIUM SERPL-SCNC: 139 MMOL/L (ref 133–144)
TACROLIMUS BLD-MCNC: 7.5 UG/L (ref 5–15)
TME LAST DOSE: 55 H
TRIGL SERPL-MCNC: 89 MG/DL

## 2021-07-23 DIAGNOSIS — Z94.1 HEART REPLACED BY TRANSPLANT (H): Primary | ICD-10-CM

## 2021-08-23 DIAGNOSIS — Z94.1 HEART REPLACED BY TRANSPLANT (H): ICD-10-CM

## 2021-08-23 RX ORDER — TACROLIMUS 1 MG/1
CAPSULE ORAL
Qty: 450 CAPSULE | Refills: 3 | Status: SHIPPED | OUTPATIENT
Start: 2021-08-23 | End: 2022-04-22

## 2021-09-11 ENCOUNTER — HEALTH MAINTENANCE LETTER (OUTPATIENT)
Age: 26
End: 2021-09-11

## 2021-09-23 ENCOUNTER — TELEPHONE (OUTPATIENT)
Dept: TRANSPLANT | Facility: CLINIC | Age: 26
End: 2021-09-23

## 2021-09-23 ENCOUNTER — PRE VISIT (OUTPATIENT)
Dept: TRANSPLANT | Facility: CLINIC | Age: 26
End: 2021-09-23

## 2021-09-23 DIAGNOSIS — Z94.1 HEART REPLACED BY TRANSPLANT (H): Primary | ICD-10-CM

## 2021-09-23 DIAGNOSIS — Z13.220 ENCOUNTER FOR LIPID SCREENING FOR CARDIOVASCULAR DISEASE: ICD-10-CM

## 2021-09-23 DIAGNOSIS — Z13.6 ENCOUNTER FOR LIPID SCREENING FOR CARDIOVASCULAR DISEASE: ICD-10-CM

## 2021-09-23 DIAGNOSIS — Z79.899 ENCOUNTER FOR LONG-TERM (CURRENT) USE OF MEDICATIONS: ICD-10-CM

## 2021-09-23 NOTE — TELEPHONE ENCOUNTER
Reviewed appts for next week with pt - including need for fasting labs and 12-hour tacro. No caffeine prior MRI.    Enc to call w/questions.     Attempted to call mom - unable to LVM.

## 2021-09-28 ENCOUNTER — OFFICE VISIT (OUTPATIENT)
Dept: CARDIOLOGY | Facility: CLINIC | Age: 26
End: 2021-09-28
Attending: INTERNAL MEDICINE
Payer: MEDICAID

## 2021-09-28 ENCOUNTER — HOSPITAL ENCOUNTER (OUTPATIENT)
Dept: GENERAL RADIOLOGY | Facility: CLINIC | Age: 26
End: 2021-09-28
Attending: INTERNAL MEDICINE
Payer: MEDICAID

## 2021-09-28 ENCOUNTER — HOSPITAL ENCOUNTER (OUTPATIENT)
Dept: MRI IMAGING | Facility: CLINIC | Age: 26
End: 2021-09-28
Attending: INTERNAL MEDICINE
Payer: MEDICAID

## 2021-09-28 ENCOUNTER — LAB (OUTPATIENT)
Dept: LAB | Facility: CLINIC | Age: 26
End: 2021-09-28
Attending: INTERNAL MEDICINE
Payer: MEDICAID

## 2021-09-28 VITALS
HEIGHT: 73 IN | DIASTOLIC BLOOD PRESSURE: 74 MMHG | WEIGHT: 193 LBS | BODY MASS INDEX: 25.58 KG/M2 | OXYGEN SATURATION: 96 % | SYSTOLIC BLOOD PRESSURE: 127 MMHG | HEART RATE: 105 BPM

## 2021-09-28 VITALS — DIASTOLIC BLOOD PRESSURE: 62 MMHG | SYSTOLIC BLOOD PRESSURE: 122 MMHG | HEART RATE: 103 BPM

## 2021-09-28 DIAGNOSIS — Z94.1 HEART REPLACED BY TRANSPLANT (H): ICD-10-CM

## 2021-09-28 DIAGNOSIS — I10 BENIGN ESSENTIAL HYPERTENSION: ICD-10-CM

## 2021-09-28 DIAGNOSIS — Z23 NEED FOR INFLUENZA VACCINATION: Primary | ICD-10-CM

## 2021-09-28 DIAGNOSIS — I63.9 CEREBROVASCULAR ACCIDENT (CVA), UNSPECIFIED MECHANISM (H): ICD-10-CM

## 2021-09-28 DIAGNOSIS — Z13.220 ENCOUNTER FOR LIPID SCREENING FOR CARDIOVASCULAR DISEASE: ICD-10-CM

## 2021-09-28 DIAGNOSIS — Z13.6 ENCOUNTER FOR LIPID SCREENING FOR CARDIOVASCULAR DISEASE: ICD-10-CM

## 2021-09-28 DIAGNOSIS — Z79.899 ENCOUNTER FOR LONG-TERM (CURRENT) USE OF MEDICATIONS: ICD-10-CM

## 2021-09-28 DIAGNOSIS — E78.2 MIXED HYPERLIPIDEMIA: ICD-10-CM

## 2021-09-28 LAB
ALBUMIN SERPL-MCNC: 3.7 G/DL (ref 3.4–5)
ALP SERPL-CCNC: 79 U/L (ref 40–150)
ALT SERPL W P-5'-P-CCNC: 33 U/L (ref 0–70)
ANION GAP SERPL CALCULATED.3IONS-SCNC: 6 MMOL/L (ref 3–14)
AST SERPL W P-5'-P-CCNC: 22 U/L (ref 0–45)
BILIRUB SERPL-MCNC: 0.5 MG/DL (ref 0.2–1.3)
BUN SERPL-MCNC: 16 MG/DL (ref 7–30)
CALCIUM SERPL-MCNC: 8.9 MG/DL (ref 8.5–10.1)
CHLORIDE BLD-SCNC: 108 MMOL/L (ref 94–109)
CHOLEST SERPL-MCNC: 157 MG/DL
CK SERPL-CCNC: 116 U/L (ref 30–300)
CMV DNA SPEC NAA+PROBE-ACNC: NOT DETECTED IU/ML
CO2 SERPL-SCNC: 26 MMOL/L (ref 20–32)
CREAT SERPL-MCNC: 0.83 MG/DL (ref 0.66–1.25)
ERYTHROCYTE [DISTWIDTH] IN BLOOD BY AUTOMATED COUNT: 12.6 % (ref 10–15)
FASTING STATUS PATIENT QL REPORTED: YES
GFR SERPL CREATININE-BSD FRML MDRD: >90 ML/MIN/1.73M2
GLUCOSE BLD-MCNC: 87 MG/DL (ref 70–99)
HCT VFR BLD AUTO: 46.2 % (ref 40–53)
HDLC SERPL-MCNC: 57 MG/DL
HGB BLD-MCNC: 15.9 G/DL (ref 13.3–17.7)
LDLC SERPL CALC-MCNC: 88 MG/DL
MAGNESIUM SERPL-MCNC: 1.8 MG/DL (ref 1.6–2.3)
MCH RBC QN AUTO: 29.2 PG (ref 26.5–33)
MCHC RBC AUTO-ENTMCNC: 34.4 G/DL (ref 31.5–36.5)
MCV RBC AUTO: 85 FL (ref 78–100)
NONHDLC SERPL-MCNC: 100 MG/DL
PHOSPHATE SERPL-MCNC: 3 MG/DL (ref 2.5–4.5)
PLATELET # BLD AUTO: 193 10E3/UL (ref 150–450)
POTASSIUM BLD-SCNC: 3.9 MMOL/L (ref 3.4–5.3)
PROT SERPL-MCNC: 7.2 G/DL (ref 6.8–8.8)
RBC # BLD AUTO: 5.44 10E6/UL (ref 4.4–5.9)
SODIUM SERPL-SCNC: 140 MMOL/L (ref 133–144)
TACROLIMUS BLD-MCNC: 8 UG/L (ref 5–15)
TME LAST DOSE: NORMAL H
TME LAST DOSE: NORMAL H
TRIGL SERPL-MCNC: 62 MG/DL
WBC # BLD AUTO: 4.9 10E3/UL (ref 4–11)

## 2021-09-28 PROCEDURE — 82550 ASSAY OF CK (CPK): CPT

## 2021-09-28 PROCEDURE — 93010 ELECTROCARDIOGRAM REPORT: CPT | Mod: 76 | Performed by: INTERNAL MEDICINE

## 2021-09-28 PROCEDURE — G0008 ADMIN INFLUENZA VIRUS VAC: HCPCS | Performed by: INTERNAL MEDICINE

## 2021-09-28 PROCEDURE — 250N000011 HC RX IP 250 OP 636: Performed by: INTERNAL MEDICINE

## 2021-09-28 PROCEDURE — 84100 ASSAY OF PHOSPHORUS: CPT

## 2021-09-28 PROCEDURE — 82040 ASSAY OF SERUM ALBUMIN: CPT

## 2021-09-28 PROCEDURE — A9585 GADOBUTROL INJECTION: HCPCS | Performed by: INTERNAL MEDICINE

## 2021-09-28 PROCEDURE — 90686 IIV4 VACC NO PRSV 0.5 ML IM: CPT | Performed by: INTERNAL MEDICINE

## 2021-09-28 PROCEDURE — 86352 CELL FUNCTION ASSAY W/STIM: CPT

## 2021-09-28 PROCEDURE — 36415 COLL VENOUS BLD VENIPUNCTURE: CPT

## 2021-09-28 PROCEDURE — 86832 HLA CLASS I HIGH DEFIN QUAL: CPT | Performed by: INTERNAL MEDICINE

## 2021-09-28 PROCEDURE — 99214 OFFICE O/P EST MOD 30 MIN: CPT | Performed by: INTERNAL MEDICINE

## 2021-09-28 PROCEDURE — G0463 HOSPITAL OUTPT CLINIC VISIT: HCPCS | Mod: 25

## 2021-09-28 PROCEDURE — 71046 X-RAY EXAM CHEST 2 VIEWS: CPT | Mod: 26 | Performed by: RADIOLOGY

## 2021-09-28 PROCEDURE — 83735 ASSAY OF MAGNESIUM: CPT

## 2021-09-28 PROCEDURE — 75563 CARD MRI W/STRESS IMG & DYE: CPT | Mod: 26 | Performed by: INTERNAL MEDICINE

## 2021-09-28 PROCEDURE — 93005 ELECTROCARDIOGRAM TRACING: CPT

## 2021-09-28 PROCEDURE — 93017 CV STRESS TEST TRACING ONLY: CPT

## 2021-09-28 PROCEDURE — 93016 CV STRESS TEST SUPVJ ONLY: CPT | Performed by: INTERNAL MEDICINE

## 2021-09-28 PROCEDURE — 86833 HLA CLASS II HIGH DEFIN QUAL: CPT | Performed by: INTERNAL MEDICINE

## 2021-09-28 PROCEDURE — 71046 X-RAY EXAM CHEST 2 VIEWS: CPT

## 2021-09-28 PROCEDURE — 85027 COMPLETE CBC AUTOMATED: CPT

## 2021-09-28 PROCEDURE — 80197 ASSAY OF TACROLIMUS: CPT | Performed by: INTERNAL MEDICINE

## 2021-09-28 PROCEDURE — 75563 CARD MRI W/STRESS IMG & DYE: CPT

## 2021-09-28 PROCEDURE — 999N000054 HC STATISTIC EKG NON-CHARGEABLE

## 2021-09-28 PROCEDURE — 255N000002 HC RX 255 OP 636: Performed by: INTERNAL MEDICINE

## 2021-09-28 PROCEDURE — 93018 CV STRESS TEST I&R ONLY: CPT | Performed by: INTERNAL MEDICINE

## 2021-09-28 PROCEDURE — 82465 ASSAY BLD/SERUM CHOLESTEROL: CPT

## 2021-09-28 PROCEDURE — 87799 DETECT AGENT NOS DNA QUANT: CPT | Performed by: INTERNAL MEDICINE

## 2021-09-28 RX ORDER — CAFFEINE CITRATE 20 MG/ML
60 SOLUTION INTRAVENOUS
Status: DISCONTINUED | OUTPATIENT
Start: 2021-09-28 | End: 2021-09-29 | Stop reason: HOSPADM

## 2021-09-28 RX ORDER — ALBUTEROL SULFATE 90 UG/1
2 AEROSOL, METERED RESPIRATORY (INHALATION) EVERY 5 MIN PRN
Status: DISCONTINUED | OUTPATIENT
Start: 2021-09-28 | End: 2021-09-29 | Stop reason: HOSPADM

## 2021-09-28 RX ORDER — ONDANSETRON 2 MG/ML
4 INJECTION INTRAMUSCULAR; INTRAVENOUS
Status: DISCONTINUED | OUTPATIENT
Start: 2021-09-28 | End: 2021-09-29 | Stop reason: HOSPADM

## 2021-09-28 RX ORDER — ACYCLOVIR 200 MG/1
0-1 CAPSULE ORAL
Status: DISCONTINUED | OUTPATIENT
Start: 2021-09-28 | End: 2021-09-29 | Stop reason: HOSPADM

## 2021-09-28 RX ORDER — METHYLPREDNISOLONE SODIUM SUCCINATE 125 MG/2ML
125 INJECTION, POWDER, LYOPHILIZED, FOR SOLUTION INTRAMUSCULAR; INTRAVENOUS
Status: DISCONTINUED | OUTPATIENT
Start: 2021-09-28 | End: 2021-09-29 | Stop reason: HOSPADM

## 2021-09-28 RX ORDER — AMINOPHYLLINE 25 MG/ML
100 INJECTION, SOLUTION INTRAVENOUS ONCE
Status: COMPLETED | OUTPATIENT
Start: 2021-09-28 | End: 2021-09-28

## 2021-09-28 RX ORDER — GADOBUTROL 604.72 MG/ML
10 INJECTION INTRAVENOUS ONCE
Status: COMPLETED | OUTPATIENT
Start: 2021-09-28 | End: 2021-09-28

## 2021-09-28 RX ORDER — REGADENOSON 0.08 MG/ML
0.4 INJECTION, SOLUTION INTRAVENOUS ONCE
Status: COMPLETED | OUTPATIENT
Start: 2021-09-28 | End: 2021-09-28

## 2021-09-28 RX ORDER — DIAZEPAM 5 MG
5 TABLET ORAL EVERY 30 MIN PRN
Status: DISCONTINUED | OUTPATIENT
Start: 2021-09-28 | End: 2021-09-29 | Stop reason: HOSPADM

## 2021-09-28 RX ORDER — DIPHENHYDRAMINE HYDROCHLORIDE 50 MG/ML
25-50 INJECTION INTRAMUSCULAR; INTRAVENOUS
Status: DISCONTINUED | OUTPATIENT
Start: 2021-09-28 | End: 2021-09-29 | Stop reason: HOSPADM

## 2021-09-28 RX ORDER — DIPHENHYDRAMINE HCL 25 MG
25 CAPSULE ORAL
Status: DISCONTINUED | OUTPATIENT
Start: 2021-09-28 | End: 2021-09-29 | Stop reason: HOSPADM

## 2021-09-28 RX ADMIN — REGADENOSON 0.4 MG: 0.08 INJECTION, SOLUTION INTRAVENOUS at 11:12

## 2021-09-28 RX ADMIN — GADOBUTROL 10 ML: 604.72 INJECTION INTRAVENOUS at 11:11

## 2021-09-28 RX ADMIN — INFLUENZA A VIRUS A/VICTORIA/2570/2019 IVR-215 (H1N1) ANTIGEN (FORMALDEHYDE INACTIVATED), INFLUENZA A VIRUS A/TASMANIA/503/2020 IVR-221 (H3N2) ANTIGEN (FORMALDEHYDE INACTIVATED), INFLUENZA B VIRUS B/PHUKET/3073/2013 ANTIGEN (FORMALDEHYDE INACTIVATED), AND INFLUENZA B VIRUS B/WASHINGTON/02/2019 ANTIGEN (FORMALDEHYDE INACTIVATED) 0.5 ML: 15; 15; 15; 15 INJECTION, SUSPENSION INTRAMUSCULAR at 15:00

## 2021-09-28 RX ADMIN — AMINOPHYLLINE 100 MG: 25 INJECTION, SOLUTION INTRAVENOUS at 11:14

## 2021-09-28 ASSESSMENT — PAIN SCALES - GENERAL: PAINLEVEL: NO PAIN (0)

## 2021-09-28 ASSESSMENT — MIFFLIN-ST. JEOR: SCORE: 1909.32

## 2021-09-28 NOTE — LETTER
9/28/2021      RE: Obed Viramontes  [address]  [facility] [address]       Dear Colleague,    Thank you for the opportunity to participate in the care of your patient, Obed Viramontes, at the [facility]. Please see a copy of my visit note below.    September 28, 2021     Mr. Viramontes is a 24-year-old gentleman with history of viral mediated dilated cardiomyopathy at age 2-1/2 for which he underwent a heart transplant on 3/23/2012.  His post transplant course has been complicated by rejection the last 3 occurring in April May and June 2017 for which he receives steroids, post transplant lymphoproliferative disorder and stroke with resulting left sided hemiplegia presents today for follow-up.   Overall he has been doing very well since I last seen him.  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  He several thousand steps today almost 10,000 since being back to work.  He is asymptomatic from the cardiac standpoint no dizziness lightheadedness falls and exercise tolerance has been stable.  He did get the Covid shot x3 at this time and denies any symptoms or issues.  Takes all medications as prescribed.  He is living with his sister again.      PAST MEDICAL HISTORY:  Past Medical History:   Diagnosis Date     Cardiomyopathy, hypertrophic obstructive (H)      H/O heart transplant (H)     Mar.23, 2012     Hemiplegia following CVA (cerebrovascular accident) (H)     left, improved with rehab     Lymphoproliferative disease (H)      Unspecified cerebral artery occlusion with cerebral infarction     age 1 1/2     FAMILY HISTORY:  Family History   Problem Relation Age of Onset     Diabetes Sister      Melanoma No family hx of      Skin Cancer No family hx of      SOCIAL HISTORY:  Social History     Socioeconomic History     Marital status: Single     Spouse name: Not on file     Number of children: Not  on file     Years of education: Not on file     Highest education level: Not on file   Occupational History     Not on file   Tobacco Use     Smoking status: Never Smoker     Smokeless tobacco: Never Used   Substance and Sexual Activity     Alcohol use: No     Drug use: No     Sexual activity: Never     Partners: Female   Other Topics Concern     Parent/sibling w/ CABG, MI or angioplasty before 65F 55M? Not Asked   Social History Narrative    Lives in [place] with father, [place]. 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  Went to [employer] .  Has 3 sisters and a brother.  Mom and dad  (2010).      Social Determinants of Health     Financial Resource Strain:      Difficulty of Paying Living Expenses:    Food Insecurity:      Worried About Running Out of Food in the Last Year:      Ran Out of Food in the Last Year:    Transportation Needs:      Lack of Transportation (Medical):      Lack of Transportation (Non-Medical):    Physical Activity:      Days of Exercise per Week:      Minutes of Exercise per Session:    Stress:      Feeling of Stress :    Social Connections:      Frequency of Communication with Friends and Family:      Frequency of Social Gatherings with Friends and Family:      Attends Muslim Services:      Active Member of Clubs or Organizations:      Attends Club or Organization Meetings:      Marital Status:    Intimate Partner Violence:      Fear of Current or Ex-Partner:      Emotionally Abused:      Physically Abused:      Sexually Abused:      CURRENT MEDICATIONS:  Current Outpatient Medications   Medication     amLODIPine (NORVASC) 5 MG tablet     aspirin 81 MG EC tablet     Cholecalciferol (VITAMIN D3) 2000 UNITS TABS     mycophenolate (GENERIC EQUIVALENT) 500 MG tablet     oseltamivir (TAMIFLU) 75 MG capsule     pravastatin (PRAVACHOL) 10 MG tablet     predniSONE (DELTASONE) 5 MG tablet     tacrolimus (GENERIC EQUIVALENT) 0.5 MG capsule     tacrolimus  "(GENERIC EQUIVALENT) 1 MG capsule     No current facility-administered medications for this visit.     ROS:   Constitutional: No fever, chills, or sweats.   ENT: No visual disturbance, ear ache, epistaxis, sore throat.   Allergies/Immunologic: Negative.   Respiratory: No cough, hemoptysis.   Cardiovascular: As per HPI.   GI: No nausea, vomiting, hematemesis, melena, or hematochezia.   : No urinary frequency, dysuria, or hematuria.   Integument: Negative.   Psychiatric: Pleasant, no major depression noted  Neuro: No focal neurological deficits noted  Endocrinology: Negative.   Musculoskeletal: As per HPI.      EXAM:  /74 (BP Location: Right arm, Patient Position: Chair, Cuff Size: Adult Regular)   Pulse 105   Ht 1.854 m (6' 1\")   Wt 87.5 kg (193 lb)   SpO2 96%   BMI 25.46 kg/m    General: appears comfortable, alert and articulate  Head: normocephalic, atraumatic  Eyes: anicteric sclera, EOMI  Neck: no adenopathy  Orophyarynx: moist mucosa, no lesions, dentition intact  Heart: regular, S1/S2, no murmur, gallop, rub, JVP at clavicle  Lungs: clear, no rales or wheezing  Abdomen: soft, non-tender, bowel sounds present, no hepatosplenomegaly  Extremities: no clubbing, cyanosis or edema  Neurological: normal speech and affect, no gross motor deficits     Labs:  Lab Results   Component Value Date    WBC 5.5 06/18/2021    HGB 16.8 06/18/2021    HCT 49.2 06/18/2021     06/18/2021     06/18/2021    POTASSIUM 3.8 06/18/2021    CHLORIDE 105 06/18/2021    CO2 29 06/18/2021    BUN 17 06/18/2021    CR 0.77 06/18/2021    GLC 91 06/18/2021    DD <0.3 12/10/2019    NTBNPI 29 09/11/2017    NTBNP 53 08/08/2018    TROPONIN 0.00 12/10/2019    TROPI <0.015 12/10/2019    AST 16 06/18/2021    ALT 33 06/18/2021    ALKPHOS 75 06/18/2021    BILITOTAL 0.7 06/18/2021    INR 1.00 01/12/2017     Labs:  reviewed     CMRI:  Clinical history: 22M, s/p heart transplantation (03/23/12) complicated by PTLD (09/2012), hx of " rejection  with mild troponin elevation. CMR to assess function.  1. The left ventricle is normal in cavity size and wall thickness. The global systolic function is normal.  The LVEF is 55%. There are no regional wall motion abnormalities.  2. The right ventricle is normal in cavity size. The global systolic function is normal. The RVEF is 53%.   3. Left atrium is mildly enlarged due to transplantation. Right atrium is normal.  4. There are no significant valvular disease.   5. There is no late gadolinium enhancement to indicate myocardial fibrosis or infiltrative disease.   6. There is no intracardiac thrombus.   7. There is no pericardial effusion.    CONCLUSIONS: Normal biventricular function, LVEF 55% and RVEF 53%, with no evidence of focal myocardial  Fibrosis     Pediatric Heart Catheterization 3/23/2018:       Coronary angiogram and RHC 10/18/2020   Ao 97/72/80  RA 7/8/6  RV 25/6  PA 22/11/15  PCWP 14/15/10  Cardiac output by Francisca: 7.4 L/min (indexed to 3.6 L/min/m2)  Cardiac output by thermodilution: 5.3 L/min (indexed to 2.6 L/min/m2)  SVR by TD CO: 1117  PVR by TD CO: 0.9 Right sided filling pressures are normal. Left sided filling pressures are normal. Normal PA pressures. Normal cardiac output level.   Normal coronary arteries     TTE 10/19/2020  Global and regional left ventricular function is normal with an EF of 55-60%.  Right ventricular function, chamber size, wall motion, and thickness are  normal.  Pulmonary artery systolic pressure cannot be assessed.  The inferior vena cava is normal.  No pericardial effusion is present.    CMR 9/28/21  1. The LV is normal in cavity size and wall thickness. The global systolic function is normal. The LVEF is 58%. There are no regional wall motion abnormalities.  2. The RV is normal in cavity size. The global systolic function is normal. The RVEF is 54%.   3. Both atria are normal in size.  4. There is no significant valvular disease.   5. Late gadolinium  enhancement imaging shows no MI, fibrosis or infiltrative disease.   6. Regadenoson stress perfusion imaging shows no ischemia.  7. There is no pericardial effusion or thickening.  8. There is no intracardiac thrombus.  CONCLUSIONS: Heart transplant with normal graft function and no ischemia or fibrosis. No change from prior study in 2019.      Assessment and Plan:   Mr. Viramontes is a pleasant 25-year-old gentleman with history of presumed viral myocarditis at age 2-1/2 with dilated cardiomyopathy and CVA with resulting left-sided hemiplegia.    He has been doing very well since the most recent visit and denies any issues.  Luckily he is much more active now as he is back to work.  We discussed the need for protective measures for Covid despite getting all 3 vaccines at this point as recommended by SOT.  Last time he was hypotensive when we saw him and increase the amlodipine to 10 mg daily.  This helped him tremendously and now he is very well controlled.  Many of the labs including drug levels are pending today and we will review as soon as they are back.  In the meantime we will not make any medication changes to his immunosuppression.  His last coronary angiogram showed essentially normal coronary anatomy and as such we decided to postpone next coronary angiogram and repeat a stress MRI prior to the next visit.  Please note that the stress MRI from today was reviewed with him and his mom who is present at the visit today and this showed completely normal cardiac function and anatomy.  Again no changes today we will see him back in 1 year with stress MRI at that time or sooner as needed.    I appreciate the opportunity to participate in the care of Mr. Viramontes.  Please do not hesitate to call anytime with questions or concerns.      Hao Shine MD

## 2021-09-28 NOTE — NURSING NOTE
Transplant Coordinator Note  Reason for visit: Annual   Caregiver: Yusuf Azevedo     Health concerns addressed today:  Pt seen in clinic with Dr Shine. MD reviewed available labs, meds and test results. Pt reports doing well. Back to work walking 8000 steps daily. Wght down~5#. BP <140/90. Pt concerned about daily headaches. Taking tylenol prn; could be seen by headache clnic.      Immunosuppressants:  ~ mg BID  ~FK 3/2 mg (goal 6-8). Level pending   ~Pred 5 mg daily      Routine screenings:    Derm: DUE  Dental: scheduled for Jan  Eye: UTD  Flu 2021 given at appt  COVID 3/3  P-13 UTD      Medication record reviewed and reconciled. Questions and concerns addressed. Copy of AVS provided. Pt verbalized an understanding of plan of care.      Patient Instructions  ~Please call your coordinator at [phone] with any questions or concerns.    ~Coordinator will call with remaining labs and any med changes.   ~Labs in 6 months  ~Derm: Please schedule

## 2021-09-28 NOTE — NURSING NOTE
Chief Complaint   Patient presents with     Follow Up     annual      Vitals were taken and medications were reconciled.   Morales Kelly, EMT  2:09 PM

## 2021-09-28 NOTE — PATIENT INSTRUCTIONS
Please call your coordinator at [phone] with any questions or concerns.      Coordinator will call with remaining labs and any med changes.     Labs in 6 months    Derm:  Please schedule

## 2021-09-28 NOTE — PROGRESS NOTES
September 28, 2021     Mr. Viramontes is a 24-year-old gentleman with history of viral mediated dilated cardiomyopathy at age 2-1/2 for which he underwent a heart transplant on 3/23/2012.  His post transplant course has been complicated by rejection the last 3 occurring in April May and June 2017 for which he receives steroids, post transplant lymphoproliferative disorder and stroke with resulting left sided hemiplegia presents today for follow-up.   Overall he has been doing very well since I last seen him.  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  He several thousand steps today almost 10,000 since being back to work.  He is asymptomatic from the cardiac standpoint no dizziness lightheadedness falls and exercise tolerance has been stable.  He did get the Covid shot x3 at this time and denies any symptoms or issues.  Takes all medications as prescribed.  He is living with his sister again.      PAST MEDICAL HISTORY:  Past Medical History:   Diagnosis Date     Cardiomyopathy, hypertrophic obstructive (H)      H/O heart transplant (H)     Mar.23, 2012     Hemiplegia following CVA (cerebrovascular accident) (H)     left, improved with rehab     Lymphoproliferative disease (H)      Unspecified cerebral artery occlusion with cerebral infarction     age 2 1/2     FAMILY HISTORY:  Family History   Problem Relation Age of Onset     Diabetes Sister      Melanoma No family hx of      Skin Cancer No family hx of      SOCIAL HISTORY:  Social History     Socioeconomic History     Marital status: Single     Spouse name: Not on file     Number of children: Not on file     Years of education: Not on file     Highest education level: Not on file   Occupational History     Not on file   Tobacco Use     Smoking status: Never Smoker     Smokeless tobacco: Never Used   Substance and Sexual Activity     Alcohol use: No     Drug use: No     Sexual activity: Never     Partners: Female   Other Topics  Concern     Parent/sibling w/ CABG, MI or angioplasty before 65F 55M? Not Asked   Social History Narrative    Lives in [place] with father, [place]. Works at [employer] as a manager, watches TV shows and plays video games.  Went to [employer] .  Has 3 sisters and a brother.  Mom and dad  (2010).      Social Determinants of Health     Financial Resource Strain:      Difficulty of Paying Living Expenses:    Food Insecurity:      Worried About Running Out of Food in the Last Year:      Ran Out of Food in the Last Year:    Transportation Needs:      Lack of Transportation (Medical):      Lack of Transportation (Non-Medical):    Physical Activity:      Days of Exercise per Week:      Minutes of Exercise per Session:    Stress:      Feeling of Stress :    Social Connections:      Frequency of Communication with Friends and Family:      Frequency of Social Gatherings with Friends and Family:      Attends Church Services:      Active Member of Clubs or Organizations:      Attends Club or Organization Meetings:      Marital Status:    Intimate Partner Violence:      Fear of Current or Ex-Partner:      Emotionally Abused:      Physically Abused:      Sexually Abused:      CURRENT MEDICATIONS:  Current Outpatient Medications   Medication     amLODIPine (NORVASC) 5 MG tablet     aspirin 81 MG EC tablet     Cholecalciferol (VITAMIN D3) 2000 UNITS TABS     mycophenolate (GENERIC EQUIVALENT) 500 MG tablet     oseltamivir (TAMIFLU) 75 MG capsule     pravastatin (PRAVACHOL) 10 MG tablet     predniSONE (DELTASONE) 5 MG tablet     tacrolimus (GENERIC EQUIVALENT) 0.5 MG capsule     tacrolimus (GENERIC EQUIVALENT) 1 MG capsule     No current facility-administered medications for this visit.     ROS:   Constitutional: No fever, chills, or sweats.   ENT: No visual disturbance, ear ache, epistaxis, sore throat.   Allergies/Immunologic: Negative.   Respiratory: No cough, hemoptysis.   Cardiovascular: As per HPI.   GI: No nausea,  "vomiting, hematemesis, melena, or hematochezia.   : No urinary frequency, dysuria, or hematuria.   Integument: Negative.   Psychiatric: Pleasant, no major depression noted  Neuro: No focal neurological deficits noted  Endocrinology: Negative.   Musculoskeletal: As per HPI.      EXAM:  /74 (BP Location: Right arm, Patient Position: Chair, Cuff Size: Adult Regular)   Pulse 105   Ht 1.854 m (6' 1\")   Wt 87.5 kg (193 lb)   SpO2 96%   BMI 25.46 kg/m    General: appears comfortable, alert and articulate  Head: normocephalic, atraumatic  Eyes: anicteric sclera, EOMI  Neck: no adenopathy  Orophyarynx: moist mucosa, no lesions, dentition intact  Heart: regular, S1/S2, no murmur, gallop, rub, JVP at clavicle  Lungs: clear, no rales or wheezing  Abdomen: soft, non-tender, bowel sounds present, no hepatosplenomegaly  Extremities: no clubbing, cyanosis or edema  Neurological: normal speech and affect, no gross motor deficits     Labs:  Lab Results   Component Value Date    WBC 5.5 06/18/2021    HGB 16.8 06/18/2021    HCT 49.2 06/18/2021     06/18/2021     06/18/2021    POTASSIUM 3.8 06/18/2021    CHLORIDE 105 06/18/2021    CO2 29 06/18/2021    BUN 17 06/18/2021    CR 0.77 06/18/2021    GLC 91 06/18/2021    DD <0.3 12/10/2019    NTBNPI 29 09/11/2017    NTBNP 53 08/08/2018    TROPONIN 0.00 12/10/2019    TROPI <0.015 12/10/2019    AST 16 06/18/2021    ALT 33 06/18/2021    ALKPHOS 75 06/18/2021    BILITOTAL 0.7 06/18/2021    INR 1.00 01/12/2017     Labs:  reviewed     CMRI:  Clinical history: 22M, s/p heart transplantation (03/23/12) complicated by PTLD (09/2012), hx of rejection  with mild troponin elevation. CMR to assess function.  1. The left ventricle is normal in cavity size and wall thickness. The global systolic function is normal.  The LVEF is 55%. There are no regional wall motion abnormalities.  2. The right ventricle is normal in cavity size. The global systolic function is normal. The RVEF is " 53%.   3. Left atrium is mildly enlarged due to transplantation. Right atrium is normal.  4. There are no significant valvular disease.   5. There is no late gadolinium enhancement to indicate myocardial fibrosis or infiltrative disease.   6. There is no intracardiac thrombus.   7. There is no pericardial effusion.    CONCLUSIONS: Normal biventricular function, LVEF 55% and RVEF 53%, with no evidence of focal myocardial  Fibrosis     Pediatric Heart Catheterization 3/23/2018:       Coronary angiogram and RHC 10/18/2020   Ao 97/72/80  RA 7/8/6  RV 25/6  PA 22/11/15  PCWP 14/15/10  Cardiac output by Francisca: 7.4 L/min (indexed to 3.6 L/min/m2)  Cardiac output by thermodilution: 5.3 L/min (indexed to 2.6 L/min/m2)  SVR by TD CO: 1117  PVR by TD CO: 0.9 Right sided filling pressures are normal. Left sided filling pressures are normal. Normal PA pressures. Normal cardiac output level.   Normal coronary arteries     TTE 10/19/2020  Global and regional left ventricular function is normal with an EF of 55-60%.  Right ventricular function, chamber size, wall motion, and thickness are  normal.  Pulmonary artery systolic pressure cannot be assessed.  The inferior vena cava is normal.  No pericardial effusion is present.    CMR 9/28/21  1. The LV is normal in cavity size and wall thickness. The global systolic function is normal. The LVEF is 58%. There are no regional wall motion abnormalities.  2. The RV is normal in cavity size. The global systolic function is normal. The RVEF is 54%.   3. Both atria are normal in size.  4. There is no significant valvular disease.   5. Late gadolinium enhancement imaging shows no MI, fibrosis or infiltrative disease.   6. Regadenoson stress perfusion imaging shows no ischemia.  7. There is no pericardial effusion or thickening.  8. There is no intracardiac thrombus.  CONCLUSIONS: Heart transplant with normal graft function and no ischemia or fibrosis. No change from prior study in 2019.       Assessment and Plan:   Mr. Viramontes is a pleasant 25-year-old gentleman with history of presumed viral myocarditis at age 2-1/2 with dilated cardiomyopathy and CVA with resulting left-sided hemiplegia.    He has been doing very well since the most recent visit and denies any issues.  Luckily he is much more active now as he is back to work.  We discussed the need for protective measures for Covid despite getting all 3 vaccines at this point as recommended by SOT.  Last time he was hypotensive when we saw him and increase the amlodipine to 10 mg daily.  This helped him tremendously and now he is very well controlled.  Many of the labs including drug levels are pending today and we will review as soon as they are back.  In the meantime we will not make any medication changes to his immunosuppression.  His last coronary angiogram showed essentially normal coronary anatomy and as such we decided to postpone next coronary angiogram and repeat a stress MRI prior to the next visit.  Please note that the stress MRI from today was reviewed with him and his mom who is present at the visit today and this showed completely normal cardiac function and anatomy.  Again no changes today we will see him back in 1 year with stress MRI at that time or sooner as needed.    I appreciate the opportunity to participate in the care of Mr. Viramontes.  Please do not hesitate to call anytime with questions or concerns.      Hao Shine MD

## 2021-09-29 LAB
ATRIAL RATE - MUSE: 108 BPM
ATRIAL RATE - MUSE: 96 BPM
DIASTOLIC BLOOD PRESSURE - MUSE: NORMAL MMHG
DIASTOLIC BLOOD PRESSURE - MUSE: NORMAL MMHG
EBV DNA # SPEC NAA+PROBE: NOT DETECTED COPIES/ML
INTERPRETATION ECG - MUSE: NORMAL
INTERPRETATION ECG - MUSE: NORMAL
P AXIS - MUSE: 51 DEGREES
P AXIS - MUSE: 64 DEGREES
PR INTERVAL - MUSE: 136 MS
PR INTERVAL - MUSE: 156 MS
QRS DURATION - MUSE: 108 MS
QRS DURATION - MUSE: 92 MS
QT - MUSE: 336 MS
QT - MUSE: 348 MS
QTC - MUSE: 439 MS
QTC - MUSE: 450 MS
R AXIS - MUSE: 83 DEGREES
R AXIS - MUSE: 83 DEGREES
SYSTOLIC BLOOD PRESSURE - MUSE: NORMAL MMHG
SYSTOLIC BLOOD PRESSURE - MUSE: NORMAL MMHG
T AXIS - MUSE: 42 DEGREES
T AXIS - MUSE: 56 DEGREES
VENTRICULAR RATE- MUSE: 108 BPM
VENTRICULAR RATE- MUSE: 96 BPM

## 2021-09-30 LAB
CW1: 1767
CW7: 2490
DONOR IDENTIFICATION: NORMAL
DSA COMMENTS: NORMAL
DSA PRESENT: YES
DSA TEST METHOD: NORMAL
ORGAN: NORMAL
SA 1 CELL: NORMAL
SA 1 TEST METHOD: NORMAL
SA 2 CELL: NORMAL
SA 2 TEST METHOD: NORMAL
SA1 HI RISK ABY: NORMAL
SA1 MOD RISK ABY: NORMAL
SA2 HI RISK ABY: NORMAL
SA2 MOD RISK ABY: NORMAL
UNACCEPTABLE ANTIGENS: NORMAL
UNOS CPRA: 0
ZZZSA 1  COMMENTS: NORMAL
ZZZSA 2 COMMENTS: NORMAL

## 2021-10-01 LAB — IMMUKNOW IMMUNE CELL FUNCTION: 251 NG/ML

## 2021-10-05 DIAGNOSIS — Z94.1 HEART REPLACED BY TRANSPLANT (H): Primary | ICD-10-CM

## 2021-10-20 ENCOUNTER — TELEPHONE (OUTPATIENT)
Dept: ORTHOPEDICS | Facility: CLINIC | Age: 26
End: 2021-10-20

## 2021-10-20 NOTE — TELEPHONE ENCOUNTER
Patient mother Jolly called and left a voice mail explaining that Obed had an accident yesterday where he slammed his (affected) hand into a car door and it is really swollen and painful.  She is wondering what to do next.  She called the Orthopedic clinic because he had seen Dr Tinoco in the past for a left wrist fusion.  Last seen in hand surgery clinic 2017.  Attempted to call patient mother back.  No answer.  Left voice mail to inform her that if they think he has a fracture and he is having a lot of pain and swelling he should be brought to urgent care or even ED today to assess for a fracture vs a soft tissue injury.  Also could call his PCP to see if they are able to help them get an evaluation of this today if they have the capability. Left RN direct phone number to call in the event he is needing to see a hand surgeon after his evaluation. Yessi Linares, СВЕТЛАНА

## 2021-11-22 DIAGNOSIS — Z94.1 HEART REPLACED BY TRANSPLANT (H): ICD-10-CM

## 2021-11-22 RX ORDER — AMLODIPINE BESYLATE 5 MG/1
10 TABLET ORAL DAILY
Qty: 180 TABLET | Refills: 3 | Status: SHIPPED | OUTPATIENT
Start: 2021-11-22 | End: 2022-11-03

## 2021-12-22 ENCOUNTER — TELEPHONE (OUTPATIENT)
Dept: TRANSPLANT | Facility: CLINIC | Age: 26
End: 2021-12-22
Payer: MEDICAID

## 2021-12-22 NOTE — TELEPHONE ENCOUNTER
Patient Call:mom Jolly Clay was exposed to covid positive family members  David wondering what she should do  Obed has sore throat and running nose        Call back needed? Yes    Return Call Needed  Same as documented in contacts section  When to return call?: Same day: [facility]

## 2021-12-22 NOTE — TELEPHONE ENCOUNTER
Pt reports mom and sister both COVID positive.  He has a sore throat, runny nose. He did a rapid test today and he was neg. Mom's rapid test was pos and further testing pos.     Enc to monitor for further symptoms (check temp) - if changes; get tested. If positive  - call to discuss possible monoclonal antibodies.     Try to isolate self from positive family members. Pt reports they are upstairs, he is downstairs.     Pt verbalized understand of next steps

## 2022-01-01 ENCOUNTER — HEALTH MAINTENANCE LETTER (OUTPATIENT)
Age: 27
End: 2022-01-01

## 2022-01-03 ENCOUNTER — DOCUMENTATION ONLY (OUTPATIENT)
Dept: LAB | Facility: CLINIC | Age: 27
End: 2022-01-03
Payer: MEDICAID

## 2022-01-06 ENCOUNTER — LAB (OUTPATIENT)
Dept: LAB | Facility: CLINIC | Age: 27
End: 2022-01-06
Payer: MEDICAID

## 2022-01-06 DIAGNOSIS — Z94.1 HEART REPLACED BY TRANSPLANT (H): ICD-10-CM

## 2022-01-06 PROCEDURE — 86833 HLA CLASS II HIGH DEFIN QUAL: CPT

## 2022-01-06 PROCEDURE — 36415 COLL VENOUS BLD VENIPUNCTURE: CPT

## 2022-01-06 PROCEDURE — 86832 HLA CLASS I HIGH DEFIN QUAL: CPT

## 2022-01-11 LAB
DONOR IDENTIFICATION: NORMAL
DSA COMMENTS: NORMAL
DSA PRESENT: NO
DSA TEST METHOD: NORMAL
ORGAN: NORMAL
SA 1 CELL: NORMAL
SA 1 TEST METHOD: NORMAL
SA 2 CELL: NORMAL
SA 2 TEST METHOD: NORMAL
SA1 HI RISK ABY: NORMAL
SA1 MOD RISK ABY: NORMAL
SA2 HI RISK ABY: NORMAL
SA2 MOD RISK ABY: NORMAL
UNACCEPTABLE ANTIGENS: NORMAL
UNOS CPRA: 0
ZZZSA 1  COMMENTS: NORMAL
ZZZSA 2 COMMENTS: NORMAL

## 2022-02-10 DIAGNOSIS — Z94.1 HEART REPLACED BY TRANSPLANT (H): ICD-10-CM

## 2022-02-11 RX ORDER — PRAVASTATIN SODIUM 10 MG
10 TABLET ORAL DAILY
Qty: 90 TABLET | Refills: 3 | Status: SHIPPED | OUTPATIENT
Start: 2022-02-11 | End: 2023-02-09

## 2022-03-16 DIAGNOSIS — Z13.6 ENCOUNTER FOR LIPID SCREENING FOR CARDIOVASCULAR DISEASE: ICD-10-CM

## 2022-03-16 DIAGNOSIS — Z79.899 ENCOUNTER FOR LONG-TERM (CURRENT) USE OF HIGH-RISK MEDICATION: ICD-10-CM

## 2022-03-16 DIAGNOSIS — Z13.220 ENCOUNTER FOR LIPID SCREENING FOR CARDIOVASCULAR DISEASE: ICD-10-CM

## 2022-03-16 DIAGNOSIS — Z94.1 HEART REPLACED BY TRANSPLANT (H): Primary | ICD-10-CM

## 2022-03-17 ENCOUNTER — IMMUNIZATION (OUTPATIENT)
Dept: NURSING | Facility: CLINIC | Age: 27
End: 2022-03-17
Payer: MEDICAID

## 2022-03-17 PROCEDURE — 0054A COVID-19,PF,PFIZER (12+ YRS): CPT

## 2022-03-17 PROCEDURE — 91305 COVID-19,PF,PFIZER (12+ YRS): CPT

## 2022-04-13 ENCOUNTER — LAB (OUTPATIENT)
Dept: LAB | Facility: CLINIC | Age: 27
End: 2022-04-13
Payer: MEDICAID

## 2022-04-13 DIAGNOSIS — Z94.1 HEART REPLACED BY TRANSPLANT (H): ICD-10-CM

## 2022-04-13 DIAGNOSIS — Z13.6 ENCOUNTER FOR LIPID SCREENING FOR CARDIOVASCULAR DISEASE: ICD-10-CM

## 2022-04-13 DIAGNOSIS — Z13.220 ENCOUNTER FOR LIPID SCREENING FOR CARDIOVASCULAR DISEASE: ICD-10-CM

## 2022-04-13 DIAGNOSIS — Z79.899 ENCOUNTER FOR LONG-TERM (CURRENT) USE OF HIGH-RISK MEDICATION: ICD-10-CM

## 2022-04-13 LAB
ERYTHROCYTE [DISTWIDTH] IN BLOOD BY AUTOMATED COUNT: 13.1 % (ref 10–15)
HCT VFR BLD AUTO: 45.6 % (ref 40–53)
HGB BLD-MCNC: 15.8 G/DL (ref 13.3–17.7)
MCH RBC QN AUTO: 28.6 PG (ref 26.5–33)
MCHC RBC AUTO-ENTMCNC: 34.6 G/DL (ref 31.5–36.5)
MCV RBC AUTO: 83 FL (ref 78–100)
PLATELET # BLD AUTO: 240 10E3/UL (ref 150–450)
RBC # BLD AUTO: 5.53 10E6/UL (ref 4.4–5.9)
TACROLIMUS BLD-MCNC: 15.9 UG/L (ref 5–15)
TME LAST DOSE: ABNORMAL H
TME LAST DOSE: ABNORMAL H
WBC # BLD AUTO: 6.3 10E3/UL (ref 4–11)

## 2022-04-13 PROCEDURE — 85027 COMPLETE CBC AUTOMATED: CPT

## 2022-04-13 PROCEDURE — 80061 LIPID PANEL: CPT

## 2022-04-13 PROCEDURE — 80197 ASSAY OF TACROLIMUS: CPT

## 2022-04-13 PROCEDURE — 84100 ASSAY OF PHOSPHORUS: CPT

## 2022-04-13 PROCEDURE — 82550 ASSAY OF CK (CPK): CPT

## 2022-04-13 PROCEDURE — 36415 COLL VENOUS BLD VENIPUNCTURE: CPT

## 2022-04-13 PROCEDURE — 83735 ASSAY OF MAGNESIUM: CPT

## 2022-04-13 PROCEDURE — 80053 COMPREHEN METABOLIC PANEL: CPT

## 2022-04-14 LAB
ALBUMIN SERPL-MCNC: 4.3 G/DL (ref 3.4–5)
ALP SERPL-CCNC: 94 U/L (ref 40–150)
ALT SERPL W P-5'-P-CCNC: 27 U/L (ref 0–70)
ANION GAP SERPL CALCULATED.3IONS-SCNC: 5 MMOL/L (ref 3–14)
AST SERPL W P-5'-P-CCNC: 14 U/L (ref 0–45)
BILIRUB SERPL-MCNC: 0.9 MG/DL (ref 0.2–1.3)
BUN SERPL-MCNC: 15 MG/DL (ref 7–30)
CALCIUM SERPL-MCNC: 9.4 MG/DL (ref 8.5–10.1)
CHLORIDE BLD-SCNC: 105 MMOL/L (ref 94–109)
CHOLEST SERPL-MCNC: 163 MG/DL
CK SERPL-CCNC: 90 U/L (ref 30–300)
CO2 SERPL-SCNC: 28 MMOL/L (ref 20–32)
CREAT SERPL-MCNC: 0.78 MG/DL (ref 0.66–1.25)
FASTING STATUS PATIENT QL REPORTED: YES
GFR SERPL CREATININE-BSD FRML MDRD: >90 ML/MIN/1.73M2
GLUCOSE BLD-MCNC: 100 MG/DL (ref 70–99)
HDLC SERPL-MCNC: 50 MG/DL
LDLC SERPL CALC-MCNC: 96 MG/DL
MAGNESIUM SERPL-MCNC: 1.3 MG/DL (ref 1.6–2.3)
NONHDLC SERPL-MCNC: 113 MG/DL
PHOSPHATE SERPL-MCNC: 2.9 MG/DL (ref 2.5–4.5)
POTASSIUM BLD-SCNC: 3.9 MMOL/L (ref 3.4–5.3)
PROT SERPL-MCNC: 7.7 G/DL (ref 6.8–8.8)
SODIUM SERPL-SCNC: 138 MMOL/L (ref 133–144)
TRIGL SERPL-MCNC: 86 MG/DL

## 2022-04-15 DIAGNOSIS — Z94.1 HEART REPLACED BY TRANSPLANT (H): Primary | ICD-10-CM

## 2022-04-15 NOTE — RESULT ENCOUNTER NOTE
Reviewed labs with pt via [employer]- will recheck tac and mag in ~1-2 weeks.   Pt has been on same dose tac since 2020.

## 2022-04-21 ENCOUNTER — LAB (OUTPATIENT)
Dept: LAB | Facility: CLINIC | Age: 27
End: 2022-04-21
Payer: MEDICAID

## 2022-04-21 DIAGNOSIS — Z94.1 HEART REPLACED BY TRANSPLANT (H): ICD-10-CM

## 2022-04-21 LAB
TACROLIMUS BLD-MCNC: 11.8 UG/L (ref 5–15)
TME LAST DOSE: NORMAL H
TME LAST DOSE: NORMAL H

## 2022-04-21 PROCEDURE — 83735 ASSAY OF MAGNESIUM: CPT

## 2022-04-21 PROCEDURE — 80048 BASIC METABOLIC PNL TOTAL CA: CPT

## 2022-04-21 PROCEDURE — 36415 COLL VENOUS BLD VENIPUNCTURE: CPT

## 2022-04-21 PROCEDURE — 80197 ASSAY OF TACROLIMUS: CPT

## 2022-04-22 ENCOUNTER — TELEPHONE (OUTPATIENT)
Dept: TRANSPLANT | Facility: CLINIC | Age: 27
End: 2022-04-22
Payer: MEDICAID

## 2022-04-22 DIAGNOSIS — R19.7 DIARRHEA: Primary | ICD-10-CM

## 2022-04-22 DIAGNOSIS — Z94.1 HEART REPLACED BY TRANSPLANT (H): ICD-10-CM

## 2022-04-22 LAB
ANION GAP SERPL CALCULATED.3IONS-SCNC: 4 MMOL/L (ref 3–14)
BUN SERPL-MCNC: 17 MG/DL (ref 7–30)
CALCIUM SERPL-MCNC: 8.8 MG/DL (ref 8.5–10.1)
CHLORIDE BLD-SCNC: 108 MMOL/L (ref 94–109)
CO2 SERPL-SCNC: 28 MMOL/L (ref 20–32)
CREAT SERPL-MCNC: 0.76 MG/DL (ref 0.66–1.25)
GFR SERPL CREATININE-BSD FRML MDRD: >90 ML/MIN/1.73M2
GLUCOSE BLD-MCNC: 94 MG/DL (ref 70–99)
MAGNESIUM SERPL-MCNC: 1.4 MG/DL (ref 1.6–2.3)
POTASSIUM BLD-SCNC: 4.2 MMOL/L (ref 3.4–5.3)
SODIUM SERPL-SCNC: 140 MMOL/L (ref 133–144)

## 2022-04-22 RX ORDER — TACROLIMUS 1 MG/1
CAPSULE ORAL
Qty: 450 CAPSULE | Refills: 3
Start: 2022-04-22 | End: 2022-08-02

## 2022-04-22 NOTE — TELEPHONE ENCOUNTER
Spoke with mom re repeat labs - mag still low at 1.4; Tac still high at 11.8 - goal 6-8.  Will drop tac dose from 3/2 to 2/2 mg     Mom reports pt is having a lot of loose BMs (constantly a problem, steadily getting worse), thinks he is developing food allergies. If they go out to dinner; he has a BM before they leave the restaurant. Leg cramps and headaches.     Will hold on mag supplement so not to make BMs more loose. Stool cultures, referral to GI. If cultured neg, can then take Imodium and Metamucil. Mom reports no antibiotics over the last 6 months.

## 2022-05-06 DIAGNOSIS — Z94.1 HEART REPLACED BY TRANSPLANT (H): Primary | ICD-10-CM

## 2022-05-11 DIAGNOSIS — Z94.1 HEART REPLACED BY TRANSPLANT (H): ICD-10-CM

## 2022-05-11 RX ORDER — MYCOPHENOLATE MOFETIL 500 MG/1
500 TABLET ORAL 2 TIMES DAILY
Qty: 180 TABLET | Refills: 3 | Status: SHIPPED | OUTPATIENT
Start: 2022-05-11 | End: 2023-05-02

## 2022-05-19 ENCOUNTER — LAB (OUTPATIENT)
Dept: LAB | Facility: CLINIC | Age: 27
End: 2022-05-19
Payer: MEDICAID

## 2022-05-19 DIAGNOSIS — Z94.1 HEART REPLACED BY TRANSPLANT (H): ICD-10-CM

## 2022-05-19 DIAGNOSIS — R19.7 DIARRHEA: ICD-10-CM

## 2022-05-19 LAB
TACROLIMUS BLD-MCNC: 7.5 UG/L (ref 5–15)
TME LAST DOSE: NORMAL H
TME LAST DOSE: NORMAL H

## 2022-05-19 PROCEDURE — 80048 BASIC METABOLIC PNL TOTAL CA: CPT

## 2022-05-19 PROCEDURE — 36415 COLL VENOUS BLD VENIPUNCTURE: CPT

## 2022-05-19 PROCEDURE — 83735 ASSAY OF MAGNESIUM: CPT

## 2022-05-19 PROCEDURE — 80197 ASSAY OF TACROLIMUS: CPT

## 2022-05-19 PROCEDURE — 84100 ASSAY OF PHOSPHORUS: CPT

## 2022-05-20 LAB
ANION GAP SERPL CALCULATED.3IONS-SCNC: 5 MMOL/L (ref 3–14)
BUN SERPL-MCNC: 13 MG/DL (ref 7–30)
CALCIUM SERPL-MCNC: 9.2 MG/DL (ref 8.5–10.1)
CHLORIDE BLD-SCNC: 107 MMOL/L (ref 94–109)
CMV DNA SPEC NAA+PROBE-ACNC: NOT DETECTED IU/ML
CO2 SERPL-SCNC: 30 MMOL/L (ref 20–32)
CREAT SERPL-MCNC: 0.74 MG/DL (ref 0.66–1.25)
GFR SERPL CREATININE-BSD FRML MDRD: >90 ML/MIN/1.73M2
GLUCOSE BLD-MCNC: 94 MG/DL (ref 70–99)
MAGNESIUM SERPL-MCNC: 1.9 MG/DL (ref 1.6–2.3)
PHOSPHATE SERPL-MCNC: 2.9 MG/DL (ref 2.5–4.5)
POTASSIUM BLD-SCNC: 3.7 MMOL/L (ref 3.4–5.3)
SODIUM SERPL-SCNC: 142 MMOL/L (ref 133–144)

## 2022-06-30 DIAGNOSIS — Z94.1 HEART REPLACED BY TRANSPLANT (H): Primary | ICD-10-CM

## 2022-08-01 DIAGNOSIS — Z94.1 HEART REPLACED BY TRANSPLANT (H): ICD-10-CM

## 2022-08-02 RX ORDER — TACROLIMUS 1 MG/1
CAPSULE ORAL
Qty: 450 CAPSULE | Refills: 3 | Status: SHIPPED | OUTPATIENT
Start: 2022-08-02 | End: 2022-10-07

## 2022-08-04 DIAGNOSIS — Z94.1 HEART REPLACED BY TRANSPLANT (H): ICD-10-CM

## 2022-08-04 RX ORDER — PREDNISONE 5 MG/1
5 TABLET ORAL DAILY
Qty: 90 TABLET | Refills: 3 | Status: SHIPPED | OUTPATIENT
Start: 2022-08-04 | End: 2023-08-31

## 2022-08-08 NOTE — TELEPHONE ENCOUNTER
Called pt lvm to cb to schedule for October [place] appts & hrt cath   Patient/Caregiver provided printed discharge information.

## 2022-08-25 ENCOUNTER — PRE VISIT (OUTPATIENT)
Dept: TRANSPLANT | Facility: CLINIC | Age: 27
End: 2022-08-25

## 2022-08-25 DIAGNOSIS — Z13.220 ENCOUNTER FOR LIPID SCREENING FOR CARDIOVASCULAR DISEASE: ICD-10-CM

## 2022-08-25 DIAGNOSIS — Z94.1 HEART REPLACED BY TRANSPLANT (H): Primary | ICD-10-CM

## 2022-08-25 DIAGNOSIS — Z13.6 ENCOUNTER FOR LIPID SCREENING FOR CARDIOVASCULAR DISEASE: ICD-10-CM

## 2022-08-25 DIAGNOSIS — Z79.899 ENCOUNTER FOR LONG-TERM (CURRENT) USE OF HIGH-RISK MEDICATION: ICD-10-CM

## 2022-09-14 ENCOUNTER — TELEPHONE (OUTPATIENT)
Dept: TRANSPLANT | Facility: CLINIC | Age: 27
End: 2022-09-14

## 2022-09-14 NOTE — TELEPHONE ENCOUNTER
Called pt to review appts for 9/14. fasting labs with 12-hour tacro level. No caffiene 12-hour prior to cMRI.     Pt verbalized understanding

## 2022-09-15 ENCOUNTER — HOSPITAL ENCOUNTER (OUTPATIENT)
Dept: GENERAL RADIOLOGY | Facility: CLINIC | Age: 27
Discharge: HOME | End: 2022-09-15
Attending: INTERNAL MEDICINE
Payer: MEDICAID

## 2022-09-15 ENCOUNTER — OFFICE VISIT (OUTPATIENT)
Dept: CARDIOLOGY | Facility: CLINIC | Age: 27
End: 2022-09-15
Attending: INTERNAL MEDICINE
Payer: MEDICAID

## 2022-09-15 ENCOUNTER — HOSPITAL ENCOUNTER (OUTPATIENT)
Dept: MRI IMAGING | Facility: CLINIC | Age: 27
Discharge: HOME | End: 2022-09-15
Attending: INTERNAL MEDICINE
Payer: MEDICAID

## 2022-09-15 ENCOUNTER — LAB (OUTPATIENT)
Dept: LAB | Facility: CLINIC | Age: 27
End: 2022-09-15
Attending: NURSE PRACTITIONER
Payer: MEDICAID

## 2022-09-15 VITALS
SYSTOLIC BLOOD PRESSURE: 127 MMHG | BODY MASS INDEX: 25.21 KG/M2 | HEART RATE: 108 BPM | OXYGEN SATURATION: 96 % | WEIGHT: 190.2 LBS | HEIGHT: 73 IN | DIASTOLIC BLOOD PRESSURE: 89 MMHG

## 2022-09-15 VITALS — SYSTOLIC BLOOD PRESSURE: 131 MMHG | DIASTOLIC BLOOD PRESSURE: 89 MMHG | HEART RATE: 93 BPM | OXYGEN SATURATION: 98 %

## 2022-09-15 DIAGNOSIS — Z94.1 HEART REPLACED BY TRANSPLANT (H): ICD-10-CM

## 2022-09-15 DIAGNOSIS — Z94.1 HEART REPLACED BY TRANSPLANT (H): Primary | ICD-10-CM

## 2022-09-15 DIAGNOSIS — D84.9 IMMUNOSUPPRESSION (H): ICD-10-CM

## 2022-09-15 DIAGNOSIS — Z13.6 ENCOUNTER FOR LIPID SCREENING FOR CARDIOVASCULAR DISEASE: ICD-10-CM

## 2022-09-15 DIAGNOSIS — I10 BENIGN ESSENTIAL HYPERTENSION: ICD-10-CM

## 2022-09-15 DIAGNOSIS — Z13.220 ENCOUNTER FOR LIPID SCREENING FOR CARDIOVASCULAR DISEASE: ICD-10-CM

## 2022-09-15 DIAGNOSIS — Z79.899 ENCOUNTER FOR LONG-TERM (CURRENT) USE OF HIGH-RISK MEDICATION: ICD-10-CM

## 2022-09-15 LAB
ALBUMIN SERPL BCG-MCNC: 4.3 G/DL (ref 3.5–5.2)
ALP SERPL-CCNC: 77 U/L (ref 40–129)
ALT SERPL W P-5'-P-CCNC: 15 U/L (ref 10–50)
ANION GAP SERPL CALCULATED.3IONS-SCNC: 8 MMOL/L (ref 7–15)
AST SERPL W P-5'-P-CCNC: 25 U/L (ref 10–50)
BILIRUB SERPL-MCNC: 0.6 MG/DL
BUN SERPL-MCNC: 13.6 MG/DL (ref 6–20)
CALCIUM SERPL-MCNC: 9.1 MG/DL (ref 8.6–10)
CHLORIDE SERPL-SCNC: 106 MMOL/L (ref 98–107)
CHOLEST SERPL-MCNC: 136 MG/DL
CK SERPL-CCNC: 213 U/L (ref 39–308)
CMV DNA SPEC NAA+PROBE-ACNC: NOT DETECTED IU/ML
CREAT SERPL-MCNC: 0.79 MG/DL (ref 0.67–1.17)
DEPRECATED HCO3 PLAS-SCNC: 28 MMOL/L (ref 22–29)
ERYTHROCYTE [DISTWIDTH] IN BLOOD BY AUTOMATED COUNT: 13 % (ref 10–15)
GFR SERPL CREATININE-BSD FRML MDRD: >90 ML/MIN/1.73M2
GLUCOSE SERPL-MCNC: 92 MG/DL (ref 70–99)
HCT VFR BLD AUTO: 46 % (ref 40–53)
HDLC SERPL-MCNC: 52 MG/DL
HGB BLD-MCNC: 15.5 G/DL (ref 13.3–17.7)
LDLC SERPL CALC-MCNC: 72 MG/DL
MAGNESIUM SERPL-MCNC: 1.6 MG/DL (ref 1.7–2.3)
MCH RBC QN AUTO: 28.5 PG (ref 26.5–33)
MCHC RBC AUTO-ENTMCNC: 33.7 G/DL (ref 31.5–36.5)
MCV RBC AUTO: 85 FL (ref 78–100)
NONHDLC SERPL-MCNC: 84 MG/DL
PHOSPHATE SERPL-MCNC: 3.1 MG/DL (ref 2.5–4.5)
PLATELET # BLD AUTO: 209 10E3/UL (ref 150–450)
POTASSIUM SERPL-SCNC: 3.8 MMOL/L (ref 3.4–5.3)
PROT SERPL-MCNC: 7.1 G/DL (ref 6.4–8.3)
RBC # BLD AUTO: 5.44 10E6/UL (ref 4.4–5.9)
SODIUM SERPL-SCNC: 142 MMOL/L (ref 136–145)
TACROLIMUS BLD-MCNC: 5.8 UG/L (ref 5–15)
TME LAST DOSE: NORMAL H
TME LAST DOSE: NORMAL H
TRIGL SERPL-MCNC: 59 MG/DL
WBC # BLD AUTO: 4.9 10E3/UL (ref 4–11)

## 2022-09-15 PROCEDURE — 80053 COMPREHEN METABOLIC PANEL: CPT

## 2022-09-15 PROCEDURE — 71046 X-RAY EXAM CHEST 2 VIEWS: CPT

## 2022-09-15 PROCEDURE — 86352 CELL FUNCTION ASSAY W/STIM: CPT

## 2022-09-15 PROCEDURE — 86833 HLA CLASS II HIGH DEFIN QUAL: CPT

## 2022-09-15 PROCEDURE — 93005 ELECTROCARDIOGRAM TRACING: CPT

## 2022-09-15 PROCEDURE — 93017 CV STRESS TEST TRACING ONLY: CPT | Performed by: NURSE PRACTITIONER

## 2022-09-15 PROCEDURE — 71046 X-RAY EXAM CHEST 2 VIEWS: CPT | Mod: 26 | Performed by: RADIOLOGY

## 2022-09-15 PROCEDURE — A9585 GADOBUTROL INJECTION: HCPCS | Performed by: INTERNAL MEDICINE

## 2022-09-15 PROCEDURE — 87799 DETECT AGENT NOS DNA QUANT: CPT

## 2022-09-15 PROCEDURE — 85027 COMPLETE CBC AUTOMATED: CPT

## 2022-09-15 PROCEDURE — 36415 COLL VENOUS BLD VENIPUNCTURE: CPT

## 2022-09-15 PROCEDURE — 250N000011 HC RX IP 250 OP 636: Performed by: STUDENT IN AN ORGANIZED HEALTH CARE EDUCATION/TRAINING PROGRAM

## 2022-09-15 PROCEDURE — 84100 ASSAY OF PHOSPHORUS: CPT

## 2022-09-15 PROCEDURE — 93010 ELECTROCARDIOGRAM REPORT: CPT | Mod: 76 | Performed by: INTERNAL MEDICINE

## 2022-09-15 PROCEDURE — 82550 ASSAY OF CK (CPK): CPT

## 2022-09-15 PROCEDURE — 80197 ASSAY OF TACROLIMUS: CPT

## 2022-09-15 PROCEDURE — 99213 OFFICE O/P EST LOW 20 MIN: CPT | Mod: 25 | Performed by: NURSE PRACTITIONER

## 2022-09-15 PROCEDURE — 83735 ASSAY OF MAGNESIUM: CPT

## 2022-09-15 PROCEDURE — 93016 CV STRESS TEST SUPVJ ONLY: CPT | Performed by: STUDENT IN AN ORGANIZED HEALTH CARE EDUCATION/TRAINING PROGRAM

## 2022-09-15 PROCEDURE — 86832 HLA CLASS I HIGH DEFIN QUAL: CPT

## 2022-09-15 PROCEDURE — 255N000002 HC RX 255 OP 636: Performed by: INTERNAL MEDICINE

## 2022-09-15 PROCEDURE — 75563 CARD MRI W/STRESS IMG & DYE: CPT

## 2022-09-15 PROCEDURE — 80061 LIPID PANEL: CPT

## 2022-09-15 PROCEDURE — 75563 CARD MRI W/STRESS IMG & DYE: CPT | Mod: 26 | Performed by: STUDENT IN AN ORGANIZED HEALTH CARE EDUCATION/TRAINING PROGRAM

## 2022-09-15 PROCEDURE — G0463 HOSPITAL OUTPT CLINIC VISIT: HCPCS | Mod: 25

## 2022-09-15 PROCEDURE — 93018 CV STRESS TEST I&R ONLY: CPT | Performed by: STUDENT IN AN ORGANIZED HEALTH CARE EDUCATION/TRAINING PROGRAM

## 2022-09-15 RX ORDER — GADOBUTROL 604.72 MG/ML
10 INJECTION INTRAVENOUS ONCE
Status: COMPLETED | OUTPATIENT
Start: 2022-09-15 | End: 2022-09-15

## 2022-09-15 RX ORDER — REGADENOSON 0.08 MG/ML
0.4 INJECTION, SOLUTION INTRAVENOUS ONCE
Status: COMPLETED | OUTPATIENT
Start: 2022-09-15 | End: 2022-09-15

## 2022-09-15 RX ORDER — AMINOPHYLLINE 25 MG/ML
100 INJECTION, SOLUTION INTRAVENOUS ONCE
Status: COMPLETED | OUTPATIENT
Start: 2022-09-15 | End: 2022-09-15

## 2022-09-15 RX ORDER — DIPHENHYDRAMINE HCL 25 MG
25 CAPSULE ORAL
Status: DISCONTINUED | OUTPATIENT
Start: 2022-09-15 | End: 2022-09-16 | Stop reason: HOSPADM

## 2022-09-15 RX ORDER — DIAZEPAM 5 MG
5 TABLET ORAL EVERY 30 MIN PRN
Status: DISCONTINUED | OUTPATIENT
Start: 2022-09-15 | End: 2022-09-16 | Stop reason: HOSPADM

## 2022-09-15 RX ORDER — ACYCLOVIR 200 MG/1
0-1 CAPSULE ORAL
Status: DISCONTINUED | OUTPATIENT
Start: 2022-09-15 | End: 2022-09-16 | Stop reason: HOSPADM

## 2022-09-15 RX ORDER — METHYLPREDNISOLONE SODIUM SUCCINATE 125 MG/2ML
125 INJECTION, POWDER, LYOPHILIZED, FOR SOLUTION INTRAMUSCULAR; INTRAVENOUS
Status: DISCONTINUED | OUTPATIENT
Start: 2022-09-15 | End: 2022-09-16 | Stop reason: HOSPADM

## 2022-09-15 RX ORDER — CAFFEINE CITRATE 20 MG/ML
60 SOLUTION INTRAVENOUS
Status: DISCONTINUED | OUTPATIENT
Start: 2022-09-15 | End: 2022-09-16 | Stop reason: HOSPADM

## 2022-09-15 RX ORDER — ONDANSETRON 2 MG/ML
4 INJECTION INTRAMUSCULAR; INTRAVENOUS
Status: DISCONTINUED | OUTPATIENT
Start: 2022-09-15 | End: 2022-09-16 | Stop reason: HOSPADM

## 2022-09-15 RX ORDER — DIPHENHYDRAMINE HYDROCHLORIDE 50 MG/ML
25-50 INJECTION INTRAMUSCULAR; INTRAVENOUS
Status: DISCONTINUED | OUTPATIENT
Start: 2022-09-15 | End: 2022-09-16 | Stop reason: HOSPADM

## 2022-09-15 RX ORDER — ALBUTEROL SULFATE 90 UG/1
2 AEROSOL, METERED RESPIRATORY (INHALATION) EVERY 5 MIN PRN
Status: DISCONTINUED | OUTPATIENT
Start: 2022-09-15 | End: 2022-09-16 | Stop reason: HOSPADM

## 2022-09-15 RX ADMIN — AMINOPHYLLINE 100 MG: 25 INJECTION, SOLUTION INTRAVENOUS at 10:41

## 2022-09-15 RX ADMIN — GADOBUTROL 10 ML: 604.72 INJECTION INTRAVENOUS at 10:38

## 2022-09-15 RX ADMIN — REGADENOSON 0.4 MG: 0.08 INJECTION, SOLUTION INTRAVENOUS at 10:38

## 2022-09-15 RX ADMIN — GADOBUTROL 10 ML: 604.72 INJECTION INTRAVENOUS at 10:40

## 2022-09-15 ASSESSMENT — PAIN SCALES - GENERAL: PAINLEVEL: NO PAIN (0)

## 2022-09-15 NOTE — PROGRESS NOTES
[facility]    HPI:   Mr. Viramontes is a 26 year old male who presents to clinic with his father for routine annual exam. 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    Since last annual visit patient reports feeling well. He has no concerns or complaints. No hospitalizations or ED visits. He continues to work at a [employer], a job he loves. Doesn't do anything formal for exercise. Denies any exertional symptoms. No LE edema, orthopnea, PND. No fevers, chills, night sweats, unexpected weight changes. Reported loose stools last year that have resolved. Has intermittent mild HA that goes away with APAP and water.     PAST MEDICAL HISTORY:  Past Medical History:   Diagnosis Date     Cardiomyopathy, hypertrophic obstructive (H)      H/O heart transplant (H)     Mar.23, 2012     Hemiplegia following CVA (cerebrovascular accident) (H)     left, improved with rehab     Lymphoproliferative disease (H)      Unspecified cerebral artery occlusion with cerebral infarction     age 2 1/2       FAMILY HISTORY:  Family History   Problem Relation Age of Onset     Diabetes Sister      Melanoma No family hx of      Skin Cancer No family hx of        SOCIAL HISTORY:  Social History     Marital status: Single     Spouse name: N/A     Number of children: N/A     Years of education: N/A     Social History Main Topics     Smoking status: Never Smoker     Smokeless tobacco: Never Used     Alcohol use No     Drug use: No     Sexual activity: No     Other Topics Concern     Not on file     Social History Narrative    Lives in [place] with father, [place]. Works at [employer] as a manager, watches TV shows and plays video games.  Went  "karol [employer]IRS[employer].  Has 3 sisters and a brother.  Mom and dad  (2010).        CURRENT MEDICATIONS:  amLODIPine (NORVASC) 5 MG tablet, Take 2 tablets (10 mg) by mouth daily  aspirin 81 MG EC tablet, Take 2 tablets (162 mg) by mouth daily  Cholecalciferol (VITAMIN D3) 2000 UNITS TABS, Take 2,000 Units by mouth daily  mycophenolate (GENERIC EQUIVALENT) 500 MG tablet, Take 1 tablet (500 mg) by mouth 2 times daily  pravastatin (PRAVACHOL) 10 MG tablet, Take 1 tablet (10 mg) by mouth daily At bedtime  predniSONE (DELTASONE) 5 MG tablet, Take 1 tablet (5 mg) by mouth daily  tacrolimus (GENERIC EQUIVALENT) 1 MG capsule, TAKE THREE CAPSULES BY MOUTH EVERY MORNING AND TAKE TWO CAPSULES BY MOUTH EVERY EVENING. (TOTAL DOSE = 3MG EVERY MORNING AND 2MG EVERY EVENING)    No current facility-administered medications on file prior to visit.      ROS:  See HPI    EXAM:  /89 (BP Location: Right arm, Patient Position: Chair, Cuff Size: Adult Large)   Pulse 108   Ht 1.85 m (6' 0.84\")   Wt 86.3 kg (190 lb 3.2 oz)   SpO2 96%   BMI 25.21 kg/m      GENERAL: Appears comfortable, in no acute distress.   HEENT: Eye symmetrical, no discharge or icterus bilaterally. Mucous membranes moist and without lesions. No thrush.   CV: Tachycardic and regular, +S1S2, no murmur, rub, or gallop. JVP not visible.   RESPIRATORY: Respirations regular, even, and unlabored. Lungs CTA throughout.   GI: Soft and non distended with normoactive bowel sounds present in all quadrants. No tenderness, rebound, guarding. No hepatomegaly.   EXTREMITIES: No peripheral edema. 2+ bilateral pedal pulses.   NEUROLOGIC: Alert and oriented x 3. No focal deficits.   MUSCULOSKELETAL: left sided hemiplegia. Tenderness to palpation over AC joint. Skin is normal without redness, warmth, or lesions.  SKIN: No jaundice. No rashes or lesions.     Labs - reviewed with patient in clinic today:  CBC RESULTS:  Lab Results   Component Value Date    WBC 4.9 09/15/2022    WBC 5.5 " 06/18/2021    RBC 5.44 09/15/2022    RBC 5.70 06/18/2021    HGB 15.5 09/15/2022    HGB 16.8 06/18/2021    HCT 46.0 09/15/2022    HCT 49.2 06/18/2021    MCV 85 09/15/2022    MCV 86 06/18/2021    MCH 28.5 09/15/2022    MCH 29.5 06/18/2021    MCHC 33.7 09/15/2022    MCHC 34.1 06/18/2021    RDW 13.0 09/15/2022    RDW 13.1 06/18/2021     09/15/2022     06/18/2021       CMP RESULTS:  Lab Results   Component Value Date     09/15/2022     06/18/2021    POTASSIUM 3.8 09/15/2022    POTASSIUM 3.7 05/19/2022    POTASSIUM 3.8 06/18/2021    CHLORIDE 106 09/15/2022    CHLORIDE 107 05/19/2022    CHLORIDE 105 06/18/2021    CO2 28 09/15/2022    CO2 30 05/19/2022    CO2 29 06/18/2021    ANIONGAP 8 09/15/2022    ANIONGAP 5 05/19/2022    ANIONGAP 5 06/18/2021    GLC 92 09/15/2022    GLC 94 05/19/2022    GLC 91 06/18/2021    BUN 13.6 09/15/2022    BUN 13 05/19/2022    BUN 17 06/18/2021    CR 0.79 09/15/2022    CR 0.77 06/18/2021    GFRESTIMATED >90 09/15/2022    GFRESTIMATED >90 06/18/2021    GFRESTBLACK >90 06/18/2021    CARLOS A 9.1 09/15/2022    CARLOS A 8.9 06/18/2021    BILITOTAL 0.6 09/15/2022    BILITOTAL 0.7 06/18/2021    ALBUMIN 4.3 09/15/2022    ALBUMIN 4.3 04/13/2022    ALBUMIN 4.2 06/18/2021    ALKPHOS 77 09/15/2022    ALKPHOS 75 06/18/2021    ALT 15 09/15/2022    ALT 33 06/18/2021    AST 25 09/15/2022    AST 16 06/18/2021        INR RESULTS:  Lab Results   Component Value Date    INR 1.00 01/12/2017       LIPID RESULTS:  Lab Results   Component Value Date    CHOL 136 09/15/2022    CHOL 172 06/18/2021    HDL 52 09/15/2022    HDL 58 06/18/2021    LDL 72 09/15/2022    LDL 96 06/18/2021    TRIG 59 09/15/2022    TRIG 89 06/18/2021    CHOLHDLRATIO 3.0 06/17/2013       IMMUNOSUPPRESSANT LEVELS:  Lab Results   Component Value Date    TACROL 7.5 05/19/2022    TACROL 7.5 06/18/2021    DOSTAC 5/18/2022/18/2022 05/19/2022    DOSTAC 55 06/18/2021    RAPAMY <2.0 (L) 01/31/2018       No components found for: CK  Lab Results    Component Value Date    MAG 1.6 (L) 09/15/2022    MAG 1.5 (L) 06/18/2021     No results found for: A1C  Lab Results   Component Value Date    PHOS 3.1 09/15/2022    PHOS 3.4 06/18/2021     Lab Results   Component Value Date    NTBNP 53 08/08/2018     Lab Results   Component Value Date    SAITESTMET [facility] 01/06/2022    SAITESTMET [facility] 10/19/2020    SAICELL Class I 01/06/2022    SAICELL Class I 10/19/2020    [id number] None 01/06/2022    [id number] None 10/19/2020    KD0PTIKMMP B:82Cw:4 6 01/06/2022    YL5WZUFQCE B:46 82 Cw:6 17 10/19/2020    SAIREPCOM  01/06/2022      Test performed by modified procedure. Serum heat inactivated and tested by a modified ([place]) protocol including fetal calf serum addition. High-risk, mfi >3,000. Mod-risk, mfi 500-3,000.    SAIREPCOM  10/19/2020      Test performed by modified procedure. Serum heat inactivated and tested   by a modified ([place]) protocol including fetal calf serum addition.   High-risk, mfi >3,000. Mod-risk, mfi 500-3,000.       Lab Results   Component Value Date    SAIITESTME [facility] 01/06/2022    SAIITESTME [facility] 10/19/2020    SAIICELL Class II 01/06/2022    SAIICELL Class II 10/19/2020    [id number] None 01/06/2022    [id number] None 10/19/2020    [id number] None 01/06/2022    KQ6PKPGFON None 10/19/2020    SAIIREPCOM  01/06/2022      Test performed by modified procedure. Serum heat inactivated and tested by a modified ([place]) protocol including fetal calf serum addition. High-risk, mfi >3,000. Mod-risk, mfi 500-3,000.    SAIIREPCOM  10/19/2020      Test performed by modified procedure. Serum heat inactivated and tested   by a modified ([place]) protocol including fetal calf serum addition.   High-risk, mfi >3,000. Mod-risk, mfi 500-3,000.       Lab Results   Component Value Date    CSPEC EDTA PLASMA 10/19/2020    CMQNT <100 03/17/2014    CMLOG  03/17/2014     <2.0  The Cytomegalovirus DNA Quantitation assay is a real-time polymerase chain   reaction (PCR) utilizing  analyte specific reagents manufactured by [employer]   [employer]. Analyte Specific Reagents (ASRs) are used in many laboratory   tests necessary for standard medical care and generally do not require FDA   approval.   This test was developed and its performance characteristics determined by   [facility].  It has not been   cleared or approved by the [place] Food and Drug Administration.       Diagnostic Studies:  caridac MRI 9/15/22 w contrast and stress  1. The LV is normal in cavity size and wall thickness. The global systolic function is normal. The LVEF is  57%. There are no regional wall motion abnormalities.  2. The RV is normal in cavity size. The global systolic function is normal. The RVEF is 55%.   3. Both atria are normal in size.  4. There is no significant valvular disease.   5. Late gadolinium enhancement imaging shows no MI, fibrosis or infiltrative disease.   6. Regadenoson stress perfusion imaging shows no ischemia.  7. There is no pericardial effusion or thickening.  8. There is no intracardiac thrombus.     CONCLUSIONS: Normal cardiac function, LVEF of 57% and RVEF 55%. There is no evidence of ischemia on stress perfusion imaging.     TTE 10/31/18  Patient after orthotopic heart transplant. There is trivial tricuspid  insufficiency. Insufficient jet to estimate right ventricular systolic  pressure. No pericardial effusion. ECG tracing shows sinus tachycardia at 111  bpm. The left and right ventricles have normal chamber size, wall thickness,  and systolic function. The calculated biplane left ventricular ejection  fraction is 66 %. The LVRI is 1.3. There is mild flow acceleration in the  proximal right atrium (possible suture line) with a mean gradient of 3 mmHg.    Cardiac MRI 9/28/21  1. The LV is normal in cavity size and wall thickness. The global systolic function is normal. The LVEF is 58%. There are no regional wall motion abnormalities.  2. The RV is  normal in cavity size. The global systolic function is normal. The RVEF is 54%.   3. Both atria are normal in size.  4. There is no significant valvular disease.   5. Late gadolinium enhancement imaging shows no MI, fibrosis or infiltrative disease.   6. Regadenoson stress perfusion imaging shows no ischemia.  7. There is no pericardial effusion or thickening.  8. There is no intracardiac thrombus.     CONCLUSIONS: Heart transplant with normal graft function and no ischemia or fibrosis. No change from prior  study in 2019.     RHC/cor angio 10/19/20  Left Main   The vessel is large and is angiographically normal.   Left Anterior Descending   The vessel is large.   First Diagonal Branch   The vessel is small and is angiographically normal.   Second Diagonal Branch   The vessel is small and is angiographically normal.   Ramus Intermedius   The vessel is moderate in size and is angiographically normal.   Lateral Ramus Intermedius   The vessel is small and is angiographically normal.   Left Circumflex   The vessel is large and is angiographically normal.   First Obtuse Marginal Branch   The vessel is small and is angiographically normal.   Second Obtuse Marginal Branch   The vessel is large and is angiographically normal.   Third Obtuse Marginal Branch   The vessel is small and is angiographically normal.   Right Coronary Artery   The vessel is large and is angiographically normal.   Acute Marginal Branch   The vessel is small.   Right Ventricular Branch   The vessel is small.   Right Posterior Descending Artery   The vessel is moderate in size and is angiographically normal.   Right Posterior Atrioventricular Artery   The vessel is moderate in size and is angiographically normal.   First Right Posterolateral Branch   The vessel is small and is angiographically normal.   Second Right Posterolateral Branch   The vessel is moderate in size and is angiographically normal.   Third Right Posterolateral Branch   The vessel is  small and is angiographically normal.     Ao 97/72/80  RA 7/8/6  RV 25/6  PA 22/11/15  PCWP 14/15/10  Cardiac output by Francisca: 7.4 L/min (indexed to 3.6 L/min/m2)  Cardiac output by thermodilution: 5.3 L/min (indexed to 2.6 L/min/m2)  SVR by TD CO: 1117  PVR by TD CO: 0.9 Right sided filling pressures are normal. Left sided filling pressures are normal. Normal PA pressures. Normal cardiac output level. Hemodynamic data has been modified in Epic per physician review.       Assessment/Plan:  Mr. Viramontes is a 26 year old male who is s/p orthotopic heart transplant in 2012 who presents to clinic for annual exam. From a transplant standpoint he is doing well. Cardiac MRI with contrast and stress today is normal. He has no e/o graft dysfunction. No recent episodes of rejection. He has no DSAs (prior DSAs that he cleared).     # Status post OHT in 2012, history of NICM 2/2 viral myocardititis  # Chronic immunosuppression  * Rejection history: three episodes in 2017 treated with steroids  * Last biopsy: 3/2018 negative for ACR or AMR    Immunosuppression:  - Prednisone 5 mg daily  - Tacrolimus, trough level goal 6-8.   -  mg bid    Cardiac allograft vasculopathy ppx:  -he is on pravastatin and aspirin 81 mg, discuss change to crestor 10 mg daily next visit (LDL 72 today)    # HTN  At goal on amlodipine 10 mg daily      25 minutes spent face-to-face with patient, >50% in counseling and/or coordination of care as described above      Yamilka Carmichael DNP, NP-C  9/15/2022            KIYA BESS

## 2022-09-15 NOTE — NURSING NOTE
Chief Complaint   Patient presents with     Follow Up     Return Heart Transplant- heart transplant annual     Vitals were taken and medications reconciled.    LALITA Lopez  12:26 PM

## 2022-09-15 NOTE — LETTER
9/15/2022      RE: Obed Viramontes  [address]  [facility] [address]       Dear Colleague,    Thank you for the opportunity to participate in the care of your patient, Obed Viramontes, at the [facility] [place] at [facility]. Please see a copy of my visit note below.    ADULT HEART TRANSPLANT CLINIC    HPI:   Mr. Viramontes is a 26 year old male who presents to clinic with his father for routine annual exam. 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    Since last annual visit patient reports feeling well. He has no concerns or complaints. No hospitalizations or ED visits. He continues to work at a [employer], a job he loves. Doesn't do anything formal for exercise. Denies any exertional symptoms. No LE edema, orthopnea, PND. No fevers, chills, night sweats, unexpected weight changes. Reported loose stools last year that have resolved. Has intermittent mild HA that goes away with APAP and water.     PAST MEDICAL HISTORY:  Past Medical History:   Diagnosis Date     Cardiomyopathy, hypertrophic obstructive (H)      H/O heart transplant (H)     Mar.23, 2012     Hemiplegia following CVA (cerebrovascular accident) (H)     left, improved with rehab     Lymphoproliferative disease (H)      Unspecified cerebral artery occlusion with cerebral infarction     age 2 1/2       FAMILY HISTORY:  Family History   Problem Relation Age of Onset     Diabetes Sister      Melanoma No family hx of      Skin Cancer No family hx of        SOCIAL HISTORY:  Social History     Marital status: Single     Spouse name: N/A     Number of children: N/A     Years of education: N/A  "    Social History Main Topics     Smoking status: Never Smoker     Smokeless tobacco: Never Used     Alcohol use No     Drug use: No     Sexual activity: No     Other Topics Concern     Not on file     Social History Narrative    Lives in [place] with father, [place]. Works at [employer] as a manager, watches TV shows and plays video games.  Went to [facility].  Has 3 sisters and a brother.  Mom and dad  (2010).        CURRENT MEDICATIONS:  amLODIPine (NORVASC) 5 MG tablet, Take 2 tablets (10 mg) by mouth daily  aspirin 81 MG EC tablet, Take 2 tablets (162 mg) by mouth daily  Cholecalciferol (VITAMIN D3) 2000 UNITS TABS, Take 2,000 Units by mouth daily  mycophenolate (GENERIC EQUIVALENT) 500 MG tablet, Take 1 tablet (500 mg) by mouth 2 times daily  pravastatin (PRAVACHOL) 10 MG tablet, Take 1 tablet (10 mg) by mouth daily At bedtime  predniSONE (DELTASONE) 5 MG tablet, Take 1 tablet (5 mg) by mouth daily  tacrolimus (GENERIC EQUIVALENT) 1 MG capsule, TAKE THREE CAPSULES BY MOUTH EVERY MORNING AND TAKE TWO CAPSULES BY MOUTH EVERY EVENING. (TOTAL DOSE = 3MG EVERY MORNING AND 2MG EVERY EVENING)    No current facility-administered medications on file prior to visit.      ROS:  See HPI    EXAM:  /89 (BP Location: Right arm, Patient Position: Chair, Cuff Size: Adult Large)   Pulse 108   Ht 1.85 m (6' 0.84\")   Wt 86.3 kg (190 lb 3.2 oz)   SpO2 96%   BMI 25.21 kg/m      GENERAL: Appears comfortable, in no acute distress.   HEENT: Eye symmetrical, no discharge or icterus bilaterally. Mucous membranes moist and without lesions. No thrush.   CV: Tachycardic and regular, +S1S2, no murmur, rub, or gallop. JVP not visible.   RESPIRATORY: Respirations regular, even, and unlabored. Lungs CTA throughout.   GI: Soft and non distended with normoactive bowel sounds present in all quadrants. No tenderness, rebound, guarding. No hepatomegaly.   EXTREMITIES: No peripheral edema. 2+ bilateral pedal pulses.   NEUROLOGIC: " Alert and oriented x 3. No focal deficits.   MUSCULOSKELETAL: left sided hemiplegia. Tenderness to palpation over AC joint. Skin is normal without redness, warmth, or lesions.  SKIN: No jaundice. No rashes or lesions.     Labs - reviewed with patient in clinic today:  CBC RESULTS:  Lab Results   Component Value Date    WBC 4.9 09/15/2022    WBC 5.5 06/18/2021    RBC 5.44 09/15/2022    RBC 5.70 06/18/2021    HGB 15.5 09/15/2022    HGB 16.8 06/18/2021    HCT 46.0 09/15/2022    HCT 49.2 06/18/2021    MCV 85 09/15/2022    MCV 86 06/18/2021    MCH 28.5 09/15/2022    MCH 29.5 06/18/2021    MCHC 33.7 09/15/2022    MCHC 34.1 06/18/2021    RDW 13.0 09/15/2022    RDW 13.1 06/18/2021     09/15/2022     06/18/2021       CMP RESULTS:  Lab Results   Component Value Date     09/15/2022     06/18/2021    POTASSIUM 3.8 09/15/2022    POTASSIUM 3.7 05/19/2022    POTASSIUM 3.8 06/18/2021    CHLORIDE 106 09/15/2022    CHLORIDE 107 05/19/2022    CHLORIDE 105 06/18/2021    CO2 28 09/15/2022    CO2 30 05/19/2022    CO2 29 06/18/2021    ANIONGAP 8 09/15/2022    ANIONGAP 5 05/19/2022    ANIONGAP 5 06/18/2021    GLC 92 09/15/2022    GLC 94 05/19/2022    GLC 91 06/18/2021    BUN 13.6 09/15/2022    BUN 13 05/19/2022    BUN 17 06/18/2021    CR 0.79 09/15/2022    CR 0.77 06/18/2021    GFRESTIMATED >90 09/15/2022    GFRESTIMATED >90 06/18/2021    GFRESTBLACK >90 06/18/2021    CARLOS A 9.1 09/15/2022    CARLOS A 8.9 06/18/2021    BILITOTAL 0.6 09/15/2022    BILITOTAL 0.7 06/18/2021    ALBUMIN 4.3 09/15/2022    ALBUMIN 4.3 04/13/2022    ALBUMIN 4.2 06/18/2021    ALKPHOS 77 09/15/2022    ALKPHOS 75 06/18/2021    ALT 15 09/15/2022    ALT 33 06/18/2021    AST 25 09/15/2022    AST 16 06/18/2021        INR RESULTS:  Lab Results   Component Value Date    INR 1.00 01/12/2017       LIPID RESULTS:  Lab Results   Component Value Date    CHOL 136 09/15/2022    CHOL 172 06/18/2021    HDL 52 09/15/2022    HDL 58 06/18/2021    LDL 72 09/15/2022     LDL 96 06/18/2021    TRIG 59 09/15/2022    TRIG 89 06/18/2021    CHOLHDLRATIO 3.0 06/17/2013       IMMUNOSUPPRESSANT LEVELS:  Lab Results   Component Value Date    TACROL 7.5 05/19/2022    TACROL 7.5 06/18/2021    DOSTAC 5/18/2022 05/19/2022    DOSTAC 55 06/18/2021    RAPAMY <2.0 (L) 01/31/2018       No components found for: CK  Lab Results   Component Value Date    MAG 1.6 (L) 09/15/2022    MAG 1.5 (L) 06/18/2021     No results found for: A1C  Lab Results   Component Value Date    PHOS 3.1 09/15/2022    PHOS 3.4 06/18/2021     Lab Results   Component Value Date    NTBNP 53 08/08/2018     Lab Results   Component Value Date    SAITESTMET [facility] 01/06/2022    SAITESTMET [facility] 10/19/2020    SAICELL Class I 01/06/2022    SAICELL Class I 10/19/2020    [id number] None 01/06/2022    [id number] None 10/19/2020    GU5SFOMNWC B:82Cw:4 6 01/06/2022    FN1WHMYGII B:46 82 Cw:6 17 10/19/2020    SAIREPCOM  01/06/2022      Test performed by modified procedure. Serum heat inactivated and tested by a modified ([place]) protocol including fetal calf serum addition. High-risk, mfi >3,000. Mod-risk, mfi 500-3,000.    SAIREPCOM  10/19/2020      Test performed by modified procedure. Serum heat inactivated and tested   by a modified ([place]) protocol including fetal calf serum addition.   High-risk, mfi >3,000. Mod-risk, mfi 500-3,000.       Lab Results   Component Value Date    SAIITESTME [facility] 01/06/2022    SAIITESTME [facility] 10/19/2020    SAIICELL Class II 01/06/2022    SAIICELL Class II 10/19/2020    [id number] None 01/06/2022    FA0OAMSHT None 10/19/2020    [id number] None 01/06/2022    [id number] None 10/19/2020    SAIIREPCOM  01/06/2022      Test performed by modified procedure. Serum heat inactivated and tested by a modified ([place]) protocol including fetal calf serum addition. High-risk, mfi >3,000. Mod-risk, mfi 500-3,000.    SAIIREPCOM  10/19/2020      Test performed by modified procedure. Serum heat inactivated and tested   by a  modified ([place]) protocol including fetal calf serum addition.   High-risk, mfi >3,000. Mod-risk, mfi 500-3,000.       Lab Results   Component Value Date    CSPEC EDTA PLASMA 10/19/2020    CMQNT <100 03/17/2014    CMLOG  03/17/2014     <2.0  The Cytomegalovirus DNA Quantitation assay is a real-time polymerase chain   reaction (PCR) utilizing analyte specific reagents manufactured by [employer]   [employer]. Analyte Specific Reagents (ASRs) are used in many laboratory   tests necessary for standard medical care and generally do not require FDA   approval.   This test was developed and its performance characteristics determined by   [facility].  It has not been   cleared or approved by the US Food and Drug Administration.       Diagnostic Studies:  caridac MRI 9/15/22 w contrast and stress  1. The LV is normal in cavity size and wall thickness. The global systolic function is normal. The LVEF is  57%. There are no regional wall motion abnormalities.  2. The RV is normal in cavity size. The global systolic function is normal. The RVEF is 55%.   3. Both atria are normal in size.  4. There is no significant valvular disease.   5. Late gadolinium enhancement imaging shows no MI, fibrosis or infiltrative disease.   6. Regadenoson stress perfusion imaging shows no ischemia.  7. There is no pericardial effusion or thickening.  8. There is no intracardiac thrombus.     CONCLUSIONS: Normal cardiac function, LVEF of 57% and RVEF 55%. There is no evidence of ischemia on stress perfusion imaging.     TTE 10/31/18  Patient after orthotopic heart transplant. There is trivial tricuspid  insufficiency. Insufficient jet to estimate right ventricular systolic  pressure. No pericardial effusion. ECG tracing shows sinus tachycardia at 111  bpm. The left and right ventricles have normal chamber size, wall thickness,  and systolic function. The calculated biplane left ventricular  ejection  fraction is 66 %. The LVRI is 1.3. There is mild flow acceleration in the  proximal right atrium (possible suture line) with a mean gradient of 3 mmHg.    Cardiac MRI 9/28/21  1. The LV is normal in cavity size and wall thickness. The global systolic function is normal. The LVEF is 58%. There are no regional wall motion abnormalities.  2. The RV is normal in cavity size. The global systolic function is normal. The RVEF is 54%.   3. Both atria are normal in size.  4. There is no significant valvular disease.   5. Late gadolinium enhancement imaging shows no MI, fibrosis or infiltrative disease.   6. Regadenoson stress perfusion imaging shows no ischemia.  7. There is no pericardial effusion or thickening.  8. There is no intracardiac thrombus.     CONCLUSIONS: Heart transplant with normal graft function and no ischemia or fibrosis. No change from prior  study in 2019.     RHC/cor angio 10/19/20  Left Main   The vessel is large and is angiographically normal.   Left Anterior Descending   The vessel is large.   First Diagonal Branch   The vessel is small and is angiographically normal.   Second Diagonal Branch   The vessel is small and is angiographically normal.   Ramus Intermedius   The vessel is moderate in size and is angiographically normal.   Lateral Ramus Intermedius   The vessel is small and is angiographically normal.   Left Circumflex   The vessel is large and is angiographically normal.   First Obtuse Marginal Branch   The vessel is small and is angiographically normal.   Second Obtuse Marginal Branch   The vessel is large and is angiographically normal.   Third Obtuse Marginal Branch   The vessel is small and is angiographically normal.   Right Coronary Artery   The vessel is large and is angiographically normal.   Acute Marginal Branch   The vessel is small.   Right Ventricular Branch   The vessel is small.   Right Posterior Descending Artery   The vessel is moderate in size and is  angiographically normal.   Right Posterior Atrioventricular Artery   The vessel is moderate in size and is angiographically normal.   First Right Posterolateral Branch   The vessel is small and is angiographically normal.   Second Right Posterolateral Branch   The vessel is moderate in size and is angiographically normal.   Third Right Posterolateral Branch   The vessel is small and is angiographically normal.     Ao 97/72/80  RA 7/8/6  RV 25/6  PA 22/11/15  PCWP 14/15/10  Cardiac output by Francisca: 7.4 L/min (indexed to 3.6 L/min/m2)  Cardiac output by thermodilution: 5.3 L/min (indexed to 2.6 L/min/m2)  SVR by TD CO: 1117  PVR by TD CO: 0.9 Right sided filling pressures are normal. Left sided filling pressures are normal. Normal PA pressures. Normal cardiac output level. Hemodynamic data has been modified in Epic per physician review.       Assessment/Plan:  Mr. Viramontes is a 26 year old male who is s/p orthotopic heart transplant in 2012 who presents to clinic for annual exam. From a transplant standpoint he is doing well. Cardiac MRI with contrast and stress today is normal. He has no e/o graft dysfunction. No recent episodes of rejection. He has no DSAs (prior DSAs that he cleared).     # Status post OHT in 2012, history of NICM 2/2 viral myocardititis  # Chronic immunosuppression  * Rejection history: three episodes in 2017 treated with steroids  * Last biopsy: 3/2018 negative for ACR or AMR    Immunosuppression:  - Prednisone 5 mg daily  - Tacrolimus, trough level goal 6-8.   -  mg bid    Cardiac allograft vasculopathy ppx:  -he is on pravastatin and aspirin 81 mg, discuss change to crestor 10 mg daily next visit (LDL 72 today)    # HTN  At goal on amlodipine 10 mg daily      25 minutes spent face-to-face with patient, >50% in counseling and/or coordination of care as described above      Yamilka Carmihcael, DEVYN, NP-C  9/15/2022            KIYA BESS

## 2022-09-15 NOTE — NURSING NOTE
Transplant Coordinator Note  Reason for visit: Annual   Caregiver: Blanca Templeton      Health concerns addressed today:  Pt seen in clinic with DEVYN Shine. DNP reviewed available labs, meds and test results. Pt reports doing well. Back to work; walks a lot at theater without SOB or fatigue. Wght and BP stable. No further concerns with diarrhea unless he eats foods like eggs or popcorn. Headaches improved, relief with prn Tylenol.       Immunosuppressants:  ~ mg BID  ~Tac 3/2 mg (goal 6-8). Level pending   ~Pred 5 mg daily      Routine screenings:    Derm: DUE  Dental: UTD  Eye: UTD  Flu vaccine due this fall   COVID 4/5; Em discussed   P-13 UTD      Medication record reviewed and reconciled. Questions and concerns addressed. Copy of AVS provided. Pt verbalized an understanding of plan of care.      Patient Instructions  ~Please call your coordinator at [phone] with any questions or concerns.    ~Coordinator will call with remaining results  ~Full labs in 6 months   ~5th COVID and flu vaccine this fall  ~Em  - call

## 2022-09-15 NOTE — PROGRESS NOTES
Pt arrived for cardiac MRI with stress. Allergies, medications, and safety checklist reviewed with patient. Test explained and all questions were answered. Denies caffeine intake. Lungs are clear. Lexiscan 0.4 mg given over 10 seconds followed by 5 mL of saline. After stress imaging complete, 100 mg IV Aminophylline given per MD order. Pt tolerated the scan, medications, and contrast well. Pre and post EKG completed. Pt monitored after MRI and escorted back to [facility] waiting room.     Deepti Light RN

## 2022-09-15 NOTE — PATIENT INSTRUCTIONS
Please call your coordinator at [phone] with any questions or concerns.      Coordinator will call with remaining results    Full labs in 6 months     5th COVID and flu vaccine this fall    Em  - call

## 2022-09-16 LAB
ATRIAL RATE - MUSE: 93 BPM
ATRIAL RATE - MUSE: 93 BPM
DIASTOLIC BLOOD PRESSURE - MUSE: NORMAL MMHG
DIASTOLIC BLOOD PRESSURE - MUSE: NORMAL MMHG
DONOR IDENTIFICATION: NORMAL
DSA COMMENTS: NORMAL
DSA PRESENT: NO
DSA TEST METHOD: NORMAL
EBV DNA # SPEC NAA+PROBE: NOT DETECTED COPIES/ML
INTERPRETATION ECG - MUSE: NORMAL
INTERPRETATION ECG - MUSE: NORMAL
ORGAN: NORMAL
P AXIS - MUSE: 55 DEGREES
P AXIS - MUSE: 61 DEGREES
PR INTERVAL - MUSE: 148 MS
PR INTERVAL - MUSE: 152 MS
QRS DURATION - MUSE: 102 MS
QRS DURATION - MUSE: 102 MS
QT - MUSE: 354 MS
QT - MUSE: 366 MS
QTC - MUSE: 440 MS
QTC - MUSE: 455 MS
R AXIS - MUSE: 16 DEGREES
R AXIS - MUSE: 24 DEGREES
SA 1 CELL: NORMAL
SA 1 TEST METHOD: NORMAL
SA 2 CELL: NORMAL
SA 2 TEST METHOD: NORMAL
SA1 HI RISK ABY: NORMAL
SA1 MOD RISK ABY: NORMAL
SA2 HI RISK ABY: NORMAL
SA2 MOD RISK ABY: NORMAL
SYSTOLIC BLOOD PRESSURE - MUSE: NORMAL MMHG
SYSTOLIC BLOOD PRESSURE - MUSE: NORMAL MMHG
T AXIS - MUSE: 40 DEGREES
T AXIS - MUSE: 42 DEGREES
UNACCEPTABLE ANTIGENS: NORMAL
UNOS CPRA: 0
VENTRICULAR RATE- MUSE: 93 BPM
VENTRICULAR RATE- MUSE: 93 BPM
ZZZSA 1  COMMENTS: NORMAL
ZZZSA 2 COMMENTS: NORMAL

## 2022-09-19 LAB — IMMUKNOW IMMUNE CELL FUNCTION: 250 NG/ML

## 2022-09-19 NOTE — RESULT ENCOUNTER NOTE
No donor antibodies  Immuknow low moderate  Tac level 5.8 at 12.5 hours - goal 6-8.  No dose change   CMV/EBV neg   Pt sent my Chart message

## 2022-10-03 NOTE — NURSING NOTE
"Reason For Visit:   Chief Complaint   Patient presents with     Surgical Followup     LEFT wrist fusion, DOS 10/20/16     RECHECK     States his left wrist is doing well.        Primary MD: Rayo Schafer  Ref. MD: Self    Age: 21 year old    ?  No    Ht 1.854 m (6' 1\")  Wt 64.4 kg (142 lb)  BMI 18.73 kg/m2    Pain Assessment  Patient Currently in Pain: No    Hand Dominance Evaluation  Hand Dominance: Right      force  R hand  level 2 force: 18.1 kg (40 lb)  L hand  level  2 force: 0.454 kg (1 lb)      Current Outpatient Prescriptions   Medication Sig Dispense Refill     predniSONE (DELTASONE) 5 MG tablet Take 1 tablet (5 mg) by mouth daily 30 tablet 3     tacrolimus (PROGRAF - GENERIC EQUIVALENT) 1 MG capsule Take 3.5 mg twice daily 270 capsule 11     tacrolimus (PROGRAF - GENERIC EQUIVALENT) 0.5 MG capsule Take 3.5 mg twice daily 60 capsule 11     aspirin 81 MG EC tablet Take 2 tablets (162 mg) by mouth daily 60 tablet 99     Cholecalciferol (VITAMIN D3) 2000 UNITS TABS Take 2,000 Units by mouth daily 100 tablet 6     amLODIPine (NORVASC) 5 MG tablet Take 1 tablet (5 mg) by mouth daily 90 tablet 3     mycophenolate (CELLCEPT - GENERIC EQUIVALENT) 500 MG tablet Take 1 tablet (500 mg) by mouth 2 times daily 180 tablet 11     pravastatin (PRAVACHOL) 10 MG tablet Take 1 tablet (10 mg) by mouth daily At bedtime 90 tablet 6       Allergies   Allergen Reactions     Latex Rash     Other [Seasonal Allergies]      Corn-abdominal pain and diarrhea.       Ernestine Sanchez, DAYANNA  "
Teaching Flowsheet   Relevant Diagnosis: Left wrist fusion with retained plate   Teaching Topic: Pre-op left wrist fusion plate removal      Person(s) involved in teaching:   Patient and Father     Motivation Level:  Asks Questions: Yes  Eager to Learn: Yes  Cooperative: Yes  Receptive (willing/able to accept information): Yes  Any cultural factors/Taoism beliefs that may influence understanding or compliance? No       Patient and Family demonstrates understanding of the following:  Reason for the appointment, diagnosis and treatment plan: Yes  Knowledge of proper use of medications and conditions for which they are ordered (with special attention to potential side effects or drug interactions): Yes  Which situations necessitate calling provider and whom to contact: Yes       Teaching Concerns Addressed:   Comments: Heart transplant 3/23/2012. Patient has been cleared from cardiology. He does not need to stop aspirin prior to surgery.       Proper use and care of  (medical equip, care aids, etc.): NA  Nutritional needs and diet plan: Yes  Pain management techniques: Yes  Wound Care: Yes  How and/when to access community resources: NA     Instructional Materials Used/Given: pre-op packet, patient states he has antiseptic soap at home.      Time spent with patient: 10 minutes.    
no

## 2022-10-06 ENCOUNTER — HOSPITAL ENCOUNTER (EMERGENCY)
Facility: CLINIC | Age: 27
Discharge: HOME | End: 2022-10-06
Attending: EMERGENCY MEDICINE | Admitting: EMERGENCY MEDICINE
Payer: MEDICAID

## 2022-10-06 ENCOUNTER — TELEPHONE (OUTPATIENT)
Dept: TRANSPLANT | Facility: CLINIC | Age: 27
End: 2022-10-06

## 2022-10-06 ENCOUNTER — APPOINTMENT (OUTPATIENT)
Dept: GENERAL RADIOLOGY | Facility: CLINIC | Age: 27
End: 2022-10-06
Attending: EMERGENCY MEDICINE
Payer: MEDICAID

## 2022-10-06 VITALS
WEIGHT: 185 LBS | OXYGEN SATURATION: 96 % | HEIGHT: 73 IN | SYSTOLIC BLOOD PRESSURE: 127 MMHG | DIASTOLIC BLOOD PRESSURE: 86 MMHG | HEART RATE: 107 BPM | TEMPERATURE: 98.1 F | RESPIRATION RATE: 16 BRPM | BODY MASS INDEX: 24.52 KG/M2

## 2022-10-06 DIAGNOSIS — Z94.1 HEART REPLACED BY TRANSPLANT (H): ICD-10-CM

## 2022-10-06 DIAGNOSIS — U07.1 INFECTION DUE TO 2019 NOVEL CORONAVIRUS: Primary | ICD-10-CM

## 2022-10-06 LAB
ALBUMIN SERPL BCG-MCNC: 4.3 G/DL (ref 3.5–5.2)
ALP SERPL-CCNC: 59 U/L (ref 40–129)
ALT SERPL W P-5'-P-CCNC: 14 U/L (ref 10–50)
ANION GAP SERPL CALCULATED.3IONS-SCNC: 14 MMOL/L (ref 7–15)
AST SERPL W P-5'-P-CCNC: 30 U/L (ref 10–50)
ATRIAL RATE - MUSE: 105 BPM
BASOPHILS # BLD AUTO: 0 10E3/UL (ref 0–0.2)
BASOPHILS NFR BLD AUTO: 0 %
BILIRUB SERPL-MCNC: 0.6 MG/DL
BUN SERPL-MCNC: 13.4 MG/DL (ref 6–20)
CALCIUM SERPL-MCNC: 8.8 MG/DL (ref 8.6–10)
CHLORIDE SERPL-SCNC: 96 MMOL/L (ref 98–107)
CREAT SERPL-MCNC: 0.74 MG/DL (ref 0.67–1.17)
D DIMER PPP FEU-MCNC: 0.38 UG/ML FEU (ref 0–0.5)
DEPRECATED HCO3 PLAS-SCNC: 21 MMOL/L (ref 22–29)
DIASTOLIC BLOOD PRESSURE - MUSE: NORMAL MMHG
EOSINOPHIL # BLD AUTO: 0 10E3/UL (ref 0–0.7)
EOSINOPHIL NFR BLD AUTO: 0 %
ERYTHROCYTE [DISTWIDTH] IN BLOOD BY AUTOMATED COUNT: 13.2 % (ref 10–15)
FLUAV RNA SPEC QL NAA+PROBE: POSITIVE
FLUBV RNA RESP QL NAA+PROBE: NEGATIVE
GFR SERPL CREATININE-BSD FRML MDRD: >90 ML/MIN/1.73M2
GLUCOSE SERPL-MCNC: 117 MG/DL (ref 70–99)
HCT VFR BLD AUTO: 45.5 % (ref 40–53)
HGB BLD-MCNC: 15.4 G/DL (ref 13.3–17.7)
HOLD SPECIMEN: NORMAL
IMM GRANULOCYTES # BLD: 0 10E3/UL
IMM GRANULOCYTES NFR BLD: 0 %
INTERPRETATION ECG - MUSE: NORMAL
LYMPHOCYTES # BLD AUTO: 1 10E3/UL (ref 0.8–5.3)
LYMPHOCYTES NFR BLD AUTO: 17 %
MCH RBC QN AUTO: 28.4 PG (ref 26.5–33)
MCHC RBC AUTO-ENTMCNC: 33.8 G/DL (ref 31.5–36.5)
MCV RBC AUTO: 84 FL (ref 78–100)
MONOCYTES # BLD AUTO: 1 10E3/UL (ref 0–1.3)
MONOCYTES NFR BLD AUTO: 17 %
NEUTROPHILS # BLD AUTO: 4 10E3/UL (ref 1.6–8.3)
NEUTROPHILS NFR BLD AUTO: 66 %
NRBC # BLD AUTO: 0 10E3/UL
NRBC BLD AUTO-RTO: 0 /100
NT-PROBNP SERPL-MCNC: 188 PG/ML (ref 0–450)
P AXIS - MUSE: 39 DEGREES
PLATELET # BLD AUTO: 183 10E3/UL (ref 150–450)
POTASSIUM SERPL-SCNC: 3.8 MMOL/L (ref 3.4–5.3)
PR INTERVAL - MUSE: 142 MS
PROT SERPL-MCNC: 6.8 G/DL (ref 6.4–8.3)
QRS DURATION - MUSE: 96 MS
QT - MUSE: 322 MS
QTC - MUSE: 425 MS
R AXIS - MUSE: 46 DEGREES
RBC # BLD AUTO: 5.43 10E6/UL (ref 4.4–5.9)
RSV RNA SPEC NAA+PROBE: NEGATIVE
SARS-COV-2 RNA RESP QL NAA+PROBE: POSITIVE
SODIUM SERPL-SCNC: 131 MMOL/L (ref 136–145)
SYSTOLIC BLOOD PRESSURE - MUSE: NORMAL MMHG
T AXIS - MUSE: 24 DEGREES
TROPONIN T SERPL HS-MCNC: 6 NG/L
VENTRICULAR RATE- MUSE: 105 BPM
WBC # BLD AUTO: 6.1 10E3/UL (ref 4–11)

## 2022-10-06 PROCEDURE — 36415 COLL VENOUS BLD VENIPUNCTURE: CPT | Performed by: EMERGENCY MEDICINE

## 2022-10-06 PROCEDURE — 85025 COMPLETE CBC W/AUTO DIFF WBC: CPT | Performed by: EMERGENCY MEDICINE

## 2022-10-06 PROCEDURE — 87637 SARSCOV2&INF A&B&RSV AMP PRB: CPT | Performed by: EMERGENCY MEDICINE

## 2022-10-06 PROCEDURE — 96360 HYDRATION IV INFUSION INIT: CPT | Performed by: EMERGENCY MEDICINE

## 2022-10-06 PROCEDURE — 93005 ELECTROCARDIOGRAM TRACING: CPT | Performed by: EMERGENCY MEDICINE

## 2022-10-06 PROCEDURE — 71046 X-RAY EXAM CHEST 2 VIEWS: CPT

## 2022-10-06 PROCEDURE — 80053 COMPREHEN METABOLIC PANEL: CPT | Performed by: EMERGENCY MEDICINE

## 2022-10-06 PROCEDURE — 250N000011 HC RX IP 250 OP 636

## 2022-10-06 PROCEDURE — C9803 HOPD COVID-19 SPEC COLLECT: HCPCS | Performed by: EMERGENCY MEDICINE

## 2022-10-06 PROCEDURE — 85379 FIBRIN DEGRADATION QUANT: CPT | Performed by: EMERGENCY MEDICINE

## 2022-10-06 PROCEDURE — 71046 X-RAY EXAM CHEST 2 VIEWS: CPT | Mod: 26 | Performed by: RADIOLOGY

## 2022-10-06 PROCEDURE — 84484 ASSAY OF TROPONIN QUANT: CPT | Performed by: EMERGENCY MEDICINE

## 2022-10-06 PROCEDURE — 99285 EMERGENCY DEPT VISIT HI MDM: CPT | Mod: 25 | Performed by: EMERGENCY MEDICINE

## 2022-10-06 PROCEDURE — 99284 EMERGENCY DEPT VISIT MOD MDM: CPT | Performed by: EMERGENCY MEDICINE

## 2022-10-06 PROCEDURE — 258N000003 HC RX IP 258 OP 636: Performed by: EMERGENCY MEDICINE

## 2022-10-06 PROCEDURE — 83880 ASSAY OF NATRIURETIC PEPTIDE: CPT | Performed by: EMERGENCY MEDICINE

## 2022-10-06 RX ORDER — ONDANSETRON 2 MG/ML
INJECTION INTRAMUSCULAR; INTRAVENOUS
Status: COMPLETED
Start: 2022-10-06 | End: 2022-10-06

## 2022-10-06 RX ORDER — ONDANSETRON 2 MG/ML
4 INJECTION INTRAMUSCULAR; INTRAVENOUS EVERY 30 MIN PRN
Status: DISCONTINUED | OUTPATIENT
Start: 2022-10-06 | End: 2022-10-06 | Stop reason: HOSPADM

## 2022-10-06 RX ADMIN — SODIUM CHLORIDE 500 ML: 9 INJECTION, SOLUTION INTRAVENOUS at 20:35

## 2022-10-06 RX ADMIN — ONDANSETRON 4 MG: 2 INJECTION INTRAMUSCULAR; INTRAVENOUS at 20:33

## 2022-10-06 ASSESSMENT — ACTIVITIES OF DAILY LIVING (ADL): ADLS_ACUITY_SCORE: 35

## 2022-10-06 ASSESSMENT — ENCOUNTER SYMPTOMS
SORE THROAT: 1
CARDIOVASCULAR NEGATIVE: 1
FATIGUE: 1
NEUROLOGICAL NEGATIVE: 1
FEVER: 0
GASTROINTESTINAL NEGATIVE: 1
COUGH: 1

## 2022-10-06 NOTE — TELEPHONE ENCOUNTER
"Mother called to report patient recently returned from a trip to [place] and had tested positive for covid. Patient's mother was not with him, unable to get a full assessment of symptoms at this time.     Contacted patient to at [phone] with no response. Left detailed voicemail requesting a call back through Hospital , provided call back number.     Paged Dr Shine, attending on for heart failure who recommends patient be seen in ER if symptomatic.        Received call back from patient and mother, was able to assess patient symptoms:       Fever of 100.0  F (37.8 C) or over: No, but reports feeling hot/flush.      Tylenol - Given today at 1130AM  Temp at 97.0 at 715PM mom reporting she is not sure it is accurate.     Chills: yes     Cough: yes    Shortness of Breath: \"little\", fine when sitting has exertional dyspnea, does not have SOB baseline, new onset    Loss of taste or smell: No    Generalized body aches:\"little\"    Persistent headache: Yes, mild    Sore throat: Yes    Nausea, Vomiting, or diarrhea: diarrhea, and reports drinking fluids but not eating much today, has slept most of the day    Positive COVID test: Yes, home test  DATE: 10/06/2022    Date of symptoms onset: Wednesday 10/05/2022    Sent to [facility] ER for additional assessment and treatment.   "

## 2022-10-07 ENCOUNTER — NURSE TRIAGE (OUTPATIENT)
Dept: NURSING | Facility: CLINIC | Age: 27
End: 2022-10-07

## 2022-10-07 ENCOUNTER — TELEPHONE (OUTPATIENT)
Dept: TRANSPLANT | Facility: CLINIC | Age: 27
End: 2022-10-07

## 2022-10-07 ENCOUNTER — TELEPHONE (OUTPATIENT)
Dept: NURSING | Facility: CLINIC | Age: 27
End: 2022-10-07

## 2022-10-07 ENCOUNTER — TELEPHONE (OUTPATIENT)
Dept: INTERNAL MEDICINE | Facility: CLINIC | Age: 27
End: 2022-10-07

## 2022-10-07 ENCOUNTER — TELEPHONE (OUTPATIENT)
Dept: EMERGENCY MEDICINE | Facility: CLINIC | Age: 27
End: 2022-10-07

## 2022-10-07 DIAGNOSIS — J10.1 INFLUENZA A: Primary | ICD-10-CM

## 2022-10-07 DIAGNOSIS — Z94.1 HEART REPLACED BY TRANSPLANT (H): ICD-10-CM

## 2022-10-07 DIAGNOSIS — Z94.1 HEART REPLACED BY TRANSPLANT (H): Primary | ICD-10-CM

## 2022-10-07 DIAGNOSIS — U07.1 2019 NOVEL CORONAVIRUS DISEASE (COVID-19): ICD-10-CM

## 2022-10-07 RX ORDER — TACROLIMUS 1 MG/1
2 CAPSULE ORAL 2 TIMES DAILY
Qty: 360 CAPSULE | Refills: 3 | Status: SHIPPED | OUTPATIENT
Start: 2022-10-07 | End: 2023-09-14

## 2022-10-07 RX ORDER — OSELTAMIVIR PHOSPHATE 75 MG/1
75 CAPSULE ORAL 2 TIMES DAILY
Qty: 10 CAPSULE | Refills: 0 | Status: SHIPPED | OUTPATIENT
Start: 2022-10-07 | End: 2022-10-12

## 2022-10-07 NOTE — ED PROVIDER NOTES
ED Provider Note  [facility]      History     Chief Complaint   Patient presents with     Shortness of Breath     Patient c/o cough, sore throat and shortness of breath o/s yesterday. HX: heart transplant; sent by his provider.      The history is provided by the patient and medical records.     Obed Viramontes is a 26 year old male who is  s/p lung transplant who recently traveled to [place] for a [employer] football game. On the way back he developed COVID symptoms and tested positive at home today. His transplant coordinator advised him to come here for evaluation to ensure that there was not any other issues going on with his heart, that he was not in heart failure or have an abnormal chest x-ray and also for consideration of COVID treatment.  He has the typical symptoms of malaise, mild cough, and sore throat.      This part of the medical record was transcribed by Jagruti Montoya Medical Scribe, from a dictation done by Clyde Frazier MD.     Past Medical History  Past Medical History:   Diagnosis Date     Cardiomyopathy, hypertrophic obstructive (H)      H/O heart transplant (H)     Mar.23, 2012     Hemiplegia following CVA (cerebrovascular accident) (H)     left, improved with rehab     Lymphoproliferative disease (H)      Unspecified cerebral artery occlusion with cerebral infarction     age 2 1/2     Past Surgical History:   Procedure Laterality Date     ARTHRODESIS WRIST Left 10/20/2016    Procedure: ARTHRODESIS WRIST;  Surgeon: Amna Tinoco MD;  [facility]     BIOPSY      gingival and tonsillar biopsy     CL AFF SURGICAL PATHOLOGY       CV CORONARY ANGIOGRAM N/A 10/19/2020    Procedure: CV CORONARY ANGIOGRAM;  Surgeon: Deniz Gonzalez MD;  Location: [facility] CARDIAC CATH LAB     CV RIGHT HEART CATH MEASUREMENTS RECORDED N/A 10/19/2020    Procedure: CV RIGHT HEART CATH;  Surgeon: Deniz Gonzalez MD;  Location: [facility]  CARDIAC CATH LAB     HEART CATH CHILD  11/13/2012    Procedure: HEART CATH CHILD;  Right Heart Cath Procedure and Biopsy *Latex Allergy*;  Surgeon: Bobbi Pruitt MD;  [facility] OR     HEART CATH CHILD  2/15/2013    Procedure: HEART CATH CHILD;  Right [place] Cath, Angiogram and Biopsy;  Surgeon: Bobbi Pruitt MD;  [facility]     HEART CATH CHILD N/A 4/10/2015    Procedure: HEART CATH CHILD;  Surgeon: Bobbi Pruitt MD;  [facility]     HEART CATH CHILD N/A 4/21/2017    Procedure: HEART CATH CHILD;  Right Heart Cath Procedure with Angio Biopsy    (latex allergy) ;  Surgeon: Bobbi Pruitt MD;  [facility]     HEART CATH, BIOPSY  3/21/2014    Procedure: HEART CATH, BIOPSY;  Right and Left Heart Cath, Angiogram, Biopsy   *Latex Allergy*;  Surgeon: Bobbi Pruitt MD;  [facility]     HEART CATH, BIOPSY Right 4/18/2016    Procedure: HEART CATH, BIOPSY;  Surgeon: Bobbi Pruitt MD;  [facility]     HEART CATH, BIOPSY Right 5/26/2017    Procedure: HEART CATH, BIOPSY;  Right Heart Cath and Biopsy  (Latex Allergy);  Surgeon: Bobbi Pruitt MD;  [facility]     HEART CATH, BIOPSY N/A 9/11/2017    Procedure: HEART CATH, BIOPSY;  Right Heart Cath, Angio and Biopsy ;  Surgeon: Bobbi Pruitt MD;  [facility]     HEART CATH, BIOPSY Right 3/23/2018    Procedure: HEART CATH, BIOPSY;  Right Heart Catheter, Angiogram and Biopsy  **Latex Allergy**;  Surgeon: Aileen Sarabia MD;  [facility]     INTERPOSITION TENDON HAND Left 10/20/2016    Procedure: INTERPOSITION TENDON HAND;  Surgeon: Amna Tinoco MD;  [facility]     ORTHOPEDIC SURGERY      at [place], left lower extremity     PICC INSERTION       PICC REMOVAL       REMOVE HARDWARE WRIST Left 10/12/2017    Procedure: REMOVE HARDWARE WRIST;  Left Wrist Fusion Plate Removal     ;  Surgeon: Amna Tinoco MD;  [facility]     TRANSPLANT HEART RECIPIENT    "    amLODIPine (NORVASC) 5 MG tablet  aspirin 81 MG EC tablet  Cholecalciferol (VITAMIN D3) 2000 UNITS TABS  mycophenolate (GENERIC EQUIVALENT) 500 MG tablet  pravastatin (PRAVACHOL) 10 MG tablet  predniSONE (DELTASONE) 5 MG tablet  tacrolimus (GENERIC EQUIVALENT) 1 MG capsule      Allergies   Allergen Reactions     Latex Rash     Other [Seasonal Allergies]      Corn-abdominal pain and diarrhea.     Family History  Family History   Problem Relation Age of Onset     Diabetes Sister      Melanoma No family hx of      Skin Cancer No family hx of      Social History   Social History     Tobacco Use     Smoking status: Never Smoker     Smokeless tobacco: Never Used   Substance Use Topics     Alcohol use: No     Drug use: No      Past medical history, past surgical history, medications, allergies, family history, and social history were reviewed with the patient. No additional pertinent items.       Review of Systems   Constitutional: Positive for fatigue. Negative for fever.   HENT: Positive for sore throat.    Respiratory: Positive for cough.    Cardiovascular: Negative.    Gastrointestinal: Negative.    Genitourinary: Negative.    Neurological: Negative.    All other systems reviewed and are negative.    A complete review of systems was performed with pertinent positives and negatives noted in the HPI, and all other systems negative.    Physical Exam   BP: 127/86  Pulse: 107  Temp: 98.1  F (36.7  C)  Resp: 16  Height: 185.4 cm (6' 1\")  Weight: 83.9 kg (185 lb)  SpO2: 96 %  Physical Exam  Vitals and nursing note reviewed.   Constitutional:       General: He is not in acute distress.  Eyes:      Pupils: Pupils are equal, round, and reactive to light.   Cardiovascular:      Rate and Rhythm: Normal rate and regular rhythm.   Pulmonary:      Effort: Pulmonary effort is normal.      Breath sounds: Normal breath sounds. No decreased breath sounds.   Musculoskeletal:      Cervical back: Neck supple.   Neurological:      Mental " Status: He is alert and oriented to person, place, and time.   Psychiatric:         Mood and Affect: Mood normal.       ED Course      Procedures              Results for orders placed or performed during the hospital encounter of 10/06/22   XR Chest 2 Views     Status: None (Preliminary result)    Impression    RESIDENT PRELIMINARY INTERPRETATION  Impression: Stable postsurgical chest. No acute cardiopulmonary  findings.   CBC with platelets and differential     Status: None   Result Value Ref Range    WBC Count 6.1 4.0 - 11.0 10e3/uL    RBC Count 5.43 4.40 - 5.90 10e6/uL    Hemoglobin 15.4 13.3 - 17.7 g/dL    Hematocrit 45.5 40.0 - 53.0 %    MCV 84 78 - 100 fL    MCH 28.4 26.5 - 33.0 pg    MCHC 33.8 31.5 - 36.5 g/dL    RDW 13.2 10.0 - 15.0 %    Platelet Count 183 150 - 450 10e3/uL    % Neutrophils 66 %    % Lymphocytes 17 %    % Monocytes 17 %    % Eosinophils 0 %    % Basophils 0 %    % Immature Granulocytes 0 %    NRBCs per 100 WBC 0 <1 /100    Absolute Neutrophils 4.0 1.6 - 8.3 10e3/uL    Absolute Lymphocytes 1.0 0.8 - 5.3 10e3/uL    Absolute Monocytes 1.0 0.0 - 1.3 10e3/uL    Absolute Eosinophils 0.0 0.0 - 0.7 10e3/uL    Absolute Basophils 0.0 0.0 - 0.2 10e3/uL    Absolute Immature Granulocytes 0.0 <=0.4 10e3/uL    Absolute NRBCs 0.0 10e3/uL   D dimer quantitative     Status: Normal   Result Value Ref Range    D-Dimer Quantitative 0.38 0.00 - 0.50 ug/mL FEU    Narrative    This D-dimer assay is intended for use in conjunction with a clinical pretest probability assessment model to exclude pulmonary embolism (PE) and deep venous thrombosis (DVT) in outpatients suspected of PE or DVT. The cut-off value is 0.50 ug/mL FEU.   Extra Tube     Status: None (In process)    Narrative    The following orders were created for panel order Extra Tube.  Procedure                               Abnormality         Status                     ---------                               -----------         ------                      Extra Red Top Tube[[id number]]                               In process                   Please view results for these tests on the individual orders.   CBC with platelets differential     Status: None    Narrative    The following orders were created for panel order CBC with platelets differential.  Procedure                               Abnormality         Status                     ---------                               -----------         ------                     CBC with platelets and d...[[id number]]                      Final result                 Please view results for these tests on the individual orders.     Medications   0.9% sodium chloride BOLUS (500 mLs Intravenous New Bag 10/6/22 2035)   ondansetron (ZOFRAN) injection 4 mg (has no administration in time range)   ondansetron (ZOFRAN) 2 MG/ML injection (4 mg  Given 10/6/22 2033)        Assessments & Plan (with Medical Decision Making)   26-year-old male heart transplant patient with recent new diagnosis of COVID.  His chest x-ray is clear today, he is not hypoxic.  He would be appropriate for monoclonal antibody treatment. I placed an order in the discharge orders for an infusion tomorrow of bebtelovimab to be done at the infusion center. I also advised him to contact his transplant coordinator tomorrow and they can follow-up on my note and ensure that he does get to the infusion center for the treatment.    This part of the medical record was transcribed by Jagruti Montoya Medical Scribe, from a dictation done by Clyde Frazier MD.     I have reviewed the nursing notes. I have reviewed the findings, diagnosis, plan and need for follow up with the patient.    New Prescriptions    No medications on file       Final diagnoses:   Infection due to 2019 novel coronavirus   Heart replaced by transplant (H)       --  Clyde Frazier MD  [facility] EMERGENCY DEPARTMENT  10/6/2022     Clyde Frazier MD  10/06/22  [id number]

## 2022-10-07 NOTE — RESULT ENCOUNTER NOTE
Influenza A/B & SARS-COV2 (Covid-19) virus PCR mulitplex is positive for Influenza A and Covid19   Negative for Influenza B, and RSV.  Positive Covid19 result and the patient will be notified of their positive Covid19 result via [employer] (if active) or they will be contacted by via phone call if not active on [employer].  A letter is sent to patient via [employer] or via mail (if no [employer]).    Patient to be notified of Positive Influenza result and advised per [facility] Respiratory Virus Panel or Influenza A/B antigen protocol.

## 2022-10-07 NOTE — TELEPHONE ENCOUNTER
[facility] Emergency Department Lab result notification    [facility] ED lab result protocol used  Influenza    Reason for call  Notify of lab results, assess symptoms,  review ED providers recommendations/discharge instructions (if necessary) and advise per ED lab result f/u protocol    Lab Result (including Rx patient on, if applicable)  Influenza A/B & SARS-COV2 (Covid-19) virus PCR mulitplex is positive for Influenza A and Covid19   Negative for Influenza B, and RSV.  Positive Covid19 result and the patient will be notified of their positive Covid19 result via [employer] (if active) or they will be contacted by via phone call if not active on [employer].  A letter is sent to patient via [employer] or via mail (if no [employer]).    Patient to be notified of Positive Influenza result and advised per [facility] Respiratory Virus Panel or Influenza A/B antigen protocol.     Information table from Emergency Dept Provider visit on 10/6/22  Symptoms reported at ED visit (Chief complaint, HPI) Chief Complaint   Patient presents with     Shortness of Breath       Patient c/o cough, sore throat and shortness of breath o/s yesterday. HX: heart transplant; sent by his provider.       The history is provided by the patient and medical records.      Obed Viramontes is a 26 year old male who is  s/p lung transplant who recently traveled to [place] for a [employer] football game. On the way back he developed COVID symptoms and tested positive at home today. His transplant coordinator advised him to come here for evaluation to ensure that there was not any other issues going on with his heart, that he was not in heart failure or have an abnormal chest x-ray and also for consideration of COVID treatment.  He has the typical symptoms of malaise, mild cough, and sore throat.       This part of the medical record was transcribed by Jagruti Montoya Medical Scribe, from a dictation done by Clyde Frazier MD.       Significant Medical hx, if applicable (i.e. CKD, diabetes) Heart transplant, immunosuppression, CVA/stroke   Allergies Allergies   Allergen Reactions     Latex Rash     Other [Seasonal Allergies]      Corn-abdominal pain and diarrhea.      Weight, if applicable Wt Readings from Last 2 Encounters:   10/06/22 83.9 kg (185 lb)   09/15/22 86.3 kg (190 lb 3.2 oz)      Coumadin/Warfarin [Yes /No] NO   Creatinine Level (mg/dl) Creatinine   Date Value Ref Range Status   10/06/2022 0.74 0.67 - 1.17 mg/dL Final   06/18/2021 0.77 0.66 - 1.25 mg/dL Final      Creatinine clearance (ml/min), if applicable Serum creatinine: 0.74 mg/dL 10/06/22 2029  Estimated creatinine clearance: 177.9 mL/min   ED providers Impression and Plan (applicable information) 26-year-old male heart transplant patient with recent new diagnosis of COVID.  His chest x-ray is clear today, he is not hypoxic.  He would be appropriate for monoclonal antibody treatment. I placed an order in the discharge orders for an infusion tomorrow of bebtelovimab to be done at the infusion center. I also advised him to contact his transplant coordinator tomorrow and they can follow-up on my note and ensure that he does get to the infusion center for the treatment.     This part of the medical record was transcribed by Jagruti Montoya, Medical Scribe, from a dictation done by Clyde Frazier MD.      I have reviewed the nursing notes. I have reviewed the findings, diagnosis, plan and need for follow up with the patient.         New Prescriptions     No medications on file         Final diagnoses:   Infection due to 2019 novel coronavirus   Heart replaced by transplant (H)         --  Clyde Frazier MD  [facility] EMERGENCY DEPARTMENT  10/6/2022     Clyde Frazier MD  10/06/22 2143     ED diagnosis  Infection due to 2019 novel coronavirus  Heart replaced by transplant (H)   ED provider  Clyde Frazier MD          RN  "Assessment (Patient s current Symptoms), include time called.   5:18 PM - spoke with huma Azevedo - consent on file - not with patient - who reports patient is getting monoclonal antibodies tomorrow 10/8/22 - he is feeling worse than yesterday - in bed sleeping - chest pressure higher than yesterday - congestion worse - doesn't have a fever - trying to keep him hydrated and eating - she doesn't sound concerned that symptoms are worse to a point of returning from our conversation  Symptom onset: 10/4/22 - mom says \"reed renee time\"  Breathing:  Mom says 'its been just fine'  Nausea/Vomiting/Diarrhea: Not today      [place] Emergency Department Provider Name & Recommendations    Consultation Time:  10/7/2022 5:40 PM  Consulted with [facility] Emergency Department Provider, Kate Flood PA-C .   Emergency Dept Provider Recommendations:  No contraindication per consult with [facility] Pharmacist between monoclonal antibody therapy and tamiflu antiviral - Recommend we hold off on tamiflu at this time and advise patient to contact transplant team for recommendation and prescribing.    RN Recommendations/Instructions per [facility] ED lab result protocol  Patient huma Azevedo notified of lab result and treatment recommendations.  Advised against tamiflu per ED consult and recommend consulting with transplant team on if antiviral treatment is appropriate at this time - huma Azevedo says that transplant team is not open on weekend but has oncall option - encouraged to call after we get off phone as if transplant team wants to initiate treatment it is best for patient to receive treatment ASAP for best outcome - encouraged to please return for any worsening at anytime.    Please Contact your PCP clinic or return to the Emergency department if your:    Symptoms return.    Symptoms worsen or other concerning symptom's.    PCP follow-up Questions asked: YES       Chloé Seals RN  [facility] l Customer " [facility]  Emergency Dept Lab Result RN  Ph# [phone]     Copy of Lab result   Component      Latest Ref Rng & Units 10/6/2022   Influenza A      Negative Positive (A)   Influenza B      Negative Negative   Resp Syncytial Virus      Negative Negative   SARS CoV2 PCR      Negative Positive (A)

## 2022-10-07 NOTE — TELEPHONE ENCOUNTER
Patient classified as COVID treatment eligible by Epic high risk algorithm:  Yes    Coronavirus (COVID-19) Notification    Reason for call  Notify of POSITIVE COVID-19 lab result, assess symptoms,  review [facility] recommendations    Lab Result   Lab test for 2019-nCoV rRt-PCR or SARS-COV-2 PCR  Oropharyngeal AND/OR nasopharyngeal swabs were POSITIVE for 2019-nCoV RNA [OR] SARS-COV-2 RNA (COVID-19) RNA     We have been unable to reach patient by phone at this time to notify of their Positive COVID-19 result.    Left voicemail message requesting a call back to [phone] [facility] for results.        A Positive COVID-19 letter will be sent via [employer] or the mail.    Zuleima Dowell

## 2022-10-07 NOTE — ED NOTES
"ED Triage Provider Note  [facility]  Encounter Date: Oct 6, 2022    History:  Chief Complaint   Patient presents with    Shortness of Breath     Patient c/o cough, sore throat and shortness of breath o/s yesterday. HX: heart transplant; sent by his provider.      Obed Viramontes is a 26 year old male who presents to the ED with history of cardiomyopathy as an infant, status post heart transplant in 2012, CVA at age 1, presents to the ED for evaluation of cough and shortness of breath.  He states that he returned from [place] few days ago.  He developed a cough, congestion over the past 2 to 3 days.  Overall feels significantly fatigued.  He took a home COVID test was positive.  Spoke with Transplant team recommended come to the ED for further evaluation.  His transplant team thought he might benefit from oral antiviral treatment.  Denies overt chest pain or shortness of breath but does state that he gets short of breath when he coughs heavily.        Review of Systems:  10 point review of systems negative aside from what is mentioned in HPI.    Exam:  /86   Pulse 107   Temp 98.1  F (36.7  C) (Oral)   Resp 16   Ht 1.854 m (6' 1\")   Wt 83.9 kg (185 lb)   SpO2 96%   BMI 24.41 kg/m    General: No acute distress. Appears stated age.   Cardio: Regular rate, extremities well perfused  Resp: Normal work of breathing, grossly normal respiratory rate  Neuro: Alert. CN II-XII grossly intact. Grossly intact strength.       Medical Decision Making:  Patient arriving to the ED with problem as above. A medical screening exam was performed.  orders initiated from Triage. The patient is appropriate to wait in lobby.  No rooms available    Vicente Avalos DO on 10/6/2022 at 8:25 PM     Vicente Avalos DO  10/07/22 0050    "

## 2022-10-07 NOTE — TELEPHONE ENCOUNTER
Nurse Triage SBAR    Is this a 2nd Level Triage? YES, LICENSED PRACTITIONER REVIEW IS REQUIRED  No  Situation:   Mother is calling and says patient tested positive for influenza and is a transplant patient  Mother is requesting treatment for influenza tonight on 10/07/22 per Nurse transplant and ED team      Background:   Patient was seen in ED on 10/06/22 and tested positive for Covid-19  Patient scheduled for antibodies IV infusion in the am on 10/08/22 per mother    Assessment:   Patient needs anti-viral treatment    Protocol Recommended Disposition:   No disposition on file.    Recommendation:   Wait for on call provider to respond     Paged on call transplant coordinator Josefina Palma    Does the patient meet one of the following criteria for ADS visit consideration? 16+ years old, with an [facility] PCP     TIP  Providers, please consider if this condition is appropriate for management at one of our Acute and Diagnostic Services sites.     If patient is a good candidate, please use dotphrase <dot>triageresponse and select Refer to ADS to [id number]

## 2022-10-07 NOTE — DISCHARGE INSTRUCTIONS
Call your transplant coordinator tomorrow morning, I put an order in for the antibody treatment for you to receive at the infusion center.

## 2022-10-08 NOTE — TELEPHONE ENCOUNTER
Received message from ER re pt testing positive for influenza. Question re timing of symptoms.     Called  Mom, pt recently back from [place], with time zone change, he is in the window.    Discussed with Dr Monson. Requested that pt hold MMF x 5 days (+COVID, as well). Prescribed Tamiflu 75 mg BID x 5 days.     Mom called with POC. Pt to also receive monoclonal antibodies 10/7/2022.     Age/Coagulation

## 2022-10-30 ENCOUNTER — HEALTH MAINTENANCE LETTER (OUTPATIENT)
Age: 27
End: 2022-10-30

## 2022-11-03 DIAGNOSIS — Z94.1 HEART REPLACED BY TRANSPLANT (H): ICD-10-CM

## 2022-11-03 RX ORDER — AMLODIPINE BESYLATE 5 MG/1
TABLET ORAL
Qty: 180 TABLET | Refills: 3 | Status: SHIPPED | OUTPATIENT
Start: 2022-11-03 | End: 2023-12-19

## 2023-02-08 DIAGNOSIS — Z94.1 HEART REPLACED BY TRANSPLANT (H): ICD-10-CM

## 2023-02-09 RX ORDER — PRAVASTATIN SODIUM 10 MG
TABLET ORAL
Qty: 90 TABLET | Refills: 3 | Status: SHIPPED | OUTPATIENT
Start: 2023-02-09 | End: 2024-07-30

## 2023-03-01 DIAGNOSIS — Z94.1 HEART REPLACED BY TRANSPLANT (H): Primary | ICD-10-CM

## 2023-05-01 DIAGNOSIS — Z94.1 HEART REPLACED BY TRANSPLANT (H): ICD-10-CM

## 2023-05-02 RX ORDER — MYCOPHENOLATE MOFETIL 500 MG/1
500 TABLET ORAL 2 TIMES DAILY
Qty: 180 TABLET | Refills: 3 | Status: SHIPPED | OUTPATIENT
Start: 2023-05-02 | End: 2024-07-10

## 2023-05-18 ENCOUNTER — MYC MEDICAL ADVICE (OUTPATIENT)
Dept: TRANSPLANT | Facility: CLINIC | Age: 28
End: 2023-05-18
Payer: MEDICAID

## 2023-06-14 DIAGNOSIS — Z94.1 HEART REPLACED BY TRANSPLANT (H): Primary | ICD-10-CM

## 2023-08-28 DIAGNOSIS — Z94.1 HEART REPLACED BY TRANSPLANT (H): ICD-10-CM

## 2023-08-31 RX ORDER — PREDNISONE 5 MG/1
5 TABLET ORAL DAILY
Qty: 90 TABLET | Refills: 3 | Status: SHIPPED | OUTPATIENT
Start: 2023-08-31

## 2023-09-05 ENCOUNTER — PRE VISIT (OUTPATIENT)
Dept: TRANSPLANT | Facility: CLINIC | Age: 28
End: 2023-09-05
Payer: MEDICAID

## 2023-09-05 DIAGNOSIS — Z94.1 HEART REPLACED BY TRANSPLANT (H): Primary | ICD-10-CM

## 2023-09-05 DIAGNOSIS — Z13.220 ENCOUNTER FOR LIPID SCREENING FOR CARDIOVASCULAR DISEASE: ICD-10-CM

## 2023-09-05 DIAGNOSIS — Z79.899 ENCOUNTER FOR LONG-TERM (CURRENT) USE OF HIGH-RISK MEDICATION: ICD-10-CM

## 2023-09-05 DIAGNOSIS — Z13.6 ENCOUNTER FOR LIPID SCREENING FOR CARDIOVASCULAR DISEASE: ICD-10-CM

## 2023-09-05 RX ORDER — ASPIRIN 81 MG/1
243 TABLET, CHEWABLE ORAL ONCE
Status: CANCELLED | OUTPATIENT
Start: 2023-09-05

## 2023-09-05 RX ORDER — ASPIRIN 325 MG
325 TABLET ORAL ONCE
Status: CANCELLED | OUTPATIENT
Start: 2023-09-05 | End: 2023-09-05

## 2023-09-05 RX ORDER — POTASSIUM CHLORIDE 1500 MG/1
20 TABLET, EXTENDED RELEASE ORAL
Status: CANCELLED | OUTPATIENT
Start: 2023-09-05

## 2023-09-05 RX ORDER — SODIUM CHLORIDE 9 MG/ML
INJECTION, SOLUTION INTRAVENOUS CONTINUOUS
Status: CANCELLED | OUTPATIENT
Start: 2023-09-05

## 2023-09-05 RX ORDER — POTASSIUM CHLORIDE 1500 MG/1
40 TABLET, EXTENDED RELEASE ORAL
Status: CANCELLED | OUTPATIENT
Start: 2023-09-05

## 2023-09-08 ENCOUNTER — TELEPHONE (OUTPATIENT)
Dept: TRANSPLANT | Facility: CLINIC | Age: 28
End: 2023-09-08
Payer: MEDICAID

## 2023-09-08 NOTE — TELEPHONE ENCOUNTER
Called pt to review appts for 9/11 - he was already at work. Agreed to read my Chart message and follow up with questions.

## 2023-09-11 ENCOUNTER — APPOINTMENT (OUTPATIENT)
Dept: MEDSURG UNIT | Facility: CLINIC | Age: 28
End: 2023-09-11
Attending: INTERNAL MEDICINE
Payer: MEDICAID

## 2023-09-11 ENCOUNTER — ANCILLARY PROCEDURE (OUTPATIENT)
Dept: CARDIOLOGY | Facility: CLINIC | Age: 28
End: 2023-09-11
Attending: INTERNAL MEDICINE
Payer: MEDICAID

## 2023-09-11 ENCOUNTER — LAB (OUTPATIENT)
Dept: LAB | Facility: CLINIC | Age: 28
End: 2023-09-11
Attending: INTERNAL MEDICINE
Payer: MEDICAID

## 2023-09-11 ENCOUNTER — HOSPITAL ENCOUNTER (OUTPATIENT)
Facility: CLINIC | Age: 28
Discharge: HOME | End: 2023-09-11
Attending: INTERNAL MEDICINE | Admitting: INTERNAL MEDICINE
Payer: MEDICAID

## 2023-09-11 ENCOUNTER — ANCILLARY PROCEDURE (OUTPATIENT)
Dept: GENERAL RADIOLOGY | Facility: CLINIC | Age: 28
End: 2023-09-11
Attending: INTERNAL MEDICINE
Payer: MEDICAID

## 2023-09-11 VITALS
OXYGEN SATURATION: 96 % | HEART RATE: 93 BPM | SYSTOLIC BLOOD PRESSURE: 133 MMHG | WEIGHT: 203.6 LBS | BODY MASS INDEX: 26.86 KG/M2 | DIASTOLIC BLOOD PRESSURE: 89 MMHG

## 2023-09-11 VITALS
TEMPERATURE: 97.7 F | DIASTOLIC BLOOD PRESSURE: 75 MMHG | HEART RATE: 89 BPM | WEIGHT: 200.4 LBS | RESPIRATION RATE: 16 BRPM | OXYGEN SATURATION: 96 % | HEIGHT: 74 IN | SYSTOLIC BLOOD PRESSURE: 115 MMHG | BODY MASS INDEX: 25.72 KG/M2

## 2023-09-11 DIAGNOSIS — Z13.6 ENCOUNTER FOR LIPID SCREENING FOR CARDIOVASCULAR DISEASE: ICD-10-CM

## 2023-09-11 DIAGNOSIS — I10 BENIGN ESSENTIAL HYPERTENSION: ICD-10-CM

## 2023-09-11 DIAGNOSIS — D84.9 IMMUNOSUPPRESSION (H): ICD-10-CM

## 2023-09-11 DIAGNOSIS — Z13.220 ENCOUNTER FOR LIPID SCREENING FOR CARDIOVASCULAR DISEASE: ICD-10-CM

## 2023-09-11 DIAGNOSIS — Z94.1 HEART REPLACED BY TRANSPLANT (H): ICD-10-CM

## 2023-09-11 DIAGNOSIS — Z94.1 HEART REPLACED BY TRANSPLANT (H): Primary | ICD-10-CM

## 2023-09-11 DIAGNOSIS — Z79.899 ENCOUNTER FOR LONG-TERM (CURRENT) USE OF HIGH-RISK MEDICATION: ICD-10-CM

## 2023-09-11 PROBLEM — Z98.890 STATUS POST CORONARY ANGIOGRAM: Status: ACTIVE | Noted: 2020-10-19

## 2023-09-11 LAB
ALBUMIN SERPL BCG-MCNC: 4.6 G/DL (ref 3.5–5.2)
ALP SERPL-CCNC: 72 U/L (ref 40–129)
ALT SERPL W P-5'-P-CCNC: 19 U/L (ref 0–70)
ANION GAP SERPL CALCULATED.3IONS-SCNC: 10 MMOL/L (ref 7–15)
AST SERPL W P-5'-P-CCNC: 20 U/L (ref 0–45)
BILIRUB SERPL-MCNC: 0.6 MG/DL
BUN SERPL-MCNC: 12.9 MG/DL (ref 6–20)
CALCIUM SERPL-MCNC: 9.2 MG/DL (ref 8.6–10)
CHLORIDE SERPL-SCNC: 103 MMOL/L (ref 98–107)
CHOLEST SERPL-MCNC: 159 MG/DL
CK SERPL-CCNC: 133 U/L (ref 39–308)
CMV DNA SPEC NAA+PROBE-ACNC: NOT DETECTED IU/ML
CREAT SERPL-MCNC: 0.75 MG/DL (ref 0.67–1.17)
DEPRECATED HCO3 PLAS-SCNC: 27 MMOL/L (ref 22–29)
EGFRCR SERPLBLD CKD-EPI 2021: >90 ML/MIN/1.73M2
ERYTHROCYTE [DISTWIDTH] IN BLOOD BY AUTOMATED COUNT: 12.7 % (ref 10–15)
GLUCOSE SERPL-MCNC: 101 MG/DL (ref 70–99)
HCT VFR BLD AUTO: 46.3 % (ref 40–53)
HDLC SERPL-MCNC: 55 MG/DL
HGB BLD-MCNC: 15.5 G/DL (ref 13.3–17.7)
HGB BLD-MCNC: 16 G/DL (ref 13.3–17.7)
LDLC SERPL CALC-MCNC: 91 MG/DL
LVEF ECHO: NORMAL
MAGNESIUM SERPL-MCNC: 1.6 MG/DL (ref 1.7–2.3)
MCH RBC QN AUTO: 28.5 PG (ref 26.5–33)
MCHC RBC AUTO-ENTMCNC: 34.6 G/DL (ref 31.5–36.5)
MCV RBC AUTO: 82 FL (ref 78–100)
NONHDLC SERPL-MCNC: 104 MG/DL
OXYHGB MFR BLDV: 73 % (ref 92–100)
PHOSPHATE SERPL-MCNC: 2.5 MG/DL (ref 2.5–4.5)
PLATELET # BLD AUTO: 204 10E3/UL (ref 150–450)
POTASSIUM SERPL-SCNC: 4 MMOL/L (ref 3.4–5.3)
PROT SERPL-MCNC: 7.3 G/DL (ref 6.4–8.3)
RBC # BLD AUTO: 5.62 10E6/UL (ref 4.4–5.9)
SODIUM SERPL-SCNC: 140 MMOL/L (ref 136–145)
TACROLIMUS BLD-MCNC: 9.4 UG/L (ref 5–15)
TME LAST DOSE: NORMAL H
TME LAST DOSE: NORMAL H
TRIGL SERPL-MCNC: 63 MG/DL
WBC # BLD AUTO: 5.2 10E3/UL (ref 4–11)

## 2023-09-11 PROCEDURE — 87799 DETECT AGENT NOS DNA QUANT: CPT | Performed by: NURSE PRACTITIONER

## 2023-09-11 PROCEDURE — 93005 ELECTROCARDIOGRAM TRACING: CPT

## 2023-09-11 PROCEDURE — 99152 MOD SED SAME PHYS/QHP 5/>YRS: CPT | Performed by: INTERNAL MEDICINE

## 2023-09-11 PROCEDURE — 82550 ASSAY OF CK (CPK): CPT | Performed by: PATHOLOGY

## 2023-09-11 PROCEDURE — 999N000134 HC STATISTIC PP CARE STAGE 3

## 2023-09-11 PROCEDURE — 250N000009 HC RX 250: Performed by: INTERNAL MEDICINE

## 2023-09-11 PROCEDURE — 272N000001 HC OR GENERAL SUPPLY STERILE: Performed by: INTERNAL MEDICINE

## 2023-09-11 PROCEDURE — 93306 TTE W/DOPPLER COMPLETE: CPT | Performed by: INTERNAL MEDICINE

## 2023-09-11 PROCEDURE — C1894 INTRO/SHEATH, NON-LASER: HCPCS | Performed by: INTERNAL MEDICINE

## 2023-09-11 PROCEDURE — 258N000003 HC RX IP 258 OP 636: Performed by: INTERNAL MEDICINE

## 2023-09-11 PROCEDURE — 82810 BLOOD GASES O2 SAT ONLY: CPT

## 2023-09-11 PROCEDURE — 250N000011 HC RX IP 250 OP 636: Performed by: INTERNAL MEDICINE

## 2023-09-11 PROCEDURE — 83735 ASSAY OF MAGNESIUM: CPT | Performed by: PATHOLOGY

## 2023-09-11 PROCEDURE — 36415 COLL VENOUS BLD VENIPUNCTURE: CPT | Performed by: PATHOLOGY

## 2023-09-11 PROCEDURE — 80053 COMPREHEN METABOLIC PANEL: CPT | Performed by: PATHOLOGY

## 2023-09-11 PROCEDURE — 99153 MOD SED SAME PHYS/QHP EA: CPT | Performed by: INTERNAL MEDICINE

## 2023-09-11 PROCEDURE — G0463 HOSPITAL OUTPT CLINIC VISIT: HCPCS | Mod: 25 | Performed by: NURSE PRACTITIONER

## 2023-09-11 PROCEDURE — 80197 ASSAY OF TACROLIMUS: CPT | Performed by: NURSE PRACTITIONER

## 2023-09-11 PROCEDURE — 999N000054 HC STATISTIC EKG NON-CHARGEABLE

## 2023-09-11 PROCEDURE — C1751 CATH, INF, PER/CENT/MIDLINE: HCPCS | Performed by: INTERNAL MEDICINE

## 2023-09-11 PROCEDURE — C1725 CATH, TRANSLUMIN NON-LASER: HCPCS | Performed by: INTERNAL MEDICINE

## 2023-09-11 PROCEDURE — 99214 OFFICE O/P EST MOD 30 MIN: CPT | Mod: 25 | Performed by: NURSE PRACTITIONER

## 2023-09-11 PROCEDURE — 84100 ASSAY OF PHOSPHORUS: CPT | Performed by: PATHOLOGY

## 2023-09-11 PROCEDURE — 99152 MOD SED SAME PHYS/QHP 5/>YRS: CPT | Mod: GC | Performed by: INTERNAL MEDICINE

## 2023-09-11 PROCEDURE — 85018 HEMOGLOBIN: CPT

## 2023-09-11 PROCEDURE — 93456 R HRT CORONARY ARTERY ANGIO: CPT | Performed by: INTERNAL MEDICINE

## 2023-09-11 PROCEDURE — 80061 LIPID PANEL: CPT | Performed by: PATHOLOGY

## 2023-09-11 PROCEDURE — 86832 HLA CLASS I HIGH DEFIN QUAL: CPT | Performed by: NURSE PRACTITIONER

## 2023-09-11 PROCEDURE — 999N000142 HC STATISTIC PROCEDURE PREP ONLY

## 2023-09-11 PROCEDURE — 99000 SPECIMEN HANDLING OFFICE-LAB: CPT | Performed by: PATHOLOGY

## 2023-09-11 PROCEDURE — 86352 CELL FUNCTION ASSAY W/STIM: CPT | Performed by: NURSE PRACTITIONER

## 2023-09-11 PROCEDURE — 93456 R HRT CORONARY ARTERY ANGIO: CPT | Mod: 26 | Performed by: INTERNAL MEDICINE

## 2023-09-11 PROCEDURE — 85027 COMPLETE CBC AUTOMATED: CPT | Performed by: PATHOLOGY

## 2023-09-11 PROCEDURE — 71046 X-RAY EXAM CHEST 2 VIEWS: CPT | Mod: GC | Performed by: RADIOLOGY

## 2023-09-11 PROCEDURE — 86833 HLA CLASS II HIGH DEFIN QUAL: CPT | Performed by: NURSE PRACTITIONER

## 2023-09-11 PROCEDURE — 250N000011 HC RX IP 250 OP 636: Mod: JZ | Performed by: INTERNAL MEDICINE

## 2023-09-11 RX ORDER — FENTANYL CITRATE 50 UG/ML
INJECTION, SOLUTION INTRAMUSCULAR; INTRAVENOUS
Status: DISCONTINUED | OUTPATIENT
Start: 2023-09-11 | End: 2023-09-11 | Stop reason: HOSPADM

## 2023-09-11 RX ORDER — NALOXONE HYDROCHLORIDE 0.4 MG/ML
0.2 INJECTION, SOLUTION INTRAMUSCULAR; INTRAVENOUS; SUBCUTANEOUS
Status: DISCONTINUED | OUTPATIENT
Start: 2023-09-11 | End: 2023-09-11 | Stop reason: HOSPADM

## 2023-09-11 RX ORDER — IOPAMIDOL 755 MG/ML
INJECTION, SOLUTION INTRAVASCULAR
Status: DISCONTINUED | OUTPATIENT
Start: 2023-09-11 | End: 2023-09-11 | Stop reason: HOSPADM

## 2023-09-11 RX ORDER — FENTANYL CITRATE 50 UG/ML
25 INJECTION, SOLUTION INTRAMUSCULAR; INTRAVENOUS
Status: DISCONTINUED | OUTPATIENT
Start: 2023-09-11 | End: 2023-09-11 | Stop reason: HOSPADM

## 2023-09-11 RX ORDER — NALOXONE HYDROCHLORIDE 0.4 MG/ML
0.4 INJECTION, SOLUTION INTRAMUSCULAR; INTRAVENOUS; SUBCUTANEOUS
Status: DISCONTINUED | OUTPATIENT
Start: 2023-09-11 | End: 2023-09-11 | Stop reason: HOSPADM

## 2023-09-11 RX ORDER — SODIUM CHLORIDE 9 MG/ML
INJECTION, SOLUTION INTRAVENOUS CONTINUOUS
Status: DISCONTINUED | OUTPATIENT
Start: 2023-09-11 | End: 2023-09-11 | Stop reason: HOSPADM

## 2023-09-11 RX ORDER — LIDOCAINE 40 MG/G
CREAM TOPICAL ONCE
Status: COMPLETED | OUTPATIENT
Start: 2023-09-11 | End: 2023-09-11

## 2023-09-11 RX ORDER — ASPIRIN 81 MG/1
243 TABLET, CHEWABLE ORAL ONCE
Status: COMPLETED | OUTPATIENT
Start: 2023-09-11 | End: 2023-09-11

## 2023-09-11 RX ORDER — OXYCODONE HYDROCHLORIDE 10 MG/1
10 TABLET ORAL EVERY 4 HOURS PRN
Status: DISCONTINUED | OUTPATIENT
Start: 2023-09-11 | End: 2023-09-11 | Stop reason: HOSPADM

## 2023-09-11 RX ORDER — POTASSIUM CHLORIDE 750 MG/1
40 TABLET, EXTENDED RELEASE ORAL
Status: DISCONTINUED | OUTPATIENT
Start: 2023-09-11 | End: 2023-09-11 | Stop reason: HOSPADM

## 2023-09-11 RX ORDER — POTASSIUM CHLORIDE 750 MG/1
20 TABLET, EXTENDED RELEASE ORAL
Status: DISCONTINUED | OUTPATIENT
Start: 2023-09-11 | End: 2023-09-11 | Stop reason: HOSPADM

## 2023-09-11 RX ORDER — LIDOCAINE 40 MG/G
CREAM TOPICAL
Status: DISCONTINUED | OUTPATIENT
Start: 2023-09-11 | End: 2023-09-11 | Stop reason: HOSPADM

## 2023-09-11 RX ORDER — OXYCODONE HYDROCHLORIDE 5 MG/1
5 TABLET ORAL EVERY 4 HOURS PRN
Status: DISCONTINUED | OUTPATIENT
Start: 2023-09-11 | End: 2023-09-11 | Stop reason: HOSPADM

## 2023-09-11 RX ORDER — FLUMAZENIL 0.1 MG/ML
0.2 INJECTION, SOLUTION INTRAVENOUS
Status: DISCONTINUED | OUTPATIENT
Start: 2023-09-11 | End: 2023-09-11 | Stop reason: HOSPADM

## 2023-09-11 RX ORDER — ATROPINE SULFATE 0.1 MG/ML
0.5 INJECTION INTRAVENOUS
Status: DISCONTINUED | OUTPATIENT
Start: 2023-09-11 | End: 2023-09-11 | Stop reason: HOSPADM

## 2023-09-11 RX ORDER — ASPIRIN 325 MG
325 TABLET ORAL ONCE
Status: COMPLETED | OUTPATIENT
Start: 2023-09-11 | End: 2023-09-11

## 2023-09-11 RX ADMIN — LIDOCAINE: 40 CREAM TOPICAL at 12:53

## 2023-09-11 RX ADMIN — SODIUM CHLORIDE: 9 INJECTION, SOLUTION INTRAVENOUS at 12:51

## 2023-09-11 ASSESSMENT — PAIN SCALES - GENERAL: PAINLEVEL: NO PAIN (0)

## 2023-09-11 ASSESSMENT — ACTIVITIES OF DAILY LIVING (ADL)
ADLS_ACUITY_SCORE: 35

## 2023-09-11 NOTE — PROGRESS NOTES
[facility]    HPI:   Mr. Viramontes is a 27 year old male who presents to clinic with his mother for routine annual exam. 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    Since last annual visit patient reports feeling well. His mom reports increased reactions lately to his known food allergens (eggs, onion, garlic). Symptoms are usually diarrhea lasting ~2 hours, resolves on it's own. No vomiting or symptoms of anaphylaxis or hives. He does not see an allergist. He continues to work at a [employer]. He describes his job as active, denies any exertional chest pain, shortness of breath, lightheadedness. No edema. His diet is admittedly a lot of fried foods. He does not routine check BP. No recent fever, chills, night sweats, unexplained weight loss, rashes, mouth sores.     PAST MEDICAL HISTORY:  Past Medical History:   Diagnosis Date    Cardiomyopathy, hypertrophic obstructive (H)     H/O heart transplant (H)     Mar.23, 2012    Hemiplegia following CVA (cerebrovascular accident) (H)     left, improved with rehab    Lymphoproliferative disease (H)     Unspecified cerebral artery occlusion with cerebral infarction     age 2 1/2       FAMILY HISTORY:  Family History   Problem Relation Age of Onset    Diabetes Sister     Melanoma No family hx of     Skin Cancer No family hx of        SOCIAL HISTORY:  Social History    Marital status: Single     Spouse name: N/A    Number of children: N/A    Years of education: N/A     Social History Main Topics    Smoking status: Never Smoker    Smokeless tobacco: Never Used    Alcohol use No    Drug use: No    Sexual activity: No     Other Topics Concern    Not on file     Social  History Narrative    Lives in [place] with father, [place]. Works at [employer] as a manager, watches TV shows and plays video games.  Went to [facility].  Has 3 sisters and a brother.  Mom and dad  (2010).        CURRENT MEDICATIONS:  amLODIPine (NORVASC) 5 MG tablet, TAKE TWO TABLETS BY MOUTH ONCE DAILY  aspirin 81 MG EC tablet, Take 2 tablets (162 mg) by mouth daily  Cholecalciferol (VITAMIN D3) 2000 UNITS TABS, Take 2,000 Units by mouth daily  mycophenolate (GENERIC EQUIVALENT) 500 MG tablet, Take 1 tablet (500 mg) by mouth 2 times daily  pravastatin (PRAVACHOL) 10 MG tablet, TAKE ONE TABLET BY MOUTH EVERY NIGHT AT BEDTIME  predniSONE (DELTASONE) 5 MG tablet, TAKE ONE TABLET BY MOUTH ONCE DAILY  tacrolimus (GENERIC EQUIVALENT) 1 MG capsule, Take 2 capsules (2 mg) by mouth 2 times daily    No current facility-administered medications on file prior to visit.      ROS:  See HPI    EXAM:  /89 (BP Location: Right arm, Patient Position: Chair, Cuff Size: Adult Regular)   Pulse 93   Wt 92.4 kg (203 lb 9.6 oz)   SpO2 96%   BMI 26.86 kg/m      GENERAL: Appears comfortable, in no acute distress.   HEENT: Eye symmetrical, no discharge or icterus bilaterally. Mucous membranes moist and without lesions. No thrush.   CV: Tachycardic and regular, +S1S2, no murmur, rub, or gallop. JVP not visible.   RESPIRATORY: Respirations regular, even, and unlabored. Lungs CTA throughout.   GI: Soft and non distended with normoactive bowel sounds present in all quadrants. No tenderness, rebound, guarding. No hepatomegaly.   EXTREMITIES: No peripheral edema. 2+ bilateral pedal pulses.   NEUROLOGIC: Alert and oriented x 3. No focal deficits.   MUSCULOSKELETAL: left sided hemiplegia. Tenderness to palpation over AC joint. Skin is normal without redness, warmth, or lesions.  SKIN: No jaundice. No rashes or lesions.     Labs - reviewed with patient in clinic today:  CBC RESULTS:  Lab Results   Component Value Date    WBC 5.2  09/11/2023    WBC 5.5 06/18/2021    RBC 5.62 09/11/2023    RBC 5.70 06/18/2021    HGB 16.0 09/11/2023    HGB 16.8 06/18/2021    HCT 46.3 09/11/2023    HCT 49.2 06/18/2021    MCV 82 09/11/2023    MCV 86 06/18/2021    MCH 28.5 09/11/2023    MCH 29.5 06/18/2021    MCHC 34.6 09/11/2023    MCHC 34.1 06/18/2021    RDW 12.7 09/11/2023    RDW 13.1 06/18/2021     09/11/2023     06/18/2021       CMP RESULTS:  Lab Results   Component Value Date     09/11/2023     06/18/2021    POTASSIUM 4.0 09/11/2023    POTASSIUM 3.7 05/19/2022    POTASSIUM 3.8 06/18/2021    CHLORIDE 103 09/11/2023    CHLORIDE 107 05/19/2022    CHLORIDE 105 06/18/2021    CO2 27 09/11/2023    CO2 30 05/19/2022    CO2 29 06/18/2021    ANIONGAP 10 09/11/2023    ANIONGAP 5 05/19/2022    ANIONGAP 5 06/18/2021     (H) 09/11/2023    GLC 94 05/19/2022    GLC 91 06/18/2021    BUN 12.9 09/11/2023    BUN 13 05/19/2022    BUN 17 06/18/2021    CR 0.75 09/11/2023    CR 0.77 06/18/2021    GFRESTIMATED >90 09/11/2023    GFRESTIMATED >90 06/18/2021    GFRESTBLACK >90 06/18/2021    CARLOS A 9.2 09/11/2023    CARLOS A 8.9 06/18/2021    BILITOTAL 0.6 09/11/2023    BILITOTAL 0.7 06/18/2021    ALBUMIN 4.6 09/11/2023    ALBUMIN 4.3 04/13/2022    ALBUMIN 4.2 06/18/2021    ALKPHOS 72 09/11/2023    ALKPHOS 75 06/18/2021    ALT 19 09/11/2023    ALT 33 06/18/2021    AST 20 09/11/2023    AST 16 06/18/2021        INR RESULTS:  Lab Results   Component Value Date    INR 1.00 01/12/2017       LIPID RESULTS:  Lab Results   Component Value Date    CHOL 159 09/11/2023    CHOL 172 06/18/2021    HDL 55 09/11/2023    HDL 58 06/18/2021    LDL 91 09/11/2023    LDL 96 06/18/2021    TRIG 63 09/11/2023    TRIG 89 06/18/2021    CHOLHDLRATIO 3.0 06/17/2013       IMMUNOSUPPRESSANT LEVELS:  Lab Results   Component Value Date    TACROL 5.8 09/15/2022    TACROL 7.5 06/18/2021    DOSTAC 9/14/2022 09/15/2022    DOSTAC 55 06/18/2021    RAPAMY <2.0 (L) 01/31/2018       No components found  for: CK  Lab Results   Component Value Date    MAG 1.6 (L) 09/11/2023    MAG 1.5 (L) 06/18/2021     No results found for: A1C  Lab Results   Component Value Date    PHOS 2.5 09/11/2023    PHOS 3.4 06/18/2021     Lab Results   Component Value Date    NTBNP 53 08/08/2018     Lab Results   Component Value Date    SAITESTMET [facility] 09/15/2022    SAITESTMET [facility] 10/19/2020    SAICELL Class I 09/15/2022    SAICELL Class I 10/19/2020    RE5HSCWGT None 09/15/2022    VH3XVJJPC None 10/19/2020    VZ0DHNLBYE Cw:4 09/15/2022    GY3SCWXXCG B:46 82 Cw:6 17 10/19/2020    SAIREPCOM  09/15/2022      Test performed by modified procedure. Serum heat inactivated and tested by a modified ([place]) protocol including fetal calf serum addition. High-risk, mfi >3,000. Mod-risk, mfi 500-3,000.    SAIREPCOM  10/19/2020      Test performed by modified procedure. Serum heat inactivated and tested   by a modified ([place]) protocol including fetal calf serum addition.   High-risk, mfi >3,000. Mod-risk, mfi 500-3,000.       Lab Results   Component Value Date    SAIITESTME [facility] 09/15/2022    SAIITESTME [facility] 10/19/2020    SAIICELL Class II 09/15/2022    SAIICELL Class II 10/19/2020    [id number] None 09/15/2022    BW5TUPVVT None 10/19/2020    MH6NRFDKVC None 09/15/2022    RQ4NGEREWV None 10/19/2020    SAIIREPCOM  09/15/2022      Test performed by modified procedure. Serum heat inactivated and tested by a modified ([place]) protocol including fetal calf serum addition. High-risk, mfi >3,000. Mod-risk, mfi 500-3,000.    SAIIREPCOM  10/19/2020      Test performed by modified procedure. Serum heat inactivated and tested   by a modified ([place]) protocol including fetal calf serum addition.   High-risk, mfi >3,000. Mod-risk, mfi 500-3,000.       Lab Results   Component Value Date    CSPEC EDTA PLASMA 10/19/2020    CMQNT <100 03/17/2014    CMLOG  03/17/2014     <2.0  The Cytomegalovirus DNA Quantitation assay is a real-time polymerase chain   reaction  (PCR) utilizing analyte specific reagents manufactured by [employer]   [facility]. Analyte Specific Reagents (ASRs) are used in many laboratory   tests necessary for standard medical care and generally do not require FDA   approval.   This test was developed and its performance characteristics determined by   [facility].  It has not been   cleared or approved by the US Food and Drug Administration.       Diagnostic Studies:  caridac MRI 9/15/22 w contrast and stress  1. The LV is normal in cavity size and wall thickness. The global systolic function is normal. The LVEF is  57%. There are no regional wall motion abnormalities.  2. The RV is normal in cavity size. The global systolic function is normal. The RVEF is 55%.   3. Both atria are normal in size.  4. There is no significant valvular disease.   5. Late gadolinium enhancement imaging shows no MI, fibrosis or infiltrative disease.   6. Regadenoson stress perfusion imaging shows no ischemia.  7. There is no pericardial effusion or thickening.  8. There is no intracardiac thrombus.     CONCLUSIONS: Normal cardiac function, LVEF of 57% and RVEF 55%. There is no evidence of ischemia on stress perfusion imaging.     TTE 9/11/23  Global and regional left ventricular function is normal with an EF of 55-60%.  Right ventricular function, chamber size, wall motion, and thickness are     normal.  Pulmonary artery systolic pressure cannot be assessed.  The inferior vena cava is normal.  No pericardial effusion is present.  No significant changes noted.    RHC/cor angio 10/19/20  Left Main   The vessel is large and is angiographically normal.   Left Anterior Descending   The vessel is large.   First Diagonal Branch   The vessel is small and is angiographically normal.   Second Diagonal Branch   The vessel is small and is angiographically normal.   Ramus Intermedius   The vessel is moderate in size and is angiographically normal.    Lateral Ramus Intermedius   The vessel is small and is angiographically normal.   Left Circumflex   The vessel is large and is angiographically normal.   First Obtuse Marginal Branch   The vessel is small and is angiographically normal.   Second Obtuse Marginal Branch   The vessel is large and is angiographically normal.   Third Obtuse Marginal Branch   The vessel is small and is angiographically normal.   Right Coronary Artery   The vessel is large and is angiographically normal.   Acute Marginal Branch   The vessel is small.   Right Ventricular Branch   The vessel is small.   Right Posterior Descending Artery   The vessel is moderate in size and is angiographically normal.   Right Posterior Atrioventricular Artery   The vessel is moderate in size and is angiographically normal.   First Right Posterolateral Branch   The vessel is small and is angiographically normal.   Second Right Posterolateral Branch   The vessel is moderate in size and is angiographically normal.   Third Right Posterolateral Branch   The vessel is small and is angiographically normal.     Ao 97/72/80  RA 7/8/6  RV 25/6  PA 22/11/15  PCWP 14/15/10  Cardiac output by Francisca: 7.4 L/min (indexed to 3.6 L/min/m2)  Cardiac output by thermodilution: 5.3 L/min (indexed to 2.6 L/min/m2)  SVR by TD CO: 1117  PVR by TD CO: 0.9 Right sided filling pressures are normal. Left sided filling pressures are normal. Normal PA pressures. Normal cardiac output level. Hemodynamic data has been modified in Epic per physician review.       Assessment/Plan:  Mr. Viramontes is a 27 year old male who is s/p orthotopic heart transplant in 2012 who presents to clinic for annual exam. From a transplant standpoint he is doing well. Last cardiac MRI with contrast and stress today was normal. He has no e/o graft dysfunction. No recent episodes of rejection. He has no DSAs (prior DSAs that he cleared). He has gained weight since last year likely diet related. Discussed today.      # Status post OHT in 2012, history of NICM 2/2 viral myocardititis  # Chronic immunosuppression  * Rejection history: three episodes in 2017 treated with steroids  * Last biopsy: 3/2018 negative for ACR or AMR    Immunosuppression:  - Prednisone 5 mg daily  - Tacrolimus, trough level goal 6-8., pending today   -  mg bid    Cardiac allograft vasculopathy ppx:  -he is on pravastatin and aspirin 81 mg, consider  change to crestor 10 mg daily next visit or if angiogram today shows any disease (LDL 91 today)    # HTN  At goal on amlodipine 10 mg daily    # Health maintenance:  - discussed derm referral for skin check  - yearly eye exam and dental q6yr  - recommend yearly flu vaccine and COVID booster when avaiable      25 minutes spent face-to-face with patient, >50% in counseling and/or coordination of care as described above      Yamilka Carmichael, DEVYN, NP-C  9/11/2023              KIYA BESS

## 2023-09-11 NOTE — PROGRESS NOTES
D/I/A:  Patient is tolerating liquids and foods, ambulating, urinating, puncture sites are stable ( no bleeding and no hematoma) and patient has a .  A&Ox4 and making needs known. CCL access sites: R groin C/D/I; no bleeding or hematoma; CMS intact. VSSA. NSR on monitor.  IV access removed.  Education completed and outlined in AVS or handout: educated patient and Mom Jolly on activity restrictions, when to seek medical attention, medications reviewed with patient.  Questions answered prior to discharge.  Belongings returned to patient at discharge. Patient transported to front door via wheelchair, sister Ashwin to transport patient home.  P: Discharged to self care.  Patient to follow up with appts as per discharge instruction.

## 2023-09-11 NOTE — PATIENT INSTRUCTIONS
Please call your transplant coordinator at [phone] with any questions or concerns.  Please note: after hours, weekends and holidays, this phone number is routed to an  to page out the coordinator on call.     Coordinator will update you on remaining results.     Next lab draw: Pending today's results and full fasting labs with level in 6 months     Derm - referral placed  Dental - please schedule   Eye - please see every 1-2 years    Flu and COVID vaccine this fall     Check BP periodically; if 2 or more readings greater than 140/90 contact transplant team

## 2023-09-11 NOTE — PROGRESS NOTES
Prep and teaching complete for CORS/angiogram and RHC; pt awake and alert, denies pain. Has ride post procedure, family at bedside; Mom Jolly will stay until sister Ashwin will come to drive pt home. Pt voided; contrast reviewed, paper to pt.  Awaiting consent; text paged SALMA. Pt prefers groin not wrist as the right wrist/arm is his usable arm. Mag 1.6, note to SALMA; Dr Bynum aware of Mag result, no replacement needed.

## 2023-09-11 NOTE — DISCHARGE INSTRUCTIONS
Going Home after an Coronary Angiogram        After you go home:  Have an adult stay with you for 24 hours.  Drink plenty of fluids.  You may eat your normal diet, unless your doctor tells you otherwise.  For 24 hours:  - Relax and take it easy.  - Do NOT smoke.  - Do NOT make any important or legal decisions.  - Do NOT drive or operate machines at home or at work.  - Do NOT drink alcohol.    Remove the Band-Aid after 24 hours. If there is minor oozing, apply another Band-aid and remove it after 12 hours.  For 2 days, do NOT have sex or do any heavy exercise.  Do NOT take a bath, or use a hot tub or pool for at least 3 days. You may shower.  Care of groin site  It is normal to have a small bruise or lump at the site.  Do NOT scrub the site.  For the first 2 days, when you cough, sneeze or move your bowels, hold your hand over the puncture site and press gently.  Do NOT lift more than 10 pounds for at least 3 to 5 days.  Do not use lotion or powder near the puncture site for 3 days.  If you start bleeding from the site in your groin, lie down flat and press firmly on the site. Call  your doctor as soon as you can.    Medicines  If you have begun Plavix or Effient (with a stent), do not stop taking it until you talk to your heart doctor (cardiologist).  If you are on metformin (Glucophage), do not restart it until you have blood tests (within 2 to 3 days after discharge). When your doctor tells you it is safe, you may restart the metformin.  Call your doctor if:  You have a large or growing hard lump around the site.    The site is red, swollen, hot or tender.  Blood or fluid is draining from the site.  You have chills or a fever greater than 101 F (38 C).  Your leg turns bluish, feels numb or cool.  You have hives, a rash or unusual itching.  Call 911 right away if you have:   Bleeding that does not stop.   Heavy bleeding.  [facility] [place]:  [phone] (7 days a  week)

## 2023-09-11 NOTE — PROGRESS NOTES
D/I/A: Pt roomed on 3C in bay [address].  Arrived via litter and accompanied by Rosa Maria SOTOMAYOR On/Off: Off monitor.  VSSA.  Rhythm upon arrival NSR on monitor.  Denies pain or sob.  Reviewed activity restrictions and when to notify RN, ie-changes to breathing or increased chest pressure or chest pain.  CCL access: R groin site, CDI, CMS+.  P: Continue to monitor status.  Discharge to home once meeting criteria.

## 2023-09-11 NOTE — Clinical Note
dry, intact, no bleeding and no hematoma. 4 Fr right femoral arterial sheath removed, manual pressure held until hemostasis. No bleeding, oozing, or hematoma. 7 fr venous sheath removed from same side and manual pressure held.  Both sites are soft, dry, and nontender upon transfer from cath lab.

## 2023-09-11 NOTE — LETTER
9/11/2023      RE: Obed Viramontes  [address]  [facility] [address]       Dear Colleague,    Thank you for the opportunity to participate in the care of your patient, Obed Viramontes, at the [facility] [place] at [facility]. Please see a copy of my visit note below.    ADULT HEART TRANSPLANT CLINIC    HPI:   Mr. Viramontes is a 27 year old male who presents to clinic with his mother for routine annual exam. 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    Since last annual visit patient reports feeling well. His mom reports increased reactions lately to his known food allergens (eggs, onion, garlic). Symptoms are usually diarrhea lasting ~2 hours, resolves on it's own. No vomiting or symptoms of anaphylaxis or hives. He does not see an allergist. He continues to work at a [employer]. He describes his job as active, denies any exertional chest pain, shortness of breath, lightheadedness. No edema. His diet is admittedly a lot of fried foods. He does not routine check BP. No recent fever, chills, night sweats, unexplained weight loss, rashes, mouth sores.     PAST MEDICAL HISTORY:  Past Medical History:   Diagnosis Date    Cardiomyopathy, hypertrophic obstructive (H)     H/O heart transplant (H)     Mar.23, 2012    Hemiplegia following CVA (cerebrovascular accident) (H)     left, improved with rehab    Lymphoproliferative disease (H)     Unspecified cerebral artery occlusion with cerebral infarction     age 2 1/2       FAMILY HISTORY:  Family History   Problem Relation Age of Onset    Diabetes Sister     Melanoma No family hx of     Skin Cancer No family hx of         SOCIAL HISTORY:  Social History    Marital status: Single     Spouse name: N/A    Number of children: N/A    Years of education: N/A     Social History Main Topics    Smoking status: Never Smoker    Smokeless tobacco: Never Used    Alcohol use No    Drug use: No    Sexual activity: No     Other Topics Concern    Not on file     Social History Narrative    Lives in [place] with father, [place]. Works at [employer] as a manager, watches TV shows and plays video games.  Went to [facility].  Has 3 sisters and a brother.  Mom and dad  (2010).        CURRENT MEDICATIONS:  amLODIPine (NORVASC) 5 MG tablet, TAKE TWO TABLETS BY MOUTH ONCE DAILY  aspirin 81 MG EC tablet, Take 2 tablets (162 mg) by mouth daily  Cholecalciferol (VITAMIN D3) 2000 UNITS TABS, Take 2,000 Units by mouth daily  mycophenolate (GENERIC EQUIVALENT) 500 MG tablet, Take 1 tablet (500 mg) by mouth 2 times daily  pravastatin (PRAVACHOL) 10 MG tablet, TAKE ONE TABLET BY MOUTH EVERY NIGHT AT BEDTIME  predniSONE (DELTASONE) 5 MG tablet, TAKE ONE TABLET BY MOUTH ONCE DAILY  tacrolimus (GENERIC EQUIVALENT) 1 MG capsule, Take 2 capsules (2 mg) by mouth 2 times daily    No current facility-administered medications on file prior to visit.      ROS:  See HPI    EXAM:  /89 (BP Location: Right arm, Patient Position: Chair, Cuff Size: Adult Regular)   Pulse 93   Wt 92.4 kg (203 lb 9.6 oz)   SpO2 96%   BMI 26.86 kg/m      GENERAL: Appears comfortable, in no acute distress.   HEENT: Eye symmetrical, no discharge or icterus bilaterally. Mucous membranes moist and without lesions. No thrush.   CV: Tachycardic and regular, +S1S2, no murmur, rub, or gallop. JVP not visible.   RESPIRATORY: Respirations regular, even, and unlabored. Lungs CTA throughout.   GI: Soft and non distended with normoactive bowel sounds present in all quadrants. No tenderness, rebound, guarding. No hepatomegaly.   EXTREMITIES: No peripheral edema. 2+ bilateral pedal pulses.    NEUROLOGIC: Alert and oriented x 3. No focal deficits.   MUSCULOSKELETAL: left sided hemiplegia. Tenderness to palpation over AC joint. Skin is normal without redness, warmth, or lesions.  SKIN: No jaundice. No rashes or lesions.     Labs - reviewed with patient in clinic today:  CBC RESULTS:  Lab Results   Component Value Date    WBC 5.2 09/11/2023    WBC 5.5 06/18/2021    RBC 5.62 09/11/2023    RBC 5.70 06/18/2021    HGB 16.0 09/11/2023    HGB 16.8 06/18/2021    HCT 46.3 09/11/2023    HCT 49.2 06/18/2021    MCV 82 09/11/2023    MCV 86 06/18/2021    MCH 28.5 09/11/2023    MCH 29.5 06/18/2021    MCHC 34.6 09/11/2023    MCHC 34.1 06/18/2021    RDW 12.7 09/11/2023    RDW 13.1 06/18/2021     09/11/2023     06/18/2021       CMP RESULTS:  Lab Results   Component Value Date     09/11/2023     06/18/2021    POTASSIUM 4.0 09/11/2023    POTASSIUM 3.7 05/19/2022    POTASSIUM 3.8 06/18/2021    CHLORIDE 103 09/11/2023    CHLORIDE 107 05/19/2022    CHLORIDE 105 06/18/2021    CO2 27 09/11/2023    CO2 30 05/19/2022    CO2 29 06/18/2021    ANIONGAP 10 09/11/2023    ANIONGAP 5 05/19/2022    ANIONGAP 5 06/18/2021     (H) 09/11/2023    GLC 94 05/19/2022    GLC 91 06/18/2021    BUN 12.9 09/11/2023    BUN 13 05/19/2022    BUN 17 06/18/2021    CR 0.75 09/11/2023    CR 0.77 06/18/2021    GFRESTIMATED >90 09/11/2023    GFRESTIMATED >90 06/18/2021    GFRESTBLACK >90 06/18/2021    CARLOS A 9.2 09/11/2023    CARLOS A 8.9 06/18/2021    BILITOTAL 0.6 09/11/2023    BILITOTAL 0.7 06/18/2021    ALBUMIN 4.6 09/11/2023    ALBUMIN 4.3 04/13/2022    ALBUMIN 4.2 06/18/2021    ALKPHOS 72 09/11/2023    ALKPHOS 75 06/18/2021    ALT 19 09/11/2023    ALT 33 06/18/2021    AST 20 09/11/2023    AST 16 06/18/2021        INR RESULTS:  Lab Results   Component Value Date    INR 1.00 01/12/2017       LIPID RESULTS:  Lab Results   Component Value Date    CHOL 159 09/11/2023    CHOL 172 06/18/2021    HDL 55 09/11/2023    HDL 58 06/18/2021     LDL 91 09/11/2023    LDL 96 06/18/2021    TRIG 63 09/11/2023    TRIG 89 06/18/2021    CHOLHDLRATIO 3.0 06/17/2013       IMMUNOSUPPRESSANT LEVELS:  Lab Results   Component Value Date    TACROL 5.8 09/15/2022    TACROL 7.5 06/18/2021    DOSTAC 9/14/2022 09/15/2022    DOSTAC 55 06/18/2021    RAPAMY <2.0 (L) 01/31/2018       No components found for: CK  Lab Results   Component Value Date    MAG 1.6 (L) 09/11/2023    MAG 1.5 (L) 06/18/2021     No results found for: A1C  Lab Results   Component Value Date    PHOS 2.5 09/11/2023    PHOS 3.4 06/18/2021     Lab Results   Component Value Date    NTBNP 53 08/08/2018     Lab Results   Component Value Date    SAITESTMET [facility] [place] 09/15/2022    SAITESTMET [facility] [place] 10/19/2020    SAICELL Class I 09/15/2022    SAICELL Class I 10/19/2020    OF2BFDOMP None 09/15/2022    FA1CZIBQV None 10/19/2020    JQ9HWMXHMZ Cw:4 09/15/2022    PE0SQKBLWZ B:46 82 Cw:6 17 10/19/2020    SAIREPCOM  09/15/2022      Test performed by modified procedure. Serum heat inactivated and tested by a modified ([place]) protocol including fetal calf serum addition. High-risk, mfi >3,000. Mod-risk, mfi 500-3,000.    SAIREPCOM  10/19/2020      Test performed by modified procedure. Serum heat inactivated and tested   by a modified ([place]) protocol including fetal calf serum addition.   High-risk, mfi >3,000. Mod-risk, mfi 500-3,000.       Lab Results   Component Value Date    SAIITESTME [facility] [place] 09/15/2022    SAIITESTME [facility] [place] 10/19/2020    SAIICELL Class II 09/15/2022    SAIICELL Class II 10/19/2020    QU8TFHBCE None 09/15/2022    NM4TIXYSS None 10/19/2020    GV9IXVIUXQ None 09/15/2022    [id number] None 10/19/2020    SAIIREPCOM  09/15/2022      Test performed by modified procedure. Serum heat inactivated and tested by a modified ([place]) protocol including fetal calf serum addition. High-risk, mfi >3,000. Mod-risk, mfi 500-3,000.    SAIIREPCOM  10/19/2020      Test performed by modified procedure. Serum heat inactivated and  tested   by a modified ([place]) protocol including fetal calf serum addition.   High-risk, mfi >3,000. Mod-risk, mfi 500-3,000.       Lab Results   Component Value Date    [facility] EDTA PLASMA 10/19/2020    CMQNT <100 03/17/2014    CMLOG  03/17/2014     <2.0  The Cytomegalovirus DNA Quantitation assay is a real-time polymerase chain   reaction (PCR) utilizing analyte specific reagents manufactured by [employer]   [employer]. Analyte Specific Reagents (ASRs) are used in many laboratory   tests necessary for standard medical care and generally do not require FDA   approval.   This test was developed and its performance characteristics determined by   [facility].  It has not been   cleared or approved by the US Food and Drug Administration.       Diagnostic Studies:  caridac MRI 9/15/22 w contrast and stress  1. The LV is normal in cavity size and wall thickness. The global systolic function is normal. The LVEF is  57%. There are no regional wall motion abnormalities.  2. The RV is normal in cavity size. The global systolic function is normal. The RVEF is 55%.   3. Both atria are normal in size.  4. There is no significant valvular disease.   5. Late gadolinium enhancement imaging shows no MI, fibrosis or infiltrative disease.   6. Regadenoson stress perfusion imaging shows no ischemia.  7. There is no pericardial effusion or thickening.  8. There is no intracardiac thrombus.     CONCLUSIONS: Normal cardiac function, LVEF of 57% and RVEF 55%. There is no evidence of ischemia on stress perfusion imaging.     TTE 9/11/23  Global and regional left ventricular function is normal with an EF of 55-60%.  Right ventricular function, chamber size, wall motion, and thickness are     normal.  Pulmonary artery systolic pressure cannot be assessed.  The inferior vena cava is normal.  No pericardial effusion is present.  No significant changes noted.    RHC/cor angio 10/19/20  Left Main   The  vessel is large and is angiographically normal.   Left Anterior Descending   The vessel is large.   First Diagonal Branch   The vessel is small and is angiographically normal.   Second Diagonal Branch   The vessel is small and is angiographically normal.   Ramus Intermedius   The vessel is moderate in size and is angiographically normal.   Lateral Ramus Intermedius   The vessel is small and is angiographically normal.   Left Circumflex   The vessel is large and is angiographically normal.   First Obtuse Marginal Branch   The vessel is small and is angiographically normal.   Second Obtuse Marginal Branch   The vessel is large and is angiographically normal.   Third Obtuse Marginal Branch   The vessel is small and is angiographically normal.   Right Coronary Artery   The vessel is large and is angiographically normal.   Acute Marginal Branch   The vessel is small.   Right Ventricular Branch   The vessel is small.   Right Posterior Descending Artery   The vessel is moderate in size and is angiographically normal.   Right Posterior Atrioventricular Artery   The vessel is moderate in size and is angiographically normal.   First Right Posterolateral Branch   The vessel is small and is angiographically normal.   Second Right Posterolateral Branch   The vessel is moderate in size and is angiographically normal.   Third Right Posterolateral Branch   The vessel is small and is angiographically normal.     Ao 97/72/80  RA 7/8/6  RV 25/6  PA 22/11/15  PCWP 14/15/10  Cardiac output by Francisca: 7.4 L/min (indexed to 3.6 L/min/m2)  Cardiac output by thermodilution: 5.3 L/min (indexed to 2.6 L/min/m2)  SVR by TD CO: 1117  PVR by TD CO: 0.9 Right sided filling pressures are normal. Left sided filling pressures are normal. Normal PA pressures. Normal cardiac output level. Hemodynamic data has been modified in Epic per physician review.       Assessment/Plan:  Mr. Viramontes is a 27 year old male who is s/p orthotopic heart transplant in  2012 who presents to clinic for annual exam. From a transplant standpoint he is doing well. Last cardiac MRI with contrast and stress today was normal. He has no e/o graft dysfunction. No recent episodes of rejection. He has no DSAs (prior DSAs that he cleared). He has gained weight since last year likely diet related. Discussed today.     # Status post OHT in 2012, history of NICM 2/2 viral myocardititis  # Chronic immunosuppression  * Rejection history: three episodes in 2017 treated with steroids  * Last biopsy: 3/2018 negative for ACR or AMR    Immunosuppression:  - Prednisone 5 mg daily  - Tacrolimus, trough level goal 6-8., pending today   -  mg bid    Cardiac allograft vasculopathy ppx:  -he is on pravastatin and aspirin 81 mg, consider  change to crestor 10 mg daily next visit or if angiogram today shows any disease (LDL 91 today)    # HTN  At goal on amlodipine 10 mg daily    # Health maintenance:  - discussed derm referral for skin check  - yearly eye exam and dental q6yr  - recommend yearly flu vaccine and COVID booster when avaiable      25 minutes spent face-to-face with patient, >50% in counseling and/or coordination of care as described above      Yamilka Carimchael, DEVYN, NP-C  9/11/2023    CC  KIYA PARKER

## 2023-09-11 NOTE — NURSING NOTE
Chief Complaint   Patient presents with    Follow Up     Return Heart Transplant     Vitals were taken and medications reconciled.    Aiden Diallo, LALITA  10:30 AM

## 2023-09-11 NOTE — NURSING NOTE
Transplant Coordinator Note  Reason for visit: Annual      Health concerns addressed today:  Pt seen in clinic with DEVYN Carmichael. DNP reviewed available labs, meds and test results; angio to follow.  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 Occas loose stools with certain foods - tries his best to avoid. Weight up  -> candy, pop and curly fires. Moderation encouraged. Does not check BP at home, enc to check periodically     Preventive cares reviewed.      Immunosuppressants:  ~ mg BID  ~Tac 2/2 mg (goal 6-8). Level pending   ~Pred 5 mg daily      Routine screenings:    Derm: DUE  Dental: UTD  Eye: UTD  Flu vaccine due this fall   COVID  LD March 2022   P-13 UTD      Medication record reviewed and reconciled. Questions and concerns addressed. Copy of AVS provided. Pt verbalized an understanding of plan of care.      Patient Instructions  ~Please call your transplant coordinator at [phone] with any questions or concerns.  Please note: after hours, weekends and holidays, this phone number is routed to an  to page out the coordinator on call.   ~Coordinator will update you on remaining results.   ~Next lab draw: Pending today's results and full fasting labs with level in 6 months   ~Derm - referral placed  ~Dental - please schedule   ~Eye - please see every 1-2 years  ~Flu and COVID vaccine this fall   ~Check BP periodically; if 2 or more readings greater than 140/90 contact transplant team

## 2023-09-12 LAB
ATRIAL RATE - MUSE: 91 BPM
DIASTOLIC BLOOD PRESSURE - MUSE: NORMAL MMHG
EBV DNA # SPEC NAA+PROBE: <500 COPIES/ML
EBV DNA SPEC NAA+PROBE-LOG#: <2.7 {LOG_COPIES}/ML
INTERPRETATION ECG - MUSE: NORMAL
P AXIS - MUSE: 46 DEGREES
PR INTERVAL - MUSE: 152 MS
QRS DURATION - MUSE: 98 MS
QT - MUSE: 352 MS
QTC - MUSE: 432 MS
R AXIS - MUSE: 18 DEGREES
SYSTOLIC BLOOD PRESSURE - MUSE: NORMAL MMHG
T AXIS - MUSE: 42 DEGREES
VENTRICULAR RATE- MUSE: 91 BPM

## 2023-09-14 DIAGNOSIS — Z94.1 HEART REPLACED BY TRANSPLANT (H): ICD-10-CM

## 2023-09-14 LAB — IMMUKNOW IMMUNE CELL FUNCTION: 346 NG/ML

## 2023-09-14 RX ORDER — TACROLIMUS 0.5 MG/1
CAPSULE ORAL
Qty: 90 CAPSULE | Refills: 3 | Status: SHIPPED | OUTPATIENT
Start: 2023-09-14

## 2023-09-14 RX ORDER — TACROLIMUS 1 MG/1
CAPSULE ORAL
Qty: 270 CAPSULE | Refills: 3 | Status: SHIPPED | OUTPATIENT
Start: 2023-09-14

## 2023-10-20 ENCOUNTER — MYC MEDICAL ADVICE (OUTPATIENT)
Dept: TRANSPLANT | Facility: CLINIC | Age: 28
End: 2023-10-20
Payer: MEDICAID

## 2023-11-12 ENCOUNTER — HEALTH MAINTENANCE LETTER (OUTPATIENT)
Age: 28
End: 2023-11-12

## 2023-11-20 ENCOUNTER — TELEPHONE (OUTPATIENT)
Dept: TRANSPLANT | Facility: CLINIC | Age: 28
End: 2023-11-20
Payer: MEDICAID

## 2023-12-01 ENCOUNTER — TELEPHONE (OUTPATIENT)
Dept: TRANSPLANT | Facility: CLINIC | Age: 28
End: 2023-12-01
Payer: MEDICAID

## 2023-12-16 DIAGNOSIS — Z94.1 HEART REPLACED BY TRANSPLANT (H): ICD-10-CM

## 2023-12-19 RX ORDER — AMLODIPINE BESYLATE 5 MG/1
TABLET ORAL
Qty: 180 TABLET | Refills: 3 | Status: SHIPPED | OUTPATIENT
Start: 2023-12-19

## 2024-02-09 ENCOUNTER — TELEPHONE (OUTPATIENT)
Dept: TRANSPLANT | Facility: CLINIC | Age: 29
End: 2024-02-09
Payer: COMMERCIAL

## 2024-02-09 NOTE — TELEPHONE ENCOUNTER
MyC reported COVID pos    LVM to cont to monitor symptoms, stay hydrated, Tylenol as needed. Call if symptoms worsen. Also responded to My Chart message

## 2024-03-29 ENCOUNTER — TELEPHONE (OUTPATIENT)
Dept: TRANSPLANT | Facility: CLINIC | Age: 29
End: 2024-03-29
Payer: COMMERCIAL

## 2024-03-29 DIAGNOSIS — Z13.220 LIPID SCREENING: ICD-10-CM

## 2024-03-29 DIAGNOSIS — Z94.1 HEART REPLACED BY TRANSPLANT (H): Primary | ICD-10-CM

## 2024-03-29 NOTE — TELEPHONE ENCOUNTER
I spoke with Jolly Obed's mother and she will have him go in to a [place] facility within the next two weeks to have his labs drawn. Orders were placed in epic.

## 2024-04-08 ENCOUNTER — LAB (OUTPATIENT)
Dept: LAB | Facility: CLINIC | Age: 29
End: 2024-04-08
Payer: COMMERCIAL

## 2024-04-08 DIAGNOSIS — Z94.1 HEART REPLACED BY TRANSPLANT (H): ICD-10-CM

## 2024-04-08 DIAGNOSIS — Z13.220 LIPID SCREENING: ICD-10-CM

## 2024-04-08 LAB
BASOPHILS # BLD AUTO: 0 10E3/UL (ref 0–0.2)
BASOPHILS NFR BLD AUTO: 0 %
EOSINOPHIL # BLD AUTO: 0.1 10E3/UL (ref 0–0.7)
EOSINOPHIL NFR BLD AUTO: 2 %
ERYTHROCYTE [DISTWIDTH] IN BLOOD BY AUTOMATED COUNT: 12.8 % (ref 10–15)
HCT VFR BLD AUTO: 46.2 % (ref 40–53)
HGB BLD-MCNC: 15.8 G/DL (ref 13.3–17.7)
IMM GRANULOCYTES # BLD: 0 10E3/UL
IMM GRANULOCYTES NFR BLD: 0 %
LYMPHOCYTES # BLD AUTO: 1.3 10E3/UL (ref 0.8–5.3)
LYMPHOCYTES NFR BLD AUTO: 26 %
MCH RBC QN AUTO: 28.4 PG (ref 26.5–33)
MCHC RBC AUTO-ENTMCNC: 34.2 G/DL (ref 31.5–36.5)
MCV RBC AUTO: 83 FL (ref 78–100)
MONOCYTES # BLD AUTO: 0.6 10E3/UL (ref 0–1.3)
MONOCYTES NFR BLD AUTO: 11 %
NEUTROPHILS # BLD AUTO: 2.9 10E3/UL (ref 1.6–8.3)
NEUTROPHILS NFR BLD AUTO: 60 %
PLATELET # BLD AUTO: 255 10E3/UL (ref 150–450)
RBC # BLD AUTO: 5.56 10E6/UL (ref 4.4–5.9)
TACROLIMUS BLD-MCNC: 6.6 UG/L (ref 5–15)
TME LAST DOSE: NORMAL H
TME LAST DOSE: NORMAL H
WBC # BLD AUTO: 4.8 10E3/UL (ref 4–11)

## 2024-04-08 PROCEDURE — 80053 COMPREHEN METABOLIC PANEL: CPT

## 2024-04-08 PROCEDURE — 80197 ASSAY OF TACROLIMUS: CPT

## 2024-04-08 PROCEDURE — 83735 ASSAY OF MAGNESIUM: CPT

## 2024-04-08 PROCEDURE — 82550 ASSAY OF CK (CPK): CPT

## 2024-04-08 PROCEDURE — 82248 BILIRUBIN DIRECT: CPT

## 2024-04-08 PROCEDURE — 80061 LIPID PANEL: CPT

## 2024-04-08 PROCEDURE — 36415 COLL VENOUS BLD VENIPUNCTURE: CPT

## 2024-04-08 PROCEDURE — 84100 ASSAY OF PHOSPHORUS: CPT

## 2024-04-08 PROCEDURE — 85025 COMPLETE CBC W/AUTO DIFF WBC: CPT

## 2024-04-09 LAB
ALBUMIN SERPL BCG-MCNC: 4.4 G/DL (ref 3.5–5.2)
ALP SERPL-CCNC: 86 U/L (ref 40–150)
ALT SERPL W P-5'-P-CCNC: 31 U/L (ref 0–70)
ANION GAP SERPL CALCULATED.3IONS-SCNC: 10 MMOL/L (ref 7–15)
AST SERPL W P-5'-P-CCNC: 26 U/L (ref 0–45)
BILIRUB DIRECT SERPL-MCNC: <0.2 MG/DL (ref 0–0.3)
BILIRUB SERPL-MCNC: 0.4 MG/DL
BUN SERPL-MCNC: 13.2 MG/DL (ref 6–20)
CALCIUM SERPL-MCNC: 9.3 MG/DL (ref 8.6–10)
CHLORIDE SERPL-SCNC: 105 MMOL/L (ref 98–107)
CHOLEST SERPL-MCNC: 165 MG/DL
CK SERPL-CCNC: 139 U/L (ref 39–308)
CREAT SERPL-MCNC: 0.76 MG/DL (ref 0.67–1.17)
DEPRECATED HCO3 PLAS-SCNC: 27 MMOL/L (ref 22–29)
EGFRCR SERPLBLD CKD-EPI 2021: >90 ML/MIN/1.73M2
FASTING STATUS PATIENT QL REPORTED: ABNORMAL
GLUCOSE SERPL-MCNC: 87 MG/DL (ref 70–99)
HDLC SERPL-MCNC: 44 MG/DL
LDLC SERPL CALC-MCNC: 106 MG/DL
MAGNESIUM SERPL-MCNC: 1.8 MG/DL (ref 1.7–2.3)
NONHDLC SERPL-MCNC: 121 MG/DL
PHOSPHATE SERPL-MCNC: 2.4 MG/DL (ref 2.5–4.5)
POTASSIUM SERPL-SCNC: 4.1 MMOL/L (ref 3.4–5.3)
PROT SERPL-MCNC: 7.4 G/DL (ref 6.4–8.3)
SODIUM SERPL-SCNC: 142 MMOL/L (ref 135–145)
TRIGL SERPL-MCNC: 74 MG/DL

## 2024-04-10 NOTE — RESULT ENCOUNTER NOTE
LVM and sent MyC 6 month labs.   Tacrolimus level 6.6; goal 6-8 - no dose change   ; enc to watch diet and weight.    Return in fall for annual

## 2024-04-16 ENCOUNTER — OFFICE VISIT (OUTPATIENT)
Dept: DERMATOLOGY | Facility: CLINIC | Age: 29
End: 2024-04-16
Attending: NURSE PRACTITIONER
Payer: COMMERCIAL

## 2024-04-16 DIAGNOSIS — Z94.1 HEART REPLACED BY TRANSPLANT (H): ICD-10-CM

## 2024-04-16 DIAGNOSIS — D18.01 ANGIOMA OF SKIN: ICD-10-CM

## 2024-04-16 DIAGNOSIS — D84.9 IMMUNOSUPPRESSED STATUS (H): ICD-10-CM

## 2024-04-16 DIAGNOSIS — D22.9 NEVUS: Primary | ICD-10-CM

## 2024-04-16 DIAGNOSIS — L81.4 LENTIGO: ICD-10-CM

## 2024-04-16 PROCEDURE — 99203 OFFICE O/P NEW LOW 30 MIN: CPT | Performed by: PHYSICIAN ASSISTANT

## 2024-04-16 ASSESSMENT — PAIN SCALES - GENERAL: PAINLEVEL: NO PAIN (0)

## 2024-04-16 NOTE — PROGRESS NOTES
HPI:   Chief complaints: Obed Viramontes is a pleasant 28 year old male who presents for Full skin cancer screening to rule out skin cancer   Last Skin Exam: n/a      1st Baseline: yes  Personal HX of Skin Cancer: no   Personal HX of Malignant Melanoma: no   Family HX of Skin Cancer / Malignant Melanoma: no  Personal HX of Atypical Moles:   no  Risk factors: Heart transplant 3/2012; immunosuppression  New / Changing lesions:none  Social History:   On review of systems, there are no further skin complaints, patient is feeling otherwise well.   ROS of the following were done and are negative: Constitutional, Eyes, Ears, Nose,   Mouth, Throat, Cardiovascular, Respiratory, GI, Genitourinary, Musculoskeletal,   Psychiatric, Endocrine, Allergic/Immunologic.    PHYSICAL EXAM:   There were no vitals taken for this visit.  Skin exam performed as follows: Type 2 skin. Mood appropriate  Alert and Oriented X 3. Well developed, well nourished in no distress.  General appearance: Normal  Head including face: Normal  Eyes: conjunctiva and lids: Normal  Mouth: Lips, teeth, gums: Normal  Neck: Normal  Chest-breast/axillae: Normal  Back: Normal  Extremities: digits/nails (clubbing): Normal  Eccrine and Apocrine glands: Normal  Right upper extremity: Normal  Left upper extremity: Normal  Right lower extremity: Normal  Left lower extremity: Normal  Skin: Scalp and body hair: See below    Pt deferred exam of breasts, groin, buttocks: No    Other physical findings:  1. Multiple pigmented macules on extremities and trunk  2. Multiple pigmented macules on face, trunk and extremities  3. Multiple vascular papules on trunk, arms and legs       Except as noted above, no other signs of skin cancer or melanoma.     ASSESSMENT/PLAN:   Benign Full skin cancer screening today. . Patient with history of none; currently immunosuppressed due to heart transplant  Advised on monthly self exams and 1 year  Patient Education: Appropriate brochures  given.    Multiple benign appearing melanocytic nevi on arms, legs and trunk. Discussed ABCDEs of melanoma and sunscreen.   Multiple lentigos on arms, legs and trunk. Advised benign, no treatment needed.  Multiple scattered angiomas. Advised benign, no treatment needed.           Follow-up: yearly/PRN sooner    1.) Patient was asked about new and changing moles. YES  2.) Patient received a complete physical skin examination: YES  3.) Patient was counseled to perform a monthly self skin examination: YES  Scribed By: Janice Luu MS, PA-C

## 2024-04-16 NOTE — PATIENT INSTRUCTIONS
Patient Education       Proper skin care from [facility]:    -Eliminate harsh soaps as they strip the natural oils from the skin, often resulting in dry itchy skin ( i.e. Dial, Zest, Yi Spring)  -Use mild soaps such as Cetaphil or Dove Sensitive Skin in the shower. You do not need to use soap on arms, legs, and trunk every time you shower unless visibly soiled.   -Avoid hot or cold showers.  -After showering, lightly dry off and apply moisturizing within 2-3 minutes. This will help trap moisture in the skin.   -Aggressive use of a moisturizer at least 1-2 times a day to the entire body (including -Vanicream, Cetaphil, Aquaphor or Cerave) and moisturize hands after every washing.  -We recommend using moisturizers that come in a tub that needs to be scooped out, not a pump. This has more of an oil base. It will hold moisture in your skin much better than a water base moisturizer. The above recommended are non-pore clogging.      Wear a sunscreen with at least SPF 30 on your face, ears, neck and V of the chest daily. Wear sunscreen on other areas of the body if those areas are exposed to the sun throughout the day. Sunscreens can contain physical and/or chemical blockers. Physical blockers are less likely to clog pores, these include zinc oxide and titanium dioxide. Reapply every two hour and after swimming.     Sunscreen examples: https://www.ewg.org/sunscreen/    UV radiation  UVA radiation remains constant throughout the day and throughout the year. It is a longer wavelength than UVB and therefore penetrates deeper into the skin leading to immediate and delayed tanning, photoaging, and skin cancer. 70-80% of UVA and UVB radiation occurs between the hours of 10am-2pm.  UVB radiation  UVB radiation causes the most harmful effects and is more significant during the summer months. However, snow and ice can reflect UVB radiation leading to skin damage during the winter months as well. UVB radiation is  responsible for tanning, burning, inflammation, delayed erythema (pinkness), pigmentation (brown spots), and skin cancer.     I recommend self monthly full body exams and yearly full body exams with a dermatology provider. If you develop a new or changing lesion please follow up for examination. Most skin cancers are pink and scaly or pink and pearly. However, we do see blue/brown/black skin cancers.  Consider the ABCDEs of melanoma when giving yourself your monthly full body exam ( don't forget the groin, buttocks, feet, toes, etc). A-asymmetry, B-borders, C-color, D-diameter, E-elevation or evolving. If you see any of these changes please follow up in clinic. If you cannot see your back I recommend purchasing a hand held mirror to use with a larger wall mirror.       Checking for Skin Cancer  You can find cancer early by checking your skin each month. There are 3 kinds of skin cancer. They are melanoma, basal cell carcinoma, and squamous cell carcinoma. Doing monthly skin checks is the best way to find new marks or skin changes. Follow the instructions below for checking your skin.   The ABCDEs of checking moles for melanoma   Check your moles or growths for signs of melanoma using ABCDE:   Asymmetry: the sides of the mole or growth don t match  Border: the edges are ragged, notched, or blurred  Color: the color within the mole or growth varies  Diameter: the mole or growth is larger than 6 mm (size of a pencil eraser)  Evolving: the size, shape, or color of the mole or growth is changing (evolving is not shown in the images below)    Checking for other types of skin cancer  Basal cell carcinoma or squamous cell carcinoma have symptoms such as:     A spot or mole that looks different from all other marks on your skin  Changes in how an area feels, such as itching, tenderness, or pain  Changes in the skin's surface, such as oozing, bleeding, or scaliness  A sore that does not heal  New swelling or redness beyond  the border of a mole    Who s at risk?  Anyone can get skin cancer. But you are at greater risk if you have:   Fair skin, light-colored hair, or light-colored eyes  Many moles or abnormal moles on your skin  A history of sunburns from sunlight or tanning beds  A family history of skin cancer  A history of exposure to radiation or chemicals  A weakened immune system  If you have had skin cancer in the past, you are at risk for recurring skin cancer.   How to check your skin  Do your monthly skin checkups in front of a full-length mirror. Check all parts of your body, including your:   Head (ears, face, neck, and scalp)  Torso (front, back, and sides)  Arms (tops, undersides, upper, and lower armpits)  Hands (palms, backs, and fingers, including under the nails)  Buttocks and genitals  Legs (front, back, and sides)  Feet (tops, soles, toes, including under the nails, and between toes)  If you have a lot of moles, take digital photos of them each month. Make sure to take photos both up close and from a distance. These can help you see if any moles change over time.   Most skin changes are not cancer. But if you see any changes in your skin, call your doctor right away. Only he or she can diagnose a problem. If you have skin cancer, seeing your doctor can be the first step toward getting the treatment that could save your life.   [employer] last reviewed this educational content on 4/1/2019 2000-2020 [employer]. [address] [place] [address], [place] [address]. All rights reserved. This information is not intended as a substitute for professional medical care. Always follow your healthcare professional's instructions.       When should I call my doctor?  If you are worsening or not improving, please, contact us or seek urgent care as noted below.     Who should I call with questions (adults)?  [facility] (adult and pediatric): [phone]  [facility]  Bureau (adult): [phone]  [facility] ([place], [place], [place] and [place]) [phone]  For urgent needs outside of business hours call the [facility] at [phone] and ask for the dermatology resident on call to be paged  If this is a medical emergency and you are unable to reach an ER, Call 911      If you need a prescription refill, please contact your pharmacy. Refills are approved or denied by our Physicians during normal business hours, Monday through Fridays  Per office policy, refills will not be granted if you have not been seen within the past year (or sooner depending on your child's condition)

## 2024-04-16 NOTE — LETTER
4/16/2024         RE: Obed Viramontes  [address]  [facility] [address]        Dear Colleague,    Thank you for referring your patient, Obed Viramontes, to the [facility]. Please see a copy of my visit note below.    HPI:   Chief complaints: Obed Viramontes is a pleasant 28 year old male who presents for Full skin cancer screening to rule out skin cancer   Last Skin Exam: n/a      1st Baseline: yes  Personal HX of Skin Cancer: no   Personal HX of Malignant Melanoma: no   Family HX of Skin Cancer / Malignant Melanoma: no  Personal HX of Atypical Moles:   no  Risk factors: Heart transplant 3/2012; immunosuppression  New / Changing lesions:none  Social History:   On review of systems, there are no further skin complaints, patient is feeling otherwise well.   ROS of the following were done and are negative: Constitutional, Eyes, Ears, Nose,   Mouth, Throat, Cardiovascular, Respiratory, GI, Genitourinary, Musculoskeletal,   Psychiatric, Endocrine, Allergic/Immunologic.    PHYSICAL EXAM:   There were no vitals taken for this visit.  Skin exam performed as follows: Type 2 skin. Mood appropriate  Alert and Oriented X 3. Well developed, well nourished in no distress.  General appearance: Normal  Head including face: Normal  Eyes: conjunctiva and lids: Normal  Mouth: Lips, teeth, gums: Normal  Neck: Normal  Chest-breast/axillae: Normal  Back: Normal  Extremities: digits/nails (clubbing): Normal  Eccrine and Apocrine glands: Normal  Right upper extremity: Normal  Left upper extremity: Normal  Right lower extremity: Normal  Left lower extremity: Normal  Skin: Scalp and body hair: See below    Pt deferred exam of breasts, groin, buttocks: No    Other physical findings:  1. Multiple pigmented macules on extremities and trunk  2. Multiple pigmented macules on face, trunk and extremities  3. Multiple vascular papules on trunk, arms and legs       Except as noted above, no other signs of  skin cancer or melanoma.     ASSESSMENT/PLAN:   Benign Full skin cancer screening today. . Patient with history of none; currently immunosuppressed due to heart transplant  Advised on monthly self exams and 1 year  Patient Education: Appropriate brochures given.    Multiple benign appearing melanocytic nevi on arms, legs and trunk. Discussed ABCDEs of melanoma and sunscreen.   Multiple lentigos on arms, legs and trunk. Advised benign, no treatment needed.  Multiple scattered angiomas. Advised benign, no treatment needed.           Follow-up: yearly/PRN sooner    1.) Patient was asked about new and changing moles. YES  2.) Patient received a complete physical skin examination: YES  3.) Patient was counseled to perform a monthly self skin examination: YES  Scribed By: Janice Luu, MS, PAADAL      Again, thank you for allowing me to participate in the care of your patient.        Sincerely,        Janice Luu PA-C

## 2024-05-16 ENCOUNTER — TELEPHONE (OUTPATIENT)
Dept: TRANSPLANT | Facility: CLINIC | Age: 29
End: 2024-05-16
Payer: COMMERCIAL

## 2024-05-16 DIAGNOSIS — Z94.1 HEART REPLACED BY TRANSPLANT (H): Primary | ICD-10-CM

## 2024-07-08 DIAGNOSIS — Z94.1 HEART REPLACED BY TRANSPLANT (H): ICD-10-CM

## 2024-07-10 RX ORDER — MYCOPHENOLATE MOFETIL 500 MG/1
500 TABLET ORAL 2 TIMES DAILY
Qty: 180 TABLET | Refills: 3 | Status: SHIPPED | OUTPATIENT
Start: 2024-07-10

## 2024-07-29 DIAGNOSIS — Z94.1 HEART REPLACED BY TRANSPLANT (H): ICD-10-CM

## 2024-07-30 DIAGNOSIS — Z94.1 HEART REPLACED BY TRANSPLANT (H): ICD-10-CM

## 2024-07-30 RX ORDER — PRAVASTATIN SODIUM 10 MG
10 TABLET ORAL AT BEDTIME
Qty: 90 TABLET | Refills: 3 | OUTPATIENT
Start: 2024-07-30

## 2024-07-30 RX ORDER — PRAVASTATIN SODIUM 10 MG
10 TABLET ORAL AT BEDTIME
Qty: 90 TABLET | Refills: 3 | Status: SHIPPED | OUTPATIENT
Start: 2024-07-30

## 2024-10-08 ENCOUNTER — TELEPHONE (OUTPATIENT)
Dept: TRANSPLANT | Facility: CLINIC | Age: 29
End: 2024-10-08
Payer: COMMERCIAL

## 2024-10-08 NOTE — TELEPHONE ENCOUNTER
Discussed need to rescheduled clinic day on 10/15 due to provider cancellation.   Patient agreeable to keep testing and reschedule provider visit for next available in person date

## 2024-10-14 ENCOUNTER — TELEPHONE (OUTPATIENT)
Dept: TRANSPLANT | Facility: CLINIC | Age: 29
End: 2024-10-14
Payer: COMMERCIAL

## 2024-10-14 DIAGNOSIS — Z94.1 HEART REPLACED BY TRANSPLANT (H): Primary | ICD-10-CM

## 2024-10-14 DIAGNOSIS — Z79.899 ENCOUNTER FOR LONG-TERM (CURRENT) USE OF HIGH-RISK MEDICATION: ICD-10-CM

## 2024-10-14 DIAGNOSIS — Z13.220 LIPID SCREENING: ICD-10-CM

## 2024-10-14 NOTE — TELEPHONE ENCOUNTER
Appointment instructions reviewed with Jolly Azevedo verbalized understanding     Pre-procedure instructions - Cardiac MRI with Contrast, Stress, and Flow  Patient Education    You are due for your annual testing and labs  Your arrival time is 7:30am on October 15th.  Location is [id number] [place] [address] Waiting Room   You will need to have Fasting Labs with a drug level  Please, take your Tacro at 7:30pm on October 14th for an accurate 12 hour trough  Do not take your morning medication until after your lab draw   After lab you will have a chest xray and then MRI (see instructions below)    How do I prepare for my exam? (Food and drink instructions)  Stop all caffeine 12 hours before the test. This includes coffee, tea, soda, chocolate and certain medicines (such as Anacin, Excedrin and NoDoz). Also avoid decaf coffee and tea, as these contain small amounts of caffeine.  You may drink water and take your morning medicines.  (Other Instructions)  You may need to stop some medicines before the test. Follow your doctor's orders.  You will need to arrive 1 hour early if you will be taking an anxiety medication for the test.     *2 days before:  Stop taking dipyridamole (Aggrenox, Persantine, Permole)  Stop taking Sildenafil (Viagra), Tadalafil (Cialis) and Vardenafil (Levitra).  If you are taking the medication for Pulmonary Hypertesnion DO NOT hold.     12 hours before:  Stop taking Theophylline (Theolair) & Aminophylline     What Should I wear: The MRI machine uses a strong magnet. Due to increased risk of metallic materials/threading in clothing, for your safety, you will be requested to change into a hospital gown. Please remove any body piercings and hair or magnetic eyelash extensions before you arrive. You will also remove watches, jewelry, hairpins, wallets, dentures, partial dental plates and hearing aids. You may wear contact lenses,  and you may be able to wear your rings. We have a safe place to keep your personal items, but it is safer to leave them at home.     How long does the Exam Take: Most tests take up to 90 minutes.       What Should I Bring: If you have any implants please bring a card or surgical report with the implant information.  We may be unable to scan you if we do not have this information. Bring the results of similar scans you may have had previously. If you are a minor (under age 18) you will need to bring a parent or legal.     Do I need a : No     What Should I do after the Exam:  No restrictions, you may resume normal activities.     What is this test:  MRI (magnetic resonance imaging) uses a strong magnet and radio waves to look inside the body. An MRA (magnetic resonance angiogram) does the same thing, but it lets us look at your blood vessels. A computer turns the radio waves into pictures showing cross sections of the body, much like slices of bread. This helps us see any problems more clearly. You may receive fluid (called  contrast ) before or during your scan. The fluid helps us see the pictures better. We give the fluid through an IV (small needle in your arm).    Please call [phone] with any questions or concerns.  Please note: after hours, weekends and holidays, this phone number is routed to an  to page out the coordinator on call.      Padmini Mckeon RN  Post Heart Transplant   [phone]

## 2024-10-15 DIAGNOSIS — Z94.1 HEART REPLACED BY TRANSPLANT (H): ICD-10-CM

## 2024-10-15 RX ORDER — TACROLIMUS 0.5 MG/1
CAPSULE ORAL
Qty: 90 CAPSULE | Refills: 3 | Status: SHIPPED | OUTPATIENT
Start: 2024-10-15

## 2024-10-15 RX ORDER — TACROLIMUS 1 MG/1
CAPSULE ORAL
Qty: 270 CAPSULE | Refills: 3 | Status: SHIPPED | OUTPATIENT
Start: 2024-10-15

## 2024-10-15 RX ORDER — PREDNISONE 5 MG/1
5 TABLET ORAL DAILY
Qty: 90 TABLET | Refills: 3 | Status: SHIPPED | OUTPATIENT
Start: 2024-10-15

## 2024-10-18 ENCOUNTER — TELEPHONE (OUTPATIENT)
Dept: TRANSPLANT | Facility: CLINIC | Age: 29
End: 2024-10-18
Payer: COMMERCIAL

## 2024-10-27 ENCOUNTER — HEALTH MAINTENANCE LETTER (OUTPATIENT)
Age: 29
End: 2024-10-27

## 2024-11-18 ENCOUNTER — LAB (OUTPATIENT)
Dept: LAB | Facility: CLINIC | Age: 29
End: 2024-11-18
Attending: INTERNAL MEDICINE
Payer: COMMERCIAL

## 2024-11-18 ENCOUNTER — HOSPITAL ENCOUNTER (OUTPATIENT)
Dept: MRI IMAGING | Facility: CLINIC | Age: 29
Discharge: HOME | End: 2024-11-18
Attending: INTERNAL MEDICINE
Payer: COMMERCIAL

## 2024-11-18 ENCOUNTER — HOSPITAL ENCOUNTER (OUTPATIENT)
Dept: GENERAL RADIOLOGY | Facility: CLINIC | Age: 29
Discharge: HOME | End: 2024-11-18
Attending: INTERNAL MEDICINE
Payer: COMMERCIAL

## 2024-11-18 VITALS — SYSTOLIC BLOOD PRESSURE: 123 MMHG | DIASTOLIC BLOOD PRESSURE: 91 MMHG | HEART RATE: 96 BPM

## 2024-11-18 DIAGNOSIS — Z94.1 HEART REPLACED BY TRANSPLANT (H): ICD-10-CM

## 2024-11-18 DIAGNOSIS — Z79.899 ENCOUNTER FOR LONG-TERM (CURRENT) USE OF HIGH-RISK MEDICATION: ICD-10-CM

## 2024-11-18 DIAGNOSIS — Z13.220 LIPID SCREENING: ICD-10-CM

## 2024-11-18 LAB
ALBUMIN SERPL BCG-MCNC: 4.4 G/DL (ref 3.5–5.2)
ALP SERPL-CCNC: 83 U/L (ref 40–150)
ALT SERPL W P-5'-P-CCNC: 13 U/L (ref 0–70)
ANION GAP SERPL CALCULATED.3IONS-SCNC: 12 MMOL/L (ref 7–15)
AST SERPL W P-5'-P-CCNC: 26 U/L (ref 0–45)
BILIRUB SERPL-MCNC: 0.6 MG/DL
BUN SERPL-MCNC: 11.6 MG/DL (ref 6–20)
CALCIUM SERPL-MCNC: 9.3 MG/DL (ref 8.8–10.4)
CHLORIDE SERPL-SCNC: 105 MMOL/L (ref 98–107)
CHOLEST SERPL-MCNC: 159 MG/DL
CK SERPL-CCNC: 162 U/L (ref 39–308)
CMV DNA SPEC NAA+PROBE-ACNC: NOT DETECTED IU/ML
CREAT SERPL-MCNC: 0.74 MG/DL (ref 0.67–1.17)
EBV DNA SERPL NAA+PROBE-ACNC: NOT DETECTED IU/ML
EGFRCR SERPLBLD CKD-EPI 2021: >90 ML/MIN/1.73M2
ERYTHROCYTE [DISTWIDTH] IN BLOOD BY AUTOMATED COUNT: 12.9 % (ref 10–15)
EST. AVERAGE GLUCOSE BLD GHB EST-MCNC: 97 MG/DL
FASTING STATUS PATIENT QL REPORTED: NORMAL
FASTING STATUS PATIENT QL REPORTED: NORMAL
GLUCOSE SERPL-MCNC: 95 MG/DL (ref 70–99)
HBA1C MFR BLD: 5 %
HCO3 SERPL-SCNC: 22 MMOL/L (ref 22–29)
HCT VFR BLD AUTO: 46.9 % (ref 40–53)
HDLC SERPL-MCNC: 54 MG/DL
HGB BLD-MCNC: 16 G/DL (ref 13.3–17.7)
LDLC SERPL CALC-MCNC: 86 MG/DL
MAGNESIUM SERPL-MCNC: 1.6 MG/DL (ref 1.7–2.3)
MCH RBC QN AUTO: 28.4 PG (ref 26.5–33)
MCHC RBC AUTO-ENTMCNC: 34.1 G/DL (ref 31.5–36.5)
MCV RBC AUTO: 83 FL (ref 78–100)
NONHDLC SERPL-MCNC: 105 MG/DL
PHOSPHATE SERPL-MCNC: 2.1 MG/DL (ref 2.5–4.5)
PLATELET # BLD AUTO: 240 10E3/UL (ref 150–450)
POTASSIUM SERPL-SCNC: 3.8 MMOL/L (ref 3.4–5.3)
PROT SERPL-MCNC: 7.4 G/DL (ref 6.4–8.3)
RBC # BLD AUTO: 5.64 10E6/UL (ref 4.4–5.9)
SODIUM SERPL-SCNC: 139 MMOL/L (ref 135–145)
SPECIMEN TYPE: NORMAL
TACROLIMUS BLD-MCNC: 6.3 UG/L (ref 5–15)
TME LAST DOSE: NORMAL H
TME LAST DOSE: NORMAL H
TRIGL SERPL-MCNC: 97 MG/DL
WBC # BLD AUTO: 5.6 10E3/UL (ref 4–11)

## 2024-11-18 PROCEDURE — 80197 ASSAY OF TACROLIMUS: CPT

## 2024-11-18 PROCEDURE — 255N000002 HC RX 255 OP 636: Performed by: INTERNAL MEDICINE

## 2024-11-18 PROCEDURE — 82310 ASSAY OF CALCIUM: CPT

## 2024-11-18 PROCEDURE — 36415 COLL VENOUS BLD VENIPUNCTURE: CPT

## 2024-11-18 PROCEDURE — 75563 CARD MRI W/STRESS IMG & DYE: CPT | Mod: 26 | Performed by: RADIOLOGY

## 2024-11-18 PROCEDURE — 93005 ELECTROCARDIOGRAM TRACING: CPT

## 2024-11-18 PROCEDURE — 82465 ASSAY BLD/SERUM CHOLESTEROL: CPT

## 2024-11-18 PROCEDURE — 83735 ASSAY OF MAGNESIUM: CPT

## 2024-11-18 PROCEDURE — 84295 ASSAY OF SERUM SODIUM: CPT

## 2024-11-18 PROCEDURE — 84520 ASSAY OF UREA NITROGEN: CPT

## 2024-11-18 PROCEDURE — 84100 ASSAY OF PHOSPHORUS: CPT

## 2024-11-18 PROCEDURE — 75563 CARD MRI W/STRESS IMG & DYE: CPT

## 2024-11-18 PROCEDURE — 82550 ASSAY OF CK (CPK): CPT

## 2024-11-18 PROCEDURE — 87799 DETECT AGENT NOS DNA QUANT: CPT

## 2024-11-18 PROCEDURE — A9585 GADOBUTROL INJECTION: HCPCS | Performed by: INTERNAL MEDICINE

## 2024-11-18 PROCEDURE — 80061 LIPID PANEL: CPT

## 2024-11-18 PROCEDURE — 71046 X-RAY EXAM CHEST 2 VIEWS: CPT | Mod: 26 | Performed by: RADIOLOGY

## 2024-11-18 PROCEDURE — 71046 X-RAY EXAM CHEST 2 VIEWS: CPT

## 2024-11-18 PROCEDURE — 250N000011 HC RX IP 250 OP 636: Performed by: RADIOLOGY

## 2024-11-18 PROCEDURE — 85027 COMPLETE CBC AUTOMATED: CPT

## 2024-11-18 PROCEDURE — 83036 HEMOGLOBIN GLYCOSYLATED A1C: CPT

## 2024-11-18 RX ORDER — ALBUTEROL SULFATE 90 UG/1
2 INHALANT RESPIRATORY (INHALATION) EVERY 5 MIN PRN
Status: DISCONTINUED | OUTPATIENT
Start: 2024-11-18 | End: 2024-11-19 | Stop reason: HOSPADM

## 2024-11-18 RX ORDER — AMINOPHYLLINE 25 MG/ML
50-100 INJECTION, SOLUTION INTRAVENOUS
Status: COMPLETED | OUTPATIENT
Start: 2024-11-18 | End: 2024-11-18

## 2024-11-18 RX ORDER — DIAZEPAM 5 MG/1
5 TABLET ORAL EVERY 30 MIN PRN
Status: DISCONTINUED | OUTPATIENT
Start: 2024-11-18 | End: 2024-11-19 | Stop reason: HOSPADM

## 2024-11-18 RX ORDER — LORAZEPAM 0.5 MG/1
0.5 TABLET ORAL EVERY 30 MIN PRN
Status: DISCONTINUED | OUTPATIENT
Start: 2024-11-18 | End: 2024-11-19 | Stop reason: HOSPADM

## 2024-11-18 RX ORDER — LIDOCAINE 40 MG/G
CREAM TOPICAL
Status: DISCONTINUED | OUTPATIENT
Start: 2024-11-18 | End: 2024-11-19 | Stop reason: HOSPADM

## 2024-11-18 RX ORDER — REGADENOSON 0.08 MG/ML
0.4 INJECTION, SOLUTION INTRAVENOUS ONCE
Status: COMPLETED | OUTPATIENT
Start: 2024-11-18 | End: 2024-11-18

## 2024-11-18 RX ORDER — GADOBUTROL 604.72 MG/ML
0.12 INJECTION INTRAVENOUS ONCE
Status: COMPLETED | OUTPATIENT
Start: 2024-11-18 | End: 2024-11-18

## 2024-11-18 RX ADMIN — AMINOPHYLLINE 100 MG: 25 INJECTION, SOLUTION INTRAVENOUS at 10:33

## 2024-11-18 RX ADMIN — REGADENOSON 0.4 MG: 0.4 INJECTION INTRAVENOUS at 10:29

## 2024-11-18 RX ADMIN — GADOBUTROL 22 ML: 604.72 INJECTION INTRAVENOUS at 11:13

## 2024-11-19 ENCOUNTER — TELEPHONE (OUTPATIENT)
Dept: TRANSPLANT | Facility: CLINIC | Age: 29
End: 2024-11-19
Payer: COMMERCIAL

## 2024-11-19 DIAGNOSIS — Z79.899 ENCOUNTER FOR LONG-TERM (CURRENT) USE OF HIGH-RISK MEDICATION: Primary | ICD-10-CM

## 2024-11-19 DIAGNOSIS — Z94.1 HEART REPLACED BY TRANSPLANT (H): ICD-10-CM

## 2024-11-19 NOTE — TELEPHONE ENCOUNTER
"Writer called patient and discussed latest lab and testing results.     Latest Cardiac MR - Per report, study shows \"borderline reduced function\", which is decreased when compared to previous study. Results discussed with Dr. Shine. DSA levels in process. ECHO to be repeated in 1 month. If DSA are positive, those will also need to be repeated.    Stable chest Xray results.    CMV and EBV were not detected.     Tacrolimus is 6.3 and within goal (6-8). Patient confirms 12 hr trough. No need to change current Tacrolimus dose of 2 mg in AM and 1.5 mg in PM.     Mag and Phos levels decreased. No changes to be implemented.     The rest of the currently resulted labs are stable.     Pt verbalizes understanding.     Update shared with primary coordinator.       "

## 2024-11-20 LAB
ATRIAL RATE - MUSE: 102 BPM
ATRIAL RATE - MUSE: 104 BPM
DIASTOLIC BLOOD PRESSURE - MUSE: NORMAL MMHG
DIASTOLIC BLOOD PRESSURE - MUSE: NORMAL MMHG
INTERPRETATION ECG - MUSE: NORMAL
INTERPRETATION ECG - MUSE: NORMAL
P AXIS - MUSE: 43 DEGREES
P AXIS - MUSE: 51 DEGREES
PR INTERVAL - MUSE: 150 MS
PR INTERVAL - MUSE: 154 MS
QRS DURATION - MUSE: 94 MS
QRS DURATION - MUSE: 96 MS
QT - MUSE: 348 MS
QT - MUSE: 358 MS
QTC - MUSE: 453 MS
QTC - MUSE: 470 MS
R AXIS - MUSE: 27 DEGREES
R AXIS - MUSE: 38 DEGREES
SYSTOLIC BLOOD PRESSURE - MUSE: NORMAL MMHG
SYSTOLIC BLOOD PRESSURE - MUSE: NORMAL MMHG
T AXIS - MUSE: 33 DEGREES
T AXIS - MUSE: 42 DEGREES
VENTRICULAR RATE- MUSE: 102 BPM
VENTRICULAR RATE- MUSE: 104 BPM

## 2024-12-17 DIAGNOSIS — Z94.1 HEART REPLACED BY TRANSPLANT (H): Primary | ICD-10-CM

## 2024-12-20 ENCOUNTER — LAB (OUTPATIENT)
Dept: LAB | Facility: CLINIC | Age: 29
End: 2024-12-20
Attending: INTERNAL MEDICINE
Payer: COMMERCIAL

## 2024-12-20 ENCOUNTER — ANCILLARY PROCEDURE (OUTPATIENT)
Dept: CARDIOLOGY | Facility: CLINIC | Age: 29
End: 2024-12-20
Attending: INTERNAL MEDICINE
Payer: COMMERCIAL

## 2024-12-20 DIAGNOSIS — Z94.1 HEART REPLACED BY TRANSPLANT (H): ICD-10-CM

## 2024-12-20 LAB — LVEF ECHO: NORMAL

## 2024-12-20 PROCEDURE — 93306 TTE W/DOPPLER COMPLETE: CPT | Performed by: INTERNAL MEDICINE

## 2024-12-20 PROCEDURE — 86833 HLA CLASS II HIGH DEFIN QUAL: CPT | Performed by: INTERNAL MEDICINE

## 2024-12-20 PROCEDURE — 99000 SPECIMEN HANDLING OFFICE-LAB: CPT | Performed by: PATHOLOGY

## 2024-12-20 PROCEDURE — 36415 COLL VENOUS BLD VENIPUNCTURE: CPT | Performed by: PATHOLOGY

## 2024-12-20 PROCEDURE — 86832 HLA CLASS I HIGH DEFIN QUAL: CPT | Performed by: INTERNAL MEDICINE

## 2024-12-28 ENCOUNTER — HEALTH MAINTENANCE LETTER (OUTPATIENT)
Age: 29
End: 2024-12-28

## 2025-01-12 LAB
DONOR IDENTIFICATION: NORMAL
DSA COMMENTS: NORMAL
DSA PRESENT: NO
DSA TEST METHOD: NORMAL
ORGAN: NORMAL
SA 1  COMMENTS: NORMAL
SA 1 CELL: NORMAL
SA 1 TEST METHOD: NORMAL
SA 2 CELL: NORMAL
SA 2 COMMENTS: NORMAL
SA 2 TEST METHOD: NORMAL
SA1 HI RISK ABY: NORMAL
SA1 MOD RISK ABY: NORMAL
SA2 HI RISK ABY: NORMAL
SA2 MOD RISK ABY: NORMAL
UNACCEPTABLE ANTIGENS: NORMAL
UNOS CPRA: 0

## 2025-01-14 ENCOUNTER — OFFICE VISIT (OUTPATIENT)
Dept: CARDIOLOGY | Facility: CLINIC | Age: 30
End: 2025-01-14
Attending: INTERNAL MEDICINE
Payer: COMMERCIAL

## 2025-01-14 VITALS
HEIGHT: 74 IN | BODY MASS INDEX: 26.98 KG/M2 | OXYGEN SATURATION: 95 % | WEIGHT: 210.2 LBS | HEART RATE: 100 BPM | DIASTOLIC BLOOD PRESSURE: 87 MMHG | SYSTOLIC BLOOD PRESSURE: 130 MMHG

## 2025-01-14 DIAGNOSIS — Z47.89 AFTERCARE FOLLOWING SURGERY OF THE MUSCULOSKELETAL SYSTEM: ICD-10-CM

## 2025-01-14 DIAGNOSIS — I10 BENIGN ESSENTIAL HYPERTENSION: Primary | ICD-10-CM

## 2025-01-14 DIAGNOSIS — R29.898 MECHANICAL PROBLEMS WITH LIMBS: ICD-10-CM

## 2025-01-14 DIAGNOSIS — Z94.1 HEART REPLACED BY TRANSPLANT (H): ICD-10-CM

## 2025-01-14 DIAGNOSIS — I10 HYPERTENSION, UNSPECIFIED TYPE: ICD-10-CM

## 2025-01-14 DIAGNOSIS — M24.522 FLEXION CONTRACTURE OF LEFT ELBOW: ICD-10-CM

## 2025-01-14 DIAGNOSIS — E44.1 MILD PROTEIN-CALORIE MALNUTRITION: ICD-10-CM

## 2025-01-14 DIAGNOSIS — D84.9 IMMUNOSUPPRESSION: ICD-10-CM

## 2025-01-14 PROCEDURE — G2211 COMPLEX E/M VISIT ADD ON: HCPCS | Performed by: INTERNAL MEDICINE

## 2025-01-14 PROCEDURE — 99213 OFFICE O/P EST LOW 20 MIN: CPT | Performed by: INTERNAL MEDICINE

## 2025-01-14 PROCEDURE — 99215 OFFICE O/P EST HI 40 MIN: CPT | Performed by: INTERNAL MEDICINE

## 2025-01-14 RX ORDER — TACROLIMUS 1 MG/1
CAPSULE ORAL
Qty: 270 CAPSULE | Refills: 3 | Status: SHIPPED | OUTPATIENT
Start: 2025-01-14

## 2025-01-14 RX ORDER — TACROLIMUS 0.5 MG/1
CAPSULE ORAL
Qty: 90 CAPSULE | Refills: 3 | Status: SHIPPED | OUTPATIENT
Start: 2025-01-14

## 2025-01-14 RX ORDER — MYCOPHENOLATE MOFETIL 500 MG/1
500 TABLET ORAL 2 TIMES DAILY
Qty: 180 TABLET | Refills: 3 | Status: SHIPPED | OUTPATIENT
Start: 2025-01-14

## 2025-01-14 RX ORDER — AMLODIPINE BESYLATE 5 MG/1
10 TABLET ORAL DAILY
Qty: 180 TABLET | Refills: 3 | Status: SHIPPED | OUTPATIENT
Start: 2025-01-14

## 2025-01-14 RX ORDER — PRAVASTATIN SODIUM 10 MG
10 TABLET ORAL AT BEDTIME
Qty: 90 TABLET | Refills: 3 | Status: SHIPPED | OUTPATIENT
Start: 2025-01-14

## 2025-01-14 RX ORDER — PREDNISONE 5 MG/1
5 TABLET ORAL DAILY
Qty: 90 TABLET | Refills: 3 | Status: SHIPPED | OUTPATIENT
Start: 2025-01-14

## 2025-01-14 ASSESSMENT — PAIN SCALES - GENERAL: PAINLEVEL_OUTOF10: NO PAIN (0)

## 2025-01-14 NOTE — PATIENT INSTRUCTIONS
Patient Instructions  1. We sent a dexa scan order to get a better idea of how your bone density is doing. Please, call [phone], option #4 if you have not heard from scheduling in a few days  2. Please, schedule your dental appointment.   3. Refills have been sent to [facility], please let us know if you have any issues   4. Your phosphorus level was a bit low. Please, increase your phosphorus containing foods: meats, dairy, meats, nuts, and vegetables     Next transplant clinic appointment:  Next annual, November 2025  Next lab draw: 6 month labs in May 2025  Coordinator will call with all pending results.     Please call your transplant coordinator at [phone] with any questions or concerns.  Please note: after hours, weekends and holidays, this phone number is routed to an  to page out the coordinator on call.       Padmini Mckeon, RN MSN   Post Heart Transplant Nurse Coordinator  [facility]  Questions: [phone]

## 2025-01-14 NOTE — NURSING NOTE
Chief Complaint   Patient presents with    Follow Up     Return Heart Transplant     Vitals were taken and medications reconciled.    LALITA Lopez  4:36 PM

## 2025-01-14 NOTE — NURSING NOTE
Transplant Coordinator Note    Reason for visit: Annual visit   Coordinator: Present   Caregiver:  Parent, Present     Health concerns addressed today:  1. Lower EF on MRI  DSA- None, on initial and repeat. Repeat echo, EF 60-65%   Per patient, no concerns today    2. Lower level phos 2.1  Per, Dr Shine encouraged to eat higher phosphorus containing foods    3. Pt had questions about prednisone and concern for decreased bone density  Dexa scan ordered per Dr Shine     Immunosuppressants and Goals:  Tacro (goal 6-8) last level 6.3   BID  Pred 5mg Daily    Hx Rejection in 2017    Routine screenings:    Derm: UTD  Dental: Due  Eye: UTD  Colonoscopy: Age  Prostate: Age    Immunizations:    Flu: UTD   Covid: UTD   RSV: Age   Shingrix: Age   Pneumonia: UTD    All available results reviewed    Medication record reviewed and reconciled  Questions and concerns addressed  AVS reviewed with patient. Pt verbalized an understanding of plan of care.     Patient Instructions  1. We sent a dexa scan order to get a better idea of how your bone density is doing. Please, call [phone], option #4 if you have not heard from scheduling in a few days.  2. Please, schedule your dental appointment.   3. Refills have been sent to [facility], please let us know if you have any issues.   4. Your phosphorus level was a bit low. Please, increase your phosphorus containing foods: meats, dairy, meats, nuts, and vegetables.     Next transplant clinic appointment:  Next annual, November 2025  Next lab draw: 6 month labs in May 2025  Coordinator will call with all pending results.     Please call your transplant coordinator at [phone] with any questions or concerns.  Please note: after hours, weekends and holidays, this phone number is routed to an  to page out the coordinator on call.     Padmini Mckeon, RN MSN   Post Heart Transplant Nurse Coordinator  [facility]  Questions:  [phone]

## 2025-01-14 NOTE — LETTER
1/14/2025      RE: Obed Viramontes  [address]  [facility] [id number]       Dear Colleague,    Thank you for the opportunity to participate in the care of your patient, Obed Viramontes, at the [facility]. Please see a copy of my visit note below.    January 14, 2025  Mr. Viramontes is a 27 year old male who presents to clinic with his mother for routine annual exam. 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     He has been doing very well recently, denies any new complaints or issues.  He continues to work at the [employer] without any limitations.  He did have 1 MRI as noted below which had a slightly lower ejection fraction at 49% however repeat echocardiogram and invasive evaluations were essentially normal.  No complaints doing well takes all medications as prescribed.     PAST MEDICAL HISTORY:  Past Medical History:   Diagnosis Date     Cardiomyopathy, hypertrophic obstructive (H)      H/O heart transplant (H)     Mar.23, 2012     Hemiplegia following CVA (cerebrovascular accident) (H)     left, improved with rehab     Lymphoproliferative disease (H)      Unspecified cerebral artery occlusion with cerebral infarction     age 2 1/2     FAMILY HISTORY:  Family History   Problem Relation Age of Onset     Diabetes Sister      Melanoma No family hx of      Skin Cancer No family hx of      SOCIAL HISTORY:  Social History     Socioeconomic History     Marital status: Single   Tobacco Use     Smoking status: Never     Passive exposure: Past     Smokeless tobacco: Never   Vaping Use     Vaping status: Never Used   Substance and Sexual  "Activity     Alcohol use: No     Drug use: No     Sexual activity: Never     Partners: Female   Social History Narrative    Lives in [place] with father, [place]. 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  Went to [facility].  Has 3 sisters and a brother.  Mom and dad  (2010).      CURRENT MEDICATIONS:  Current Outpatient Medications   Medication Sig Dispense Refill     amLODIPine (NORVASC) 5 MG tablet TAKE TWO TABLETS BY MOUTH ONCE DAILY 180 tablet 3     aspirin 81 MG EC tablet Take 2 tablets (162 mg) by mouth daily 60 tablet 99     Cholecalciferol (VITAMIN D3) 2000 UNITS TABS Take 2,000 Units by mouth daily 100 tablet 6     mycophenolate (GENERIC EQUIVALENT) 500 MG tablet TAKE ONE TABLET BY MOUTH TWICE A  tablet 3     pravastatin (PRAVACHOL) 10 MG tablet Take 1 tablet (10 mg) by mouth at bedtime 90 tablet 3     predniSONE (DELTASONE) 5 MG tablet TAKE ONE TABLET BY MOUTH ONCE DAILY 90 tablet 3     tacrolimus (GENERIC EQUIVALENT) 0.5 MG capsule TAKE 1 CAPSULE BY MOUTH EVERY EVENING 90 capsule 3     tacrolimus (GENERIC EQUIVALENT) 1 MG capsule TAKE 2 CAPSULES BY MOUTH IN THE MORNING AND TAKE 1 CAPSULE EVERY EVENING 270 capsule 3     No current facility-administered medications for this visit.     EXAM:  /87 (BP Location: Right arm, Patient Position: Chair, Cuff Size: Adult Large)   Pulse 100   Ht 1.88 m (6' 2\")   Wt 95.3 kg (210 lb 3.2 oz)   SpO2 95%   BMI 26.99 kg/m    GENERAL: Appears comfortable, in no acute distress.   HEENT: Eye symmetrical, no discharge or icterus bilaterally. Mucous membranes moist and without lesions. No thrush.   CV: Tachycardic and regular, +S1S2, no murmur, rub, or gallop. JVP not visible.   RESPIRATORY: Respirations regular, even, and unlabored. Lungs CTA throughout.   GI: Soft and non distended with normoactive bowel sounds present in all quadrants. No tenderness, rebound, guarding. No hepatomegaly.   EXTREMITIES: No peripheral " edema. 2+ bilateral pedal pulses.   NEUROLOGIC: Alert and oriented x 3. No focal deficits.   MUSCULOSKELETAL: left sided hemiplegia. Tenderness to palpation over AC joint. Skin is normal without redness, warmth, or lesions.  SKIN: No jaundice. No rashes or lesions.      Labs:  Lab Results   Component Value Date    WBC 5.6 11/18/2024    HGB 16.0 11/18/2024    HCT 46.9 11/18/2024     11/18/2024     11/18/2024    POTASSIUM 3.8 11/18/2024    CHLORIDE 105 11/18/2024    CO2 22 11/18/2024    BUN 11.6 11/18/2024    CR 0.74 11/18/2024    GLC 95 11/18/2024    DD 0.38 10/06/2022    NTBNPI 188 10/06/2022    NTBNP 53 08/08/2018    TROPONIN 0.00 12/10/2019    TROPI <0.015 12/10/2019    AST 26 11/18/2024    ALT 13 11/18/2024    ALKPHOS 83 11/18/2024    BILITOTAL 0.6 11/18/2024    INR 1.00 01/12/2017     Caridac MRI 9/15/22 w contrast and stress  1. The LV is normal in cavity size and wall thickness. The global systolic function is normal. The LVEF is  57%. There are no regional wall motion abnormalities.  2. The RV is normal in cavity size. The global systolic function is normal. The RVEF is 55%.   3. Both atria are normal in size.  4. There is no significant valvular disease.   5. Late gadolinium enhancement imaging shows no MI, fibrosis or infiltrative disease.   6. Regadenoson stress perfusion imaging shows no ischemia.  7. There is no pericardial effusion or thickening.  8. There is no intracardiac thrombus.  CONCLUSIONS: Normal cardiac function, LVEF of 57% and RVEF 55%. There is no evidence of ischemia on stress perfusion imaging.      TTE 9/11/23  Global and regional left ventricular function is normal with an EF of 55-60%.  Right ventricular function, chamber size, wall motion, and thickness are     normal.  Pulmonary artery systolic pressure cannot be assessed.  The inferior vena cava is normal.  No pericardial effusion is present.  No significant changes noted.     RHC/cor angio 10/19/20  Ao 97/72/80  RA  7/8/6  RV 25/6  PA 22/11/15  PCWP 14/15/10  Cardiac output by Francisca: 7.4 L/min (indexed to 3.6 L/min/m2)  Cardiac output by thermodilution: 5.3 L/min (indexed to 2.6 L/min/m2)  SVR by TD CO: 1117  PVR by TD CO: 0.9 Right sided filling pressures are normal. Left sided filling pressures are normal. Normal PA pressures. Normal cardiac output level. Hemodynamic data has been modified in Epic per physician review.       RHC/angiogram 9/11/23:  Normal coronaries  RA: 5/5/3 mmHg  RV: 21/--/4 mmHg  PA: 20/8/13 mmHg  CWP: 11/10/9 mmHg  CO/CI (Francisca): 6.25/2.88 L/min/m2  PA sat: 73%     cMRI 11/2024:  1. The LV is normal in cavity size and wall thickness. The global systolic function is borderline reduced.  The LVEF is 49% which is slightly decreased from previous. There are no regional wall motion abnormalities.  2. The RV is normal in cavity size. The global systolic function is mildly reduced. The RVEF is 46%.   3. Both atria are normal in size.  4. There is no significant valvular disease.   5. Late gadolinium enhancement imaging shows no MI, fibrosis or infiltrative disease.   6. Regadenoson stress perfusion imaging shows no ischemia.  7. There is no pericardial effusion or thickening.  8. There is no intracardiac thrombus.  CONCLUSIONS: Status post heart transplantation with normal graft size and borderline reduced function (LVEF 49%, RVEF 46%). This is decreased from previous. No fibrosis or ischemia by LGE or stress perfusion  imaging. Otherwise, no other significant changes since 9/15/2022 exam.      TTE 12/20/24:  Global and regional left ventricular function is normal with an EF of 60-65%.  Global right ventricular function is normal.  No significant valvular abnormalities present.   IVC diameter <2.1 cm collapsing >50% with sniff suggests a normal RA pressure of 3 mmHg.  This study was compared with the study from 9/11/23. No significant changes noted.    Assessment/Plan:  Mr. Viramontes is a 29 year old male who is  s/p orthotopic heart transplant in 2012 who presents to clinic for annual exam. From a transplant standpoint he is doing well. Last cardiac MRI with contrast and stress today was normal. He has no e/o graft dysfunction. No recent episodes of rejection. He has no DSAs (prior DSAs that he cleared). He is doing well no new issues or complaints.  We are not going to make any medication changes.  We will get a DEXA scan given that he remains on prednisone 5 mg daily restart sure that there is no significant decrease in bone density.  No other issues continue current management.     # Status post OHT in 2012, history of NICM 2/2 viral myocardititis  # Chronic immunosuppression  * Rejection history: three episodes in 2017 treated with steroids  * Last biopsy: 3/2018 negative for ACR or AMR     Immunosuppression:  - Prednisone 5 mg daily  - Tacrolimus, trough level goal 6-8., pending today   -  mg bid     Cardiac allograft vasculopathy ppx:  -he is on pravastatin and aspirin 81 mg, consider  change to crestor 10 mg daily next visit or if angiogram today shows any disease (LDL 91 today)     # HTN  At goal on amlodipine 10 mg daily     # Health maintenance:  - discussed derm referral for skin check  - yearly eye exam and dental q6yr  - recommend yearly flu vaccine and COVID booster when avaiable     I appreciate the opportunity to participate in the care of Obed Viramontes . Please do not hesitate to contact me with any further questions.  I have spent a total of 40 minutes today reviewing labs, imaging studies, discussing with colleagues, face-to-face time with patient and documentation in the medical record.  The longitudinal plan of care for the diagnosis(es)/condition(s) as documented were addressed during this visit. Due to the added complexity in care, I will continue to support Obed in the subsequent management and with ongoing continuity of care.    Sincerely,   Hao Shine MD  [facility]  Division of Cardiology         Please do not hesitate to contact me if you have any questions/concerns.     Sincerely,     Hao Shine MD

## 2025-01-14 NOTE — PROGRESS NOTES
January 14, 2025  Mr. Viramontes is a 27 year old male who presents to clinic with his mother for routine annual exam. 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     He has been doing very well recently, denies any new complaints or issues.  He continues to work at the [employer] without any limitations.  He did have 1 MRI as noted below which had a slightly lower ejection fraction at 49% however repeat echocardiogram and invasive evaluations were essentially normal.  No complaints doing well takes all medications as prescribed.     PAST MEDICAL HISTORY:  Past Medical History:   Diagnosis Date    Cardiomyopathy, hypertrophic obstructive (H)     H/O heart transplant (H)     Mar.23, 2012    Hemiplegia following CVA (cerebrovascular accident) (H)     left, improved with rehab    Lymphoproliferative disease (H)     Unspecified cerebral artery occlusion with cerebral infarction     age 2 1/2     FAMILY HISTORY:  Family History   Problem Relation Age of Onset    Diabetes Sister     Melanoma No family hx of     Skin Cancer No family hx of      SOCIAL HISTORY:  Social History     Socioeconomic History    Marital status: Single   Tobacco Use    Smoking status: Never     Passive exposure: Past    Smokeless tobacco: Never   Vaping Use    Vaping status: Never Used   Substance and Sexual Activity    Alcohol use: No    Drug use: No    Sexual activity: Never     Partners: Female   Social History Narrative    Lives in [place] with father, [place]. 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  Went to [facility].  Has 3 sisters and a brother.  Mom and dad  (2010).      CURRENT MEDICATIONS:  Current Outpatient  "Medications   Medication Sig Dispense Refill    amLODIPine (NORVASC) 5 MG tablet TAKE TWO TABLETS BY MOUTH ONCE DAILY 180 tablet 3    aspirin 81 MG EC tablet Take 2 tablets (162 mg) by mouth daily 60 tablet 99    Cholecalciferol (VITAMIN D3) 2000 UNITS TABS Take 2,000 Units by mouth daily 100 tablet 6    mycophenolate (GENERIC EQUIVALENT) 500 MG tablet TAKE ONE TABLET BY MOUTH TWICE A  tablet 3    pravastatin (PRAVACHOL) 10 MG tablet Take 1 tablet (10 mg) by mouth at bedtime 90 tablet 3    predniSONE (DELTASONE) 5 MG tablet TAKE ONE TABLET BY MOUTH ONCE DAILY 90 tablet 3    tacrolimus (GENERIC EQUIVALENT) 0.5 MG capsule TAKE 1 CAPSULE BY MOUTH EVERY EVENING 90 capsule 3    tacrolimus (GENERIC EQUIVALENT) 1 MG capsule TAKE 2 CAPSULES BY MOUTH IN THE MORNING AND TAKE 1 CAPSULE EVERY EVENING 270 capsule 3     No current facility-administered medications for this visit.     EXAM:  /87 (BP Location: Right arm, Patient Position: Chair, Cuff Size: Adult Large)   Pulse 100   Ht 1.88 m (6' 2\")   Wt 95.3 kg (210 lb 3.2 oz)   SpO2 95%   BMI 26.99 kg/m    GENERAL: Appears comfortable, in no acute distress.   HEENT: Eye symmetrical, no discharge or icterus bilaterally. Mucous membranes moist and without lesions. No thrush.   CV: Tachycardic and regular, +S1S2, no murmur, rub, or gallop. JVP not visible.   RESPIRATORY: Respirations regular, even, and unlabored. Lungs CTA throughout.   GI: Soft and non distended with normoactive bowel sounds present in all quadrants. No tenderness, rebound, guarding. No hepatomegaly.   EXTREMITIES: No peripheral edema. 2+ bilateral pedal pulses.   NEUROLOGIC: Alert and oriented x 3. No focal deficits.   MUSCULOSKELETAL: left sided hemiplegia. Tenderness to palpation over AC joint. Skin is normal without redness, warmth, or lesions.  SKIN: No jaundice. No rashes or lesions.      Labs:  Lab Results   Component Value Date    WBC 5.6 11/18/2024    HGB 16.0 11/18/2024    HCT 46.9 " 11/18/2024     11/18/2024     11/18/2024    POTASSIUM 3.8 11/18/2024    CHLORIDE 105 11/18/2024    CO2 22 11/18/2024    BUN 11.6 11/18/2024    CR 0.74 11/18/2024    GLC 95 11/18/2024    DD 0.38 10/06/2022    NTBNPI 188 10/06/2022    NTBNP 53 08/08/2018    TROPONIN 0.00 12/10/2019    TROPI <0.015 12/10/2019    AST 26 11/18/2024    ALT 13 11/18/2024    ALKPHOS 83 11/18/2024    BILITOTAL 0.6 11/18/2024    INR 1.00 01/12/2017     Caridac MRI 9/15/22 w contrast and stress  1. The LV is normal in cavity size and wall thickness. The global systolic function is normal. The LVEF is  57%. There are no regional wall motion abnormalities.  2. The RV is normal in cavity size. The global systolic function is normal. The RVEF is 55%.   3. Both atria are normal in size.  4. There is no significant valvular disease.   5. Late gadolinium enhancement imaging shows no MI, fibrosis or infiltrative disease.   6. Regadenoson stress perfusion imaging shows no ischemia.  7. There is no pericardial effusion or thickening.  8. There is no intracardiac thrombus.  CONCLUSIONS: Normal cardiac function, LVEF of 57% and RVEF 55%. There is no evidence of ischemia on stress perfusion imaging.      TTE 9/11/23  Global and regional left ventricular function is normal with an EF of 55-60%.  Right ventricular function, chamber size, wall motion, and thickness are     normal.  Pulmonary artery systolic pressure cannot be assessed.  The inferior vena cava is normal.  No pericardial effusion is present.  No significant changes noted.     RHC/cor angio 10/19/20  Ao 97/72/80  RA 7/8/6  RV 25/6  PA 22/11/15  PCWP 14/15/10  Cardiac output by Francisca: 7.4 L/min (indexed to 3.6 L/min/m2)  Cardiac output by thermodilution: 5.3 L/min (indexed to 2.6 L/min/m2)  SVR by TD CO: 1117  PVR by TD CO: 0.9 Right sided filling pressures are normal. Left sided filling pressures are normal. Normal PA pressures. Normal cardiac output level. Hemodynamic data has been  modified in Epic per physician review.       RHC/angiogram 9/11/23:  Normal coronaries  RA: 5/5/3 mmHg  RV: 21/--/4 mmHg  PA: 20/8/13 mmHg  CWP: 11/10/9 mmHg  CO/CI (Francisca): 6.25/2.88 L/min/m2  PA sat: 73%     cMRI 11/2024:  1. The LV is normal in cavity size and wall thickness. The global systolic function is borderline reduced.  The LVEF is 49% which is slightly decreased from previous. There are no regional wall motion abnormalities.  2. The RV is normal in cavity size. The global systolic function is mildly reduced. The RVEF is 46%.   3. Both atria are normal in size.  4. There is no significant valvular disease.   5. Late gadolinium enhancement imaging shows no MI, fibrosis or infiltrative disease.   6. Regadenoson stress perfusion imaging shows no ischemia.  7. There is no pericardial effusion or thickening.  8. There is no intracardiac thrombus.  CONCLUSIONS: Status post heart transplantation with normal graft size and borderline reduced function (LVEF 49%, RVEF 46%). This is decreased from previous. No fibrosis or ischemia by LGE or stress perfusion  imaging. Otherwise, no other significant changes since 9/15/2022 exam.      TTE 12/20/24:  Global and regional left ventricular function is normal with an EF of 60-65%.  Global right ventricular function is normal.  No significant valvular abnormalities present.   IVC diameter <2.1 cm collapsing >50% with sniff suggests a normal RA pressure of 3 mmHg.  This study was compared with the study from 9/11/23. No significant changes noted.    Assessment/Plan:  Mr. Viramontes is a 29 year old male who is s/p orthotopic heart transplant in 2012 who presents to clinic for annual exam. From a transplant standpoint he is doing well. Last cardiac MRI with contrast and stress today was normal. He has no e/o graft dysfunction. No recent episodes of rejection. He has no DSAs (prior DSAs that he cleared). He is doing well no new issues or complaints.  We are not going to make any  medication changes.  We will get a DEXA scan given that he remains on prednisone 5 mg daily restart sure that there is no significant decrease in bone density.  No other issues continue current management.     # Status post OHT in 2012, history of NICM 2/2 viral myocardititis  # Chronic immunosuppression  * Rejection history: three episodes in 2017 treated with steroids  * Last biopsy: 3/2018 negative for ACR or AMR     Immunosuppression:  - Prednisone 5 mg daily  - Tacrolimus, trough level goal 6-8., pending today   -  mg bid     Cardiac allograft vasculopathy ppx:  -he is on pravastatin and aspirin 81 mg, consider  change to crestor 10 mg daily next visit or if angiogram today shows any disease (LDL 91 today)     # HTN  At goal on amlodipine 10 mg daily     # Health maintenance:  - discussed derm referral for skin check  - yearly eye exam and dental q6yr  - recommend yearly flu vaccine and COVID booster when avaiable     I appreciate the opportunity to participate in the care of Obed Viramontes . Please do not hesitate to contact me with any further questions.  I have spent a total of 40 minutes today reviewing labs, imaging studies, discussing with colleagues, face-to-face time with patient and documentation in the medical record.  The longitudinal plan of care for the diagnosis(es)/condition(s) as documented were addressed during this visit. Due to the added complexity in care, I will continue to support Obed in the subsequent management and with ongoing continuity of care.    Sincerely,   Hao Shine MD  [facility] Division of Cardiology

## 2025-02-01 ENCOUNTER — HEALTH MAINTENANCE LETTER (OUTPATIENT)
Age: 30
End: 2025-02-01

## 2025-03-01 ENCOUNTER — HEALTH MAINTENANCE LETTER (OUTPATIENT)
Age: 30
End: 2025-03-01

## 2025-03-27 ENCOUNTER — TELEPHONE (OUTPATIENT)
Dept: TRANSPLANT | Facility: CLINIC | Age: 30
End: 2025-03-27
Payer: COMMERCIAL

## 2025-03-27 DIAGNOSIS — Z94.1 HEART REPLACED BY TRANSPLANT (H): Primary | ICD-10-CM

## 2025-03-27 DIAGNOSIS — Z13.220 LIPID SCREENING: ICD-10-CM

## 2025-03-27 NOTE — TELEPHONE ENCOUNTER
I left a message for Obed stating he was due for 6 month labs. Orders are in epic.I asked him to call me back to confirm he received the message.

## 2025-03-31 NOTE — PROGRESS NOTES
September 6, 2018    HPI:    Mr. Viramontes is a 22-year-old gentleman with history of viral mediated dilated cardiomyopathy at age 2-1/2 for which he underwent a heart transplant on 3/23/2012.  His post transplant course has been complicated by rejection the last 3 occurring in April May and June 2017 for which he receives steroids, post transplant lymphoproliferative disorder and stroke with resulting left sided hemiplegia presents today to establish care with cardiology as he transfers his care from the pediatric side.    He is currently doing well.  He is compliant with his medications.  He is tolerating his medications without side effects.  He denies any chest pain, shortness of breath, abdominal distention, lower extremity swelling, PND orthopnea, lightheadedness, dizziness, syncope.  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  He endorses no limitations to his daily activities.  From a heart standpoint, he has no concerns at today's visit and overall feels he is doing well.      PAST MEDICAL HISTORY:  Past Medical History:   Diagnosis Date     Cardiomyopathy, hypertrophic obstructive (H)      H/O heart transplant (H)     Mar.23, 2012     Hemiplegia following CVA (cerebrovascular accident) (H)     left, improved with rehab     Lymphoproliferative disease (H)      Unspecified cerebral artery occlusion with cerebral infarction     age 2 1/2       FAMILY HISTORY:  Family History   Problem Relation Age of Onset     Diabetes Sister        SOCIAL HISTORY:  Social History     Social History     Marital status: Single     Spouse name: N/A     Number of children: N/A     Years of education: N/A     Social History Main Topics     Smoking status: Never Smoker     Smokeless tobacco: Never Used     Alcohol use No     Drug use: No     Sexual activity: No     Other Topics Concern     None     Social History Narrative    Lives in [place], [place]. Works at [employer] theater at night, watches TV shows and plays video games.  Went to [place]  This encounter was created in error - please disregard.   "HS.  Has 3 sisters and a brother.  Mom and dad  (2010).        CURRENT MEDICATIONS:  Current Outpatient Prescriptions   Medication Sig Dispense Refill     amLODIPine (NORVASC) 5 MG tablet Take 1 tablet (5 mg) by mouth daily 90 tablet 3     aspirin 81 MG EC tablet Take 2 tablets (162 mg) by mouth daily 60 tablet 99     Cholecalciferol (VITAMIN D3) 2000 UNITS TABS Take 2,000 Units by mouth daily 100 tablet 6     mycophenolate (GENERIC EQUIVALENT) 500 MG tablet Take 1 tablet (500 mg) by mouth 2 times daily 180 tablet 5     pravastatin (PRAVACHOL) 10 MG tablet Take 1 tablet (10 mg) by mouth daily At bedtime 90 tablet 3     predniSONE (DELTASONE) 2.5 MG tablet Take 1 tablet (2.5 mg) by mouth daily (Total dose 7.5mg every day) 90 tablet 11     predniSONE (DELTASONE) 5 MG tablet Take 1 tablet (5 mg) by mouth daily (Total dose 7.5mg every day) 90 tablet 11     tacrolimus (GENERIC EQUIVALENT) 0.5 MG capsule Take 1 capsule (0.5 mg) by mouth daily with 3 - 1 mg capsules for a total of 3.5 mg in the evening 90 capsule 3     tacrolimus (GENERIC EQUIVALENT) 1 MG capsule Take 3 capsules (3 mg) by mouth 2 times daily With 1 - 0.5 mg capsule for a total of 3 mg in AM and 3.5 mg in  capsule 3       ROS:   14 point review of systems was obtained from the patient with pertinent positives and negatives as described above in the HPI.    EXAM:  /82 (BP Location: Right arm, Cuff Size: Adult Small)  Pulse 98  Ht 1.854 m (6' 1\")  Wt 68.5 kg (151 lb)  SpO2 97%  BMI 19.92 kg/m2  General: appears comfortable, alert and articulate  Head: normocephalic, atraumatic  Eyes: anicteric sclera, EOMI  Neck: no adenopathy  Orophyarynx: moist mucosa, no lesions, dentition intact  Heart: regular, S1/S2, no murmur, gallop, rub, JVP at clavicle  Lungs: clear, no rales or wheezing  Abdomen: soft, non-tender, bowel sounds present, no hepatosplenomegaly  Extremities: no clubbing, cyanosis or edema  Neurological: normal speech and " affect, no gross motor deficits    Labs:  CBC RESULTS:  Lab Results   Component Value Date    WBC 5.3 09/06/2018    RBC 4.83 09/06/2018    HGB 14.5 09/06/2018    HCT 42.2 09/06/2018    MCV 87 09/06/2018    MCH 30.0 09/06/2018    MCHC 34.4 09/06/2018    RDW 13.0 09/06/2018     09/06/2018       CMP RESULTS:  Lab Results   Component Value Date     09/06/2018    POTASSIUM 3.5 09/06/2018    CHLORIDE 106 09/06/2018    CO2 26 09/06/2018    ANIONGAP 8 09/06/2018     (H) 09/06/2018    BUN 22 09/06/2018    CR 0.80 09/06/2018    GFRESTIMATED >90 09/06/2018    GFRESTBLACK >90 09/06/2018    CARLOS A 8.6 09/06/2018    BILITOTAL 0.4 09/06/2018    ALBUMIN 4.1 09/06/2018    ALKPHOS 56 09/06/2018    ALT 15 09/06/2018    AST 9 09/06/2018        CMRI:  Clinical history: 22M, s/p heart transplantation (03/23/12) complicated by PTLD (09/2012), hx of rejection  with mild troponin elevation. CMR to assess function.     Comparison CMR: No prior CMR.     1. The left ventricle is normal in cavity size and wall thickness. The global systolic function is normal.  The LVEF is 55%. There are no regional wall motion abnormalities.     2. The right ventricle is normal in cavity size. The global systolic function is normal. The RVEF is 53%.      3. Left atrium is mildly enlarged due to transplantation. Right atrium is normal.     4. There are no significant valvular disease.      5. There is no late gadolinium enhancement to indicate myocardial fibrosis or infiltrative disease.      6. There is no intracardiac thrombus.      7. There is no pericardial effusion.       CONCLUSIONS: Normal biventricular function, LVEF 55% and RVEF 53%, with no evidence of focal myocardial  Fibrosis    Pediatric Heart Catheterization 3/23/2018:        Assessment and Plan:     Mrs. Viramontes is a pleasant 22-year-old gentleman with history of and is a pleasant 22-year-old gentleman with history of presumed viral myocarditis at age 2-1/2 with dilated  cardiomyopathy and CVA with resulting with resulting left-sided hemiplegia.  He presents today as he transfers his care from pediatric cardiology to  adult cardiology.  Overall he is doing well, he has no functional limitations that he has noted, his cardiac MRI and his cardiopulmonary stress test today were reassuring and his laboratory studies are also reassuring today.  We will touch base with him regarding his tacrolimus level and if any adjustments are needed for his goal of 6-8.  From a steroid standpoint, he is currently on 7.5 mg daily.  We will keep this at 7.5 mg daily for now and consider in the future down titration.  He will continue to follow with endocrine for his bone health given his chronic steroid use.  Follow up in 6 months time.    Patient seen and discussed with Dr. Hao Shine who is in agreement with the above assessment and plan.    Juancarlos Mandujano  Cardiology Fellow    I have seen and evaluated the patient with Dr. Mandujano and agree with the assessment and plan as above.  Hao Shine MD  Cardiology

## 2025-05-22 ENCOUNTER — TELEPHONE (OUTPATIENT)
Dept: TRANSPLANT | Facility: CLINIC | Age: 30
End: 2025-05-22
Payer: COMMERCIAL

## 2025-05-22 NOTE — TELEPHONE ENCOUNTER
I spoke with Obed's mom, and she said if he hasn't gone in she will remind him to have the labs drawn next week.

## 2025-06-11 ENCOUNTER — LAB (OUTPATIENT)
Dept: LAB | Facility: CLINIC | Age: 30
End: 2025-06-11
Payer: COMMERCIAL

## 2025-06-11 ENCOUNTER — RESULTS FOLLOW-UP (OUTPATIENT)
Dept: TRANSPLANT | Facility: CLINIC | Age: 30
End: 2025-06-11

## 2025-06-11 DIAGNOSIS — Z94.1 HEART REPLACED BY TRANSPLANT (H): ICD-10-CM

## 2025-06-11 DIAGNOSIS — Z13.220 LIPID SCREENING: ICD-10-CM

## 2025-06-11 LAB
ALBUMIN SERPL BCG-MCNC: 4.3 G/DL (ref 3.5–5.2)
ALP SERPL-CCNC: 84 U/L (ref 40–150)
ALT SERPL W P-5'-P-CCNC: 25 U/L (ref 0–70)
ANION GAP SERPL CALCULATED.3IONS-SCNC: 8 MMOL/L (ref 7–15)
AST SERPL W P-5'-P-CCNC: 26 U/L (ref 0–45)
BASOPHILS # BLD AUTO: 0 10E3/UL (ref 0–0.2)
BASOPHILS NFR BLD AUTO: 0 %
BILIRUB SERPL-MCNC: 0.5 MG/DL
BILIRUBIN DIRECT (ROCHE PRO & PURE): 0.21 MG/DL (ref 0–0.45)
BUN SERPL-MCNC: 14.6 MG/DL (ref 6–20)
CALCIUM SERPL-MCNC: 9.3 MG/DL (ref 8.8–10.4)
CHLORIDE SERPL-SCNC: 104 MMOL/L (ref 98–107)
CHOLEST SERPL-MCNC: 150 MG/DL
CK SERPL-CCNC: 129 U/L (ref 39–308)
CREAT SERPL-MCNC: 0.83 MG/DL (ref 0.67–1.17)
EGFRCR SERPLBLD CKD-EPI 2021: >90 ML/MIN/1.73M2
EOSINOPHIL # BLD AUTO: 0.1 10E3/UL (ref 0–0.7)
EOSINOPHIL NFR BLD AUTO: 2 %
ERYTHROCYTE [DISTWIDTH] IN BLOOD BY AUTOMATED COUNT: 12.5 % (ref 10–15)
FASTING STATUS PATIENT QL REPORTED: NORMAL
FASTING STATUS PATIENT QL REPORTED: NORMAL
GLUCOSE SERPL-MCNC: 90 MG/DL (ref 70–99)
HCO3 SERPL-SCNC: 27 MMOL/L (ref 22–29)
HCT VFR BLD AUTO: 46.4 % (ref 40–53)
HDLC SERPL-MCNC: 46 MG/DL
HGB BLD-MCNC: 15.9 G/DL (ref 13.3–17.7)
IMM GRANULOCYTES # BLD: 0 10E3/UL
IMM GRANULOCYTES NFR BLD: 0 %
LDLC SERPL CALC-MCNC: 89 MG/DL
LYMPHOCYTES # BLD AUTO: 1.2 10E3/UL (ref 0.8–5.3)
LYMPHOCYTES NFR BLD AUTO: 27 %
MCH RBC QN AUTO: 28.4 PG (ref 26.5–33)
MCHC RBC AUTO-ENTMCNC: 34.3 G/DL (ref 31.5–36.5)
MCV RBC AUTO: 83 FL (ref 78–100)
MONOCYTES # BLD AUTO: 0.5 10E3/UL (ref 0–1.3)
MONOCYTES NFR BLD AUTO: 11 %
NEUTROPHILS # BLD AUTO: 2.8 10E3/UL (ref 1.6–8.3)
NEUTROPHILS NFR BLD AUTO: 60 %
NONHDLC SERPL-MCNC: 104 MG/DL
PLATELET # BLD AUTO: 233 10E3/UL (ref 150–450)
POTASSIUM SERPL-SCNC: 3.8 MMOL/L (ref 3.4–5.3)
PROT SERPL-MCNC: 7.3 G/DL (ref 6.4–8.3)
RBC # BLD AUTO: 5.59 10E6/UL (ref 4.4–5.9)
SODIUM SERPL-SCNC: 139 MMOL/L (ref 135–145)
TACROLIMUS BLD-MCNC: 4.8 UG/L (ref 5–15)
TME LAST DOSE: ABNORMAL H
TME LAST DOSE: ABNORMAL H
TRIGL SERPL-MCNC: 73 MG/DL
WBC # BLD AUTO: 4.7 10E3/UL (ref 4–11)

## 2025-06-11 PROCEDURE — 80061 LIPID PANEL: CPT

## 2025-06-11 PROCEDURE — 80197 ASSAY OF TACROLIMUS: CPT

## 2025-06-11 PROCEDURE — 85025 COMPLETE CBC W/AUTO DIFF WBC: CPT

## 2025-06-11 PROCEDURE — 82248 BILIRUBIN DIRECT: CPT

## 2025-06-11 PROCEDURE — 36415 COLL VENOUS BLD VENIPUNCTURE: CPT

## 2025-06-11 PROCEDURE — 82550 ASSAY OF CK (CPK): CPT

## 2025-06-11 PROCEDURE — 80053 COMPREHEN METABOLIC PANEL: CPT

## 2025-06-12 RX ORDER — TACROLIMUS 0.5 MG/1
CAPSULE ORAL
Status: SHIPPED
Start: 2025-06-12

## 2025-06-12 RX ORDER — TACROLIMUS 1 MG/1
CAPSULE ORAL
Qty: 360 CAPSULE | Refills: 3 | Status: SHIPPED | OUTPATIENT
Start: 2025-06-12

## 2025-06-18 DIAGNOSIS — Z94.1 HEART REPLACED BY TRANSPLANT (H): Primary | ICD-10-CM

## (undated) DEVICE — KIT HAND CONTROL ACIST 016795

## (undated) DEVICE — BNDG KLING 3" 2232

## (undated) DEVICE — DRAPE STERI TOWEL LG 1010

## (undated) DEVICE — MANIFOLD KIT ANGIO AUTOMATED 014613

## (undated) DEVICE — SOL NACL 0.9% IRRIG 1000ML BOTTLE 2F7124

## (undated) DEVICE — SUCTION MANIFOLD DORNOCH ULTRA CART UL-CL500

## (undated) DEVICE — Device

## (undated) DEVICE — KIT HAND CONTROL ACIST 014644 AR-P54

## (undated) DEVICE — INTRO TERUMO 7FRX25CM W/MARKER RSB703

## (undated) DEVICE — LINEN TOWEL PACK X5 5464

## (undated) DEVICE — DRSG GAUZE 4X8"

## (undated) DEVICE — SU PDS II 4-0 PS-2 18" Z496G

## (undated) DEVICE — SOL WATER IRRIG 1000ML BOTTLE 2F7114

## (undated) DEVICE — INTRO SHEATH 7FRX10CM PINNACLE RSS702

## (undated) DEVICE — PREP CHLORAPREP 26ML TINTED ORANGE  260815

## (undated) DEVICE — DRSG ADAPTIC 3X8" 6113

## (undated) DEVICE — INTRO SHEATH AVANTI 4FRX23CM 504604T

## (undated) DEVICE — PACK HEART LEFT CUSTOM

## (undated) DEVICE — LINEN GOWN XLG 5407

## (undated) DEVICE — LINEN ORTHO PACK 5446

## (undated) DEVICE — CATH SWAN 7FR X 110CM

## (undated) DEVICE — SLEEVE TR BAND RADIAL COMPRESSION DEVICE 24CM TRB24-REG

## (undated) DEVICE — TUBING PRESSURE 30"

## (undated) DEVICE — DRSG ABDOMINAL 07 1/2X8" 7197D

## (undated) DEVICE — FASTENER CATH BALLOON CLAMPX2 STATLOCK 0684-00-493

## (undated) DEVICE — SU VICRYL 2-0 CT-2 27" UND J269H

## (undated) DEVICE — CAST PADDING 4" COTTON WEBRIL STERILE 9084S

## (undated) DEVICE — ESU GROUND PAD ADULT W/CORD E7507

## (undated) DEVICE — CATH ANGIO INFINITI 3DRC 4FRX100CM 538476

## (undated) DEVICE — TOURNIQUET CUFF 18" REPRO RED 60-7070-103

## (undated) DEVICE — DRAPE C-ARM OEC MINI VIEW 6800   00-901917-01

## (undated) DEVICE — PEN MARKING SKIN VISIMARK 1424SR

## (undated) DEVICE — CATH ANGIO INFINITI JL4 4FRX100CM 538420

## (undated) DEVICE — INTRO GLIDESHEATH SLENDER 6FR 10X45CM 60-1060

## (undated) DEVICE — DECANTER TRANSFER DEVICE 2008S

## (undated) DEVICE — SHTH INTRO 0.021IN ID 6FR DIA

## (undated) DEVICE — RX BACITRACIN OINTMENT 0.9G 1/32OZ 01680 11109

## (undated) DEVICE — STRAP KNEE/BODY 31143004

## (undated) DEVICE — LIGHT HANDLE X1 31140133

## (undated) DEVICE — GLOVE PROTEXIS W/NEU-THERA 6.5  2D73TE65

## (undated) DEVICE — 0.035IN X 260CM, EMERALD DIAGNOSTIC GUIDEWIRE, FIXED-CORE PTFE COATED, STANDARD, 3MM EXCHANGE J-TIP (EA/1)

## (undated) DEVICE — BLADE KNIFE SURG 15 371115

## (undated) RX ORDER — LIDOCAINE HYDROCHLORIDE 10 MG/ML
INJECTION, SOLUTION EPIDURAL; INFILTRATION; INTRACAUDAL; PERINEURAL
Status: DISPENSED
Start: 2020-10-19

## (undated) RX ORDER — LIDOCAINE HYDROCHLORIDE 10 MG/ML
INJECTION, SOLUTION EPIDURAL; INFILTRATION; INTRACAUDAL; PERINEURAL
Status: DISPENSED
Start: 2017-09-11

## (undated) RX ORDER — LIDOCAINE HYDROCHLORIDE 10 MG/ML
INJECTION, SOLUTION EPIDURAL; INFILTRATION; INTRACAUDAL; PERINEURAL
Status: DISPENSED
Start: 2017-05-26

## (undated) RX ORDER — NICARDIPINE HCL-0.9% SOD CHLOR 1 MG/10 ML
SYRINGE (ML) INTRAVENOUS
Status: DISPENSED
Start: 2023-09-11

## (undated) RX ORDER — NITROGLYCERIN 5 MG/ML
VIAL (ML) INTRAVENOUS
Status: DISPENSED
Start: 2023-09-11

## (undated) RX ORDER — PROPOFOL 10 MG/ML
INJECTION, EMULSION INTRAVENOUS
Status: DISPENSED
Start: 2017-10-12

## (undated) RX ORDER — FENTANYL CITRATE 50 UG/ML
INJECTION, SOLUTION INTRAMUSCULAR; INTRAVENOUS
Status: DISPENSED
Start: 2017-05-26

## (undated) RX ORDER — FENTANYL CITRATE 50 UG/ML
INJECTION, SOLUTION INTRAMUSCULAR; INTRAVENOUS
Status: DISPENSED
Start: 2023-09-11

## (undated) RX ORDER — AMINOPHYLLINE 25 MG/ML
INJECTION, SOLUTION INTRAVENOUS
Status: DISPENSED
Start: 2021-09-28

## (undated) RX ORDER — HEPARIN SODIUM 1000 [USP'U]/ML
INJECTION, SOLUTION INTRAVENOUS; SUBCUTANEOUS
Status: DISPENSED
Start: 2017-04-21

## (undated) RX ORDER — ONDANSETRON 2 MG/ML
INJECTION INTRAMUSCULAR; INTRAVENOUS
Status: DISPENSED
Start: 2017-10-12

## (undated) RX ORDER — FENTANYL CITRATE 50 UG/ML
INJECTION, SOLUTION INTRAMUSCULAR; INTRAVENOUS
Status: DISPENSED
Start: 2018-03-23

## (undated) RX ORDER — HEPARIN SODIUM 1000 [USP'U]/ML
INJECTION, SOLUTION INTRAVENOUS; SUBCUTANEOUS
Status: DISPENSED
Start: 2017-09-11

## (undated) RX ORDER — ACETAMINOPHEN 325 MG/1
TABLET ORAL
Status: DISPENSED
Start: 2017-10-12

## (undated) RX ORDER — PROPOFOL 10 MG/ML
INJECTION, EMULSION INTRAVENOUS
Status: DISPENSED
Start: 2018-03-23

## (undated) RX ORDER — CEFAZOLIN SODIUM 2 G/100ML
INJECTION, SOLUTION INTRAVENOUS
Status: DISPENSED
Start: 2017-10-12

## (undated) RX ORDER — LIDOCAINE HYDROCHLORIDE 20 MG/ML
INJECTION, SOLUTION EPIDURAL; INFILTRATION; INTRACAUDAL; PERINEURAL
Status: DISPENSED
Start: 2017-05-26

## (undated) RX ORDER — FENTANYL CITRATE 50 UG/ML
INJECTION, SOLUTION INTRAMUSCULAR; INTRAVENOUS
Status: DISPENSED
Start: 2017-09-11

## (undated) RX ORDER — OXYCODONE HYDROCHLORIDE 5 MG/1
TABLET ORAL
Status: DISPENSED
Start: 2017-10-12

## (undated) RX ORDER — REGADENOSON 0.08 MG/ML
INJECTION, SOLUTION INTRAVENOUS
Status: DISPENSED
Start: 2019-10-07

## (undated) RX ORDER — ONDANSETRON 2 MG/ML
INJECTION INTRAMUSCULAR; INTRAVENOUS
Status: DISPENSED
Start: 2017-09-11

## (undated) RX ORDER — HEPARIN SODIUM 1000 [USP'U]/ML
INJECTION, SOLUTION INTRAVENOUS; SUBCUTANEOUS
Status: DISPENSED
Start: 2017-05-26

## (undated) RX ORDER — REGADENOSON 0.08 MG/ML
INJECTION, SOLUTION INTRAVENOUS
Status: DISPENSED
Start: 2021-09-28

## (undated) RX ORDER — HEPARIN SODIUM 1000 [USP'U]/ML
INJECTION, SOLUTION INTRAVENOUS; SUBCUTANEOUS
Status: DISPENSED
Start: 2018-03-23

## (undated) RX ORDER — LIDOCAINE HYDROCHLORIDE 20 MG/ML
INJECTION, SOLUTION EPIDURAL; INFILTRATION; INTRACAUDAL; PERINEURAL
Status: DISPENSED
Start: 2017-09-11

## (undated) RX ORDER — SODIUM CHLORIDE 9 MG/ML
INJECTION, SOLUTION INTRAVENOUS
Status: DISPENSED
Start: 2020-10-19

## (undated) RX ORDER — REGADENOSON 0.08 MG/ML
INJECTION, SOLUTION INTRAVENOUS
Status: DISPENSED
Start: 2024-11-18

## (undated) RX ORDER — PROPOFOL 10 MG/ML
INJECTION, EMULSION INTRAVENOUS
Status: DISPENSED
Start: 2017-05-26

## (undated) RX ORDER — NITROGLYCERIN 5 MG/ML
VIAL (ML) INTRAVENOUS
Status: DISPENSED
Start: 2020-10-19

## (undated) RX ORDER — LIDOCAINE HYDROCHLORIDE 20 MG/ML
INJECTION, SOLUTION INFILTRATION; PERINEURAL
Status: DISPENSED
Start: 2017-10-12

## (undated) RX ORDER — FENTANYL CITRATE 50 UG/ML
INJECTION, SOLUTION INTRAMUSCULAR; INTRAVENOUS
Status: DISPENSED
Start: 2020-10-19

## (undated) RX ORDER — AMINOPHYLLINE 25 MG/ML
INJECTION, SOLUTION INTRAVENOUS
Status: DISPENSED
Start: 2022-09-15

## (undated) RX ORDER — HEPARIN SODIUM 1000 [USP'U]/ML
INJECTION, SOLUTION INTRAVENOUS; SUBCUTANEOUS
Status: DISPENSED
Start: 2020-10-19

## (undated) RX ORDER — FENTANYL CITRATE 50 UG/ML
INJECTION, SOLUTION INTRAMUSCULAR; INTRAVENOUS
Status: DISPENSED
Start: 2017-10-12

## (undated) RX ORDER — AMINOPHYLLINE 25 MG/ML
INJECTION, SOLUTION INTRAVENOUS
Status: DISPENSED
Start: 2024-11-18

## (undated) RX ORDER — LIDOCAINE HYDROCHLORIDE 10 MG/ML
INJECTION, SOLUTION EPIDURAL; INFILTRATION; INTRACAUDAL; PERINEURAL
Status: DISPENSED
Start: 2018-03-23

## (undated) RX ORDER — PROPOFOL 10 MG/ML
INJECTION, EMULSION INTRAVENOUS
Status: DISPENSED
Start: 2017-09-11

## (undated) RX ORDER — BUPIVACAINE HYDROCHLORIDE 5 MG/ML
INJECTION, SOLUTION PERINEURAL
Status: DISPENSED
Start: 2017-10-12

## (undated) RX ORDER — REGADENOSON 0.08 MG/ML
INJECTION, SOLUTION INTRAVENOUS
Status: DISPENSED
Start: 2022-09-15

## (undated) RX ORDER — SODIUM CHLORIDE 9 MG/ML
INJECTION, SOLUTION INTRAVENOUS
Status: DISPENSED
Start: 2023-09-11

## (undated) RX ORDER — HEPARIN SODIUM 1000 [USP'U]/ML
INJECTION, SOLUTION INTRAVENOUS; SUBCUTANEOUS
Status: DISPENSED
Start: 2023-09-11

## (undated) RX ORDER — LIDOCAINE 40 MG/G
CREAM TOPICAL
Status: DISPENSED
Start: 2017-04-21

## (undated) RX ORDER — LIDOCAINE HYDROCHLORIDE 20 MG/ML
INJECTION, SOLUTION EPIDURAL; INFILTRATION; INTRACAUDAL; PERINEURAL
Status: DISPENSED
Start: 2018-03-23